# Patient Record
Sex: MALE | Race: WHITE | NOT HISPANIC OR LATINO | Employment: OTHER | URBAN - NONMETROPOLITAN AREA
[De-identification: names, ages, dates, MRNs, and addresses within clinical notes are randomized per-mention and may not be internally consistent; named-entity substitution may affect disease eponyms.]

---

## 2017-09-09 ENCOUNTER — HOSPITAL ENCOUNTER (EMERGENCY)
Facility: HOSPITAL | Age: 42
Discharge: HOME | End: 2017-09-10
Attending: FAMILY MEDICINE | Admitting: FAMILY MEDICINE

## 2017-09-09 ENCOUNTER — APPOINTMENT (OUTPATIENT)
Dept: GENERAL RADIOLOGY | Facility: HOSPITAL | Age: 42
End: 2017-09-09

## 2017-09-09 DIAGNOSIS — R07.9 CHEST PAIN WITH LOW RISK FOR CARDIAC ETIOLOGY: Primary | ICD-10-CM

## 2017-09-09 LAB
6-ACETYL MORPHINE: NEGATIVE
ALBUMIN SERPL-MCNC: 4.6 G/DL (ref 3.5–5)
ALBUMIN/GLOB SERPL: 1.8 G/DL (ref 1.5–2.5)
ALP SERPL-CCNC: 88 U/L (ref 40–129)
ALT SERPL W P-5'-P-CCNC: 30 U/L (ref 10–44)
AMPHET+METHAMPHET UR QL: NEGATIVE
AMYLASE SERPL-CCNC: 60 U/L (ref 28–100)
ANION GAP SERPL CALCULATED.3IONS-SCNC: 5.2 MMOL/L (ref 3.6–11.2)
APTT PPP: 27 SECONDS (ref 23.8–36.1)
AST SERPL-CCNC: 25 U/L (ref 10–34)
BARBITURATES UR QL SCN: NEGATIVE
BASOPHILS # BLD AUTO: 0.09 10*3/MM3 (ref 0–0.3)
BASOPHILS NFR BLD AUTO: 0.7 % (ref 0–2)
BENZODIAZ UR QL SCN: NEGATIVE
BILIRUB SERPL-MCNC: 0.3 MG/DL (ref 0.2–1.8)
BILIRUB UR QL STRIP: NEGATIVE
BUN BLD-MCNC: 7 MG/DL (ref 7–21)
BUN/CREAT SERPL: 8 (ref 7–25)
BUPRENORPHINE SERPL-MCNC: NEGATIVE NG/ML
CALCIUM SPEC-SCNC: 9 MG/DL (ref 7.7–10)
CANNABINOIDS SERPL QL: NEGATIVE
CHLORIDE SERPL-SCNC: 110 MMOL/L (ref 99–112)
CK MB SERPL-CCNC: 2.2 NG/ML (ref 0–5)
CK MB SERPL-CCNC: 2.52 NG/ML (ref 0–5)
CK MB SERPL-RTO: 1.5 % (ref 0–3)
CK MB SERPL-RTO: 1.8 % (ref 0–3)
CK SERPL-CCNC: 142 U/L (ref 24–204)
CK SERPL-CCNC: 143 U/L (ref 24–204)
CLARITY UR: CLEAR
CO2 SERPL-SCNC: 24.8 MMOL/L (ref 24.3–31.9)
COCAINE UR QL: NEGATIVE
COLOR UR: YELLOW
CREAT BLD-MCNC: 0.87 MG/DL (ref 0.43–1.29)
DEPRECATED RDW RBC AUTO: 43.3 FL (ref 37–54)
EOSINOPHIL # BLD AUTO: 0.27 10*3/MM3 (ref 0–0.7)
EOSINOPHIL NFR BLD AUTO: 2.1 % (ref 0–5)
ERYTHROCYTE [DISTWIDTH] IN BLOOD BY AUTOMATED COUNT: 13.3 % (ref 11.5–14.5)
GFR SERPL CREATININE-BSD FRML MDRD: 96 ML/MIN/1.73
GLOBULIN UR ELPH-MCNC: 2.6 GM/DL
GLUCOSE BLD-MCNC: 131 MG/DL (ref 70–110)
GLUCOSE UR STRIP-MCNC: NEGATIVE MG/DL
HCT VFR BLD AUTO: 45.4 % (ref 42–52)
HGB BLD-MCNC: 15.1 G/DL (ref 14–18)
HGB UR QL STRIP.AUTO: NEGATIVE
IMM GRANULOCYTES # BLD: 0.02 10*3/MM3 (ref 0–0.03)
IMM GRANULOCYTES NFR BLD: 0.2 % (ref 0–0.5)
INR PPP: 0.99 (ref 0.9–1.1)
KETONES UR QL STRIP: NEGATIVE
LEUKOCYTE ESTERASE UR QL STRIP.AUTO: NEGATIVE
LIPASE SERPL-CCNC: 70 U/L (ref 13–60)
LYMPHOCYTES # BLD AUTO: 2.59 10*3/MM3 (ref 1–3)
LYMPHOCYTES NFR BLD AUTO: 20.4 % (ref 21–51)
MCH RBC QN AUTO: 30 PG (ref 27–33)
MCHC RBC AUTO-ENTMCNC: 33.3 G/DL (ref 33–37)
MCV RBC AUTO: 90.3 FL (ref 80–94)
METHADONE UR QL SCN: NEGATIVE
MONOCYTES # BLD AUTO: 1.19 10*3/MM3 (ref 0.1–0.9)
MONOCYTES NFR BLD AUTO: 9.4 % (ref 0–10)
NEUTROPHILS # BLD AUTO: 8.55 10*3/MM3 (ref 1.4–6.5)
NEUTROPHILS NFR BLD AUTO: 67.2 % (ref 30–70)
NITRITE UR QL STRIP: NEGATIVE
OPIATES UR QL: POSITIVE
OSMOLALITY SERPL CALC.SUM OF ELEC: 279.2 MOSM/KG (ref 273–305)
OXYCODONE UR QL SCN: NEGATIVE
PCP UR QL SCN: NEGATIVE
PH UR STRIP.AUTO: 6.5 [PH] (ref 5–8)
PLATELET # BLD AUTO: 386 10*3/MM3 (ref 130–400)
PMV BLD AUTO: 9.9 FL (ref 6–10)
POTASSIUM BLD-SCNC: 3.6 MMOL/L (ref 3.5–5.3)
PROT SERPL-MCNC: 7.2 G/DL (ref 6–8)
PROT UR QL STRIP: NEGATIVE
PROTHROMBIN TIME: 13.2 SECONDS (ref 11–15.4)
RBC # BLD AUTO: 5.03 10*6/MM3 (ref 4.7–6.1)
SODIUM BLD-SCNC: 140 MMOL/L (ref 135–153)
SP GR UR STRIP: 1.02 (ref 1–1.03)
TROPONIN I SERPL-MCNC: <0.006 NG/ML
TROPONIN I SERPL-MCNC: <0.006 NG/ML
UROBILINOGEN UR QL STRIP: NORMAL
WBC NRBC COR # BLD: 12.71 10*3/MM3 (ref 4.5–12.5)

## 2017-09-09 PROCEDURE — 80307 DRUG TEST PRSMV CHEM ANLYZR: CPT | Performed by: FAMILY MEDICINE

## 2017-09-09 PROCEDURE — 96376 TX/PRO/DX INJ SAME DRUG ADON: CPT

## 2017-09-09 PROCEDURE — 85025 COMPLETE CBC W/AUTO DIFF WBC: CPT | Performed by: FAMILY MEDICINE

## 2017-09-09 PROCEDURE — 99284 EMERGENCY DEPT VISIT MOD MDM: CPT

## 2017-09-09 PROCEDURE — 81003 URINALYSIS AUTO W/O SCOPE: CPT | Performed by: FAMILY MEDICINE

## 2017-09-09 PROCEDURE — 25010000002 ONDANSETRON PER 1 MG: Performed by: FAMILY MEDICINE

## 2017-09-09 PROCEDURE — 84484 ASSAY OF TROPONIN QUANT: CPT | Performed by: FAMILY MEDICINE

## 2017-09-09 PROCEDURE — 85610 PROTHROMBIN TIME: CPT | Performed by: FAMILY MEDICINE

## 2017-09-09 PROCEDURE — 71020 HC CHEST PA AND LATERAL: CPT

## 2017-09-09 PROCEDURE — 85730 THROMBOPLASTIN TIME PARTIAL: CPT | Performed by: FAMILY MEDICINE

## 2017-09-09 PROCEDURE — 71020 XR CHEST 2 VW: CPT | Performed by: RADIOLOGY

## 2017-09-09 PROCEDURE — 93005 ELECTROCARDIOGRAM TRACING: CPT | Performed by: FAMILY MEDICINE

## 2017-09-09 PROCEDURE — 25010000002 MORPHINE PER 10 MG: Performed by: FAMILY MEDICINE

## 2017-09-09 PROCEDURE — 96375 TX/PRO/DX INJ NEW DRUG ADDON: CPT

## 2017-09-09 PROCEDURE — 87086 URINE CULTURE/COLONY COUNT: CPT | Performed by: FAMILY MEDICINE

## 2017-09-09 PROCEDURE — 93010 ELECTROCARDIOGRAM REPORT: CPT | Performed by: INTERNAL MEDICINE

## 2017-09-09 PROCEDURE — 82150 ASSAY OF AMYLASE: CPT | Performed by: FAMILY MEDICINE

## 2017-09-09 PROCEDURE — 82550 ASSAY OF CK (CPK): CPT | Performed by: FAMILY MEDICINE

## 2017-09-09 PROCEDURE — 82553 CREATINE MB FRACTION: CPT | Performed by: FAMILY MEDICINE

## 2017-09-09 PROCEDURE — 96374 THER/PROPH/DIAG INJ IV PUSH: CPT

## 2017-09-09 PROCEDURE — 83690 ASSAY OF LIPASE: CPT | Performed by: FAMILY MEDICINE

## 2017-09-09 PROCEDURE — 80053 COMPREHEN METABOLIC PANEL: CPT | Performed by: FAMILY MEDICINE

## 2017-09-09 RX ORDER — ASPIRIN 81 MG/1
324 TABLET, CHEWABLE ORAL ONCE
Status: COMPLETED | OUTPATIENT
Start: 2017-09-09 | End: 2017-09-09

## 2017-09-09 RX ORDER — MORPHINE SULFATE 2 MG/ML
2 INJECTION, SOLUTION INTRAMUSCULAR; INTRAVENOUS ONCE
Status: COMPLETED | OUTPATIENT
Start: 2017-09-09 | End: 2017-09-09

## 2017-09-09 RX ORDER — SODIUM CHLORIDE 0.9 % (FLUSH) 0.9 %
10 SYRINGE (ML) INJECTION AS NEEDED
Status: DISCONTINUED | OUTPATIENT
Start: 2017-09-09 | End: 2017-09-10 | Stop reason: HOSPADM

## 2017-09-09 RX ORDER — HYDROCODONE BITARTRATE AND ACETAMINOPHEN 5; 325 MG/1; MG/1
1 TABLET ORAL EVERY 6 HOURS PRN
Qty: 12 TABLET | Refills: 0 | Status: SHIPPED | OUTPATIENT
Start: 2017-09-09 | End: 2017-11-22

## 2017-09-09 RX ORDER — SODIUM CHLORIDE 9 MG/ML
INJECTION, SOLUTION INTRAVENOUS
Status: COMPLETED
Start: 2017-09-09 | End: 2017-09-09

## 2017-09-09 RX ORDER — ONDANSETRON 2 MG/ML
4 INJECTION INTRAMUSCULAR; INTRAVENOUS ONCE
Status: COMPLETED | OUTPATIENT
Start: 2017-09-09 | End: 2017-09-09

## 2017-09-09 RX ADMIN — ONDANSETRON 4 MG: 2 INJECTION, SOLUTION INTRAMUSCULAR; INTRAVENOUS at 20:49

## 2017-09-09 RX ADMIN — ASPIRIN 324 MG: 81 TABLET, CHEWABLE ORAL at 20:19

## 2017-09-09 RX ADMIN — MORPHINE SULFATE 2 MG: 2 INJECTION, SOLUTION INTRAMUSCULAR; INTRAVENOUS at 20:49

## 2017-09-09 RX ADMIN — MORPHINE SULFATE 2 MG: 2 INJECTION, SOLUTION INTRAMUSCULAR; INTRAVENOUS at 21:43

## 2017-09-09 RX ADMIN — SODIUM CHLORIDE 500 ML: 9 INJECTION, SOLUTION INTRAVENOUS at 20:19

## 2017-09-10 VITALS
OXYGEN SATURATION: 100 % | DIASTOLIC BLOOD PRESSURE: 75 MMHG | HEART RATE: 83 BPM | HEIGHT: 74 IN | RESPIRATION RATE: 16 BRPM | SYSTOLIC BLOOD PRESSURE: 124 MMHG | WEIGHT: 210 LBS | BODY MASS INDEX: 26.95 KG/M2 | TEMPERATURE: 97.9 F

## 2017-09-10 NOTE — ED NOTES
Pt asking for something for pain at this time. Rates pain 8/10. Provider to be made aware.      Steffanie Lunsford RN  09/09/17 4541

## 2017-09-10 NOTE — ED PROVIDER NOTES
Subjective   HPI Comments: 41 yo male new to the area - moved from [place]- states that he has a cardiac history- had a LHC because he had positive enzymes in [place] that showed no blockages and that was about 6 months ago; and he also has a pacemaker that was placed- says that he can not take nitro because it closes his airway off; says that he isnt sure if this is his lungs or his heart or his back because he hasnt found a doctor yet and his is out of his medications -- well his pain medication has his metoprolol and lipitor and aspirin    Patient is a 42 y.o. male presenting with chest pain.   History provided by:  Patient  Chest Pain   Pain location:  L chest  Pain quality: aching and stabbing    Pain radiates to:  Does not radiate  Onset quality:  Sudden  Duration:  2 hours  Timing:  Constant  Progression:  Partially resolved  Chronicity:  New  Context: breathing and at rest    Relieved by:  Nothing  Worsened by:  Nothing  Ineffective treatments:  None tried  Associated symptoms: anxiety and back pain    Associated symptoms: no abdominal pain, no cough, no dizziness, no fatigue, no headache, no nausea, no shortness of breath, no vomiting and no weakness    Risk factors: hypertension, male sex and smoking        Review of Systems   Constitutional: Negative for activity change, appetite change, chills and fatigue.   HENT: Negative for congestion.    Eyes: Negative for pain.   Respiratory: Negative for cough, shortness of breath, wheezing and stridor.    Cardiovascular: Positive for chest pain.   Gastrointestinal: Negative for abdominal pain, diarrhea, nausea and vomiting.   Genitourinary: Negative for dysuria.   Musculoskeletal: Positive for back pain. Negative for arthralgias, myalgias, neck pain and neck stiffness.   Skin: Negative for rash.   Neurological: Negative for dizziness, syncope, speech difficulty, weakness and headaches.   Psychiatric/Behavioral: Negative for agitation.       Past Medical History:    Diagnosis Date   • Hyperlipidemia    • Injury of back        Allergies   Allergen Reactions   • Celexa [Citalopram Hydrobromide]    • Haldol [Haloperidol Lactate]    • Nitroglycerin    • Risperidone And Related        History reviewed. No pertinent surgical history.    History reviewed. No pertinent family history.    Social History     Social History   • Marital status:      Spouse name: N/A   • Number of children: N/A   • Years of education: N/A     Social History Main Topics   • Smoking status: Current Every Day Smoker     Packs/day: 1.00   • Smokeless tobacco: None   • Alcohol use None   • Drug use: None   • Sexual activity: Not Asked     Other Topics Concern   • None     Social History Narrative   • None           Objective   Physical Exam   Constitutional: He is oriented to person, place, and time. He appears well-developed and well-nourished.   HENT:   Head: Normocephalic.   Right Ear: External ear normal.   Left Ear: External ear normal.   Nose: Nose normal.   Mouth/Throat: Oropharynx is clear and moist.   Eyes: EOM are normal. Pupils are equal, round, and reactive to light.   Neck: Neck supple. No tracheal deviation present. No thyromegaly present.   Cardiovascular: Normal rate and regular rhythm.  Exam reveals no friction rub.    No murmur heard.  Pulmonary/Chest: Effort normal and breath sounds normal. No respiratory distress. He has no wheezes.   Abdominal: Soft. Bowel sounds are normal. He exhibits no distension. There is no tenderness.   Musculoskeletal: Normal range of motion.   Neurological: He is alert and oriented to person, place, and time.   Skin: Skin is warm.   Psychiatric: He has a normal mood and affect. His behavior is normal. Judgment and thought content normal.   Nursing note and vitals reviewed.      Procedures         ED Course  ED Course   Value Comment By Time   ECG 12 Lead EKG interpretation 1944 sinus tachycardia PCPs right bundle branch block 103 bpm nonspecific ST changes  Krystina Campbell, DO 09/10 0104   ECG 12 Lead Spoke with interventional cardiology to review EKG - felt RBBB but nothing acute at this time.    Pt says he is having back and chest pain would like more pain medicine ; discussed work up and pt says he wants to leave feels better- up walking in the hallway wanting IV out- will set up for outpt stress test and cardiology follow up Krystina Campbell, DO 09/10 0108   ECG 12 Lead EKG interpretation 2055 normal sinus rhythm right bundle branch block  nonspecific ST changes no evidence of acute ischemic change at this time the Krystina Campbell, DO 09/10 0110   ECG 12 Lead EKG interpretation 2337 paced rhythm 80 bpm nonspecific ST changes in no evidence of acute ischemic changes Krystina Campbell, DO 09/10 0110            HEART Score  History: Slightly suspicious (+0)  ECG: Normal (+0)  Age: Less than 45 (+0)  Risk Factors: 3 or more risk factors OR history of atherosclerotic disease (+2)  Troponin: Normal limit or lower (+0)  Total: 2         MDM  Number of Diagnoses or Management Options  Chest pain with low risk for cardiac etiology: new and does not require workup     Amount and/or Complexity of Data Reviewed  Clinical lab tests: ordered and reviewed  Tests in the radiology section of CPT®: ordered and reviewed  Tests in the medicine section of CPT®: reviewed and ordered  Discuss the patient with other providers: yes  Independent visualization of images, tracings, or specimens: yes    Risk of Complications, Morbidity, and/or Mortality  Presenting problems: moderate  Diagnostic procedures: moderate  Management options: moderate        Final diagnoses:   Chest pain with low risk for cardiac etiology            Krystina Campbell, DO  09/10/17 7179

## 2017-09-10 NOTE — ED NOTES
Spouse signed as pts  on the chart. Pt left ambulatory with understanding of out pt stress test. NADN. VS stable.        Steffanie Lunsford RN  09/10/17 0025

## 2017-09-11 ENCOUNTER — TRANSCRIBE ORDERS (OUTPATIENT)
Dept: ADMINISTRATIVE | Facility: HOSPITAL | Age: 42
End: 2017-09-11

## 2017-09-11 DIAGNOSIS — R07.9 CHEST PAIN WITH LOW RISK FOR CARDIAC ETIOLOGY: Primary | ICD-10-CM

## 2017-09-12 LAB — BACTERIA SPEC AEROBE CULT: NORMAL

## 2017-09-14 ENCOUNTER — HOSPITAL ENCOUNTER (OUTPATIENT)
Dept: GENERAL RADIOLOGY | Facility: HOSPITAL | Age: 42
Discharge: HOME | End: 2017-09-14
Admitting: NURSE PRACTITIONER

## 2017-09-14 ENCOUNTER — HOSPITAL ENCOUNTER (OUTPATIENT)
Dept: GENERAL RADIOLOGY | Facility: HOSPITAL | Age: 42
Discharge: HOME | End: 2017-09-14

## 2017-09-14 ENCOUNTER — TRANSCRIBE ORDERS (OUTPATIENT)
Dept: ADMINISTRATIVE | Facility: HOSPITAL | Age: 42
End: 2017-09-14

## 2017-09-14 DIAGNOSIS — M25.561 RIGHT KNEE PAIN, UNSPECIFIED CHRONICITY: ICD-10-CM

## 2017-09-14 DIAGNOSIS — M25.561 RIGHT KNEE PAIN, UNSPECIFIED CHRONICITY: Primary | ICD-10-CM

## 2017-09-14 DIAGNOSIS — M54.9 BACK PAIN, UNSPECIFIED BACK LOCATION, UNSPECIFIED BACK PAIN LATERALITY, UNSPECIFIED CHRONICITY: ICD-10-CM

## 2017-09-14 PROCEDURE — 73564 X-RAY EXAM KNEE 4 OR MORE: CPT | Performed by: RADIOLOGY

## 2017-09-14 PROCEDURE — 72052 X-RAY EXAM NECK SPINE 6/>VWS: CPT | Performed by: RADIOLOGY

## 2017-09-14 PROCEDURE — 72074 X-RAY EXAM THORAC SPINE4/>VW: CPT

## 2017-09-14 PROCEDURE — 73564 X-RAY EXAM KNEE 4 OR MORE: CPT

## 2017-09-14 PROCEDURE — 72072 X-RAY EXAM THORAC SPINE 3VWS: CPT | Performed by: RADIOLOGY

## 2017-09-14 PROCEDURE — 72050 X-RAY EXAM NECK SPINE 4/5VWS: CPT

## 2017-09-14 PROCEDURE — 72110 X-RAY EXAM L-2 SPINE 4/>VWS: CPT

## 2017-09-14 PROCEDURE — 72110 X-RAY EXAM L-2 SPINE 4/>VWS: CPT | Performed by: RADIOLOGY

## 2017-10-10 ENCOUNTER — APPOINTMENT (OUTPATIENT)
Dept: GENERAL RADIOLOGY | Facility: HOSPITAL | Age: 42
End: 2017-10-10

## 2017-10-10 ENCOUNTER — HOSPITAL ENCOUNTER (EMERGENCY)
Facility: HOSPITAL | Age: 42
Discharge: LEFT BEFORE DISCHARGE | End: 2017-10-10
Attending: INTERNAL MEDICINE | Admitting: INTERNAL MEDICINE

## 2017-10-10 ENCOUNTER — APPOINTMENT (OUTPATIENT)
Dept: CT IMAGING | Facility: HOSPITAL | Age: 42
End: 2017-10-10

## 2017-10-10 VITALS
TEMPERATURE: 98 F | RESPIRATION RATE: 20 BRPM | SYSTOLIC BLOOD PRESSURE: 158 MMHG | HEART RATE: 104 BPM | DIASTOLIC BLOOD PRESSURE: 94 MMHG | OXYGEN SATURATION: 98 % | HEIGHT: 74 IN | WEIGHT: 200 LBS | BODY MASS INDEX: 25.67 KG/M2

## 2017-10-10 DIAGNOSIS — R51.9 ACUTE INTRACTABLE HEADACHE, UNSPECIFIED HEADACHE TYPE: ICD-10-CM

## 2017-10-10 DIAGNOSIS — R53.1 WEAKNESS: ICD-10-CM

## 2017-10-10 DIAGNOSIS — R07.9 CHEST PAIN AT REST: Primary | ICD-10-CM

## 2017-10-10 LAB
6-ACETYL MORPHINE: NEGATIVE
ALBUMIN SERPL-MCNC: 4.4 G/DL (ref 3.5–5)
ALBUMIN/GLOB SERPL: 1.6 G/DL (ref 1.5–2.5)
ALP SERPL-CCNC: 96 U/L (ref 40–129)
ALT SERPL W P-5'-P-CCNC: 21 U/L (ref 10–44)
AMPHET+METHAMPHET UR QL: NEGATIVE
ANION GAP SERPL CALCULATED.3IONS-SCNC: 8.4 MMOL/L (ref 3.6–11.2)
APTT PPP: 26.1 SECONDS (ref 23.8–36.1)
AST SERPL-CCNC: 21 U/L (ref 10–34)
BARBITURATES UR QL SCN: NEGATIVE
BASOPHILS # BLD AUTO: 0.16 10*3/MM3 (ref 0–0.3)
BASOPHILS NFR BLD AUTO: 1.6 % (ref 0–2)
BENZODIAZ UR QL SCN: NEGATIVE
BILIRUB SERPL-MCNC: 0.2 MG/DL (ref 0.2–1.8)
BUN BLD-MCNC: 14 MG/DL (ref 7–21)
BUN/CREAT SERPL: 12.6 (ref 7–25)
BUPRENORPHINE SERPL-MCNC: NEGATIVE NG/ML
CALCIUM SPEC-SCNC: 9.5 MG/DL (ref 7.7–10)
CANNABINOIDS SERPL QL: NEGATIVE
CHLORIDE SERPL-SCNC: 107 MMOL/L (ref 99–112)
CO2 SERPL-SCNC: 26.6 MMOL/L (ref 24.3–31.9)
COCAINE UR QL: NEGATIVE
CREAT BLD-MCNC: 1.11 MG/DL (ref 0.43–1.29)
CRP SERPL-MCNC: <0.5 MG/DL (ref 0–0.99)
D DIMER PPP FEU-MCNC: 0.89 MCGFEU/ML (ref 0–0.5)
DEPRECATED RDW RBC AUTO: 43.9 FL (ref 37–54)
EOSINOPHIL # BLD AUTO: 0.61 10*3/MM3 (ref 0–0.7)
EOSINOPHIL NFR BLD AUTO: 6.2 % (ref 0–5)
ERYTHROCYTE [DISTWIDTH] IN BLOOD BY AUTOMATED COUNT: 13.4 % (ref 11.5–14.5)
ERYTHROCYTE [SEDIMENTATION RATE] IN BLOOD: 7 MM/HR (ref 0–15)
GFR SERPL CREATININE-BSD FRML MDRD: 72 ML/MIN/1.73
GLOBULIN UR ELPH-MCNC: 2.8 GM/DL
GLUCOSE BLD-MCNC: 97 MG/DL (ref 70–110)
HCT VFR BLD AUTO: 42.8 % (ref 42–52)
HGB BLD-MCNC: 14 G/DL (ref 14–18)
IMM GRANULOCYTES # BLD: 0.03 10*3/MM3 (ref 0–0.03)
IMM GRANULOCYTES NFR BLD: 0.3 % (ref 0–0.5)
INR PPP: 1.03 (ref 0.9–1.1)
LYMPHOCYTES # BLD AUTO: 2.89 10*3/MM3 (ref 1–3)
LYMPHOCYTES NFR BLD AUTO: 29.5 % (ref 21–51)
MCH RBC QN AUTO: 29.5 PG (ref 27–33)
MCHC RBC AUTO-ENTMCNC: 32.7 G/DL (ref 33–37)
MCV RBC AUTO: 90.3 FL (ref 80–94)
METHADONE UR QL SCN: NEGATIVE
MONOCYTES # BLD AUTO: 0.9 10*3/MM3 (ref 0.1–0.9)
MONOCYTES NFR BLD AUTO: 9.2 % (ref 0–10)
NEUTROPHILS # BLD AUTO: 5.2 10*3/MM3 (ref 1.4–6.5)
NEUTROPHILS NFR BLD AUTO: 53.2 % (ref 30–70)
OPIATES UR QL: NEGATIVE
OSMOLALITY SERPL CALC.SUM OF ELEC: 283.5 MOSM/KG (ref 273–305)
OXYCODONE UR QL SCN: NEGATIVE
PCP UR QL SCN: NEGATIVE
PLATELET # BLD AUTO: 341 10*3/MM3 (ref 130–400)
PMV BLD AUTO: 10 FL (ref 6–10)
POTASSIUM BLD-SCNC: 3.5 MMOL/L (ref 3.5–5.3)
PROT SERPL-MCNC: 7.2 G/DL (ref 6–8)
PROTHROMBIN TIME: 13.7 SECONDS (ref 11–15.4)
RBC # BLD AUTO: 4.74 10*6/MM3 (ref 4.7–6.1)
SODIUM BLD-SCNC: 142 MMOL/L (ref 135–153)
TROPONIN I SERPL-MCNC: <0.006 NG/ML
WBC NRBC COR # BLD: 9.79 10*3/MM3 (ref 4.5–12.5)

## 2017-10-10 PROCEDURE — 85025 COMPLETE CBC W/AUTO DIFF WBC: CPT | Performed by: INTERNAL MEDICINE

## 2017-10-10 PROCEDURE — 80307 DRUG TEST PRSMV CHEM ANLYZR: CPT | Performed by: INTERNAL MEDICINE

## 2017-10-10 PROCEDURE — 85379 FIBRIN DEGRADATION QUANT: CPT | Performed by: INTERNAL MEDICINE

## 2017-10-10 PROCEDURE — 85652 RBC SED RATE AUTOMATED: CPT | Performed by: INTERNAL MEDICINE

## 2017-10-10 PROCEDURE — 70450 CT HEAD/BRAIN W/O DYE: CPT | Performed by: RADIOLOGY

## 2017-10-10 PROCEDURE — 84484 ASSAY OF TROPONIN QUANT: CPT | Performed by: INTERNAL MEDICINE

## 2017-10-10 PROCEDURE — 70450 CT HEAD/BRAIN W/O DYE: CPT

## 2017-10-10 PROCEDURE — 85610 PROTHROMBIN TIME: CPT | Performed by: INTERNAL MEDICINE

## 2017-10-10 PROCEDURE — 93005 ELECTROCARDIOGRAM TRACING: CPT | Performed by: INTERNAL MEDICINE

## 2017-10-10 PROCEDURE — 99284 EMERGENCY DEPT VISIT MOD MDM: CPT

## 2017-10-10 PROCEDURE — 86140 C-REACTIVE PROTEIN: CPT | Performed by: INTERNAL MEDICINE

## 2017-10-10 PROCEDURE — 85730 THROMBOPLASTIN TIME PARTIAL: CPT | Performed by: INTERNAL MEDICINE

## 2017-10-10 PROCEDURE — 80053 COMPREHEN METABOLIC PANEL: CPT | Performed by: INTERNAL MEDICINE

## 2017-10-10 PROCEDURE — 93010 ELECTROCARDIOGRAM REPORT: CPT | Performed by: INTERNAL MEDICINE

## 2017-10-10 RX ORDER — METOCLOPRAMIDE HYDROCHLORIDE 5 MG/ML
10 INJECTION INTRAMUSCULAR; INTRAVENOUS ONCE
Status: DISCONTINUED | OUTPATIENT
Start: 2017-10-10 | End: 2017-10-10

## 2017-10-10 RX ORDER — SODIUM CHLORIDE 0.9 % (FLUSH) 0.9 %
10 SYRINGE (ML) INJECTION AS NEEDED
Status: DISCONTINUED | OUTPATIENT
Start: 2017-10-10 | End: 2017-10-10 | Stop reason: HOSPADM

## 2017-10-10 NOTE — ED NOTES
Patient back from CT at this time now complains of CP. EKG orders placed at this time.      Tammy Garcia RN  10/10/17 1922

## 2017-10-10 NOTE — ED PROVIDER NOTES
Subjective   HPI Comments: This is a 42-year-old male who presents to the emergency department reporting that around 3:00 this afternoon which is the last time seen well, he began to experience amnesia and is unable to remember presenting to the emergency department.  He has his wife to corroborate this.  He is also experienced very mild weakness to his right upper extremity and right lower extremity, but he's not had any numbness.    He was able to ambulate without difficulty.    Also reported onset at shortly thereafter of a constant anterior chest discomfort that is dull a 3 or 4 out of 10 and does not radiate.    Patient is a 42 y.o. male presenting with strokes.   Stroke   Presenting symptoms: confusion, headaches and weakness    Onset quality:  Sudden  Duration:  5 hours  Timing:  Constant  Progression:  Unchanged  Similar to previous episodes: yes    Associated symptoms: chest pain    Associated symptoms: no difficulty swallowing, no dizziness, no facial pain, no fall, no fever, no hearing loss, no bladder incontinence, no nausea, no neck pain, no paresthesias, no seizures, no tinnitus, no vertigo and no vomiting        Review of Systems   Constitutional: Negative for fever.   HENT: Negative for hearing loss, tinnitus and trouble swallowing.    Cardiovascular: Positive for chest pain.   Gastrointestinal: Negative for nausea and vomiting.   Genitourinary: Negative for bladder incontinence.   Musculoskeletal: Negative for neck pain.   Neurological: Positive for weakness and headaches. Negative for dizziness, vertigo, seizures and paresthesias.   Psychiatric/Behavioral: Positive for confusion.   All other systems reviewed and are negative.      Past Medical History:   Diagnosis Date   • Hyperlipidemia    • Injury of back        Allergies   Allergen Reactions   • Celexa [Citalopram Hydrobromide]    • Haldol [Haloperidol Lactate]    • Nitroglycerin    • Risperidone And Related        No past surgical history on  file.    No family history on file.    Social History     Social History   • Marital status:      Spouse name: N/A   • Number of children: N/A   • Years of education: N/A     Social History Main Topics   • Smoking status: Current Every Day Smoker     Packs/day: 1.00   • Smokeless tobacco: Not on file   • Alcohol use Not on file   • Drug use: Not on file   • Sexual activity: Not on file     Other Topics Concern   • Not on file     Social History Narrative   • No narrative on file           Objective   Physical Exam   Constitutional: He is oriented to person, place, and time. He appears well-developed.   HENT:   Head: Normocephalic.   Eyes: Conjunctivae and EOM are normal. Pupils are equal, round, and reactive to light.   Neck: Normal range of motion. Neck supple.   Cardiovascular: Normal rate, regular rhythm, normal heart sounds and intact distal pulses.    Pulmonary/Chest: Effort normal.   Abdominal: Soft. Bowel sounds are normal.   Musculoskeletal: Normal range of motion.   Neurological: He is alert and oriented to person, place, and time. He has normal reflexes. No cranial nerve deficit or sensory deficit. GCS eye subscore is 4. GCS verbal subscore is 5. GCS motor subscore is 6.   Reflex Scores:       Brachioradialis reflexes are 2+ on the right side and 2+ on the left side.       Patellar reflexes are 2+ on the right side and 2+ on the left side.  Mild weakness in hand  and plantar flex flexion on the right.   Vitals reviewed.      Procedures         ED Course  ED Course   Value Comment By Time   ECG 12 Lead (Reviewed) Mohsen Bajwa MD 10/10 2006    NSR@98 RBBB NSSTW changes Mohsen Bajwa MD 10/10 2008    In addition to the neurologic complaint, chest pain, patient's also complaining of a dull frontal headache. Mohsen Bajwa MD 10/10 2013    Requested IV narcotics and was adamant that he would leave AGAINST MEDICAL ADVICE if he did not receive them.  Offered alternative pain medications, and  Reglan for his headache and he left. Mohsen Bajwa MD 10/10 2045                  [facility]    Final diagnoses:   Chest pain at rest   Acute intractable headache, unspecified headache type   Weakness            Mohsen Bajwa MD  10/10/17 2047

## 2017-10-11 NOTE — ED NOTES
Patient reports about 5 hours ago he started having chest pain and right sided weakness, patient reports short term memory loss. Patient alert and oriented x3, patient reports walking around lobby before being room. Provider aware     Alley Bates RN  10/10/17 2011

## 2017-10-11 NOTE — ED NOTES
"Patient states \" if I don't get something stronger for pain than Im leaving\". Informed patient of risk when signing out against medical advice. Provider aware, provider informed patient he would give him non narcotic pain medication, patient refused. Patient signed AMA papers and stated \" Im going to [place]\". Provider aware.      Alley Bates RN  10/10/17 2045    "

## 2017-10-11 NOTE — ED NOTES
"Patient refused IV Reglan, states \"I want something stronger for pain\". Provider aware.       Alley Bates RN  10/10/17 2033    "

## 2017-10-30 ENCOUNTER — TRANSCRIBE ORDERS (OUTPATIENT)
Dept: ADMINISTRATIVE | Facility: HOSPITAL | Age: 42
End: 2017-10-30

## 2017-10-30 DIAGNOSIS — I35.0 AORTIC STENOSIS, MILD: Primary | ICD-10-CM

## 2017-11-05 ENCOUNTER — APPOINTMENT (OUTPATIENT)
Dept: CT IMAGING | Facility: HOSPITAL | Age: 42
End: 2017-11-05

## 2017-11-05 ENCOUNTER — HOSPITAL ENCOUNTER (EMERGENCY)
Facility: HOSPITAL | Age: 42
Discharge: HOME | End: 2017-11-05
Attending: EMERGENCY MEDICINE | Admitting: FAMILY MEDICINE

## 2017-11-05 VITALS
TEMPERATURE: 98.8 F | SYSTOLIC BLOOD PRESSURE: 151 MMHG | RESPIRATION RATE: 17 BRPM | WEIGHT: 210 LBS | HEIGHT: 74 IN | BODY MASS INDEX: 26.95 KG/M2 | HEART RATE: 81 BPM | DIASTOLIC BLOOD PRESSURE: 84 MMHG | OXYGEN SATURATION: 98 %

## 2017-11-05 DIAGNOSIS — M54.2 ACUTE NECK PAIN: Primary | ICD-10-CM

## 2017-11-05 DIAGNOSIS — S39.92XA INJURY OF LOW BACK, INITIAL ENCOUNTER: ICD-10-CM
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
## 2017-11-05 PROCEDURE — 72125 CT NECK SPINE W/O DYE: CPT | Performed by: RADIOLOGY

## 2017-11-05 PROCEDURE — 72131 CT LUMBAR SPINE W/O DYE: CPT

## 2017-11-05 PROCEDURE — 72131 CT LUMBAR SPINE W/O DYE: CPT | Performed by: RADIOLOGY

## 2017-11-05 PROCEDURE — 99283 EMERGENCY DEPT VISIT LOW MDM: CPT

## 2017-11-05 PROCEDURE — 72125 CT NECK SPINE W/O DYE: CPT

## 2017-11-05 RX ORDER — CYCLOBENZAPRINE HCL 10 MG
10 TABLET ORAL 3 TIMES DAILY PRN
Qty: 20 TABLET | Refills: 0 | Status: ON HOLD | OUTPATIENT
Start: 2017-11-05 | End: 2017-11-25

## 2017-11-05 RX ORDER — HYDROCODONE BITARTRATE AND ACETAMINOPHEN 10; 325 MG/1; MG/1
1 TABLET ORAL ONCE
Status: COMPLETED | OUTPATIENT
Start: 2017-11-05 | End: 2017-11-05

## 2017-11-05 RX ADMIN — HYDROCODONE BITARTRATE AND ACETAMINOPHEN 1 TABLET: 10; 325 TABLET ORAL at 19:20

## 2017-11-06 NOTE — ED PROVIDER NOTES
Subjective   Patient is a 42 y.o. male presenting with fall.   History provided by:  Patient   used: No    Fall   Mechanism of injury: fall    Injury location:  Torso and head/neck  Head/neck injury location:  R neck and L neck  Torso injury location:  Back  Incident location:  Home  Time since incident:  1 hour  Arrived directly from scene: yes    Fall:     Fall occurred:  Tripped (off front porch)    Impact surface:  [place]    Point of impact:  Back and neck  Tetanus status:  Up to date  Prior to arrival data:     Patient ambulatory at scene: yes      Blood loss:  None    Responsiveness at scene:  Alert    Orientation at scene:  Person, place, situation and time    Loss of consciousness: no      Amnesic to event: no    Associated symptoms: back pain and neck pain    Associated symptoms: no chest pain, no headaches, no nausea and no vomiting    Risk factors: no anticoagulation therapy        Review of Systems   Constitutional: Negative for activity change and fever.   HENT: Negative for congestion, ear pain and sore throat.    Eyes: Negative for pain.   Respiratory: Negative for cough, shortness of breath and wheezing.    Cardiovascular: Negative for chest pain.   Gastrointestinal: Negative for abdominal distention, diarrhea, nausea and vomiting.   Genitourinary: Negative for difficulty urinating and dysuria.   Musculoskeletal: Positive for back pain and neck pain. Negative for arthralgias and myalgias.   Skin: Negative for rash and wound.   Neurological: Negative for dizziness and headaches.   Psychiatric/Behavioral: Negative for agitation.   All other systems reviewed and are negative.      Past Medical History:   Diagnosis Date   • Hyperlipidemia    • Injury of back        Allergies   Allergen Reactions   • Celexa [Citalopram Hydrobromide]    • Haldol [Haloperidol Lactate]    • Nitroglycerin    • Risperidone And Related        History reviewed. No pertinent surgical history.    History  reviewed. No pertinent family history.    Social History     Social History   • Marital status:      Spouse name: N/A   • Number of children: N/A   • Years of education: N/A     Social History Main Topics   • Smoking status: Current Every Day Smoker     Packs/day: 1.00   • Smokeless tobacco: None   • Alcohol use None   • Drug use: None   • Sexual activity: Not Asked     Other Topics Concern   • None     Social History Narrative           Objective   Physical Exam   Constitutional: He is oriented to person, place, and time. He appears well-developed and well-nourished.   HENT:   Head: Normocephalic and atraumatic.   Eyes: EOM are normal. Pupils are equal, round, and reactive to light.   Neck: Normal range of motion. Neck supple.   Cardiovascular: Normal rate, regular rhythm and normal heart sounds.    Pulmonary/Chest: Effort normal and breath sounds normal.   Abdominal: Soft. Bowel sounds are normal.   Musculoskeletal:        Cervical back: He exhibits decreased range of motion, pain and spasm.        Lumbar back: He exhibits decreased range of motion, tenderness and pain.   Neurological: He is alert and oriented to person, place, and time.   Skin: Skin is warm and dry.   Psychiatric: He has a normal mood and affect. His behavior is normal. Judgment and thought content normal.   Nursing note and vitals reviewed.      Procedures         ED Course  ED Course                  MDM  Number of Diagnoses or Management Options  Acute neck pain:   Fall, initial encounter:   Injury of low back, initial encounter:      Amount and/or Complexity of Data Reviewed  Clinical lab tests: ordered and reviewed  Tests in the radiology section of CPT®: ordered and reviewed  Tests in the medicine section of CPT®: reviewed and ordered  Independent visualization of images, tracings, or specimens: yes    Patient Progress  Patient progress: stable      Final diagnoses:   Acute neck pain   Injury of low back, initial encounter   Fall,  initial encounter            ROSEMARIE Agrawal  11/05/17 2014

## 2017-11-18 ENCOUNTER — HOSPITAL ENCOUNTER (EMERGENCY)
Facility: HOSPITAL | Age: 42
Discharge: HOME | End: 2017-11-18
Attending: EMERGENCY MEDICINE | Admitting: EMERGENCY MEDICINE

## 2017-11-18 ENCOUNTER — APPOINTMENT (OUTPATIENT)
Dept: CT IMAGING | Facility: HOSPITAL | Age: 42
End: 2017-11-18

## 2017-11-18 VITALS
BODY MASS INDEX: 26.95 KG/M2 | SYSTOLIC BLOOD PRESSURE: 122 MMHG | HEART RATE: 89 BPM | OXYGEN SATURATION: 98 % | TEMPERATURE: 98.7 F | DIASTOLIC BLOOD PRESSURE: 86 MMHG | WEIGHT: 210 LBS | HEIGHT: 74 IN | RESPIRATION RATE: 18 BRPM

## 2017-11-18 DIAGNOSIS — R10.32 BILATERAL LOWER ABDOMINAL CRAMPING: Primary | ICD-10-CM

## 2017-11-18 DIAGNOSIS — R10.31 BILATERAL LOWER ABDOMINAL CRAMPING: Primary | ICD-10-CM

## 2017-11-18 LAB
6-ACETYL MORPHINE: NEGATIVE
ALBUMIN SERPL-MCNC: 4.3 G/DL (ref 3.5–5)
ALBUMIN/GLOB SERPL: 1.8 G/DL (ref 1.5–2.5)
ALP SERPL-CCNC: 92 U/L (ref 40–129)
ALT SERPL W P-5'-P-CCNC: 20 U/L (ref 10–44)
AMPHET+METHAMPHET UR QL: NEGATIVE
AMYLASE SERPL-CCNC: 50 U/L (ref 28–100)
ANION GAP SERPL CALCULATED.3IONS-SCNC: 4 MMOL/L (ref 3.6–11.2)
AST SERPL-CCNC: 19 U/L (ref 10–34)
BARBITURATES UR QL SCN: NEGATIVE
BASOPHILS # BLD AUTO: 0.06 10*3/MM3 (ref 0–0.3)
BASOPHILS NFR BLD AUTO: 0.5 % (ref 0–2)
BENZODIAZ UR QL SCN: NEGATIVE
BILIRUB SERPL-MCNC: 0.3 MG/DL (ref 0.2–1.8)
BILIRUB UR QL STRIP: NEGATIVE
BUN BLD-MCNC: 14 MG/DL (ref 7–21)
BUN/CREAT SERPL: 12.3 (ref 7–25)
BUPRENORPHINE SERPL-MCNC: NEGATIVE NG/ML
CALCIUM SPEC-SCNC: 9.4 MG/DL (ref 7.7–10)
CANNABINOIDS SERPL QL: NEGATIVE
CHLORIDE SERPL-SCNC: 111 MMOL/L (ref 99–112)
CLARITY UR: CLEAR
CO2 SERPL-SCNC: 25 MMOL/L (ref 24.3–31.9)
COCAINE UR QL: NEGATIVE
COLOR UR: YELLOW
CREAT BLD-MCNC: 1.14 MG/DL (ref 0.43–1.29)
DEPRECATED RDW RBC AUTO: 44.3 FL (ref 37–54)
EOSINOPHIL # BLD AUTO: 0.39 10*3/MM3 (ref 0–0.7)
EOSINOPHIL NFR BLD AUTO: 3.4 % (ref 0–5)
ERYTHROCYTE [DISTWIDTH] IN BLOOD BY AUTOMATED COUNT: 13.8 % (ref 11.5–14.5)
GFR SERPL CREATININE-BSD FRML MDRD: 70 ML/MIN/1.73
GLOBULIN UR ELPH-MCNC: 2.4 GM/DL
GLUCOSE BLD-MCNC: 111 MG/DL (ref 70–110)
GLUCOSE UR STRIP-MCNC: NEGATIVE MG/DL
HCT VFR BLD AUTO: 42.9 % (ref 42–52)
HGB BLD-MCNC: 14.2 G/DL (ref 14–18)
HGB UR QL STRIP.AUTO: NEGATIVE
IMM GRANULOCYTES # BLD: 0.01 10*3/MM3 (ref 0–0.03)
IMM GRANULOCYTES NFR BLD: 0.1 % (ref 0–0.5)
KETONES UR QL STRIP: NEGATIVE
LEUKOCYTE ESTERASE UR QL STRIP.AUTO: NEGATIVE
LIPASE SERPL-CCNC: 55 U/L (ref 13–60)
LYMPHOCYTES # BLD AUTO: 2.33 10*3/MM3 (ref 1–3)
LYMPHOCYTES NFR BLD AUTO: 20.4 % (ref 21–51)
MCH RBC QN AUTO: 29.7 PG (ref 27–33)
MCHC RBC AUTO-ENTMCNC: 33.1 G/DL (ref 33–37)
MCV RBC AUTO: 89.7 FL (ref 80–94)
METHADONE UR QL SCN: NEGATIVE
MONOCYTES # BLD AUTO: 0.9 10*3/MM3 (ref 0.1–0.9)
MONOCYTES NFR BLD AUTO: 7.9 % (ref 0–10)
NEUTROPHILS # BLD AUTO: 7.72 10*3/MM3 (ref 1.4–6.5)
NEUTROPHILS NFR BLD AUTO: 67.7 % (ref 30–70)
NITRITE UR QL STRIP: NEGATIVE
OPIATES UR QL: NEGATIVE
OSMOLALITY SERPL CALC.SUM OF ELEC: 280.6 MOSM/KG (ref 273–305)
OXYCODONE UR QL SCN: NEGATIVE
PCP UR QL SCN: NEGATIVE
PH UR STRIP.AUTO: 7 [PH] (ref 5–8)
PLATELET # BLD AUTO: 329 10*3/MM3 (ref 130–400)
PMV BLD AUTO: 10.2 FL (ref 6–10)
POTASSIUM BLD-SCNC: 3.8 MMOL/L (ref 3.5–5.3)
PROT SERPL-MCNC: 6.7 G/DL (ref 6–8)
PROT UR QL STRIP: NEGATIVE
RBC # BLD AUTO: 4.78 10*6/MM3 (ref 4.7–6.1)
SODIUM BLD-SCNC: 140 MMOL/L (ref 135–153)
SP GR UR STRIP: 1.02 (ref 1–1.03)
UROBILINOGEN UR QL STRIP: NORMAL
WBC NRBC COR # BLD: 11.41 10*3/MM3 (ref 4.5–12.5)

## 2017-11-18 PROCEDURE — 74177 CT ABD & PELVIS W/CONTRAST: CPT | Performed by: RADIOLOGY

## 2017-11-18 PROCEDURE — 80307 DRUG TEST PRSMV CHEM ANLYZR: CPT | Performed by: NURSE PRACTITIONER

## 2017-11-18 PROCEDURE — 82150 ASSAY OF AMYLASE: CPT | Performed by: NURSE PRACTITIONER

## 2017-11-18 PROCEDURE — 83690 ASSAY OF LIPASE: CPT | Performed by: NURSE PRACTITIONER

## 2017-11-18 PROCEDURE — 80053 COMPREHEN METABOLIC PANEL: CPT | Performed by: NURSE PRACTITIONER

## 2017-11-18 PROCEDURE — 96374 THER/PROPH/DIAG INJ IV PUSH: CPT

## 2017-11-18 PROCEDURE — 96375 TX/PRO/DX INJ NEW DRUG ADDON: CPT

## 2017-11-18 PROCEDURE — 85025 COMPLETE CBC W/AUTO DIFF WBC: CPT | Performed by: NURSE PRACTITIONER

## 2017-11-18 PROCEDURE — 25010000002 MORPHINE PER 10 MG: Performed by: NURSE PRACTITIONER

## 2017-11-18 PROCEDURE — 25010000002 ONDANSETRON PER 1 MG: Performed by: NURSE PRACTITIONER

## 2017-11-18 PROCEDURE — 74177 CT ABD & PELVIS W/CONTRAST: CPT

## 2017-11-18 PROCEDURE — 81003 URINALYSIS AUTO W/O SCOPE: CPT | Performed by: NURSE PRACTITIONER

## 2017-11-18 PROCEDURE — 99283 EMERGENCY DEPT VISIT LOW MDM: CPT

## 2017-11-18 PROCEDURE — 0 IOPAMIDOL 61 % SOLUTION: Performed by: EMERGENCY MEDICINE

## 2017-11-18 RX ORDER — TRAZODONE HYDROCHLORIDE 150 MG/1
150 TABLET ORAL NIGHTLY
Status: ON HOLD | COMMUNITY
End: 2018-06-11

## 2017-11-18 RX ORDER — SODIUM CHLORIDE 0.9 % (FLUSH) 0.9 %
10 SYRINGE (ML) INJECTION AS NEEDED
Status: DISCONTINUED | OUTPATIENT
Start: 2017-11-18 | End: 2017-11-18 | Stop reason: HOSPADM

## 2017-11-18 RX ORDER — FLUOXETINE HYDROCHLORIDE 20 MG/1
20 CAPSULE ORAL NIGHTLY
Status: ON HOLD | COMMUNITY
End: 2018-06-11

## 2017-11-18 RX ORDER — METOCLOPRAMIDE 5 MG/1
5 TABLET ORAL
Qty: 20 TABLET | Refills: 0 | Status: SHIPPED | OUTPATIENT
Start: 2017-11-18 | End: 2017-12-01 | Stop reason: HOSPADM

## 2017-11-18 RX ORDER — LOXAPINE SUCCINATE 10 MG/1
75 TABLET ORAL NIGHTLY
Status: ON HOLD | COMMUNITY
End: 2018-06-11

## 2017-11-18 RX ORDER — ONDANSETRON 2 MG/ML
4 INJECTION INTRAMUSCULAR; INTRAVENOUS ONCE
Status: COMPLETED | OUTPATIENT
Start: 2017-11-18 | End: 2017-11-18

## 2017-11-18 RX ORDER — MORPHINE SULFATE 2 MG/ML
2 INJECTION, SOLUTION INTRAMUSCULAR; INTRAVENOUS ONCE
Status: COMPLETED | OUTPATIENT
Start: 2017-11-18 | End: 2017-11-18

## 2017-11-18 RX ORDER — AMLODIPINE BESYLATE 10 MG/1
10 TABLET ORAL NIGHTLY
COMMUNITY
End: 2017-12-01 | Stop reason: HOSPADM

## 2017-11-18 RX ADMIN — IOPAMIDOL 100 ML: 612 INJECTION, SOLUTION INTRAVENOUS at 17:28

## 2017-11-18 RX ADMIN — ONDANSETRON 4 MG: 2 INJECTION, SOLUTION INTRAMUSCULAR; INTRAVENOUS at 17:06

## 2017-11-18 RX ADMIN — MORPHINE SULFATE 2 MG: 2 INJECTION, SOLUTION INTRAMUSCULAR; INTRAVENOUS at 17:07

## 2017-11-18 NOTE — ED NOTES
Patient c/o of continued left umbilicus abdominal pain; FRANKIE Castellanos APRN aware.     Itzel Lin RN  11/18/17 3275

## 2017-11-18 NOTE — DISCHARGE INSTRUCTIONS

## 2017-11-18 NOTE — ED PROVIDER NOTES
Subjective   Patient is a 42 y.o. male presenting with abdominal pain.   History provided by:  Patient  Abdominal Pain   Pain location:  RLQ and LLQ  Pain quality: aching and sharp    Pain radiates to:  Does not radiate  Pain severity:  Moderate  Onset quality:  Sudden  Timing:  Constant  Progression:  Worsening  Chronicity:  New  Relieved by:  None tried  Worsened by:  Nothing  Ineffective treatments:  None tried  Associated symptoms: nausea    Associated symptoms: no chest pain, no diarrhea, no dysuria, no fever, no flatus and no vomiting        Review of Systems   Constitutional: Negative.  Negative for fever.   HENT: Negative.    Respiratory: Negative.    Cardiovascular: Negative.  Negative for chest pain.   Gastrointestinal: Positive for abdominal pain and nausea. Negative for diarrhea, flatus and vomiting.   Endocrine: Negative.    Genitourinary: Negative.  Negative for dysuria.   Skin: Negative.    Neurological: Negative.    Psychiatric/Behavioral: Negative.    All other systems reviewed and are negative.      Past Medical History:   Diagnosis Date   • Heart attack    • Hyperlipidemia    • Injury of back    • Stroke        Allergies   Allergen Reactions   • Celexa [Citalopram Hydrobromide]    • Haldol [Haloperidol Lactate]    • Nitroglycerin    • Risperidone And Related        Past Surgical History:   Procedure Laterality Date   • AORTIC VALVE REPAIR/REPLACEMENT MITRAL VALVE REPAIR/REPLACEMENT     • APPENDECTOMY     • CARDIAC CATHETERIZATION     • CHOLECYSTECTOMY     • HERNIA REPAIR     • PACEMAKER IMPLANTATION         History reviewed. No pertinent family history.    Social History     Social History   • Marital status:      Spouse name: N/A   • Number of children: N/A   • Years of education: N/A     Social History Main Topics   • Smoking status: Current Every Day Smoker     Packs/day: 1.00   • Smokeless tobacco: Never Used   • Alcohol use No   • Drug use: No   • Sexual activity: Defer     Other  Topics Concern   • None     Social History Narrative   • None           Objective   Physical Exam   Constitutional: He is oriented to person, place, and time. He appears well-developed and well-nourished. No distress.   HENT:   Head: Normocephalic and atraumatic.   Right Ear: External ear normal.   Left Ear: External ear normal.   Nose: Nose normal.   Eyes: Conjunctivae and EOM are normal. Pupils are equal, round, and reactive to light.   Neck: Normal range of motion. Neck supple. No JVD present. No tracheal deviation present.   Cardiovascular: Normal rate, regular rhythm and normal heart sounds.    No murmur heard.  Pulmonary/Chest: Effort normal and breath sounds normal. No respiratory distress. He has no wheezes.   Abdominal: Soft. Bowel sounds are normal. There is tenderness.   Musculoskeletal: Normal range of motion. He exhibits no edema or deformity.   Neurological: He is alert and oriented to person, place, and time. No cranial nerve deficit.   Skin: Skin is warm and dry. No rash noted. He is not diaphoretic. No erythema. No pallor.   Psychiatric: He has a normal mood and affect. His behavior is normal. Thought content normal.   Nursing note and vitals reviewed.      Procedures         ED Course  ED Course                  MDM  Number of Diagnoses or Management Options  Bilateral lower abdominal cramping: new and does not require workup     Amount and/or Complexity of Data Reviewed  Clinical lab tests: reviewed  Tests in the radiology section of CPT®: reviewed    Risk of Complications, Morbidity, and/or Mortality  Presenting problems: low  Diagnostic procedures: low  Management options: low    Patient Progress  Patient progress: stable      Final diagnoses:   Bilateral lower abdominal cramping            Lynsey Castellanos, APRN  11/18/17 1829

## 2017-11-18 NOTE — ED NOTES
Consent signed by patient for Ct Abd/Pelvis with Contrast; patient reports that he has had this CT before and verbalizes understanding.     Itzel Lin RN  11/18/17 4306

## 2017-11-22 ENCOUNTER — HOSPITAL ENCOUNTER (EMERGENCY)
Facility: HOSPITAL | Age: 42
Discharge: HOME | End: 2017-11-22
Attending: FAMILY MEDICINE | Admitting: FAMILY MEDICINE

## 2017-11-22 ENCOUNTER — APPOINTMENT (OUTPATIENT)
Dept: GENERAL RADIOLOGY | Facility: HOSPITAL | Age: 42
End: 2017-11-22

## 2017-11-22 VITALS
HEIGHT: 74 IN | OXYGEN SATURATION: 95 % | DIASTOLIC BLOOD PRESSURE: 96 MMHG | TEMPERATURE: 98.1 F | BODY MASS INDEX: 26.95 KG/M2 | RESPIRATION RATE: 18 BRPM | WEIGHT: 210 LBS | HEART RATE: 76 BPM | SYSTOLIC BLOOD PRESSURE: 129 MMHG

## 2017-11-22 DIAGNOSIS — R07.9 CHEST PAIN WITH LOW RISK FOR CARDIAC ETIOLOGY: Primary | ICD-10-CM

## 2017-11-22 LAB
ALBUMIN SERPL-MCNC: 4.8 G/DL (ref 3.5–5)
ALBUMIN/GLOB SERPL: 1.8 G/DL (ref 1.5–2.5)
ALP SERPL-CCNC: 113 U/L (ref 40–129)
ALT SERPL W P-5'-P-CCNC: 20 U/L (ref 10–44)
ANION GAP SERPL CALCULATED.3IONS-SCNC: 5.7 MMOL/L (ref 3.6–11.2)
AST SERPL-CCNC: 21 U/L (ref 10–34)
BASOPHILS # BLD AUTO: 0.07 10*3/MM3 (ref 0–0.3)
BASOPHILS NFR BLD AUTO: 0.7 % (ref 0–2)
BILIRUB SERPL-MCNC: 0.5 MG/DL (ref 0.2–1.8)
BUN BLD-MCNC: 9 MG/DL (ref 7–21)
BUN/CREAT SERPL: 8.3 (ref 7–25)
CALCIUM SPEC-SCNC: 9.3 MG/DL (ref 7.7–10)
CHLORIDE SERPL-SCNC: 112 MMOL/L (ref 99–112)
CK MB SERPL-CCNC: <0.18 NG/ML (ref 0–5)
CO2 SERPL-SCNC: 24.3 MMOL/L (ref 24.3–31.9)
CREAT BLD-MCNC: 1.08 MG/DL (ref 0.43–1.29)
DEPRECATED RDW RBC AUTO: 44.4 FL (ref 37–54)
EOSINOPHIL # BLD AUTO: 0.46 10*3/MM3 (ref 0–0.7)
EOSINOPHIL NFR BLD AUTO: 4.4 % (ref 0–5)
ERYTHROCYTE [DISTWIDTH] IN BLOOD BY AUTOMATED COUNT: 13.5 % (ref 11.5–14.5)
GFR SERPL CREATININE-BSD FRML MDRD: 75 ML/MIN/1.73
GLOBULIN UR ELPH-MCNC: 2.7 GM/DL
GLUCOSE BLD-MCNC: 123 MG/DL (ref 70–110)
HCT VFR BLD AUTO: 46.2 % (ref 42–52)
HGB BLD-MCNC: 15.2 G/DL (ref 14–18)
HOLD SPECIMEN: NORMAL
HOLD SPECIMEN: NORMAL
IMM GRANULOCYTES # BLD: 0.02 10*3/MM3 (ref 0–0.03)
IMM GRANULOCYTES NFR BLD: 0.2 % (ref 0–0.5)
LYMPHOCYTES # BLD AUTO: 2.31 10*3/MM3 (ref 1–3)
LYMPHOCYTES NFR BLD AUTO: 21.9 % (ref 21–51)
MCH RBC QN AUTO: 29.6 PG (ref 27–33)
MCHC RBC AUTO-ENTMCNC: 32.9 G/DL (ref 33–37)
MCV RBC AUTO: 89.9 FL (ref 80–94)
MONOCYTES # BLD AUTO: 0.77 10*3/MM3 (ref 0.1–0.9)
MONOCYTES NFR BLD AUTO: 7.3 % (ref 0–10)
NEUTROPHILS # BLD AUTO: 6.93 10*3/MM3 (ref 1.4–6.5)
NEUTROPHILS NFR BLD AUTO: 65.5 % (ref 30–70)
OSMOLALITY SERPL CALC.SUM OF ELEC: 283.2 MOSM/KG (ref 273–305)
PLATELET # BLD AUTO: 332 10*3/MM3 (ref 130–400)
PMV BLD AUTO: 10.6 FL (ref 6–10)
POTASSIUM BLD-SCNC: 4.4 MMOL/L (ref 3.5–5.3)
PROT SERPL-MCNC: 7.5 G/DL (ref 6–8)
RBC # BLD AUTO: 5.14 10*6/MM3 (ref 4.7–6.1)
SODIUM BLD-SCNC: 142 MMOL/L (ref 135–153)
TROPONIN I SERPL-MCNC: <0.006 NG/ML
TROPONIN I SERPL-MCNC: <0.006 NG/ML
WBC NRBC COR # BLD: 10.56 10*3/MM3 (ref 4.5–12.5)
WHOLE BLOOD HOLD SPECIMEN: NORMAL
WHOLE BLOOD HOLD SPECIMEN: NORMAL

## 2017-11-22 PROCEDURE — 25010000002 MORPHINE PER 10 MG: Performed by: FAMILY MEDICINE

## 2017-11-22 PROCEDURE — 25010000002 HYDROMORPHONE PER 4 MG

## 2017-11-22 PROCEDURE — 93010 ELECTROCARDIOGRAM REPORT: CPT | Performed by: INTERNAL MEDICINE

## 2017-11-22 PROCEDURE — 36415 COLL VENOUS BLD VENIPUNCTURE: CPT

## 2017-11-22 PROCEDURE — 93005 ELECTROCARDIOGRAM TRACING: CPT | Performed by: FAMILY MEDICINE

## 2017-11-22 PROCEDURE — 84484 ASSAY OF TROPONIN QUANT: CPT | Performed by: FAMILY MEDICINE

## 2017-11-22 PROCEDURE — 71020 XR CHEST 2 VW: CPT | Performed by: RADIOLOGY

## 2017-11-22 PROCEDURE — 96374 THER/PROPH/DIAG INJ IV PUSH: CPT

## 2017-11-22 PROCEDURE — 99284 EMERGENCY DEPT VISIT MOD MDM: CPT

## 2017-11-22 PROCEDURE — 93005 ELECTROCARDIOGRAM TRACING: CPT | Performed by: EMERGENCY MEDICINE

## 2017-11-22 PROCEDURE — 96375 TX/PRO/DX INJ NEW DRUG ADDON: CPT

## 2017-11-22 PROCEDURE — 25010000002 ONDANSETRON PER 1 MG

## 2017-11-22 PROCEDURE — 82553 CREATINE MB FRACTION: CPT | Performed by: FAMILY MEDICINE

## 2017-11-22 PROCEDURE — 80053 COMPREHEN METABOLIC PANEL: CPT | Performed by: FAMILY MEDICINE

## 2017-11-22 PROCEDURE — 71020 HC CHEST PA AND LATERAL: CPT

## 2017-11-22 PROCEDURE — 85025 COMPLETE CBC W/AUTO DIFF WBC: CPT | Performed by: FAMILY MEDICINE

## 2017-11-22 RX ORDER — MORPHINE SULFATE 2 MG/ML
2 INJECTION, SOLUTION INTRAMUSCULAR; INTRAVENOUS ONCE
Status: COMPLETED | OUTPATIENT
Start: 2017-11-22 | End: 2017-11-22

## 2017-11-22 RX ORDER — ASPIRIN 325 MG
325 TABLET ORAL ONCE
Status: COMPLETED | OUTPATIENT
Start: 2017-11-22 | End: 2017-11-22

## 2017-11-22 RX ORDER — ONDANSETRON 2 MG/ML
4 INJECTION INTRAMUSCULAR; INTRAVENOUS ONCE
Status: COMPLETED | OUTPATIENT
Start: 2017-11-22 | End: 2017-11-22

## 2017-11-22 RX ORDER — HYDROMORPHONE HYDROCHLORIDE 1 MG/ML
0.5 INJECTION, SOLUTION INTRAMUSCULAR; INTRAVENOUS; SUBCUTANEOUS ONCE
Status: COMPLETED | OUTPATIENT
Start: 2017-11-22 | End: 2017-11-22

## 2017-11-22 RX ORDER — HYDROCODONE BITARTRATE AND ACETAMINOPHEN 5; 325 MG/1; MG/1
1 TABLET ORAL EVERY 6 HOURS PRN
Qty: 12 TABLET | Refills: 0 | Status: ON HOLD | OUTPATIENT
Start: 2017-11-22 | End: 2017-11-25

## 2017-11-22 RX ORDER — ONDANSETRON 2 MG/ML
INJECTION INTRAMUSCULAR; INTRAVENOUS
Status: COMPLETED
Start: 2017-11-22 | End: 2017-11-22

## 2017-11-22 RX ADMIN — ONDANSETRON 4 MG: 2 INJECTION INTRAMUSCULAR; INTRAVENOUS at 20:45

## 2017-11-22 RX ADMIN — MORPHINE SULFATE 2 MG: 2 INJECTION, SOLUTION INTRAMUSCULAR; INTRAVENOUS at 20:45

## 2017-11-22 RX ADMIN — ONDANSETRON 4 MG: 2 INJECTION, SOLUTION INTRAMUSCULAR; INTRAVENOUS at 20:45

## 2017-11-22 RX ADMIN — HYDROMORPHONE HYDROCHLORIDE 0.5 MG: 1 INJECTION, SOLUTION INTRAMUSCULAR; INTRAVENOUS; SUBCUTANEOUS at 22:18

## 2017-11-22 RX ADMIN — ASPIRIN 325 MG: 325 TABLET ORAL at 20:20

## 2017-11-24 ENCOUNTER — HOSPITAL ENCOUNTER (INPATIENT)
Facility: HOSPITAL | Age: 42
LOS: 3 days | Discharge: HOME | End: 2017-12-01
Attending: FAMILY MEDICINE | Admitting: INTERNAL MEDICINE

## 2017-11-24 ENCOUNTER — APPOINTMENT (OUTPATIENT)
Dept: GENERAL RADIOLOGY | Facility: HOSPITAL | Age: 42
End: 2017-11-24

## 2017-11-24 DIAGNOSIS — R07.9 CHEST PAIN IN ADULT: Primary | ICD-10-CM

## 2017-11-24 DIAGNOSIS — I25.10 CAD IN NATIVE ARTERY: ICD-10-CM

## 2017-11-24 LAB
ALBUMIN SERPL-MCNC: 4.7 G/DL (ref 3.5–5)
ALBUMIN/GLOB SERPL: 1.8 G/DL (ref 1.5–2.5)
ALP SERPL-CCNC: 110 U/L (ref 40–129)
ALT SERPL W P-5'-P-CCNC: 19 U/L (ref 10–44)
ANION GAP SERPL CALCULATED.3IONS-SCNC: 5 MMOL/L (ref 3.6–11.2)
AST SERPL-CCNC: 18 U/L (ref 10–34)
BASOPHILS # BLD AUTO: 0.1 10*3/MM3 (ref 0–0.3)
BASOPHILS NFR BLD AUTO: 1.2 % (ref 0–2)
BILIRUB SERPL-MCNC: 0.3 MG/DL (ref 0.2–1.8)
BNP SERPL-MCNC: <2 PG/ML (ref 0–100)
BUN BLD-MCNC: 9 MG/DL (ref 7–21)
BUN/CREAT SERPL: 7.6 (ref 7–25)
CALCIUM SPEC-SCNC: 9.5 MG/DL (ref 7.7–10)
CHLORIDE SERPL-SCNC: 110 MMOL/L (ref 99–112)
CO2 SERPL-SCNC: 26 MMOL/L (ref 24.3–31.9)
CREAT BLD-MCNC: 1.19 MG/DL (ref 0.43–1.29)
DEPRECATED RDW RBC AUTO: 43 FL (ref 37–54)
EOSINOPHIL # BLD AUTO: 0.43 10*3/MM3 (ref 0–0.7)
EOSINOPHIL NFR BLD AUTO: 5.1 % (ref 0–5)
ERYTHROCYTE [DISTWIDTH] IN BLOOD BY AUTOMATED COUNT: 13.2 % (ref 11.5–14.5)
GFR SERPL CREATININE-BSD FRML MDRD: 67 ML/MIN/1.73
GLOBULIN UR ELPH-MCNC: 2.6 GM/DL
GLUCOSE BLD-MCNC: 117 MG/DL (ref 70–110)
HCT VFR BLD AUTO: 43.9 % (ref 42–52)
HGB BLD-MCNC: 14.3 G/DL (ref 14–18)
IMM GRANULOCYTES # BLD: 0.01 10*3/MM3 (ref 0–0.03)
IMM GRANULOCYTES NFR BLD: 0.1 % (ref 0–0.5)
LYMPHOCYTES # BLD AUTO: 2.33 10*3/MM3 (ref 1–3)
LYMPHOCYTES NFR BLD AUTO: 27.4 % (ref 21–51)
MCH RBC QN AUTO: 29.3 PG (ref 27–33)
MCHC RBC AUTO-ENTMCNC: 32.6 G/DL (ref 33–37)
MCV RBC AUTO: 90 FL (ref 80–94)
MONOCYTES # BLD AUTO: 0.57 10*3/MM3 (ref 0.1–0.9)
MONOCYTES NFR BLD AUTO: 6.7 % (ref 0–10)
NEUTROPHILS # BLD AUTO: 5.05 10*3/MM3 (ref 1.4–6.5)
NEUTROPHILS NFR BLD AUTO: 59.5 % (ref 30–70)
OSMOLALITY SERPL CALC.SUM OF ELEC: 281 MOSM/KG (ref 273–305)
PLATELET # BLD AUTO: 322 10*3/MM3 (ref 130–400)
PMV BLD AUTO: 10.3 FL (ref 6–10)
POTASSIUM BLD-SCNC: 3.5 MMOL/L (ref 3.5–5.3)
PROT SERPL-MCNC: 7.3 G/DL (ref 6–8)
RBC # BLD AUTO: 4.88 10*6/MM3 (ref 4.7–6.1)
SODIUM BLD-SCNC: 141 MMOL/L (ref 135–153)
TROPONIN I SERPL-MCNC: <0.006 NG/ML
TROPONIN I SERPL-MCNC: <0.006 NG/ML
WBC NRBC COR # BLD: 8.49 10*3/MM3 (ref 4.5–12.5)

## 2017-11-24 PROCEDURE — 99284 EMERGENCY DEPT VISIT MOD MDM: CPT

## 2017-11-24 PROCEDURE — 83880 ASSAY OF NATRIURETIC PEPTIDE: CPT | Performed by: FAMILY MEDICINE

## 2017-11-24 PROCEDURE — 71010 XR CHEST 1 VW: CPT | Performed by: RADIOLOGY

## 2017-11-24 PROCEDURE — 84484 ASSAY OF TROPONIN QUANT: CPT | Performed by: FAMILY MEDICINE

## 2017-11-24 PROCEDURE — 25010000002 MORPHINE PER 10 MG: Performed by: FAMILY MEDICINE

## 2017-11-24 PROCEDURE — 80053 COMPREHEN METABOLIC PANEL: CPT | Performed by: FAMILY MEDICINE

## 2017-11-24 PROCEDURE — 96375 TX/PRO/DX INJ NEW DRUG ADDON: CPT

## 2017-11-24 PROCEDURE — 85025 COMPLETE CBC W/AUTO DIFF WBC: CPT | Performed by: FAMILY MEDICINE

## 2017-11-24 PROCEDURE — 93005 ELECTROCARDIOGRAM TRACING: CPT | Performed by: EMERGENCY MEDICINE

## 2017-11-24 PROCEDURE — 71010 HC CHEST PA OR AP: CPT

## 2017-11-24 PROCEDURE — G0378 HOSPITAL OBSERVATION PER HR: HCPCS

## 2017-11-24 PROCEDURE — 96376 TX/PRO/DX INJ SAME DRUG ADON: CPT

## 2017-11-24 RX ORDER — MORPHINE SULFATE 2 MG/ML
2 INJECTION, SOLUTION INTRAMUSCULAR; INTRAVENOUS ONCE
Status: COMPLETED | OUTPATIENT
Start: 2017-11-24 | End: 2017-11-24

## 2017-11-24 RX ORDER — SODIUM CHLORIDE 0.9 % (FLUSH) 0.9 %
10 SYRINGE (ML) INJECTION AS NEEDED
Status: DISCONTINUED | OUTPATIENT
Start: 2017-11-24 | End: 2017-12-01 | Stop reason: HOSPADM

## 2017-11-24 RX ORDER — SODIUM CHLORIDE 0.9 % (FLUSH) 0.9 %
1-10 SYRINGE (ML) INJECTION AS NEEDED
Status: DISCONTINUED | OUTPATIENT
Start: 2017-11-24 | End: 2017-12-01 | Stop reason: HOSPADM

## 2017-11-24 RX ORDER — ASPIRIN 81 MG/1
324 TABLET, CHEWABLE ORAL ONCE
Status: COMPLETED | OUTPATIENT
Start: 2017-11-24 | End: 2017-11-24

## 2017-11-24 RX ORDER — HEPARIN SODIUM 5000 [USP'U]/ML
5000 INJECTION, SOLUTION INTRAVENOUS; SUBCUTANEOUS EVERY 12 HOURS SCHEDULED
Status: DISCONTINUED | OUTPATIENT
Start: 2017-11-25 | End: 2017-11-25

## 2017-11-24 RX ORDER — MORPHINE SULFATE 2 MG/ML
1 INJECTION, SOLUTION INTRAMUSCULAR; INTRAVENOUS ONCE
Status: COMPLETED | OUTPATIENT
Start: 2017-11-24 | End: 2017-11-24

## 2017-11-24 RX ADMIN — ASPIRIN 324 MG: 81 TABLET, CHEWABLE ORAL at 20:51

## 2017-11-24 RX ADMIN — Medication 10 ML: at 21:53

## 2017-11-24 RX ADMIN — MORPHINE SULFATE 1 MG: 2 INJECTION, SOLUTION INTRAMUSCULAR; INTRAVENOUS at 20:52

## 2017-11-24 RX ADMIN — MORPHINE SULFATE 2 MG: 2 INJECTION, SOLUTION INTRAMUSCULAR; INTRAVENOUS at 21:53

## 2017-11-25 ENCOUNTER — APPOINTMENT (OUTPATIENT)
Dept: NUCLEAR MEDICINE | Facility: HOSPITAL | Age: 42
End: 2017-11-25
Attending: INTERNAL MEDICINE

## 2017-11-25 ENCOUNTER — APPOINTMENT (OUTPATIENT)
Dept: CARDIOLOGY | Facility: HOSPITAL | Age: 42
End: 2017-11-25
Attending: INTERNAL MEDICINE

## 2017-11-25 LAB
ALBUMIN SERPL-MCNC: 4.3 G/DL (ref 3.5–5)
ALBUMIN/GLOB SERPL: 1.7 G/DL (ref 1.5–2.5)
ALP SERPL-CCNC: 99 U/L (ref 40–129)
ALT SERPL W P-5'-P-CCNC: 18 U/L (ref 10–44)
ANION GAP SERPL CALCULATED.3IONS-SCNC: 4.3 MMOL/L (ref 3.6–11.2)
APTT PPP: 28.5 SECONDS (ref 23.8–36.1)
APTT PPP: 40 SECONDS (ref 23.8–36.1)
AST SERPL-CCNC: 17 U/L (ref 10–34)
BASOPHILS # BLD AUTO: 0.08 10*3/MM3 (ref 0–0.3)
BASOPHILS # BLD AUTO: 0.09 10*3/MM3 (ref 0–0.3)
BASOPHILS NFR BLD AUTO: 0.9 % (ref 0–2)
BASOPHILS NFR BLD AUTO: 1.1 % (ref 0–2)
BH CV ECHO MEAS - % IVS THICK: 10 %
BH CV ECHO MEAS - % LVPW THICK: 39.1 %
BH CV ECHO MEAS - ACS: 0.96 CM
BH CV ECHO MEAS - AO MAX PG (FULL): 14.5 MMHG
BH CV ECHO MEAS - AO MAX PG: 25.8 MMHG
BH CV ECHO MEAS - AO MEAN PG (FULL): 8.8 MMHG
BH CV ECHO MEAS - AO MEAN PG: 13.7 MMHG
BH CV ECHO MEAS - AO ROOT AREA (BSA CORRECTED): 1.5
BH CV ECHO MEAS - AO ROOT AREA: 9 CM^2
BH CV ECHO MEAS - AO ROOT DIAM: 3.4 CM
BH CV ECHO MEAS - AO V2 MAX: 253.3 CM/SEC
BH CV ECHO MEAS - AO V2 MEAN: 170.3 CM/SEC
BH CV ECHO MEAS - AO V2 VTI: 53.9 CM
BH CV ECHO MEAS - AVA(I,A): 1.3 CM^2
BH CV ECHO MEAS - AVA(I,D): 1.3 CM^2
BH CV ECHO MEAS - AVA(V,A): 1.4 CM^2
BH CV ECHO MEAS - AVA(V,D): 1.4 CM^2
BH CV ECHO MEAS - BSA(HAYCOCK): 2.2 M^2
BH CV ECHO MEAS - BSA: 2.2 M^2
BH CV ECHO MEAS - BZI_BMI: 27 KILOGRAMS/M^2
BH CV ECHO MEAS - BZI_METRIC_HEIGHT: 188 CM
BH CV ECHO MEAS - BZI_METRIC_WEIGHT: 95.3 KG
BH CV ECHO MEAS - CONTRAST EF 4CH: 53.7 ML/M^2
BH CV ECHO MEAS - EDV(CUBED): 179.8 ML
BH CV ECHO MEAS - EDV(MOD-SP4): 41 ML
BH CV ECHO MEAS - EDV(TEICH): 156.4 ML
BH CV ECHO MEAS - EF(CUBED): 67.9 %
BH CV ECHO MEAS - EF(MOD-SP4): 53.7 %
BH CV ECHO MEAS - EF(TEICH): 58.8 %
BH CV ECHO MEAS - ESV(CUBED): 57.7 ML
BH CV ECHO MEAS - ESV(MOD-SP4): 19 ML
BH CV ECHO MEAS - ESV(TEICH): 64.5 ML
BH CV ECHO MEAS - FS: 31.5 %
BH CV ECHO MEAS - IVS/LVPW: 0.87
BH CV ECHO MEAS - IVSD: 0.87 CM
BH CV ECHO MEAS - IVSS: 0.96 CM
BH CV ECHO MEAS - LV DIASTOLIC VOL/BSA (35-75): 18.5 ML/M^2
BH CV ECHO MEAS - LV MASS(C)D: 203.5 GRAMS
BH CV ECHO MEAS - LV MASS(C)DI: 91.7 GRAMS/M^2
BH CV ECHO MEAS - LV MASS(C)S: 151.7 GRAMS
BH CV ECHO MEAS - LV MASS(C)SI: 68.4 GRAMS/M^2
BH CV ECHO MEAS - LV MAX PG: 11.3 MMHG
BH CV ECHO MEAS - LV MEAN PG: 5 MMHG
BH CV ECHO MEAS - LV SYSTOLIC VOL/BSA (12-30): 8.6 ML/M^2
BH CV ECHO MEAS - LV V1 MAX: 165 CM/SEC
BH CV ECHO MEAS - LV V1 MEAN: 97.8 CM/SEC
BH CV ECHO MEAS - LV V1 VTI: 33.2 CM
BH CV ECHO MEAS - LVIDD: 5.6 CM
BH CV ECHO MEAS - LVIDS: 3.9 CM
BH CV ECHO MEAS - LVLD AP4: 7.4 CM
BH CV ECHO MEAS - LVLS AP4: 6.5 CM
BH CV ECHO MEAS - LVOT AREA (M): 2 CM^2
BH CV ECHO MEAS - LVOT AREA: 2.1 CM^2
BH CV ECHO MEAS - LVOT DIAM: 1.6 CM
BH CV ECHO MEAS - LVPWD: 1 CM
BH CV ECHO MEAS - LVPWS: 1.4 CM
BH CV ECHO MEAS - MV A MAX VEL: 56.8 CM/SEC
BH CV ECHO MEAS - MV E MAX VEL: 62.7 CM/SEC
BH CV ECHO MEAS - MV E/A: 1.1
BH CV ECHO MEAS - PA ACC SLOPE: 703 CM/SEC^2
BH CV ECHO MEAS - PA ACC TIME: 0.12 SEC
BH CV ECHO MEAS - PA PR(ACCEL): 25.1 MMHG
BH CV ECHO MEAS - RAP SYSTOLE: 10 MMHG
BH CV ECHO MEAS - RVDD: 0.82 CM
BH CV ECHO MEAS - RVSP: 28.9 MMHG
BH CV ECHO MEAS - SI(AO): 218.7 ML/M^2
BH CV ECHO MEAS - SI(CUBED): 55 ML/M^2
BH CV ECHO MEAS - SI(LVOT): 31.3 ML/M^2
BH CV ECHO MEAS - SI(MOD-SP4): 9.9 ML/M^2
BH CV ECHO MEAS - SI(TEICH): 41.5 ML/M^2
BH CV ECHO MEAS - SV(AO): 485.2 ML
BH CV ECHO MEAS - SV(CUBED): 122.1 ML
BH CV ECHO MEAS - SV(LVOT): 69.5 ML
BH CV ECHO MEAS - SV(MOD-SP4): 22 ML
BH CV ECHO MEAS - SV(TEICH): 92 ML
BH CV ECHO MEAS - TR MAX VEL: 217.2 CM/SEC
BH CV NUCLEAR PRIOR STUDY: 3
BH CV STRESS BP STAGE 1: NORMAL
BH CV STRESS BP STAGE 2: NORMAL
BH CV STRESS COMMENTS STAGE 1: NORMAL
BH CV STRESS COMMENTS STAGE 2: NORMAL
BH CV STRESS DOSE REGADENOSON STAGE 1: 0.4
BH CV STRESS DURATION MIN STAGE 1: 0
BH CV STRESS DURATION MIN STAGE 2: 4
BH CV STRESS DURATION SEC STAGE 1: 15
BH CV STRESS DURATION SEC STAGE 2: 0
BH CV STRESS HR STAGE 1: 84
BH CV STRESS HR STAGE 2: 88
BH CV STRESS PROTOCOL 1: NORMAL
BH CV STRESS RECOVERY BP: NORMAL MMHG
BH CV STRESS RECOVERY HR: 87 BPM
BH CV STRESS STAGE 1: 1
BH CV STRESS STAGE 2: 2
BILIRUB SERPL-MCNC: 0.3 MG/DL (ref 0.2–1.8)
BUN BLD-MCNC: 9 MG/DL (ref 7–21)
BUN/CREAT SERPL: 8.2 (ref 7–25)
CALCIUM SPEC-SCNC: 9 MG/DL (ref 7.7–10)
CHLORIDE SERPL-SCNC: 111 MMOL/L (ref 99–112)
CHOLEST SERPL-MCNC: 235 MG/DL (ref 0–200)
CK MB SERPL-CCNC: 0.24 NG/ML (ref 0–5)
CK MB SERPL-CCNC: 0.44 NG/ML (ref 0–5)
CK MB SERPL-CCNC: <0.18 NG/ML (ref 0–5)
CK MB SERPL-RTO: 0.4 % (ref 0–3)
CK MB SERPL-RTO: 0.4 % (ref 0–3)
CK MB SERPL-RTO: NORMAL % (ref 0–3)
CK SERPL-CCNC: 66 U/L (ref 24–204)
CK SERPL-CCNC: 67 U/L (ref 24–204)
CK SERPL-CCNC: 99 U/L (ref 24–204)
CO2 SERPL-SCNC: 25.7 MMOL/L (ref 24.3–31.9)
CREAT BLD-MCNC: 1.1 MG/DL (ref 0.43–1.29)
DEPRECATED RDW RBC AUTO: 43.6 FL (ref 37–54)
DEPRECATED RDW RBC AUTO: 43.9 FL (ref 37–54)
EOSINOPHIL # BLD AUTO: 0.33 10*3/MM3 (ref 0–0.7)
EOSINOPHIL # BLD AUTO: 0.37 10*3/MM3 (ref 0–0.7)
EOSINOPHIL NFR BLD AUTO: 3.9 % (ref 0–5)
EOSINOPHIL NFR BLD AUTO: 4.6 % (ref 0–5)
ERYTHROCYTE [DISTWIDTH] IN BLOOD BY AUTOMATED COUNT: 13.3 % (ref 11.5–14.5)
ERYTHROCYTE [DISTWIDTH] IN BLOOD BY AUTOMATED COUNT: 13.3 % (ref 11.5–14.5)
GFR SERPL CREATININE-BSD FRML MDRD: 73 ML/MIN/1.73
GLOBULIN UR ELPH-MCNC: 2.5 GM/DL
GLUCOSE BLD-MCNC: 95 MG/DL (ref 70–110)
HBA1C MFR BLD: 5.6 % (ref 4.5–5.7)
HCT VFR BLD AUTO: 43 % (ref 42–52)
HCT VFR BLD AUTO: 43.8 % (ref 42–52)
HDLC SERPL-MCNC: 45 MG/DL (ref 60–100)
HGB BLD-MCNC: 13.7 G/DL (ref 14–18)
HGB BLD-MCNC: 14.1 G/DL (ref 14–18)
IMM GRANULOCYTES # BLD: 0.01 10*3/MM3 (ref 0–0.03)
IMM GRANULOCYTES # BLD: 0.02 10*3/MM3 (ref 0–0.03)
IMM GRANULOCYTES NFR BLD: 0.1 % (ref 0–0.5)
IMM GRANULOCYTES NFR BLD: 0.3 % (ref 0–0.5)
INR PPP: 0.93 (ref 0.9–1.1)
LDLC SERPL CALC-MCNC: 153 MG/DL (ref 0–100)
LDLC/HDLC SERPL: 3.4 {RATIO}
LV EF 2D ECHO EST: 65 %
LV EF NUC BP: 60 %
LYMPHOCYTES # BLD AUTO: 1.85 10*3/MM3 (ref 1–3)
LYMPHOCYTES # BLD AUTO: 2.58 10*3/MM3 (ref 1–3)
LYMPHOCYTES NFR BLD AUTO: 21.9 % (ref 21–51)
LYMPHOCYTES NFR BLD AUTO: 32.4 % (ref 21–51)
MAXIMAL PREDICTED HEART RATE: 177 BPM
MCH RBC QN AUTO: 29 PG (ref 27–33)
MCH RBC QN AUTO: 29.5 PG (ref 27–33)
MCHC RBC AUTO-ENTMCNC: 31.9 G/DL (ref 33–37)
MCHC RBC AUTO-ENTMCNC: 32.2 G/DL (ref 33–37)
MCV RBC AUTO: 91.1 FL (ref 80–94)
MCV RBC AUTO: 91.6 FL (ref 80–94)
MONOCYTES # BLD AUTO: 0.57 10*3/MM3 (ref 0.1–0.9)
MONOCYTES # BLD AUTO: 0.59 10*3/MM3 (ref 0.1–0.9)
MONOCYTES NFR BLD AUTO: 6.8 % (ref 0–10)
MONOCYTES NFR BLD AUTO: 7.4 % (ref 0–10)
MYOGLOBIN SERPL-MCNC: 20 NG/ML (ref 0–109)
MYOGLOBIN SERPL-MCNC: 23 NG/ML (ref 0–109)
MYOGLOBIN SERPL-MCNC: 44 NG/ML (ref 0–109)
NEUTROPHILS # BLD AUTO: 4.32 10*3/MM3 (ref 1.4–6.5)
NEUTROPHILS # BLD AUTO: 5.6 10*3/MM3 (ref 1.4–6.5)
NEUTROPHILS NFR BLD AUTO: 54.2 % (ref 30–70)
NEUTROPHILS NFR BLD AUTO: 66.4 % (ref 30–70)
OSMOLALITY SERPL CALC.SUM OF ELEC: 279.8 MOSM/KG (ref 273–305)
PERCENT MAX PREDICTED HR: 49.15 %
PLATELET # BLD AUTO: 272 10*3/MM3 (ref 130–400)
PLATELET # BLD AUTO: 301 10*3/MM3 (ref 130–400)
PMV BLD AUTO: 10.4 FL (ref 6–10)
PMV BLD AUTO: 10.6 FL (ref 6–10)
POTASSIUM BLD-SCNC: 3.8 MMOL/L (ref 3.5–5.3)
PROT SERPL-MCNC: 6.8 G/DL (ref 6–8)
PROTHROMBIN TIME: 12.6 SECONDS (ref 11–15.4)
RBC # BLD AUTO: 4.72 10*6/MM3 (ref 4.7–6.1)
RBC # BLD AUTO: 4.78 10*6/MM3 (ref 4.7–6.1)
SODIUM BLD-SCNC: 141 MMOL/L (ref 135–153)
STRESS BASELINE BP: NORMAL MMHG
STRESS BASELINE HR: 69 BPM
STRESS PERCENT HR: 58 %
STRESS POST PEAK BP: NORMAL MMHG
STRESS POST PEAK HR: 87 BPM
STRESS TARGET HR: 150 BPM
TRIGL SERPL-MCNC: 186 MG/DL (ref 0–150)
TROPONIN I SERPL-MCNC: <0.006 NG/ML
VLDLC SERPL-MCNC: 37.2 MG/DL
WBC NRBC COR # BLD: 7.97 10*3/MM3 (ref 4.5–12.5)
WBC NRBC COR # BLD: 8.44 10*3/MM3 (ref 4.5–12.5)

## 2017-11-25 PROCEDURE — 93306 TTE W/DOPPLER COMPLETE: CPT

## 2017-11-25 PROCEDURE — G0378 HOSPITAL OBSERVATION PER HR: HCPCS

## 2017-11-25 PROCEDURE — 25010000002 HEPARIN (PORCINE) PER 1000 UNITS: Performed by: INTERNAL MEDICINE

## 2017-11-25 PROCEDURE — 93306 TTE W/DOPPLER COMPLETE: CPT | Performed by: INTERNAL MEDICINE

## 2017-11-25 PROCEDURE — A9500 TC99M SESTAMIBI: HCPCS | Performed by: INTERNAL MEDICINE

## 2017-11-25 PROCEDURE — 25010000002 REGADENOSON 0.4 MG/5ML SOLUTION: Performed by: INTERNAL MEDICINE

## 2017-11-25 PROCEDURE — 85025 COMPLETE CBC W/AUTO DIFF WBC: CPT | Performed by: INTERNAL MEDICINE

## 2017-11-25 PROCEDURE — 82550 ASSAY OF CK (CPK): CPT | Performed by: INTERNAL MEDICINE

## 2017-11-25 PROCEDURE — 83036 HEMOGLOBIN GLYCOSYLATED A1C: CPT | Performed by: INTERNAL MEDICINE

## 2017-11-25 PROCEDURE — 84484 ASSAY OF TROPONIN QUANT: CPT | Performed by: INTERNAL MEDICINE

## 2017-11-25 PROCEDURE — 80061 LIPID PANEL: CPT | Performed by: INTERNAL MEDICINE

## 2017-11-25 PROCEDURE — 25010000002 MORPHINE PER 10 MG: Performed by: INTERNAL MEDICINE

## 2017-11-25 PROCEDURE — 78452 HT MUSCLE IMAGE SPECT MULT: CPT

## 2017-11-25 PROCEDURE — 0 TECHNETIUM SESTAMIBI: Performed by: INTERNAL MEDICINE

## 2017-11-25 PROCEDURE — 99223 1ST HOSP IP/OBS HIGH 75: CPT | Performed by: INTERNAL MEDICINE

## 2017-11-25 PROCEDURE — 85730 THROMBOPLASTIN TIME PARTIAL: CPT | Performed by: INTERNAL MEDICINE

## 2017-11-25 PROCEDURE — 80053 COMPREHEN METABOLIC PANEL: CPT | Performed by: INTERNAL MEDICINE

## 2017-11-25 PROCEDURE — 82553 CREATINE MB FRACTION: CPT | Performed by: INTERNAL MEDICINE

## 2017-11-25 PROCEDURE — 99222 1ST HOSP IP/OBS MODERATE 55: CPT | Performed by: INTERNAL MEDICINE

## 2017-11-25 PROCEDURE — 83874 ASSAY OF MYOGLOBIN: CPT | Performed by: INTERNAL MEDICINE

## 2017-11-25 PROCEDURE — 94799 UNLISTED PULMONARY SVC/PX: CPT

## 2017-11-25 PROCEDURE — 78452 HT MUSCLE IMAGE SPECT MULT: CPT | Performed by: INTERNAL MEDICINE

## 2017-11-25 PROCEDURE — 93017 CV STRESS TEST TRACING ONLY: CPT

## 2017-11-25 PROCEDURE — 25010000002 ONDANSETRON PER 1 MG: Performed by: INTERNAL MEDICINE

## 2017-11-25 PROCEDURE — 85610 PROTHROMBIN TIME: CPT | Performed by: INTERNAL MEDICINE

## 2017-11-25 PROCEDURE — 93018 CV STRESS TEST I&R ONLY: CPT | Performed by: INTERNAL MEDICINE

## 2017-11-25 RX ORDER — AMLODIPINE BESYLATE 10 MG/1
10 TABLET ORAL NIGHTLY
Status: CANCELLED | OUTPATIENT
Start: 2017-11-25

## 2017-11-25 RX ORDER — TRAZODONE HYDROCHLORIDE 50 MG/1
150 TABLET ORAL NIGHTLY
Status: CANCELLED | OUTPATIENT
Start: 2017-11-25

## 2017-11-25 RX ORDER — ATORVASTATIN CALCIUM 40 MG/1
80 TABLET, FILM COATED ORAL NIGHTLY
Status: DISCONTINUED | OUTPATIENT
Start: 2017-11-25 | End: 2017-12-01 | Stop reason: HOSPADM

## 2017-11-25 RX ORDER — WARFARIN SODIUM 5 MG/1
5 TABLET ORAL
Status: DISCONTINUED | OUTPATIENT
Start: 2017-11-25 | End: 2017-11-27

## 2017-11-25 RX ORDER — FLUOXETINE HYDROCHLORIDE 20 MG/1
20 CAPSULE ORAL NIGHTLY
Status: CANCELLED | OUTPATIENT
Start: 2017-11-25

## 2017-11-25 RX ORDER — METOCLOPRAMIDE 10 MG/1
5 TABLET ORAL
Status: CANCELLED | OUTPATIENT
Start: 2017-11-25

## 2017-11-25 RX ORDER — HEPARIN SODIUM 5000 [USP'U]/ML
53.2 INJECTION, SOLUTION INTRAVENOUS; SUBCUTANEOUS AS NEEDED
Status: DISCONTINUED | OUTPATIENT
Start: 2017-11-25 | End: 2017-12-01

## 2017-11-25 RX ORDER — TRAZODONE HYDROCHLORIDE 50 MG/1
150 TABLET ORAL NIGHTLY
Status: DISCONTINUED | OUTPATIENT
Start: 2017-11-25 | End: 2017-12-01 | Stop reason: HOSPADM

## 2017-11-25 RX ORDER — LOXAPINE SUCCINATE 10 MG/1
75 TABLET ORAL NIGHTLY
Status: CANCELLED | OUTPATIENT
Start: 2017-11-25

## 2017-11-25 RX ORDER — LOXAPINE SUCCINATE 25 MG/1
75 TABLET ORAL NIGHTLY
Status: DISCONTINUED | OUTPATIENT
Start: 2017-11-25 | End: 2017-12-01 | Stop reason: HOSPADM

## 2017-11-25 RX ORDER — MORPHINE SULFATE 2 MG/ML
2 INJECTION, SOLUTION INTRAMUSCULAR; INTRAVENOUS EVERY 4 HOURS PRN
Status: DISCONTINUED | OUTPATIENT
Start: 2017-11-25 | End: 2017-12-01 | Stop reason: HOSPADM

## 2017-11-25 RX ORDER — ONDANSETRON 2 MG/ML
4 INJECTION INTRAMUSCULAR; INTRAVENOUS EVERY 6 HOURS PRN
Status: DISCONTINUED | OUTPATIENT
Start: 2017-11-25 | End: 2017-12-01 | Stop reason: HOSPADM

## 2017-11-25 RX ORDER — HEPARIN SODIUM 5000 [USP'U]/ML
26.6 INJECTION, SOLUTION INTRAVENOUS; SUBCUTANEOUS AS NEEDED
Status: DISCONTINUED | OUTPATIENT
Start: 2017-11-25 | End: 2017-12-01

## 2017-11-25 RX ORDER — POTASSIUM CHLORIDE 20 MEQ/1
40 TABLET, EXTENDED RELEASE ORAL ONCE
Status: COMPLETED | OUTPATIENT
Start: 2017-11-25 | End: 2017-11-25

## 2017-11-25 RX ORDER — FLUOXETINE HYDROCHLORIDE 20 MG/1
20 CAPSULE ORAL NIGHTLY
Status: DISCONTINUED | OUTPATIENT
Start: 2017-11-25 | End: 2017-12-01 | Stop reason: HOSPADM

## 2017-11-25 RX ORDER — HEPARIN SODIUM 5000 [USP'U]/ML
53.2 INJECTION, SOLUTION INTRAVENOUS; SUBCUTANEOUS ONCE
Status: COMPLETED | OUTPATIENT
Start: 2017-11-25 | End: 2017-11-25

## 2017-11-25 RX ADMIN — HEPARIN SODIUM 5000 UNITS: 5000 INJECTION, SOLUTION INTRAVENOUS; SUBCUTANEOUS at 14:05

## 2017-11-25 RX ADMIN — Medication 10 ML: at 18:57

## 2017-11-25 RX ADMIN — REGADENOSON 0.4 MG: 0.08 INJECTION, SOLUTION INTRAVENOUS at 09:54

## 2017-11-25 RX ADMIN — ATORVASTATIN CALCIUM 80 MG: 40 TABLET, FILM COATED ORAL at 18:57

## 2017-11-25 RX ADMIN — MORPHINE SULFATE 2 MG: 2 INJECTION, SOLUTION INTRAMUSCULAR; INTRAVENOUS at 10:54

## 2017-11-25 RX ADMIN — METOPROLOL TARTRATE 12.5 MG: 25 TABLET, FILM COATED ORAL at 18:57

## 2017-11-25 RX ADMIN — HEPARIN SODIUM 10.6 UNITS/KG/HR: 10000 INJECTION, SOLUTION INTRAVENOUS at 14:05

## 2017-11-25 RX ADMIN — HEPARIN SODIUM 5000 UNITS: 5000 INJECTION, SOLUTION INTRAVENOUS; SUBCUTANEOUS at 00:34

## 2017-11-25 RX ADMIN — MORPHINE SULFATE 2 MG: 2 INJECTION, SOLUTION INTRAMUSCULAR; INTRAVENOUS at 00:34

## 2017-11-25 RX ADMIN — WARFARIN SODIUM 5 MG: 5 TABLET ORAL at 17:06

## 2017-11-25 RX ADMIN — POTASSIUM CHLORIDE 40 MEQ: 1500 TABLET, EXTENDED RELEASE ORAL at 02:01

## 2017-11-25 RX ADMIN — TECHNETIUM TC-99M SESTAMIBI 1 DOSE: 1 INJECTION INTRAVENOUS at 09:55

## 2017-11-25 RX ADMIN — MORPHINE SULFATE 2 MG: 2 INJECTION, SOLUTION INTRAMUSCULAR; INTRAVENOUS at 23:10

## 2017-11-25 RX ADMIN — TRAZODONE HYDROCHLORIDE 150 MG: 50 TABLET ORAL at 18:57

## 2017-11-25 RX ADMIN — ONDANSETRON 4 MG: 2 INJECTION, SOLUTION INTRAMUSCULAR; INTRAVENOUS at 00:34

## 2017-11-25 RX ADMIN — MORPHINE SULFATE 2 MG: 2 INJECTION, SOLUTION INTRAMUSCULAR; INTRAVENOUS at 14:57

## 2017-11-25 RX ADMIN — MORPHINE SULFATE 2 MG: 2 INJECTION, SOLUTION INTRAMUSCULAR; INTRAVENOUS at 18:57

## 2017-11-25 RX ADMIN — METOPROLOL TARTRATE 12.5 MG: 25 TABLET, FILM COATED ORAL at 02:01

## 2017-11-25 RX ADMIN — Medication 10 ML: at 00:34

## 2017-11-25 RX ADMIN — MORPHINE SULFATE 2 MG: 2 INJECTION, SOLUTION INTRAMUSCULAR; INTRAVENOUS at 04:22

## 2017-11-25 RX ADMIN — ONDANSETRON 4 MG: 2 INJECTION, SOLUTION INTRAMUSCULAR; INTRAVENOUS at 18:57

## 2017-11-25 RX ADMIN — TECHNETIUM TC-99M SESTAMIBI 1 DOSE: 1 INJECTION INTRAVENOUS at 07:57

## 2017-11-25 RX ADMIN — FLUOXETINE HYDROCHLORIDE 20 MG: 20 CAPSULE ORAL at 18:57

## 2017-11-25 RX ADMIN — ATORVASTATIN CALCIUM 80 MG: 40 TABLET, FILM COATED ORAL at 02:01

## 2017-11-26 LAB
ANION GAP SERPL CALCULATED.3IONS-SCNC: 2.9 MMOL/L (ref 3.6–11.2)
APTT PPP: 52.5 SECONDS (ref 23.8–36.1)
APTT PPP: 66.9 SECONDS (ref 23.8–36.1)
APTT PPP: 71.3 SECONDS (ref 23.8–36.1)
BASOPHILS # BLD AUTO: 0.07 10*3/MM3 (ref 0–0.3)
BASOPHILS NFR BLD AUTO: 0.9 % (ref 0–2)
BUN BLD-MCNC: 12 MG/DL (ref 7–21)
BUN/CREAT SERPL: 11.1 (ref 7–25)
CALCIUM SPEC-SCNC: 9 MG/DL (ref 7.7–10)
CHLORIDE SERPL-SCNC: 112 MMOL/L (ref 99–112)
CO2 SERPL-SCNC: 25.1 MMOL/L (ref 24.3–31.9)
CREAT BLD-MCNC: 1.08 MG/DL (ref 0.43–1.29)
DEPRECATED RDW RBC AUTO: 44.3 FL (ref 37–54)
EOSINOPHIL # BLD AUTO: 0.61 10*3/MM3 (ref 0–0.7)
EOSINOPHIL NFR BLD AUTO: 7.4 % (ref 0–5)
ERYTHROCYTE [DISTWIDTH] IN BLOOD BY AUTOMATED COUNT: 13.4 % (ref 11.5–14.5)
GFR SERPL CREATININE-BSD FRML MDRD: 75 ML/MIN/1.73
GLUCOSE BLD-MCNC: 104 MG/DL (ref 70–110)
HCT VFR BLD AUTO: 45.5 % (ref 42–52)
HGB BLD-MCNC: 14.8 G/DL (ref 14–18)
IMM GRANULOCYTES # BLD: 0.02 10*3/MM3 (ref 0–0.03)
IMM GRANULOCYTES NFR BLD: 0.2 % (ref 0–0.5)
INR PPP: 0.91 (ref 0.9–1.1)
LYMPHOCYTES # BLD AUTO: 2.75 10*3/MM3 (ref 1–3)
LYMPHOCYTES NFR BLD AUTO: 33.5 % (ref 21–51)
MAGNESIUM SERPL-MCNC: 2.2 MG/DL (ref 1.7–2.6)
MCH RBC QN AUTO: 29.9 PG (ref 27–33)
MCHC RBC AUTO-ENTMCNC: 32.5 G/DL (ref 33–37)
MCV RBC AUTO: 91.9 FL (ref 80–94)
MONOCYTES # BLD AUTO: 0.6 10*3/MM3 (ref 0.1–0.9)
MONOCYTES NFR BLD AUTO: 7.3 % (ref 0–10)
NEUTROPHILS # BLD AUTO: 4.16 10*3/MM3 (ref 1.4–6.5)
NEUTROPHILS NFR BLD AUTO: 50.7 % (ref 30–70)
OSMOLALITY SERPL CALC.SUM OF ELEC: 279.5 MOSM/KG (ref 273–305)
PHOSPHATE SERPL-MCNC: 4.7 MG/DL (ref 2.7–4.5)
PLATELET # BLD AUTO: 303 10*3/MM3 (ref 130–400)
PMV BLD AUTO: 10.6 FL (ref 6–10)
POTASSIUM BLD-SCNC: 4.3 MMOL/L (ref 3.5–5.3)
PROTHROMBIN TIME: 12.3 SECONDS (ref 11–15.4)
RBC # BLD AUTO: 4.95 10*6/MM3 (ref 4.7–6.1)
SODIUM BLD-SCNC: 140 MMOL/L (ref 135–153)
WBC NRBC COR # BLD: 8.21 10*3/MM3 (ref 4.5–12.5)

## 2017-11-26 PROCEDURE — 25010000002 HEPARIN (PORCINE) PER 1000 UNITS: Performed by: INTERNAL MEDICINE

## 2017-11-26 PROCEDURE — 99233 SBSQ HOSP IP/OBS HIGH 50: CPT | Performed by: INTERNAL MEDICINE

## 2017-11-26 PROCEDURE — G0378 HOSPITAL OBSERVATION PER HR: HCPCS

## 2017-11-26 PROCEDURE — 25010000002 MORPHINE PER 10 MG: Performed by: INTERNAL MEDICINE

## 2017-11-26 PROCEDURE — 85025 COMPLETE CBC W/AUTO DIFF WBC: CPT | Performed by: PHYSICIAN ASSISTANT

## 2017-11-26 PROCEDURE — 25010000002 ONDANSETRON PER 1 MG: Performed by: INTERNAL MEDICINE

## 2017-11-26 PROCEDURE — 99232 SBSQ HOSP IP/OBS MODERATE 35: CPT | Performed by: INTERNAL MEDICINE

## 2017-11-26 PROCEDURE — 83735 ASSAY OF MAGNESIUM: CPT | Performed by: PHYSICIAN ASSISTANT

## 2017-11-26 PROCEDURE — 85730 THROMBOPLASTIN TIME PARTIAL: CPT | Performed by: INTERNAL MEDICINE

## 2017-11-26 PROCEDURE — 85610 PROTHROMBIN TIME: CPT | Performed by: INTERNAL MEDICINE

## 2017-11-26 PROCEDURE — 80048 BASIC METABOLIC PNL TOTAL CA: CPT | Performed by: PHYSICIAN ASSISTANT

## 2017-11-26 PROCEDURE — 84100 ASSAY OF PHOSPHORUS: CPT | Performed by: PHYSICIAN ASSISTANT

## 2017-11-26 PROCEDURE — 94799 UNLISTED PULMONARY SVC/PX: CPT

## 2017-11-26 RX ORDER — PANTOPRAZOLE SODIUM 40 MG/1
40 TABLET, DELAYED RELEASE ORAL
Status: DISCONTINUED | OUTPATIENT
Start: 2017-11-26 | End: 2017-12-01 | Stop reason: HOSPADM

## 2017-11-26 RX ADMIN — ATORVASTATIN CALCIUM 80 MG: 40 TABLET, FILM COATED ORAL at 19:52

## 2017-11-26 RX ADMIN — MORPHINE SULFATE 2 MG: 2 INJECTION, SOLUTION INTRAMUSCULAR; INTRAVENOUS at 07:47

## 2017-11-26 RX ADMIN — METOPROLOL TARTRATE 12.5 MG: 25 TABLET, FILM COATED ORAL at 19:52

## 2017-11-26 RX ADMIN — MORPHINE SULFATE 2 MG: 2 INJECTION, SOLUTION INTRAMUSCULAR; INTRAVENOUS at 15:58

## 2017-11-26 RX ADMIN — ONDANSETRON 4 MG: 2 INJECTION, SOLUTION INTRAMUSCULAR; INTRAVENOUS at 19:55

## 2017-11-26 RX ADMIN — HEPARIN SODIUM 16.6 UNITS/KG/HR: 10000 INJECTION, SOLUTION INTRAVENOUS at 11:06

## 2017-11-26 RX ADMIN — WARFARIN SODIUM 5 MG: 5 TABLET ORAL at 16:52

## 2017-11-26 RX ADMIN — FLUOXETINE HYDROCHLORIDE 20 MG: 20 CAPSULE ORAL at 19:52

## 2017-11-26 RX ADMIN — PANTOPRAZOLE SODIUM 40 MG: 40 TABLET, DELAYED RELEASE ORAL at 11:57

## 2017-11-26 RX ADMIN — MORPHINE SULFATE 2 MG: 2 INJECTION, SOLUTION INTRAMUSCULAR; INTRAVENOUS at 11:57

## 2017-11-26 RX ADMIN — TRAZODONE HYDROCHLORIDE 150 MG: 50 TABLET ORAL at 19:52

## 2017-11-26 RX ADMIN — MORPHINE SULFATE 2 MG: 2 INJECTION, SOLUTION INTRAMUSCULAR; INTRAVENOUS at 19:53

## 2017-11-26 RX ADMIN — Medication 10 ML: at 19:55

## 2017-11-27 ENCOUNTER — ANESTHESIA EVENT (OUTPATIENT)
Dept: CARDIOLOGY | Facility: HOSPITAL | Age: 42
End: 2017-11-27

## 2017-11-27 ENCOUNTER — APPOINTMENT (OUTPATIENT)
Dept: CARDIOLOGY | Facility: HOSPITAL | Age: 42
End: 2017-11-27
Attending: INTERNAL MEDICINE

## 2017-11-27 ENCOUNTER — ANESTHESIA (OUTPATIENT)
Dept: CARDIOLOGY | Facility: HOSPITAL | Age: 42
End: 2017-11-27

## 2017-11-27 LAB
APTT PPP: 95.8 SECONDS (ref 23.8–36.1)
APTT PPP: >100 SECONDS (ref 23.8–36.1)
APTT PPP: >100 SECONDS (ref 23.8–36.1)
INR PPP: 0.94 (ref 0.9–1.1)
LV EF 2D ECHO EST: 65 %
PROTHROMBIN TIME: 12.6 SECONDS (ref 11–15.4)

## 2017-11-27 PROCEDURE — 93312 ECHO TRANSESOPHAGEAL: CPT | Performed by: INTERNAL MEDICINE

## 2017-11-27 PROCEDURE — 93325 DOPPLER ECHO COLOR FLOW MAPG: CPT | Performed by: INTERNAL MEDICINE

## 2017-11-27 PROCEDURE — 85610 PROTHROMBIN TIME: CPT | Performed by: INTERNAL MEDICINE

## 2017-11-27 PROCEDURE — 25010000002 PROPOFOL 10 MG/ML EMULSION: Performed by: NURSE ANESTHETIST, CERTIFIED REGISTERED

## 2017-11-27 PROCEDURE — 93321 DOPPLER ECHO F-UP/LMTD STD: CPT

## 2017-11-27 PROCEDURE — 93312 ECHO TRANSESOPHAGEAL: CPT

## 2017-11-27 PROCEDURE — 25010000002 HEPARIN (PORCINE) PER 1000 UNITS: Performed by: INTERNAL MEDICINE

## 2017-11-27 PROCEDURE — 99232 SBSQ HOSP IP/OBS MODERATE 35: CPT | Performed by: INTERNAL MEDICINE

## 2017-11-27 PROCEDURE — 94799 UNLISTED PULMONARY SVC/PX: CPT

## 2017-11-27 PROCEDURE — 25010000002 MORPHINE PER 10 MG: Performed by: INTERNAL MEDICINE

## 2017-11-27 PROCEDURE — 85730 THROMBOPLASTIN TIME PARTIAL: CPT | Performed by: INTERNAL MEDICINE

## 2017-11-27 PROCEDURE — 93321 DOPPLER ECHO F-UP/LMTD STD: CPT | Performed by: INTERNAL MEDICINE

## 2017-11-27 PROCEDURE — G0378 HOSPITAL OBSERVATION PER HR: HCPCS

## 2017-11-27 PROCEDURE — 93325 DOPPLER ECHO COLOR FLOW MAPG: CPT

## 2017-11-27 PROCEDURE — 25010000002 MIDAZOLAM PER 1 MG: Performed by: NURSE ANESTHETIST, CERTIFIED REGISTERED

## 2017-11-27 RX ORDER — ONDANSETRON 2 MG/ML
4 INJECTION INTRAMUSCULAR; INTRAVENOUS ONCE AS NEEDED
Status: DISCONTINUED | OUTPATIENT
Start: 2017-11-27 | End: 2017-12-01 | Stop reason: HOSPADM

## 2017-11-27 RX ORDER — WARFARIN SODIUM 7.5 MG/1
7.5 TABLET ORAL
Status: DISCONTINUED | OUTPATIENT
Start: 2017-11-27 | End: 2017-11-29

## 2017-11-27 RX ORDER — MIDAZOLAM HYDROCHLORIDE 1 MG/ML
INJECTION INTRAMUSCULAR; INTRAVENOUS AS NEEDED
Status: DISCONTINUED | OUTPATIENT
Start: 2017-11-27 | End: 2017-11-27 | Stop reason: SURG

## 2017-11-27 RX ORDER — SODIUM CHLORIDE 9 MG/ML
INJECTION, SOLUTION INTRAVENOUS CONTINUOUS PRN
Status: DISCONTINUED | OUTPATIENT
Start: 2017-11-27 | End: 2017-11-27 | Stop reason: SURG

## 2017-11-27 RX ORDER — PROPOFOL 10 MG/ML
VIAL (ML) INTRAVENOUS AS NEEDED
Status: DISCONTINUED | OUTPATIENT
Start: 2017-11-27 | End: 2017-11-27 | Stop reason: SURG

## 2017-11-27 RX ORDER — OXYCODONE HYDROCHLORIDE AND ACETAMINOPHEN 5; 325 MG/1; MG/1
1 TABLET ORAL ONCE AS NEEDED
Status: DISCONTINUED | OUTPATIENT
Start: 2017-11-27 | End: 2017-12-01 | Stop reason: HOSPADM

## 2017-11-27 RX ORDER — IPRATROPIUM BROMIDE AND ALBUTEROL SULFATE 2.5; .5 MG/3ML; MG/3ML
3 SOLUTION RESPIRATORY (INHALATION) ONCE AS NEEDED
Status: DISCONTINUED | OUTPATIENT
Start: 2017-11-27 | End: 2017-12-01 | Stop reason: HOSPADM

## 2017-11-27 RX ADMIN — MORPHINE SULFATE 2 MG: 2 INJECTION, SOLUTION INTRAMUSCULAR; INTRAVENOUS at 00:23

## 2017-11-27 RX ADMIN — MORPHINE SULFATE 2 MG: 2 INJECTION, SOLUTION INTRAMUSCULAR; INTRAVENOUS at 17:19

## 2017-11-27 RX ADMIN — MORPHINE SULFATE 2 MG: 2 INJECTION, SOLUTION INTRAMUSCULAR; INTRAVENOUS at 21:31

## 2017-11-27 RX ADMIN — TRAZODONE HYDROCHLORIDE 150 MG: 50 TABLET ORAL at 20:19

## 2017-11-27 RX ADMIN — PROPOFOL 30 MG: 10 INJECTION, EMULSION INTRAVENOUS at 10:10

## 2017-11-27 RX ADMIN — MIDAZOLAM HYDROCHLORIDE 2 MG: 1 INJECTION, SOLUTION INTRAMUSCULAR; INTRAVENOUS at 10:08

## 2017-11-27 RX ADMIN — WARFARIN SODIUM 7.5 MG: 7.5 TABLET ORAL at 17:19

## 2017-11-27 RX ADMIN — PROPOFOL 20 MG: 10 INJECTION, EMULSION INTRAVENOUS at 10:18

## 2017-11-27 RX ADMIN — PROPOFOL 30 MG: 10 INJECTION, EMULSION INTRAVENOUS at 10:13

## 2017-11-27 RX ADMIN — PROPOFOL 20 MG: 10 INJECTION, EMULSION INTRAVENOUS at 10:11

## 2017-11-27 RX ADMIN — MORPHINE SULFATE 2 MG: 2 INJECTION, SOLUTION INTRAMUSCULAR; INTRAVENOUS at 09:34

## 2017-11-27 RX ADMIN — ATORVASTATIN CALCIUM 80 MG: 40 TABLET, FILM COATED ORAL at 20:19

## 2017-11-27 RX ADMIN — MORPHINE SULFATE 2 MG: 2 INJECTION, SOLUTION INTRAMUSCULAR; INTRAVENOUS at 13:31

## 2017-11-27 RX ADMIN — MORPHINE SULFATE 2 MG: 2 INJECTION, SOLUTION INTRAMUSCULAR; INTRAVENOUS at 06:05

## 2017-11-27 RX ADMIN — METOPROLOL TARTRATE 12.5 MG: 25 TABLET, FILM COATED ORAL at 12:22

## 2017-11-27 RX ADMIN — HEPARIN SODIUM 16.6 UNITS/KG/HR: 10000 INJECTION, SOLUTION INTRAVENOUS at 06:05

## 2017-11-27 RX ADMIN — SODIUM CHLORIDE: 9 INJECTION, SOLUTION INTRAVENOUS at 10:07

## 2017-11-27 RX ADMIN — Medication 10 ML: at 06:05

## 2017-11-27 RX ADMIN — MIDAZOLAM HYDROCHLORIDE 2 MG: 1 INJECTION, SOLUTION INTRAMUSCULAR; INTRAVENOUS at 10:10

## 2017-11-27 RX ADMIN — PROPOFOL 30 MG: 10 INJECTION, EMULSION INTRAVENOUS at 10:16

## 2017-11-27 RX ADMIN — METOPROLOL TARTRATE 12.5 MG: 25 TABLET, FILM COATED ORAL at 20:19

## 2017-11-27 RX ADMIN — HEPARIN SODIUM 13.6 UNITS/KG/HR: 10000 INJECTION, SOLUTION INTRAVENOUS at 09:35

## 2017-11-27 RX ADMIN — HEPARIN SODIUM 10.6 UNITS/KG/HR: 10000 INJECTION, SOLUTION INTRAVENOUS at 18:53

## 2017-11-27 RX ADMIN — FLUOXETINE HYDROCHLORIDE 20 MG: 20 CAPSULE ORAL at 20:19

## 2017-11-27 RX ADMIN — PROPOFOL 30 MG: 10 INJECTION, EMULSION INTRAVENOUS at 10:21

## 2017-11-27 RX ADMIN — PROPOFOL 20 MG: 10 INJECTION, EMULSION INTRAVENOUS at 10:24

## 2017-11-27 NOTE — ANESTHESIA PREPROCEDURE EVALUATION
Anesthesia Evaluation     Patient summary reviewed and Nursing notes reviewed   no history of anesthetic complications:  NPO Solid Status: > 8 hours  NPO Liquid Status: > 8 hours     Airway   Mallampati: II  TM distance: >3 FB  Neck ROM: full  no difficulty expected  Dental - normal exam     Pulmonary - normal exam   (+) a smoker Current,   Cardiovascular - normal exam  Exercise tolerance: good (4-7 METS)    NYHA Classification: II  Rhythm: regular  Rate: normal    (+) hypertension, past MI  >12 months, hyperlipidemia      Neuro/Psych  (+) CVA, psychiatric history Anxiety and Depression,    GI/Hepatic/Renal/Endo - negative ROS     Musculoskeletal     (+) back pain, neck pain,   Abdominal  - normal exam    Bowel sounds: normal.   Substance History - negative use     OB/GYN negative ob/gyn ROS         Other - negative ROS                                     Anesthesia Plan    ASA 3     general     intravenous induction   Anesthetic plan and risks discussed with patient.    Plan discussed with attending.

## 2017-11-28 ENCOUNTER — APPOINTMENT (OUTPATIENT)
Dept: ULTRASOUND IMAGING | Facility: HOSPITAL | Age: 42
End: 2017-11-28
Attending: INTERNAL MEDICINE

## 2017-11-28 ENCOUNTER — TRANSCRIBE ORDERS (OUTPATIENT)
Dept: ADMINISTRATIVE | Facility: HOSPITAL | Age: 42
End: 2017-11-28

## 2017-11-28 PROBLEM — R07.9 CHEST PAIN IN ADULT: Status: ACTIVE | Noted: 2017-11-28

## 2017-11-28 LAB
ANION GAP SERPL CALCULATED.3IONS-SCNC: 6.3 MMOL/L (ref 3.6–11.2)
APTT PPP: 54.1 SECONDS (ref 23.8–36.1)
APTT PPP: 56.5 SECONDS (ref 23.8–36.1)
APTT PPP: 59.7 SECONDS (ref 23.8–36.1)
APTT PPP: 79.6 SECONDS (ref 23.8–36.1)
BASOPHILS # BLD AUTO: 0.08 10*3/MM3 (ref 0–0.3)
BASOPHILS NFR BLD AUTO: 0.9 % (ref 0–2)
BUN BLD-MCNC: 8 MG/DL (ref 7–21)
BUN/CREAT SERPL: 6.7 (ref 7–25)
CALCIUM SPEC-SCNC: 8.9 MG/DL (ref 7.7–10)
CHLORIDE SERPL-SCNC: 112 MMOL/L (ref 99–112)
CO2 SERPL-SCNC: 22.7 MMOL/L (ref 24.3–31.9)
CREAT BLD-MCNC: 1.19 MG/DL (ref 0.43–1.29)
DEPRECATED RDW RBC AUTO: 43.6 FL (ref 37–54)
DEPRECATED RDW RBC AUTO: 43.8 FL (ref 37–54)
EOSINOPHIL # BLD AUTO: 0.41 10*3/MM3 (ref 0–0.7)
EOSINOPHIL NFR BLD AUTO: 4.4 % (ref 0–5)
ERYTHROCYTE [DISTWIDTH] IN BLOOD BY AUTOMATED COUNT: 13.2 % (ref 11.5–14.5)
ERYTHROCYTE [DISTWIDTH] IN BLOOD BY AUTOMATED COUNT: 13.3 % (ref 11.5–14.5)
GFR SERPL CREATININE-BSD FRML MDRD: 67 ML/MIN/1.73
GLUCOSE BLD-MCNC: 93 MG/DL (ref 70–110)
HCT VFR BLD AUTO: 42.9 % (ref 42–52)
HCT VFR BLD AUTO: 43.9 % (ref 42–52)
HGB BLD-MCNC: 13.6 G/DL (ref 14–18)
HGB BLD-MCNC: 14 G/DL (ref 14–18)
IMM GRANULOCYTES # BLD: 0.02 10*3/MM3 (ref 0–0.03)
IMM GRANULOCYTES NFR BLD: 0.2 % (ref 0–0.5)
INR PPP: 1.04 (ref 0.9–1.1)
LYMPHOCYTES # BLD AUTO: 2.32 10*3/MM3 (ref 1–3)
LYMPHOCYTES NFR BLD AUTO: 24.7 % (ref 21–51)
MCH RBC QN AUTO: 29.5 PG (ref 27–33)
MCH RBC QN AUTO: 29.6 PG (ref 27–33)
MCHC RBC AUTO-ENTMCNC: 31.7 G/DL (ref 33–37)
MCHC RBC AUTO-ENTMCNC: 31.9 G/DL (ref 33–37)
MCV RBC AUTO: 92.4 FL (ref 80–94)
MCV RBC AUTO: 93.5 FL (ref 80–94)
MONOCYTES # BLD AUTO: 0.66 10*3/MM3 (ref 0.1–0.9)
MONOCYTES NFR BLD AUTO: 7 % (ref 0–10)
NEUTROPHILS # BLD AUTO: 5.89 10*3/MM3 (ref 1.4–6.5)
NEUTROPHILS NFR BLD AUTO: 62.8 % (ref 30–70)
OSMOLALITY SERPL CALC.SUM OF ELEC: 279.3 MOSM/KG (ref 273–305)
PLATELET # BLD AUTO: 290 10*3/MM3 (ref 130–400)
PLATELET # BLD AUTO: 313 10*3/MM3 (ref 130–400)
PMV BLD AUTO: 10.3 FL (ref 6–10)
PMV BLD AUTO: 10.6 FL (ref 6–10)
POTASSIUM BLD-SCNC: 4.3 MMOL/L (ref 3.5–5.3)
PROTHROMBIN TIME: 13.7 SECONDS (ref 11–15.4)
RBC # BLD AUTO: 4.59 10*6/MM3 (ref 4.7–6.1)
RBC # BLD AUTO: 4.75 10*6/MM3 (ref 4.7–6.1)
SODIUM BLD-SCNC: 141 MMOL/L (ref 135–153)
WBC NRBC COR # BLD: 8.44 10*3/MM3 (ref 4.5–12.5)
WBC NRBC COR # BLD: 9.38 10*3/MM3 (ref 4.5–12.5)

## 2017-11-28 PROCEDURE — 94799 UNLISTED PULMONARY SVC/PX: CPT

## 2017-11-28 PROCEDURE — 85610 PROTHROMBIN TIME: CPT | Performed by: INTERNAL MEDICINE

## 2017-11-28 PROCEDURE — 85730 THROMBOPLASTIN TIME PARTIAL: CPT | Performed by: INTERNAL MEDICINE

## 2017-11-28 PROCEDURE — 85025 COMPLETE CBC W/AUTO DIFF WBC: CPT | Performed by: INTERNAL MEDICINE

## 2017-11-28 PROCEDURE — 25010000002 MORPHINE PER 10 MG: Performed by: INTERNAL MEDICINE

## 2017-11-28 PROCEDURE — 85027 COMPLETE CBC AUTOMATED: CPT | Performed by: INTERNAL MEDICINE

## 2017-11-28 PROCEDURE — 80048 BASIC METABOLIC PNL TOTAL CA: CPT | Performed by: INTERNAL MEDICINE

## 2017-11-28 PROCEDURE — 99231 SBSQ HOSP IP/OBS SF/LOW 25: CPT | Performed by: INTERNAL MEDICINE

## 2017-11-28 PROCEDURE — 25010000002 HEPARIN (PORCINE) PER 1000 UNITS: Performed by: INTERNAL MEDICINE

## 2017-11-28 PROCEDURE — 99232 SBSQ HOSP IP/OBS MODERATE 35: CPT | Performed by: INTERNAL MEDICINE

## 2017-11-28 PROCEDURE — 93970 EXTREMITY STUDY: CPT | Performed by: RADIOLOGY

## 2017-11-28 PROCEDURE — 93970 EXTREMITY STUDY: CPT

## 2017-11-28 RX ORDER — RANOLAZINE 500 MG/1
500 TABLET, EXTENDED RELEASE ORAL EVERY 12 HOURS SCHEDULED
Status: DISCONTINUED | OUTPATIENT
Start: 2017-11-28 | End: 2017-12-01 | Stop reason: HOSPADM

## 2017-11-28 RX ORDER — WARFARIN SODIUM 2.5 MG/1
2.5 TABLET ORAL
Status: COMPLETED | OUTPATIENT
Start: 2017-11-28 | End: 2017-11-28

## 2017-11-28 RX ADMIN — WARFARIN 2.5 MG: 2.5 TABLET ORAL at 18:01

## 2017-11-28 RX ADMIN — PANTOPRAZOLE SODIUM 40 MG: 40 TABLET, DELAYED RELEASE ORAL at 05:47

## 2017-11-28 RX ADMIN — MORPHINE SULFATE 2 MG: 2 INJECTION, SOLUTION INTRAMUSCULAR; INTRAVENOUS at 14:16

## 2017-11-28 RX ADMIN — WARFARIN SODIUM 7.5 MG: 7.5 TABLET ORAL at 18:01

## 2017-11-28 RX ADMIN — MORPHINE SULFATE 2 MG: 2 INJECTION, SOLUTION INTRAMUSCULAR; INTRAVENOUS at 05:55

## 2017-11-28 RX ADMIN — MORPHINE SULFATE 2 MG: 2 INJECTION, SOLUTION INTRAMUSCULAR; INTRAVENOUS at 18:44

## 2017-11-28 RX ADMIN — ATORVASTATIN CALCIUM 80 MG: 40 TABLET, FILM COATED ORAL at 19:55

## 2017-11-28 RX ADMIN — RANOLAZINE 500 MG: 500 TABLET, FILM COATED, EXTENDED RELEASE ORAL at 19:54

## 2017-11-28 RX ADMIN — HEPARIN SODIUM: 10000 INJECTION, SOLUTION INTRAVENOUS at 07:41

## 2017-11-28 RX ADMIN — TRAZODONE HYDROCHLORIDE 150 MG: 50 TABLET ORAL at 19:55

## 2017-11-28 RX ADMIN — FLUOXETINE HYDROCHLORIDE 20 MG: 20 CAPSULE ORAL at 19:54

## 2017-11-28 RX ADMIN — METOPROLOL TARTRATE 12.5 MG: 25 TABLET, FILM COATED ORAL at 07:44

## 2017-11-28 RX ADMIN — METOPROLOL TARTRATE 12.5 MG: 25 TABLET, FILM COATED ORAL at 19:55

## 2017-11-28 RX ADMIN — MORPHINE SULFATE 2 MG: 2 INJECTION, SOLUTION INTRAMUSCULAR; INTRAVENOUS at 10:15

## 2017-11-29 LAB
APTT PPP: 52.8 SECONDS (ref 23.8–36.1)
APTT PPP: 55.6 SECONDS (ref 23.8–36.1)
APTT PPP: >100 SECONDS (ref 23.8–36.1)
BASOPHILS # BLD AUTO: 0.08 10*3/MM3 (ref 0–0.3)
BASOPHILS NFR BLD AUTO: 0.7 % (ref 0–2)
DEPRECATED RDW RBC AUTO: 43.5 FL (ref 37–54)
EOSINOPHIL # BLD AUTO: 0.6 10*3/MM3 (ref 0–0.7)
EOSINOPHIL NFR BLD AUTO: 5.5 % (ref 0–5)
ERYTHROCYTE [DISTWIDTH] IN BLOOD BY AUTOMATED COUNT: 13.1 % (ref 11.5–14.5)
HCT VFR BLD AUTO: 43.1 % (ref 42–52)
HGB BLD-MCNC: 13.7 G/DL (ref 14–18)
IMM GRANULOCYTES # BLD: 0.03 10*3/MM3 (ref 0–0.03)
IMM GRANULOCYTES NFR BLD: 0.3 % (ref 0–0.5)
INR PPP: 1.34 (ref 0.9–1.1)
LYMPHOCYTES # BLD AUTO: 2.57 10*3/MM3 (ref 1–3)
LYMPHOCYTES NFR BLD AUTO: 23.4 % (ref 21–51)
MCH RBC QN AUTO: 29.2 PG (ref 27–33)
MCHC RBC AUTO-ENTMCNC: 31.8 G/DL (ref 33–37)
MCV RBC AUTO: 91.9 FL (ref 80–94)
MONOCYTES # BLD AUTO: 0.81 10*3/MM3 (ref 0.1–0.9)
MONOCYTES NFR BLD AUTO: 7.4 % (ref 0–10)
NEUTROPHILS # BLD AUTO: 6.88 10*3/MM3 (ref 1.4–6.5)
NEUTROPHILS NFR BLD AUTO: 62.7 % (ref 30–70)
PLATELET # BLD AUTO: 269 10*3/MM3 (ref 130–400)
PMV BLD AUTO: 10.4 FL (ref 6–10)
PROTHROMBIN TIME: 16.7 SECONDS (ref 11–15.4)
RBC # BLD AUTO: 4.69 10*6/MM3 (ref 4.7–6.1)
WBC NRBC COR # BLD: 10.97 10*3/MM3 (ref 4.5–12.5)

## 2017-11-29 PROCEDURE — 25010000002 MORPHINE PER 10 MG: Performed by: INTERNAL MEDICINE

## 2017-11-29 PROCEDURE — 94799 UNLISTED PULMONARY SVC/PX: CPT

## 2017-11-29 PROCEDURE — 25010000002 HEPARIN (PORCINE) PER 1000 UNITS: Performed by: INTERNAL MEDICINE

## 2017-11-29 PROCEDURE — 85610 PROTHROMBIN TIME: CPT | Performed by: INTERNAL MEDICINE

## 2017-11-29 PROCEDURE — 85025 COMPLETE CBC W/AUTO DIFF WBC: CPT | Performed by: PHYSICIAN ASSISTANT

## 2017-11-29 PROCEDURE — 99232 SBSQ HOSP IP/OBS MODERATE 35: CPT | Performed by: INTERNAL MEDICINE

## 2017-11-29 PROCEDURE — 85730 THROMBOPLASTIN TIME PARTIAL: CPT | Performed by: HOSPITALIST

## 2017-11-29 PROCEDURE — 85730 THROMBOPLASTIN TIME PARTIAL: CPT | Performed by: INTERNAL MEDICINE

## 2017-11-29 RX ORDER — ASPIRIN 81 MG/1
81 TABLET ORAL DAILY
Status: DISCONTINUED | OUTPATIENT
Start: 2017-11-29 | End: 2017-12-01 | Stop reason: HOSPADM

## 2017-11-29 RX ORDER — LISINOPRIL 2.5 MG/1
5 TABLET ORAL
Status: DISCONTINUED | OUTPATIENT
Start: 2017-11-29 | End: 2017-12-01 | Stop reason: HOSPADM

## 2017-11-29 RX ORDER — WARFARIN SODIUM 5 MG/1
10 TABLET ORAL
Status: COMPLETED | OUTPATIENT
Start: 2017-11-29 | End: 2017-11-29

## 2017-11-29 RX ADMIN — PANTOPRAZOLE SODIUM 40 MG: 40 TABLET, DELAYED RELEASE ORAL at 05:20

## 2017-11-29 RX ADMIN — HEPARIN SODIUM 9.6 UNITS/KG/HR: 10000 INJECTION, SOLUTION INTRAVENOUS at 05:18

## 2017-11-29 RX ADMIN — MORPHINE SULFATE 2 MG: 2 INJECTION, SOLUTION INTRAMUSCULAR; INTRAVENOUS at 01:16

## 2017-11-29 RX ADMIN — MORPHINE SULFATE 2 MG: 2 INJECTION, SOLUTION INTRAMUSCULAR; INTRAVENOUS at 21:16

## 2017-11-29 RX ADMIN — LISINOPRIL 5 MG: 2.5 TABLET ORAL at 14:20

## 2017-11-29 RX ADMIN — MORPHINE SULFATE 2 MG: 2 INJECTION, SOLUTION INTRAMUSCULAR; INTRAVENOUS at 05:20

## 2017-11-29 RX ADMIN — MORPHINE SULFATE 2 MG: 2 INJECTION, SOLUTION INTRAMUSCULAR; INTRAVENOUS at 13:09

## 2017-11-29 RX ADMIN — ATORVASTATIN CALCIUM 80 MG: 40 TABLET, FILM COATED ORAL at 20:18

## 2017-11-29 RX ADMIN — FLUOXETINE HYDROCHLORIDE 20 MG: 20 CAPSULE ORAL at 20:18

## 2017-11-29 RX ADMIN — ASPIRIN 81 MG: 81 TABLET ORAL at 14:20

## 2017-11-29 RX ADMIN — METOPROLOL TARTRATE 12.5 MG: 25 TABLET, FILM COATED ORAL at 20:18

## 2017-11-29 RX ADMIN — METOPROLOL TARTRATE 12.5 MG: 25 TABLET, FILM COATED ORAL at 08:33

## 2017-11-29 RX ADMIN — MORPHINE SULFATE 2 MG: 2 INJECTION, SOLUTION INTRAMUSCULAR; INTRAVENOUS at 09:32

## 2017-11-29 RX ADMIN — MORPHINE SULFATE 2 MG: 2 INJECTION, SOLUTION INTRAMUSCULAR; INTRAVENOUS at 17:12

## 2017-11-29 RX ADMIN — WARFARIN SODIUM 10 MG: 5 TABLET ORAL at 17:30

## 2017-11-29 RX ADMIN — RANOLAZINE 500 MG: 500 TABLET, FILM COATED, EXTENDED RELEASE ORAL at 20:18

## 2017-11-29 RX ADMIN — TRAZODONE HYDROCHLORIDE 150 MG: 50 TABLET ORAL at 20:18

## 2017-11-29 RX ADMIN — RANOLAZINE 500 MG: 500 TABLET, FILM COATED, EXTENDED RELEASE ORAL at 08:33

## 2017-11-29 NOTE — ANESTHESIA POSTPROCEDURE EVALUATION
Patient: Bahman Banks    Procedure Summary     Date Anesthesia Start Anesthesia Stop Room / Location    11/27/17 1008 1038 [facility] NONINVASIVE LAB       Procedure Diagnosis Scheduled Providers Provider    ADULT TRANSESOPHAGEAL ECHO (MELODY) W/ CONT IF NECESSARY PER PROTOCOL (Valvular Disease; Aortic Valve Assessment) MD Teodoro Siegel MD          Anesthesia Type: general  Last vitals  BP   114/83 (11/29/17 0603)   Temp   97.5 °F (36.4 °C) (11/29/17 0603)   Pulse   68 (11/29/17 0603)   Resp   18 (11/29/17 0603)     SpO2   93 % (11/29/17 0603)     Post Anesthesia Care and Evaluation    Patient location during evaluation: floor  Patient participation: complete - patient participated  Level of consciousness: awake and alert  Pain score: 0  Pain management: adequate  Airway patency: patent  Anesthetic complications: No anesthetic complications  PONV Status: controlled  Cardiovascular status: acceptable  Respiratory status: acceptable  Hydration status: acceptable

## 2017-11-30 ENCOUNTER — APPOINTMENT (OUTPATIENT)
Dept: CT IMAGING | Facility: HOSPITAL | Age: 42
End: 2017-11-30

## 2017-11-30 LAB
ANION GAP SERPL CALCULATED.3IONS-SCNC: 4.7 MMOL/L (ref 3.6–11.2)
APTT PPP: 58 SECONDS (ref 23.8–36.1)
APTT PPP: 77.8 SECONDS (ref 23.8–36.1)
APTT PPP: >100 SECONDS (ref 23.8–36.1)
BASOPHILS # BLD AUTO: 0.07 10*3/MM3 (ref 0–0.3)
BASOPHILS NFR BLD AUTO: 0.8 % (ref 0–2)
BUN BLD-MCNC: 11 MG/DL (ref 7–21)
BUN/CREAT SERPL: 11.6 (ref 7–25)
CALCIUM SPEC-SCNC: 8.8 MG/DL (ref 7.7–10)
CHLORIDE SERPL-SCNC: 108 MMOL/L (ref 99–112)
CO2 SERPL-SCNC: 28.3 MMOL/L (ref 24.3–31.9)
CREAT BLD-MCNC: 0.95 MG/DL (ref 0.43–1.29)
DEPRECATED RDW RBC AUTO: 43.1 FL (ref 37–54)
EOSINOPHIL # BLD AUTO: 0.42 10*3/MM3 (ref 0–0.7)
EOSINOPHIL NFR BLD AUTO: 5 % (ref 0–5)
ERYTHROCYTE [DISTWIDTH] IN BLOOD BY AUTOMATED COUNT: 13.1 % (ref 11.5–14.5)
GFR SERPL CREATININE-BSD FRML MDRD: 87 ML/MIN/1.73
GLUCOSE BLD-MCNC: 95 MG/DL (ref 70–110)
HCT VFR BLD AUTO: 42.5 % (ref 42–52)
HGB BLD-MCNC: 13.5 G/DL (ref 14–18)
IMM GRANULOCYTES # BLD: 0.02 10*3/MM3 (ref 0–0.03)
IMM GRANULOCYTES NFR BLD: 0.2 % (ref 0–0.5)
INR PPP: 1.63 (ref 0.9–1.1)
LYMPHOCYTES # BLD AUTO: 2.52 10*3/MM3 (ref 1–3)
LYMPHOCYTES NFR BLD AUTO: 29.7 % (ref 21–51)
MCH RBC QN AUTO: 28.8 PG (ref 27–33)
MCHC RBC AUTO-ENTMCNC: 31.8 G/DL (ref 33–37)
MCV RBC AUTO: 90.6 FL (ref 80–94)
MONOCYTES # BLD AUTO: 0.93 10*3/MM3 (ref 0.1–0.9)
MONOCYTES NFR BLD AUTO: 11 % (ref 0–10)
NEUTROPHILS # BLD AUTO: 4.52 10*3/MM3 (ref 1.4–6.5)
NEUTROPHILS NFR BLD AUTO: 53.3 % (ref 30–70)
OSMOLALITY SERPL CALC.SUM OF ELEC: 280.5 MOSM/KG (ref 273–305)
PLATELET # BLD AUTO: 271 10*3/MM3 (ref 130–400)
PMV BLD AUTO: 10.6 FL (ref 6–10)
POTASSIUM BLD-SCNC: 3.8 MMOL/L (ref 3.5–5.3)
PROTHROMBIN TIME: 19.6 SECONDS (ref 11–15.4)
RBC # BLD AUTO: 4.69 10*6/MM3 (ref 4.7–6.1)
SODIUM BLD-SCNC: 141 MMOL/L (ref 135–153)
WBC NRBC COR # BLD: 8.48 10*3/MM3 (ref 4.5–12.5)

## 2017-11-30 PROCEDURE — 85025 COMPLETE CBC W/AUTO DIFF WBC: CPT | Performed by: INTERNAL MEDICINE

## 2017-11-30 PROCEDURE — 99232 SBSQ HOSP IP/OBS MODERATE 35: CPT | Performed by: INTERNAL MEDICINE

## 2017-11-30 PROCEDURE — 85610 PROTHROMBIN TIME: CPT | Performed by: INTERNAL MEDICINE

## 2017-11-30 PROCEDURE — 71250 CT THORAX DX C-: CPT | Performed by: RADIOLOGY

## 2017-11-30 PROCEDURE — 71250 CT THORAX DX C-: CPT

## 2017-11-30 PROCEDURE — 25010000002 HEPARIN (PORCINE) PER 1000 UNITS: Performed by: INTERNAL MEDICINE

## 2017-11-30 PROCEDURE — 85730 THROMBOPLASTIN TIME PARTIAL: CPT | Performed by: INTERNAL MEDICINE

## 2017-11-30 PROCEDURE — 25010000002 MORPHINE PER 10 MG: Performed by: INTERNAL MEDICINE

## 2017-11-30 PROCEDURE — 80048 BASIC METABOLIC PNL TOTAL CA: CPT | Performed by: INTERNAL MEDICINE

## 2017-11-30 RX ORDER — WARFARIN SODIUM 5 MG/1
10 TABLET ORAL
Status: COMPLETED | OUTPATIENT
Start: 2017-11-30 | End: 2017-11-30

## 2017-11-30 RX ADMIN — FLUOXETINE HYDROCHLORIDE 20 MG: 20 CAPSULE ORAL at 20:19

## 2017-11-30 RX ADMIN — ASPIRIN 81 MG: 81 TABLET ORAL at 09:24

## 2017-11-30 RX ADMIN — METOPROLOL TARTRATE 12.5 MG: 25 TABLET, FILM COATED ORAL at 20:19

## 2017-11-30 RX ADMIN — TRAZODONE HYDROCHLORIDE 150 MG: 50 TABLET ORAL at 20:19

## 2017-11-30 RX ADMIN — MORPHINE SULFATE 2 MG: 2 INJECTION, SOLUTION INTRAMUSCULAR; INTRAVENOUS at 19:00

## 2017-11-30 RX ADMIN — MORPHINE SULFATE 2 MG: 2 INJECTION, SOLUTION INTRAMUSCULAR; INTRAVENOUS at 10:22

## 2017-11-30 RX ADMIN — LISINOPRIL 5 MG: 2.5 TABLET ORAL at 09:23

## 2017-11-30 RX ADMIN — MORPHINE SULFATE 2 MG: 2 INJECTION, SOLUTION INTRAMUSCULAR; INTRAVENOUS at 01:59

## 2017-11-30 RX ADMIN — MORPHINE SULFATE 2 MG: 2 INJECTION, SOLUTION INTRAMUSCULAR; INTRAVENOUS at 14:17

## 2017-11-30 RX ADMIN — METOPROLOL TARTRATE 12.5 MG: 25 TABLET, FILM COATED ORAL at 09:24

## 2017-11-30 RX ADMIN — RANOLAZINE 500 MG: 500 TABLET, FILM COATED, EXTENDED RELEASE ORAL at 20:19

## 2017-11-30 RX ADMIN — MORPHINE SULFATE 2 MG: 2 INJECTION, SOLUTION INTRAMUSCULAR; INTRAVENOUS at 05:53

## 2017-11-30 RX ADMIN — ATORVASTATIN CALCIUM 80 MG: 40 TABLET, FILM COATED ORAL at 20:19

## 2017-11-30 RX ADMIN — PANTOPRAZOLE SODIUM 40 MG: 40 TABLET, DELAYED RELEASE ORAL at 05:52

## 2017-11-30 RX ADMIN — WARFARIN SODIUM 10 MG: 5 TABLET ORAL at 17:13

## 2017-11-30 RX ADMIN — HEPARIN SODIUM 11.6 UNITS/KG/HR: 10000 INJECTION, SOLUTION INTRAVENOUS at 05:53

## 2017-11-30 RX ADMIN — RANOLAZINE 500 MG: 500 TABLET, FILM COATED, EXTENDED RELEASE ORAL at 09:20

## 2017-12-01 VITALS
WEIGHT: 210 LBS | HEIGHT: 74 IN | TEMPERATURE: 98.1 F | DIASTOLIC BLOOD PRESSURE: 89 MMHG | RESPIRATION RATE: 18 BRPM | OXYGEN SATURATION: 96 % | SYSTOLIC BLOOD PRESSURE: 153 MMHG | HEART RATE: 66 BPM | BODY MASS INDEX: 26.95 KG/M2

## 2017-12-01 LAB
APTT PPP: 82.7 SECONDS (ref 23.8–36.1)
APTT PPP: >100 SECONDS (ref 23.8–36.1)
INR PPP: 2.28 (ref 0.9–1.1)
INR PPP: 2.55 (ref 0.9–1.1)
PROTHROMBIN TIME: 25.5 SECONDS (ref 11–15.4)
PROTHROMBIN TIME: 27.8 SECONDS (ref 11–15.4)

## 2017-12-01 PROCEDURE — 85730 THROMBOPLASTIN TIME PARTIAL: CPT | Performed by: INTERNAL MEDICINE

## 2017-12-01 PROCEDURE — 85730 THROMBOPLASTIN TIME PARTIAL: CPT | Performed by: HOSPITALIST

## 2017-12-01 PROCEDURE — 99239 HOSP IP/OBS DSCHRG MGMT >30: CPT | Performed by: INTERNAL MEDICINE

## 2017-12-01 PROCEDURE — 25010000002 ONDANSETRON PER 1 MG: Performed by: INTERNAL MEDICINE

## 2017-12-01 PROCEDURE — 85610 PROTHROMBIN TIME: CPT | Performed by: INTERNAL MEDICINE

## 2017-12-01 PROCEDURE — 25010000002 HEPARIN (PORCINE) PER 1000 UNITS: Performed by: INTERNAL MEDICINE

## 2017-12-01 PROCEDURE — 25010000002 MORPHINE PER 10 MG: Performed by: INTERNAL MEDICINE

## 2017-12-01 RX ORDER — WARFARIN SODIUM 5 MG/1
TABLET ORAL
Qty: 45 TABLET | Refills: 0 | Status: SHIPPED | OUTPATIENT
Start: 2017-12-01 | End: 2017-12-01

## 2017-12-01 RX ORDER — ATORVASTATIN CALCIUM 80 MG/1
80 TABLET, FILM COATED ORAL NIGHTLY
Qty: 30 TABLET | Refills: 0 | Status: ON HOLD | OUTPATIENT
Start: 2017-12-01 | End: 2018-06-11

## 2017-12-01 RX ORDER — WARFARIN SODIUM 5 MG/1
5 TABLET ORAL
Status: COMPLETED | OUTPATIENT
Start: 2017-12-01 | End: 2017-12-01

## 2017-12-01 RX ORDER — LISINOPRIL 5 MG/1
5 TABLET ORAL DAILY
Qty: 30 TABLET | Refills: 0 | Status: ON HOLD | OUTPATIENT
Start: 2017-12-01 | End: 2018-06-11

## 2017-12-01 RX ORDER — WARFARIN SODIUM 5 MG/1
TABLET ORAL
Qty: 45 TABLET | Refills: 0 | Status: SHIPPED | OUTPATIENT
Start: 2017-12-01 | End: 2017-12-29 | Stop reason: SDUPTHER

## 2017-12-01 RX ORDER — RANOLAZINE 500 MG/1
500 TABLET, EXTENDED RELEASE ORAL EVERY 12 HOURS SCHEDULED
Qty: 60 TABLET | Refills: 0 | Status: ON HOLD | OUTPATIENT
Start: 2017-12-01 | End: 2018-06-11

## 2017-12-01 RX ORDER — ASPIRIN 81 MG/1
81 TABLET ORAL DAILY
Status: ON HOLD
Start: 2017-12-01 | End: 2018-06-11

## 2017-12-01 RX ADMIN — ONDANSETRON 4 MG: 2 INJECTION, SOLUTION INTRAMUSCULAR; INTRAVENOUS at 11:38

## 2017-12-01 RX ADMIN — MORPHINE SULFATE 2 MG: 2 INJECTION, SOLUTION INTRAMUSCULAR; INTRAVENOUS at 02:46

## 2017-12-01 RX ADMIN — MORPHINE SULFATE 2 MG: 2 INJECTION, SOLUTION INTRAMUSCULAR; INTRAVENOUS at 06:21

## 2017-12-01 RX ADMIN — MORPHINE SULFATE 2 MG: 2 INJECTION, SOLUTION INTRAMUSCULAR; INTRAVENOUS at 10:19

## 2017-12-01 RX ADMIN — RANOLAZINE 500 MG: 500 TABLET, FILM COATED, EXTENDED RELEASE ORAL at 07:55

## 2017-12-01 RX ADMIN — ASPIRIN 81 MG: 81 TABLET ORAL at 07:56

## 2017-12-01 RX ADMIN — HEPARIN SODIUM 6.6 UNITS/KG/HR: 10000 INJECTION, SOLUTION INTRAVENOUS at 06:20

## 2017-12-01 RX ADMIN — LISINOPRIL 5 MG: 2.5 TABLET ORAL at 07:56

## 2017-12-01 RX ADMIN — PANTOPRAZOLE SODIUM 40 MG: 40 TABLET, DELAYED RELEASE ORAL at 06:20

## 2017-12-01 RX ADMIN — METOPROLOL TARTRATE 12.5 MG: 25 TABLET, FILM COATED ORAL at 07:55

## 2017-12-01 RX ADMIN — Medication 10 ML: at 10:20

## 2017-12-01 RX ADMIN — WARFARIN SODIUM 5 MG: 5 TABLET ORAL at 13:09

## 2017-12-01 RX ADMIN — OXYCODONE HYDROCHLORIDE AND ACETAMINOPHEN 1 TABLET: 5; 325 TABLET ORAL at 07:56

## 2017-12-04 ENCOUNTER — ANTICOAGULATION VISIT (OUTPATIENT)
Dept: PHARMACY | Facility: HOSPITAL | Age: 42
End: 2017-12-04

## 2017-12-04 DIAGNOSIS — I48.91 ATRIAL FIBRILLATION, UNSPECIFIED TYPE (HCC): ICD-10-CM

## 2017-12-04 LAB
INR PPP: 4.8 (ref 0.91–1.09)
PROTHROMBIN TIME: 58.1 SECONDS (ref 10–13.8)

## 2017-12-04 PROCEDURE — G0463 HOSPITAL OUTPT CLINIC VISIT: HCPCS | Performed by: PHARMACIST

## 2017-12-04 PROCEDURE — 36416 COLLJ CAPILLARY BLOOD SPEC: CPT

## 2017-12-04 PROCEDURE — 85610 PROTHROMBIN TIME: CPT

## 2017-12-04 NOTE — PROGRESS NOTES
Anticoagulation Clinic Progress Note  Indication:     Referring Provider:   Initial Warfarin Start Date:   Planned Duration of Therapy:   Goal INR: 2.0-3.0  Current Drug Interactions: Aspirin, Fluoxetine      Anticoagulation Clinic INR History:  Date 12/4/17           Total Weekly Dose            INR 4.8               Clinic Interview:  Tablet Strength: pt has 5mg tablets  Current Dose: pt verified dose of 7.5mg daily   Clinical Outcomes      Negatives Major bleeding event, Thromboembolic event, Anticoagulation-related hospital admission, Anticoagulation-related ED visit, Anticoagulation-related fatality     Comments Pt reportedly asked for S/Sx of bleeding and denied any bleeding or bruising.        Patient Findings      Negatives Signs/symptoms of thrombosis, Signs/symptoms of bleeding, Laboratory test error suspected, Change in health, Change in alcohol use, Change in activity, Upcoming invasive procedure, Emergency department visit, Upcoming dental procedure, Missed doses, Extra doses, Change in medications, Change in diet/appetite, Hospital admission, Bruising, Other complaints     Comments Pt reportedly asked for S/Sx of bleeding and denied any bleeding or bruising         Plan:  1. INR is 4.8. Instructed pt to hold warfarin tonight then take 2.5mg daily until RTC  2. RTC Thursday   3. Medications: reviewed  4. Pt declines refills.  5. Verbal and written information provided. Pt expresses understanding and has no further questions at this time.  6. Pt has appointment with Dr. Mcclellan  On Dec 13th at 3:40 (per [place] on 3S)  7. Pt was given warfarin education by Lynsey Hdz, JassonD Candidate.  Pt was given folder with drug cards and expressed understanding.   8. Pt was counseled on risk of bleeding and advised to seek emergency help if significant bleeding occurred.  Pt expressed understanding.         Heather Dillard PharmD  12/4/2017  4:09 PM

## 2017-12-06 ENCOUNTER — APPOINTMENT (OUTPATIENT)
Dept: CT IMAGING | Facility: HOSPITAL | Age: 42
End: 2017-12-06

## 2017-12-06 ENCOUNTER — HOSPITAL ENCOUNTER (EMERGENCY)
Facility: HOSPITAL | Age: 42
Discharge: HOME | End: 2017-12-06
Attending: EMERGENCY MEDICINE | Admitting: EMERGENCY MEDICINE

## 2017-12-06 ENCOUNTER — APPOINTMENT (OUTPATIENT)
Dept: GENERAL RADIOLOGY | Facility: HOSPITAL | Age: 42
End: 2017-12-06

## 2017-12-06 VITALS
OXYGEN SATURATION: 99 % | HEART RATE: 72 BPM | DIASTOLIC BLOOD PRESSURE: 105 MMHG | HEIGHT: 74 IN | SYSTOLIC BLOOD PRESSURE: 128 MMHG | BODY MASS INDEX: 26.95 KG/M2 | TEMPERATURE: 98 F | RESPIRATION RATE: 18 BRPM | WEIGHT: 210 LBS

## 2017-12-06 DIAGNOSIS — R07.89 ATYPICAL CHEST PAIN: ICD-10-CM

## 2017-12-06 DIAGNOSIS — R53.1 ACUTE LEFT-SIDED WEAKNESS: Primary | ICD-10-CM

## 2017-12-06 LAB
6-ACETYL MORPHINE: NEGATIVE
ABO GROUP BLD: NORMAL
ABO GROUP BLD: NORMAL
ALBUMIN SERPL-MCNC: 5 G/DL (ref 3.5–5)
ALBUMIN/GLOB SERPL: 1.8 G/DL (ref 1.5–2.5)
ALP SERPL-CCNC: 120 U/L (ref 40–129)
ALT SERPL W P-5'-P-CCNC: 68 U/L (ref 10–44)
AMPHET+METHAMPHET UR QL: NEGATIVE
ANION GAP SERPL CALCULATED.3IONS-SCNC: 9.8 MMOL/L (ref 3.6–11.2)
APTT PPP: 33.1 SECONDS (ref 23.8–36.1)
AST SERPL-CCNC: 23 U/L (ref 10–34)
BARBITURATES UR QL SCN: NEGATIVE
BASOPHILS # BLD AUTO: 0.07 10*3/MM3 (ref 0–0.3)
BASOPHILS NFR BLD AUTO: 0.6 % (ref 0–2)
BENZODIAZ UR QL SCN: NEGATIVE
BILIRUB SERPL-MCNC: 0.6 MG/DL (ref 0.2–1.8)
BILIRUB UR QL STRIP: NEGATIVE
BLD GP AB SCN SERPL QL: NEGATIVE
BUN BLD-MCNC: 11 MG/DL (ref 7–21)
BUN/CREAT SERPL: 10.4 (ref 7–25)
BUPRENORPHINE SERPL-MCNC: NEGATIVE NG/ML
CALCIUM SPEC-SCNC: 9.5 MG/DL (ref 7.7–10)
CANNABINOIDS SERPL QL: NEGATIVE
CHLORIDE SERPL-SCNC: 107 MMOL/L (ref 99–112)
CLARITY UR: CLEAR
CO2 SERPL-SCNC: 23.2 MMOL/L (ref 24.3–31.9)
COCAINE UR QL: NEGATIVE
COLOR UR: ABNORMAL
CREAT BLD-MCNC: 1.06 MG/DL (ref 0.43–1.29)
DEPRECATED RDW RBC AUTO: 43.6 FL (ref 37–54)
EOSINOPHIL # BLD AUTO: 0.16 10*3/MM3 (ref 0–0.7)
EOSINOPHIL NFR BLD AUTO: 1.3 % (ref 0–5)
ERYTHROCYTE [DISTWIDTH] IN BLOOD BY AUTOMATED COUNT: 13.5 % (ref 11.5–14.5)
GFR SERPL CREATININE-BSD FRML MDRD: 76 ML/MIN/1.73
GLOBULIN UR ELPH-MCNC: 2.8 GM/DL
GLUCOSE BLD-MCNC: 128 MG/DL (ref 70–110)
GLUCOSE BLDC GLUCOMTR-MCNC: 124 MG/DL (ref 70–130)
GLUCOSE UR STRIP-MCNC: NEGATIVE MG/DL
HCT VFR BLD AUTO: 45.4 % (ref 42–52)
HGB BLD-MCNC: 15.4 G/DL (ref 14–18)
HGB UR QL STRIP.AUTO: NEGATIVE
HOLD SPECIMEN: NORMAL
HOLD SPECIMEN: NORMAL
IMM GRANULOCYTES # BLD: 0.03 10*3/MM3 (ref 0–0.03)
IMM GRANULOCYTES NFR BLD: 0.2 % (ref 0–0.5)
INR PPP: 1.59 (ref 0.9–1.1)
KETONES UR QL STRIP: NEGATIVE
LEUKOCYTE ESTERASE UR QL STRIP.AUTO: NEGATIVE
LYMPHOCYTES # BLD AUTO: 2.41 10*3/MM3 (ref 1–3)
LYMPHOCYTES NFR BLD AUTO: 19.9 % (ref 21–51)
MCH RBC QN AUTO: 29.7 PG (ref 27–33)
MCHC RBC AUTO-ENTMCNC: 33.9 G/DL (ref 33–37)
MCV RBC AUTO: 87.6 FL (ref 80–94)
METHADONE UR QL SCN: NEGATIVE
MONOCYTES # BLD AUTO: 0.87 10*3/MM3 (ref 0.1–0.9)
MONOCYTES NFR BLD AUTO: 7.2 % (ref 0–10)
NEUTROPHILS # BLD AUTO: 8.57 10*3/MM3 (ref 1.4–6.5)
NEUTROPHILS NFR BLD AUTO: 70.8 % (ref 30–70)
NITRITE UR QL STRIP: NEGATIVE
OPIATES UR QL: POSITIVE
OSMOLALITY SERPL CALC.SUM OF ELEC: 280.4 MOSM/KG (ref 273–305)
OXYCODONE UR QL SCN: NEGATIVE
PCP UR QL SCN: NEGATIVE
PH UR STRIP.AUTO: 5.5 [PH] (ref 5–8)
PLATELET # BLD AUTO: 298 10*3/MM3 (ref 130–400)
PMV BLD AUTO: 10.6 FL (ref 6–10)
POTASSIUM BLD-SCNC: 4 MMOL/L (ref 3.5–5.3)
PROT SERPL-MCNC: 7.8 G/DL (ref 6–8)
PROT UR QL STRIP: ABNORMAL
PROTHROMBIN TIME: 19.2 SECONDS (ref 11–15.4)
RBC # BLD AUTO: 5.18 10*6/MM3 (ref 4.7–6.1)
RH BLD: POSITIVE
RH BLD: POSITIVE
SODIUM BLD-SCNC: 140 MMOL/L (ref 135–153)
SP GR UR STRIP: 1.02 (ref 1–1.03)
TROPONIN I SERPL-MCNC: <0.006 NG/ML
UROBILINOGEN UR QL STRIP: ABNORMAL
WBC NRBC COR # BLD: 12.11 10*3/MM3 (ref 4.5–12.5)
WHOLE BLOOD HOLD SPECIMEN: NORMAL
WHOLE BLOOD HOLD SPECIMEN: NORMAL

## 2017-12-06 PROCEDURE — 86900 BLOOD TYPING SEROLOGIC ABO: CPT | Performed by: EMERGENCY MEDICINE

## 2017-12-06 PROCEDURE — 80307 DRUG TEST PRSMV CHEM ANLYZR: CPT | Performed by: EMERGENCY MEDICINE

## 2017-12-06 PROCEDURE — 70450 CT HEAD/BRAIN W/O DYE: CPT

## 2017-12-06 PROCEDURE — 86850 RBC ANTIBODY SCREEN: CPT | Performed by: EMERGENCY MEDICINE

## 2017-12-06 PROCEDURE — 85730 THROMBOPLASTIN TIME PARTIAL: CPT | Performed by: EMERGENCY MEDICINE

## 2017-12-06 PROCEDURE — 96374 THER/PROPH/DIAG INJ IV PUSH: CPT

## 2017-12-06 PROCEDURE — 85610 PROTHROMBIN TIME: CPT | Performed by: EMERGENCY MEDICINE

## 2017-12-06 PROCEDURE — 70450 CT HEAD/BRAIN W/O DYE: CPT | Performed by: RADIOLOGY

## 2017-12-06 PROCEDURE — 86901 BLOOD TYPING SEROLOGIC RH(D): CPT | Performed by: EMERGENCY MEDICINE

## 2017-12-06 PROCEDURE — 99285 EMERGENCY DEPT VISIT HI MDM: CPT

## 2017-12-06 PROCEDURE — 81003 URINALYSIS AUTO W/O SCOPE: CPT | Performed by: EMERGENCY MEDICINE

## 2017-12-06 PROCEDURE — 93005 ELECTROCARDIOGRAM TRACING: CPT | Performed by: EMERGENCY MEDICINE

## 2017-12-06 PROCEDURE — 86900 BLOOD TYPING SEROLOGIC ABO: CPT

## 2017-12-06 PROCEDURE — 85025 COMPLETE CBC W/AUTO DIFF WBC: CPT | Performed by: EMERGENCY MEDICINE

## 2017-12-06 PROCEDURE — 82962 GLUCOSE BLOOD TEST: CPT

## 2017-12-06 PROCEDURE — 71010 XR CHEST 1 VW: CPT | Performed by: RADIOLOGY

## 2017-12-06 PROCEDURE — 84484 ASSAY OF TROPONIN QUANT: CPT | Performed by: EMERGENCY MEDICINE

## 2017-12-06 PROCEDURE — 71010 HC CHEST PA OR AP: CPT

## 2017-12-06 PROCEDURE — 25010000002 MORPHINE PER 10 MG: Performed by: EMERGENCY MEDICINE

## 2017-12-06 PROCEDURE — 93010 ELECTROCARDIOGRAM REPORT: CPT | Performed by: INTERNAL MEDICINE

## 2017-12-06 PROCEDURE — 80053 COMPREHEN METABOLIC PANEL: CPT | Performed by: EMERGENCY MEDICINE

## 2017-12-06 PROCEDURE — 86901 BLOOD TYPING SEROLOGIC RH(D): CPT

## 2017-12-06 RX ORDER — SODIUM CHLORIDE 0.9 % (FLUSH) 0.9 %
10 SYRINGE (ML) INJECTION AS NEEDED
Status: DISCONTINUED | OUTPATIENT
Start: 2017-12-06 | End: 2017-12-06 | Stop reason: HOSPADM

## 2017-12-06 RX ADMIN — MORPHINE SULFATE 4 MG: 4 INJECTION INTRAVENOUS at 21:03

## 2017-12-07 ENCOUNTER — TELEPHONE (OUTPATIENT)
Dept: PHARMACY | Facility: HOSPITAL | Age: 42
End: 2017-12-07

## 2017-12-07 ENCOUNTER — APPOINTMENT (OUTPATIENT)
Dept: PHARMACY | Facility: HOSPITAL | Age: 42
End: 2017-12-07

## 2017-12-07 NOTE — TELEPHONE ENCOUNTER
Patients wife called the clinic today and reported that they did not have transportation to come to the appointment today and needed to cancel but that the patient had been in the emergency department last night for symptoms of stroke. The patient wife reported that a stroke was ruled out and that the patient felt good today and was not having any of the same symptoms. The patients INR was collected last night in the ER and resulted at 1.59. Instructions were given via telephone to the patients wife since they reported that they could not come today and knowing what the patients INR was last night. The patients wife reported that the patient held coumadin dosing Monday night then took 2.5 mg the rest of the time. The patient was instructed to take 5 mg tonight, Saturday, Sunday and 6 mg on Friday then return to the clinic Monday for follow-up and recheck of INR. The patient only has 5 mg tablets on hand and said they would not be able to get a prescription until tomorrow. The patients wife requested the prescription to be sent to [facility].    Called [facility]: Warfarin 1 mg: Take 1 tablet by mouth UD by anticoagulation clinic. #30 Rf:0    Thank you,  Tabitha Peraza. AMARI High  12/07/17  3:56 PM

## 2017-12-07 NOTE — DISCHARGE INSTRUCTIONS

## 2017-12-07 NOTE — ED PROVIDER NOTES
Subjective   HPI Comments: Patient with history of A. fib coronary artery disease status post open heart surgery pacemaker.  Who recently was told his INR was way high and stopped his Coumadin.  Today about 8:15 AM started having left-sided weakness not progressed states than a little confused no speech changes or facial droop.  He also started having chest pain all across  his chest, sharp stabbing pain.     Patient is a 42 y.o. male presenting with neurologic complaint.   History provided by:  Patient   used: No    Neurologic Problem   The patient's primary symptoms include focal weakness. This is a new problem. The current episode started today. The neurological problem developed suddenly. The last time the patient was known to be well was 12/6/2017 8:15 AM.  The problem is unchanged. There was left-sided focality noted. Associated symptoms include chest pain and shortness of breath. Pertinent negatives include no abdominal pain or fever. Past treatments include nothing. The treatment provided no relief.       Review of Systems   Constitutional: Negative.  Negative for fever.   HENT: Negative.    Respiratory: Positive for shortness of breath.    Cardiovascular: Positive for chest pain.   Gastrointestinal: Negative.  Negative for abdominal pain.   Endocrine: Negative.    Genitourinary: Negative.  Negative for dysuria.   Skin: Negative.    Neurological: Positive for focal weakness.   Psychiatric/Behavioral: Negative.    All other systems reviewed and are negative.      Past Medical History:   Diagnosis Date   • Hallucinations    • Heart attack    • Hyperlipidemia    • Hypertension    • Injury of back    • Nervous breakdown     12 years ago   • Stroke        Allergies   Allergen Reactions   • Bee Venom Anaphylaxis   • Fentanyl      Pt states that he stops breathing   • Celexa [Citalopram Hydrobromide]    • Haldol [Haloperidol Lactate]    • Nitroglycerin    • Risperidone And Related        Past  Surgical History:   Procedure Laterality Date   • AORTIC VALVE REPAIR/REPLACEMENT MITRAL VALVE REPAIR/REPLACEMENT     • APPENDECTOMY     • CARDIAC CATHETERIZATION     • CHOLECYSTECTOMY     • HERNIA REPAIR      umbilical   • PACEMAKER IMPLANTATION     • PATELLAR RECONSTRUCTION     • QUADRICEPS REPAIR         History reviewed. No pertinent family history.    Social History     Social History   • Marital status:      Spouse name: N/A   • Number of children: N/A   • Years of education: N/A     Social History Main Topics   • Smoking status: Current Every Day Smoker     Packs/day: 1.00   • Smokeless tobacco: Never Used   • Alcohol use No   • Drug use: No   • Sexual activity: Defer     Other Topics Concern   • None     Social History Narrative           Objective   Physical Exam   Constitutional: He is oriented to person, place, and time. He appears well-developed and well-nourished. No distress.   HENT:   Head: Normocephalic and atraumatic.   Right Ear: External ear normal.   Left Ear: External ear normal.   Nose: Nose normal.   Eyes: Conjunctivae and EOM are normal. Pupils are equal, round, and reactive to light.   Neck: Normal range of motion. Neck supple. No JVD present. No tracheal deviation present.   Cardiovascular: Normal rate, regular rhythm and normal heart sounds.    No murmur heard.  Pulmonary/Chest: Effort normal and breath sounds normal. No respiratory distress. He has no wheezes.   Abdominal: Soft. Bowel sounds are normal. There is no tenderness.   Musculoskeletal: Normal range of motion. He exhibits no edema or deformity.   Neurological: He is alert and oriented to person, place, and time. He has normal strength. No cranial nerve deficit or sensory deficit.   Has some mild decrease in strength in the left side but still 5 over 5   Skin: Skin is warm and dry. No rash noted. He is not diaphoretic. No erythema. No pallor.   Psychiatric: He has a normal mood and affect. His behavior is normal. Thought  content normal.   Nursing note and vitals reviewed.      Procedures         ED Course  ED Course        CT is negative.  His PT is returned 1.97 he is in sinus rhythm at this time.  Had discussion about possibility of a stroke. symptoms began 9 hours ago and is on Coumadin he would not be a candidate for TPA in any case.  He declines going to [place] where there is a stroke center.  States he wants to go home and sleep it off and if he is not better in the morning will return.  Understands the risk that we cannot rule out a completed stroke or that his symptoms may worsen          MDM    Final diagnoses:   Acute left-sided weakness   Atypical chest pain            Hunter Callejas MD  12/06/17 5217

## 2017-12-08 ENCOUNTER — HOSPITAL ENCOUNTER (EMERGENCY)
Facility: HOSPITAL | Age: 42
Discharge: HOME | End: 2017-12-08
Attending: FAMILY MEDICINE | Admitting: FAMILY MEDICINE

## 2017-12-08 VITALS
TEMPERATURE: 97.6 F | HEART RATE: 78 BPM | SYSTOLIC BLOOD PRESSURE: 136 MMHG | WEIGHT: 210 LBS | BODY MASS INDEX: 26.95 KG/M2 | OXYGEN SATURATION: 99 % | RESPIRATION RATE: 18 BRPM | DIASTOLIC BLOOD PRESSURE: 67 MMHG | HEIGHT: 74 IN

## 2017-12-08 DIAGNOSIS — Z79.01 CHRONIC ANTICOAGULATION: ICD-10-CM

## 2017-12-08 DIAGNOSIS — R07.9 CHEST PAIN IN ADULT: Primary | ICD-10-CM

## 2017-12-08 LAB
ALBUMIN SERPL-MCNC: 5.4 G/DL (ref 3.5–5)
ALBUMIN/GLOB SERPL: 1.9 G/DL (ref 1.5–2.5)
ALP SERPL-CCNC: 120 U/L (ref 40–129)
ALT SERPL W P-5'-P-CCNC: 61 U/L (ref 10–44)
ANION GAP SERPL CALCULATED.3IONS-SCNC: 6 MMOL/L (ref 3.6–11.2)
AST SERPL-CCNC: 26 U/L (ref 10–34)
BASOPHILS # BLD AUTO: 0.08 10*3/MM3 (ref 0–0.3)
BASOPHILS NFR BLD AUTO: 0.7 % (ref 0–2)
BILIRUB SERPL-MCNC: 0.5 MG/DL (ref 0.2–1.8)
BUN BLD-MCNC: 15 MG/DL (ref 7–21)
BUN/CREAT SERPL: 12.8 (ref 7–25)
CALCIUM SPEC-SCNC: 9.9 MG/DL (ref 7.7–10)
CHLORIDE SERPL-SCNC: 104 MMOL/L (ref 99–112)
CO2 SERPL-SCNC: 28 MMOL/L (ref 24.3–31.9)
CREAT BLD-MCNC: 1.17 MG/DL (ref 0.43–1.29)
DEPRECATED RDW RBC AUTO: 42.2 FL (ref 37–54)
EOSINOPHIL # BLD AUTO: 0.25 10*3/MM3 (ref 0–0.7)
EOSINOPHIL NFR BLD AUTO: 2.3 % (ref 0–5)
ERYTHROCYTE [DISTWIDTH] IN BLOOD BY AUTOMATED COUNT: 13.4 % (ref 11.5–14.5)
ETHANOL BLD-MCNC: <10 MG/DL
ETHANOL UR QL: <0.01 %
GFR SERPL CREATININE-BSD FRML MDRD: 68 ML/MIN/1.73
GLOBULIN UR ELPH-MCNC: 2.9 GM/DL
GLUCOSE BLD-MCNC: 109 MG/DL (ref 70–110)
HCT VFR BLD AUTO: 48.3 % (ref 42–52)
HGB BLD-MCNC: 16.2 G/DL (ref 14–18)
IMM GRANULOCYTES # BLD: 0.02 10*3/MM3 (ref 0–0.03)
IMM GRANULOCYTES NFR BLD: 0.2 % (ref 0–0.5)
INR PPP: 1.53 (ref 0.9–1.1)
LYMPHOCYTES # BLD AUTO: 2.2 10*3/MM3 (ref 1–3)
LYMPHOCYTES NFR BLD AUTO: 20.3 % (ref 21–51)
MCH RBC QN AUTO: 29 PG (ref 27–33)
MCHC RBC AUTO-ENTMCNC: 33.5 G/DL (ref 33–37)
MCV RBC AUTO: 86.4 FL (ref 80–94)
MONOCYTES # BLD AUTO: 0.79 10*3/MM3 (ref 0.1–0.9)
MONOCYTES NFR BLD AUTO: 7.3 % (ref 0–10)
NEUTROPHILS # BLD AUTO: 7.48 10*3/MM3 (ref 1.4–6.5)
NEUTROPHILS NFR BLD AUTO: 69.2 % (ref 30–70)
OSMOLALITY SERPL CALC.SUM OF ELEC: 277.1 MOSM/KG (ref 273–305)
PLATELET # BLD AUTO: 333 10*3/MM3 (ref 130–400)
PMV BLD AUTO: 10.4 FL (ref 6–10)
POTASSIUM BLD-SCNC: 4 MMOL/L (ref 3.5–5.3)
PROT SERPL-MCNC: 8.3 G/DL (ref 6–8)
PROTHROMBIN TIME: 18.6 SECONDS (ref 11–15.4)
RBC # BLD AUTO: 5.59 10*6/MM3 (ref 4.7–6.1)
SODIUM BLD-SCNC: 138 MMOL/L (ref 135–153)
TROPONIN I SERPL-MCNC: <0.006 NG/ML
TROPONIN I SERPL-MCNC: <0.006 NG/ML
WBC NRBC COR # BLD: 10.82 10*3/MM3 (ref 4.5–12.5)

## 2017-12-08 PROCEDURE — 96375 TX/PRO/DX INJ NEW DRUG ADDON: CPT

## 2017-12-08 PROCEDURE — 96376 TX/PRO/DX INJ SAME DRUG ADON: CPT

## 2017-12-08 PROCEDURE — 96374 THER/PROPH/DIAG INJ IV PUSH: CPT

## 2017-12-08 PROCEDURE — 84484 ASSAY OF TROPONIN QUANT: CPT | Performed by: FAMILY MEDICINE

## 2017-12-08 PROCEDURE — 36415 COLL VENOUS BLD VENIPUNCTURE: CPT

## 2017-12-08 PROCEDURE — 25010000002 LORAZEPAM PER 2 MG: Performed by: FAMILY MEDICINE

## 2017-12-08 PROCEDURE — 85610 PROTHROMBIN TIME: CPT | Performed by: FAMILY MEDICINE

## 2017-12-08 PROCEDURE — 80053 COMPREHEN METABOLIC PANEL: CPT | Performed by: FAMILY MEDICINE

## 2017-12-08 PROCEDURE — 25010000002 HYDROMORPHONE PER 4 MG: Performed by: FAMILY MEDICINE

## 2017-12-08 PROCEDURE — 85025 COMPLETE CBC W/AUTO DIFF WBC: CPT | Performed by: FAMILY MEDICINE

## 2017-12-08 PROCEDURE — 93005 ELECTROCARDIOGRAM TRACING: CPT | Performed by: EMERGENCY MEDICINE

## 2017-12-08 PROCEDURE — 80307 DRUG TEST PRSMV CHEM ANLYZR: CPT | Performed by: FAMILY MEDICINE

## 2017-12-08 PROCEDURE — 99284 EMERGENCY DEPT VISIT MOD MDM: CPT

## 2017-12-08 RX ORDER — HYDROMORPHONE HYDROCHLORIDE 1 MG/ML
0.5 INJECTION, SOLUTION INTRAMUSCULAR; INTRAVENOUS; SUBCUTANEOUS ONCE
Status: COMPLETED | OUTPATIENT
Start: 2017-12-08 | End: 2017-12-08

## 2017-12-08 RX ORDER — SODIUM CHLORIDE 0.9 % (FLUSH) 0.9 %
10 SYRINGE (ML) INJECTION AS NEEDED
Status: DISCONTINUED | OUTPATIENT
Start: 2017-12-08 | End: 2017-12-08 | Stop reason: HOSPADM

## 2017-12-08 RX ORDER — LORAZEPAM 2 MG/ML
1 INJECTION INTRAMUSCULAR ONCE
Status: COMPLETED | OUTPATIENT
Start: 2017-12-08 | End: 2017-12-08

## 2017-12-08 RX ORDER — ASPIRIN 81 MG/1
324 TABLET, CHEWABLE ORAL ONCE
Status: COMPLETED | OUTPATIENT
Start: 2017-12-08 | End: 2017-12-08

## 2017-12-08 RX ORDER — FAMOTIDINE 10 MG/ML
20 INJECTION, SOLUTION INTRAVENOUS ONCE
Status: COMPLETED | OUTPATIENT
Start: 2017-12-08 | End: 2017-12-08

## 2017-12-08 RX ADMIN — FAMOTIDINE 20 MG: 10 INJECTION INTRAVENOUS at 17:04

## 2017-12-08 RX ADMIN — HYDROMORPHONE HYDROCHLORIDE 1 MG: 1 INJECTION, SOLUTION INTRAMUSCULAR; INTRAVENOUS; SUBCUTANEOUS at 18:20

## 2017-12-08 RX ADMIN — ASPIRIN 324 MG: 81 TABLET, CHEWABLE ORAL at 17:03

## 2017-12-08 RX ADMIN — HYDROMORPHONE HYDROCHLORIDE 0.5 MG: 1 INJECTION, SOLUTION INTRAMUSCULAR; INTRAVENOUS; SUBCUTANEOUS at 17:04

## 2017-12-08 RX ADMIN — HYDROMORPHONE HYDROCHLORIDE 1 MG: 1 INJECTION, SOLUTION INTRAMUSCULAR; INTRAVENOUS; SUBCUTANEOUS at 19:39

## 2017-12-08 RX ADMIN — LORAZEPAM 1 MG: 2 INJECTION INTRAMUSCULAR; INTRAVENOUS at 18:21

## 2017-12-08 NOTE — ED PROVIDER NOTES
"Subjective   History of Present Illness  41 y/o M here w/ continued CP for several weeks. Pt was recently admitted for CP and had stress test that was read as a \"low risk study.\" Pt also underwent MELODY during recent admission with EF of 65%. Pt states that the CP is no worse but will not resolve since his admission. Pt is currently being worked up by Dr Pastrana as outpt and reports he has been referred to specialists in [place] but has not had appt yet. Pt denies any SOA. Pt reports some skin lesions in the midline chest over his scar from valve repair and states that the area has some tenderness and redness. No fevers/chills. Pt reports that the only thing that relieves his CP is dilaudid.   Review of Systems   Constitutional: Negative for chills, fatigue and fever.   Eyes: Negative for photophobia and visual disturbance.   Respiratory: Negative for cough, chest tightness, shortness of breath and wheezing.    Cardiovascular: Positive for chest pain. Negative for palpitations.   Gastrointestinal: Negative for abdominal distention, abdominal pain, diarrhea, nausea and vomiting.   Genitourinary: Negative for difficulty urinating and dysuria.   Musculoskeletal: Negative for back pain and neck pain.   Skin: Positive for rash. Negative for color change and pallor.   Neurological: Negative for tremors, seizures, light-headedness, numbness and headaches.   Hematological: Bruises/bleeds easily.   All other systems reviewed and are negative.      Past Medical History:   Diagnosis Date   • Hallucinations    • Heart attack    • Hyperlipidemia    • Hypertension    • Injury of back    • Nervous breakdown     12 years ago   • Stroke        Allergies   Allergen Reactions   • Bee Venom Anaphylaxis   • Fentanyl      Pt states that he stops breathing   • Celexa [Citalopram Hydrobromide]    • Haldol [Haloperidol Lactate]    • Nitroglycerin    • Risperidone And Related        Past Surgical History:   Procedure Laterality Date   • AORTIC " VALVE REPAIR/REPLACEMENT MITRAL VALVE REPAIR/REPLACEMENT     • APPENDECTOMY     • CARDIAC CATHETERIZATION     • CHOLECYSTECTOMY     • HERNIA REPAIR      umbilical   • PACEMAKER IMPLANTATION     • PATELLAR RECONSTRUCTION     • QUADRICEPS REPAIR         History reviewed. No pertinent family history.    Social History     Social History   • Marital status:      Spouse name: N/A   • Number of children: N/A   • Years of education: N/A     Social History Main Topics   • Smoking status: Current Every Day Smoker     Packs/day: 1.00   • Smokeless tobacco: Never Used   • Alcohol use No   • Drug use: No   • Sexual activity: Defer     Other Topics Concern   • None     Social History Narrative           Objective   Physical Exam   Constitutional: He is oriented to person, place, and time. He appears well-developed and well-nourished. He is active.   HENT:   Head: Normocephalic and atraumatic.   Right Ear: Hearing and external ear normal.   Left Ear: Hearing and external ear normal.   Nose: Nose normal.   Mouth/Throat: Uvula is midline, oropharynx is clear and moist and mucous membranes are normal.   Eyes: Conjunctivae, EOM and lids are normal. Pupils are equal, round, and reactive to light.   Neck: Trachea normal, normal range of motion, full passive range of motion without pain and phonation normal. Neck supple.   Cardiovascular: Normal rate, regular rhythm and normal heart sounds.    Pulmonary/Chest: Effort normal and breath sounds normal.   Abdominal: Soft. Normal appearance. There is no tenderness.   Neurological: He is alert and oriented to person, place, and time. GCS eye subscore is 4. GCS verbal subscore is 5. GCS motor subscore is 6.   Skin: Skin is warm, dry and intact.        Psychiatric: He has a normal mood and affect. His speech is normal and behavior is normal. Cognition and memory are normal.   Nursing note and vitals reviewed.      Procedures         ED Course  ED Course      Pt has Tn neg x 2. HEART  score of 3. Pt had stress test within the past two weeks that was read as a low risk study. Pt has had multiple presentations to this ED with this continued CP and has been r/o for MI multiple times. Pt continued to require dilaudid but at d/c states his pain is much improved.            MDM  Number of Diagnoses or Management Options  Chest pain in adult: new and requires workup  Chronic anticoagulation: established and worsening     Amount and/or Complexity of Data Reviewed  Clinical lab tests: reviewed and ordered  Tests in the medicine section of CPT®: reviewed and ordered  Independent visualization of images, tracings, or specimens: yes    Risk of Complications, Morbidity, and/or Mortality  Presenting problems: high  Diagnostic procedures: high  Management options: high    Patient Progress  Patient progress: stable      Final diagnoses:   Chest pain in adult   Chronic anticoagulation            Ezra Joselo Yousif MD  12/08/17 1946

## 2017-12-11 ENCOUNTER — TELEPHONE (OUTPATIENT)
Dept: PHARMACY | Facility: HOSPITAL | Age: 42
End: 2017-12-11

## 2017-12-11 ENCOUNTER — APPOINTMENT (OUTPATIENT)
Dept: PHARMACY | Facility: HOSPITAL | Age: 42
End: 2017-12-11

## 2017-12-11 NOTE — TELEPHONE ENCOUNTER
Patient did not show for coumadin clinic appointment today. Called patient at phone number provided on face sheet. The phone went directly to voicemail and voicemail was not set up. Called x 2 with no success.       Thank you,  Tabitha Peraza. AMARI High  12/11/17  4:45 PM

## 2017-12-12 ENCOUNTER — TELEPHONE (OUTPATIENT)
Dept: PHARMACY | Facility: HOSPITAL | Age: 42
End: 2017-12-12

## 2017-12-12 ENCOUNTER — TELEPHONE (OUTPATIENT)
Dept: CARDIOLOGY | Facility: CLINIC | Age: 42
End: 2017-12-12

## 2017-12-12 NOTE — TELEPHONE ENCOUNTER
Heather called from the coag clinic and stated that Bahman has no showed his last 2 appts and if he no shows his next one that they will have to dismiss him. She stated that he has an apt with  tomorrow and wanted to know if we can mention to him about it. She stated that he can call them tomorrow and they will work him in so he can get his INR checked. Their phone number is [phone].

## 2017-12-12 NOTE — TELEPHONE ENCOUNTER
I have attempted to call the patient x 3 for missed appointment.  I am sending a letter of missed appointment to the address listed in the patient's chart.      Heather Dillard [facility]  12/12/17  10:40 AM      Spoke with Haleigh at Dr. Mcclellan's office and asked her to advise Pt to come to Coumadin Clinic ASAP.  Explained to her that he has not shown up since his original appointment and we have not been able to reach him.     Heather Dillard [facility]  12/12/17  1:31 PM

## 2017-12-13 ENCOUNTER — TELEPHONE (OUTPATIENT)
Dept: PHARMACY | Facility: HOSPITAL | Age: 42
End: 2017-12-13

## 2017-12-13 NOTE — TELEPHONE ENCOUNTER
Mrs. Banks called the coumadin clinic today and said that they are not going to be able to come to the clinic today and probably not this week.  They do not have any gas money and she said that her  only has medicare but not been able to get medicaid yet so they can't afford the fee for RT[facility].  She also said that they called Dr. Mcclellan's office and told them that they wanted to see Dr. Pastrana instead of Eleuterio and the office didn't have an open appointment for them until January.  I explained the severity of their situation of being on warfarin without being monitored.  She said she knows but that there is nothing they can do about it at this time.  She said he is taking warfarin 5mg daily since he left the ER on 12/8 and that is the dose they told him to continue with his last INR of 1.53.  I told her that I was going to check on a few things for them and call them back and she gave me a phone number to call her ([phone]).  I then called Justyna Webb and she recommended calling Michael Bean and that she may have gas cards.  She said to call her back if Michael didn't and she may have a  look into it.  I called Michael and left a message to call me as soon as possible along with her coworker in their office.    I then called Carey at Dr. Mcclellan's office and explained the situation.  She said there is no notes about the wife calling and there was not an appointment scheduled with any of their cardiologists.  She said she is going to call the patient and talk to them and try to get them to come in sooner.  They would be able to check the inr while he was at the office but they wouldn't be able to continue since they will not be allowed to check inrs at the beginning of the year.  She said she would call back and let us know what was said between her and the family on Thursday.    Renee Winston [facility]  12/13/17  6:08 PM

## 2017-12-13 NOTE — TELEPHONE ENCOUNTER
Patient apparently showed for appointment today and only wants to see Dr. Pastrana.  They apparently said they did not have money to get the PT/INRs checked.  We'll let Dr. Pastrana discuss at his next appointment and see if we are going to be able to continue warfarin as it will be dangerous to continue without monitoring and we will not be able to prescribe this for him.

## 2017-12-14 ENCOUNTER — ANTICOAGULATION VISIT (OUTPATIENT)
Dept: PHARMACY | Facility: HOSPITAL | Age: 42
End: 2017-12-14

## 2017-12-14 DIAGNOSIS — I48.91 ATRIAL FIBRILLATION, UNSPECIFIED TYPE (HCC): ICD-10-CM

## 2017-12-14 LAB — INR PPP: 3.3 (ref 0.9–1.1)

## 2017-12-14 PROCEDURE — 85610 PROTHROMBIN TIME: CPT

## 2017-12-14 PROCEDURE — G0463 HOSPITAL OUTPT CLINIC VISIT: HCPCS | Performed by: PHARMACIST

## 2017-12-14 PROCEDURE — 36416 COLLJ CAPILLARY BLOOD SPEC: CPT

## 2017-12-14 NOTE — PROGRESS NOTES
Anticoagulation Clinic Progress Note  Indication:     Referring Provider:   Initial Warfarin Start Date:   Planned Duration of Therapy:   Goal INR: 2.0-3.0  Current Drug Interactions: Aspirin, Fluoxetine      Anticoagulation Clinic INR History:  Date 12/4/17 12/14/17          Total Weekly Dose  35mg          INR 4.8 3.3              Clinic Interview:  Tablet Strength: pt has 5mg tablets  Current Dose: pt verified dose of 5mg daily (Pt was given those instructions at the ER)     Clinical Outcomes      Negatives Major bleeding event, Thromboembolic event, Anticoagulation-related hospital admission, Anticoagulation-related ED visit, Anticoagulation-related fatality     Comments Pt reports that he came to the ER and may have had a stroke.  MD recommended pt be sent to [facility], pt denied.       Pt reported minor bleeding to finger (that he cut) that stopped with pressure.        Patient Findings      Positives Signs/symptoms of thrombosis, Signs/symptoms of bleeding, Change in health, Emergency department visit     Negatives Laboratory test error suspected, Change in alcohol use, Change in activity, Upcoming invasive procedure, Upcoming dental procedure, Missed doses, Extra doses, Change in medications, Change in diet/appetite, Hospital admission, Bruising, Other complaints     Comments Pt reports that he came to the ER and may have had a stroke.  MD recommended pt be sent to [facility], pt denied.       Pt reported minor bleeding to finger (that he cut) that stopped with pressure.          Plan:  1. INR is 3.3. Instructed pt to take 2.5mg on Mon, Thursday and 5mg ROW   2. RTC Thursday   3. Medications: reports no changes  4. Pt declines refills.  5. Verbal and written information provided. Pt expresses understanding and has no further questions at this time.  6. Pt states they will call to see about getting sooner appointment with cardiologist and PCP (Tessa Hurtado)   7. Pt has had trouble with transportation issues.  Pt was  counseled on the importance of monitoring INR and the dangers of warfarin without proper monitoring.  Pt has completed a [employer] application to see if assistance is available for transportation.  I will also see if PCP would be interested in providing Home Health Referral for monitoring.   8.  Pt counseled to go to ER with any S/Sx of stroke/clot or S/Sx of severe bleeding.         Heather Dillard, PharmD  12/14/2017  11:33 AM

## 2017-12-18 ENCOUNTER — APPOINTMENT (OUTPATIENT)
Dept: GENERAL RADIOLOGY | Facility: HOSPITAL | Age: 42
End: 2017-12-18

## 2017-12-18 ENCOUNTER — HOSPITAL ENCOUNTER (EMERGENCY)
Facility: HOSPITAL | Age: 42
Discharge: HOME | End: 2017-12-19
Attending: FAMILY MEDICINE | Admitting: FAMILY MEDICINE

## 2017-12-18 DIAGNOSIS — Z86.79 HISTORY OF CORONARY ARTERY DISEASE: ICD-10-CM

## 2017-12-18 DIAGNOSIS — R07.9 CHEST PAIN IN ADULT: Primary | ICD-10-CM

## 2017-12-18 LAB
ALBUMIN SERPL-MCNC: 4.7 G/DL (ref 3.5–5)
ALBUMIN/GLOB SERPL: 1.7 G/DL (ref 1.5–2.5)
ALP SERPL-CCNC: 112 U/L (ref 40–129)
ALT SERPL W P-5'-P-CCNC: 29 U/L (ref 10–44)
ANION GAP SERPL CALCULATED.3IONS-SCNC: 6.8 MMOL/L (ref 3.6–11.2)
AST SERPL-CCNC: 17 U/L (ref 10–34)
BASOPHILS # BLD AUTO: 0.08 10*3/MM3 (ref 0–0.3)
BASOPHILS NFR BLD AUTO: 0.6 % (ref 0–2)
BILIRUB SERPL-MCNC: 0.3 MG/DL (ref 0.2–1.8)
BNP SERPL-MCNC: <2 PG/ML (ref 0–100)
BUN BLD-MCNC: 13 MG/DL (ref 7–21)
BUN/CREAT SERPL: 9.4 (ref 7–25)
CALCIUM SPEC-SCNC: 9.2 MG/DL (ref 7.7–10)
CHLORIDE SERPL-SCNC: 112 MMOL/L (ref 99–112)
CO2 SERPL-SCNC: 25.2 MMOL/L (ref 24.3–31.9)
CREAT BLD-MCNC: 1.39 MG/DL (ref 0.43–1.29)
DEPRECATED RDW RBC AUTO: 44.5 FL (ref 37–54)
EOSINOPHIL # BLD AUTO: 0.44 10*3/MM3 (ref 0–0.7)
EOSINOPHIL NFR BLD AUTO: 3.3 % (ref 0–5)
ERYTHROCYTE [DISTWIDTH] IN BLOOD BY AUTOMATED COUNT: 13.8 % (ref 11.5–14.5)
GFR SERPL CREATININE-BSD FRML MDRD: 56 ML/MIN/1.73
GLOBULIN UR ELPH-MCNC: 2.7 GM/DL
GLUCOSE BLD-MCNC: 100 MG/DL (ref 70–110)
HCT VFR BLD AUTO: 45.5 % (ref 42–52)
HGB BLD-MCNC: 15 G/DL (ref 14–18)
IMM GRANULOCYTES # BLD: 0.04 10*3/MM3 (ref 0–0.03)
IMM GRANULOCYTES NFR BLD: 0.3 % (ref 0–0.5)
INR PPP: 1.55 (ref 0.9–1.1)
LYMPHOCYTES # BLD AUTO: 3.14 10*3/MM3 (ref 1–3)
LYMPHOCYTES NFR BLD AUTO: 23.5 % (ref 21–51)
MCH RBC QN AUTO: 29.5 PG (ref 27–33)
MCHC RBC AUTO-ENTMCNC: 33 G/DL (ref 33–37)
MCV RBC AUTO: 89.6 FL (ref 80–94)
MONOCYTES # BLD AUTO: 1.05 10*3/MM3 (ref 0.1–0.9)
MONOCYTES NFR BLD AUTO: 7.9 % (ref 0–10)
NEUTROPHILS # BLD AUTO: 8.6 10*3/MM3 (ref 1.4–6.5)
NEUTROPHILS NFR BLD AUTO: 64.4 % (ref 30–70)
OSMOLALITY SERPL CALC.SUM OF ELEC: 287 MOSM/KG (ref 273–305)
PLATELET # BLD AUTO: 397 10*3/MM3 (ref 130–400)
PMV BLD AUTO: 10.2 FL (ref 6–10)
POTASSIUM BLD-SCNC: 4.6 MMOL/L (ref 3.5–5.3)
PROT SERPL-MCNC: 7.4 G/DL (ref 6–8)
PROTHROMBIN TIME: 18.8 SECONDS (ref 11–15.4)
RBC # BLD AUTO: 5.08 10*6/MM3 (ref 4.7–6.1)
SODIUM BLD-SCNC: 144 MMOL/L (ref 135–153)
TROPONIN I SERPL-MCNC: <0.006 NG/ML
WBC NRBC COR # BLD: 13.35 10*3/MM3 (ref 4.5–12.5)

## 2017-12-18 PROCEDURE — 83880 ASSAY OF NATRIURETIC PEPTIDE: CPT | Performed by: FAMILY MEDICINE

## 2017-12-18 PROCEDURE — 99284 EMERGENCY DEPT VISIT MOD MDM: CPT

## 2017-12-18 PROCEDURE — 84484 ASSAY OF TROPONIN QUANT: CPT | Performed by: FAMILY MEDICINE

## 2017-12-18 PROCEDURE — 80053 COMPREHEN METABOLIC PANEL: CPT | Performed by: FAMILY MEDICINE

## 2017-12-18 PROCEDURE — 85025 COMPLETE CBC W/AUTO DIFF WBC: CPT | Performed by: FAMILY MEDICINE

## 2017-12-18 PROCEDURE — 71010 HC CHEST PA OR AP: CPT

## 2017-12-18 PROCEDURE — 71010 XR CHEST 1 VW: CPT | Performed by: RADIOLOGY

## 2017-12-18 PROCEDURE — 36415 COLL VENOUS BLD VENIPUNCTURE: CPT

## 2017-12-18 PROCEDURE — 93005 ELECTROCARDIOGRAM TRACING: CPT | Performed by: FAMILY MEDICINE

## 2017-12-18 PROCEDURE — 93010 ELECTROCARDIOGRAM REPORT: CPT | Performed by: INTERNAL MEDICINE

## 2017-12-18 PROCEDURE — 85610 PROTHROMBIN TIME: CPT | Performed by: FAMILY MEDICINE

## 2017-12-19 ENCOUNTER — APPOINTMENT (OUTPATIENT)
Dept: ULTRASOUND IMAGING | Facility: HOSPITAL | Age: 42
End: 2017-12-19

## 2017-12-19 VITALS
HEART RATE: 78 BPM | HEIGHT: 74 IN | WEIGHT: 210 LBS | OXYGEN SATURATION: 96 % | DIASTOLIC BLOOD PRESSURE: 89 MMHG | SYSTOLIC BLOOD PRESSURE: 126 MMHG | TEMPERATURE: 98.2 F | BODY MASS INDEX: 26.95 KG/M2 | RESPIRATION RATE: 16 BRPM

## 2017-12-19 LAB — TROPONIN I SERPL-MCNC: <0.006 NG/ML

## 2017-12-19 PROCEDURE — 84484 ASSAY OF TROPONIN QUANT: CPT | Performed by: FAMILY MEDICINE

## 2017-12-19 PROCEDURE — 25010000002 ONDANSETRON PER 1 MG: Performed by: FAMILY MEDICINE

## 2017-12-19 PROCEDURE — 25010000002 HYDROMORPHONE PER 4 MG: Performed by: FAMILY MEDICINE

## 2017-12-19 PROCEDURE — 25010000002 HYDROMORPHONE PER 4 MG

## 2017-12-19 PROCEDURE — 36415 COLL VENOUS BLD VENIPUNCTURE: CPT

## 2017-12-19 PROCEDURE — 96375 TX/PRO/DX INJ NEW DRUG ADDON: CPT

## 2017-12-19 PROCEDURE — 96361 HYDRATE IV INFUSION ADD-ON: CPT

## 2017-12-19 PROCEDURE — 96374 THER/PROPH/DIAG INJ IV PUSH: CPT

## 2017-12-19 RX ORDER — HYDROMORPHONE HCL 110MG/55ML
PATIENT CONTROLLED ANALGESIA SYRINGE INTRAVENOUS
Status: COMPLETED
Start: 2017-12-19 | End: 2017-12-19

## 2017-12-19 RX ORDER — HYDROMORPHONE HYDROCHLORIDE 1 MG/ML
0.5 INJECTION, SOLUTION INTRAMUSCULAR; INTRAVENOUS; SUBCUTANEOUS ONCE
Status: DISCONTINUED | OUTPATIENT
Start: 2017-12-19 | End: 2017-12-19 | Stop reason: HOSPADM

## 2017-12-19 RX ORDER — ONDANSETRON 2 MG/ML
4 INJECTION INTRAMUSCULAR; INTRAVENOUS ONCE
Status: COMPLETED | OUTPATIENT
Start: 2017-12-19 | End: 2017-12-19

## 2017-12-19 RX ORDER — HYDROMORPHONE HYDROCHLORIDE 1 MG/ML
0.5 INJECTION, SOLUTION INTRAMUSCULAR; INTRAVENOUS; SUBCUTANEOUS ONCE
Status: COMPLETED | OUTPATIENT
Start: 2017-12-19 | End: 2017-12-19

## 2017-12-19 RX ORDER — SODIUM CHLORIDE 0.9 % (FLUSH) 0.9 %
10 SYRINGE (ML) INJECTION AS NEEDED
Status: DISCONTINUED | OUTPATIENT
Start: 2017-12-19 | End: 2017-12-19 | Stop reason: HOSPADM

## 2017-12-19 RX ORDER — FAMOTIDINE 10 MG/ML
20 INJECTION, SOLUTION INTRAVENOUS ONCE
Status: COMPLETED | OUTPATIENT
Start: 2017-12-19 | End: 2017-12-19

## 2017-12-19 RX ADMIN — HYDROMORPHONE HYDROCHLORIDE 1 MG: 2 INJECTION INTRAMUSCULAR; INTRAVENOUS; SUBCUTANEOUS at 00:19

## 2017-12-19 RX ADMIN — ONDANSETRON 4 MG: 2 INJECTION, SOLUTION INTRAMUSCULAR; INTRAVENOUS at 01:03

## 2017-12-19 RX ADMIN — SODIUM CHLORIDE 1000 ML: 9 INJECTION, SOLUTION INTRAVENOUS at 00:20

## 2017-12-19 RX ADMIN — HYDROMORPHONE HYDROCHLORIDE 0.5 MG: 1 INJECTION, SOLUTION INTRAMUSCULAR; INTRAVENOUS; SUBCUTANEOUS at 02:17

## 2017-12-19 RX ADMIN — FAMOTIDINE 20 MG: 10 INJECTION INTRAVENOUS at 01:03

## 2017-12-19 RX ADMIN — HYDROMORPHONE HYDROCHLORIDE 0.5 MG: 2 INJECTION INTRAMUSCULAR; INTRAVENOUS; SUBCUTANEOUS at 01:03

## 2017-12-19 NOTE — ED PROVIDER NOTES
Subjective   History of Present Illness  43 y/o M w/h/o MI in the past p/w continued CP for 2 days. Pt has been seen multiple times within the past few weeks for this continued CP that is believed to be noncardiac in nature. Pt had a recent admission to this hosp and had stress test performed that was low risk for ischemic disease. Pt states his CP has been continuous and worsened today. Pt has had LUE symptoms with this CP in the past. Pt denies any SOA. Pt has been adherent to his coumadin.  Review of Systems   Constitutional: Negative for chills, fatigue and fever.   Eyes: Negative for photophobia and visual disturbance.   Respiratory: Negative for cough, shortness of breath and wheezing.    Cardiovascular: Positive for chest pain. Negative for palpitations and leg swelling.   Gastrointestinal: Negative for abdominal distention, abdominal pain, diarrhea, nausea and vomiting.   Genitourinary: Negative for difficulty urinating and dysuria.   Musculoskeletal: Negative for back pain and neck pain.   Skin: Negative for color change and pallor.   Hematological: Bruises/bleeds easily.   All other systems reviewed and are negative.      Past Medical History:   Diagnosis Date   • Hallucinations    • Heart attack    • Hyperlipidemia    • Hypertension    • Injury of back    • Nervous breakdown     12 years ago   • Stroke        Allergies   Allergen Reactions   • Bee Venom Anaphylaxis   • Fentanyl      Pt states that he stops breathing   • Celexa [Citalopram Hydrobromide]    • Haldol [Haloperidol Lactate]    • Nitroglycerin    • Risperidone And Related        Past Surgical History:   Procedure Laterality Date   • AORTIC VALVE REPAIR/REPLACEMENT MITRAL VALVE REPAIR/REPLACEMENT     • APPENDECTOMY     • CARDIAC CATHETERIZATION     • CHOLECYSTECTOMY     • HERNIA REPAIR      umbilical   • PACEMAKER IMPLANTATION     • PATELLAR RECONSTRUCTION     • QUADRICEPS REPAIR         History reviewed. No pertinent family history.    Social  History     Social History   • Marital status:      Spouse name: N/A   • Number of children: N/A   • Years of education: N/A     Social History Main Topics   • Smoking status: Current Every Day Smoker     Packs/day: 1.00   • Smokeless tobacco: Never Used   • Alcohol use No   • Drug use: No   • Sexual activity: Defer     Other Topics Concern   • None     Social History Narrative           Objective   Physical Exam   Constitutional: He is oriented to person, place, and time. He appears well-developed and well-nourished. He is active.   HENT:   Head: Normocephalic and atraumatic.   Right Ear: Hearing and external ear normal.   Left Ear: Hearing and external ear normal.   Nose: Nose normal.   Mouth/Throat: Uvula is midline, oropharynx is clear and moist and mucous membranes are normal.   Eyes: Conjunctivae, EOM and lids are normal. Pupils are equal, round, and reactive to light.   Neck: Trachea normal, normal range of motion, full passive range of motion without pain and phonation normal. Neck supple.   Cardiovascular: Normal rate and regular rhythm.    Murmur heard.  Pulmonary/Chest: Effort normal and breath sounds normal.   Abdominal: Soft. Normal appearance.   Neurological: He is alert and oriented to person, place, and time. GCS eye subscore is 4. GCS verbal subscore is 5. GCS motor subscore is 6.   Skin: Skin is warm, dry and intact.   Psychiatric: He has a normal mood and affect. His speech is normal and behavior is normal. Cognition and memory are normal.   Nursing note and vitals reviewed.      Procedures         ED Course  ED Course   Value Comment By Time   ECG 12 Lead Atrial-sensed ventricular-paced rhythm. 80 bpm. QTc 463ms. Ezra Yousif MD 12/19 0031      Pt's Tn negative x 2, no acute EKG changes concerning for STEMI. Pt with recent admission for this CP that was evaluated with stress test that was read as a low risk study. Pt pain well controlled with dilaudid and resolved by the time  of d/c. Pt to f/u with Dr Pastrana as outpt and f/u with coumadin clinic.  0201-I spoke to both Dr Pastrana and Dr Rodarte who agreed pt did not need admission for this pain. Dr Pastrana informed me that the pt was not f/u as an outpt as he should. Additionally, pt's insistence on dilaudid and morphine to handle his pain is concerning. Pt informed that he would need to f/u as an outpt with Dr Pastrana and maintain his anticoagulation clinic appts.            MDM  Number of Diagnoses or Management Options  Chest pain in adult: established and worsening  History of coronary artery disease: established and worsening     Amount and/or Complexity of Data Reviewed  Clinical lab tests: reviewed and ordered  Tests in the radiology section of CPT®: reviewed and ordered  Tests in the medicine section of CPT®: reviewed and ordered  Decide to obtain previous medical records or to obtain history from someone other than the patient: yes  Independent visualization of images, tracings, or specimens: yes    Risk of Complications, Morbidity, and/or Mortality  Presenting problems: high  Diagnostic procedures: high  Management options: high    Patient Progress  Patient progress: stable      Final diagnoses:   Chest pain in adult   History of coronary artery disease            Ezra Yousif MD  12/19/17 0140       Ezra Yousif MD  12/19/17 0141       Ezra Yousif MD  12/19/17 0204       Ezra Yousif MD  12/19/17 0210

## 2017-12-19 NOTE — ED NOTES
Pt awaiting someone to pick him up at this time. Pt states family will be at the hospital in 20 minutes.      Tereza Ochoa RN  12/19/17 0602

## 2017-12-19 NOTE — ED NOTES
Pt requesting a muscle relaxer prescription to go home with. Dr. Sandoval made aware, no new prescriptions provided.      Tereza Ochoa RN  12/19/17 0549

## 2017-12-19 NOTE — ED NOTES
0.5mg dilaudid administered from prior 2mg vial administration. 0.5mg from 2mg administered at 0103.  1mg total wasted in accudose.      Tereza Ochoa RN  12/19/17 7715

## 2017-12-19 NOTE — ED NOTES
Pt presents to the ER stating that he has been having left sided chest pain for 4 weeks. Pt states that he has a history of valve replacement and stroke. Pt states that he was diagnosed previously with chest wall pain, but this pain is different today than usual. Pt states that today pain began radiating down his left arm and making his fingers tingle. Pt denies any other symptoms other than left sided chest pain rating it 10/10 that is unresolved with rest. Pt states that he was recently seen by Dr. Pastrana and started on Coumadin and has been making visits to a clinic to monitor his levels.      Tereza Ochoa RN  12/18/17 0370

## 2017-12-19 NOTE — ED NOTES
"Pt standing in the hallway stating, \"I removed my IV, I'm ready to leave.\" Pt redirected to Pt room and gauze applied to area.      Tereza Ochoa RN  12/19/17 0600    "

## 2017-12-20 LAB
INR PPP: 3.3 (ref 0.91–1.09)
PROTHROMBIN TIME: 40 SECONDS (ref 10–13.8)

## 2017-12-21 ENCOUNTER — ANTICOAGULATION VISIT (OUTPATIENT)
Dept: PHARMACY | Facility: HOSPITAL | Age: 42
End: 2017-12-21

## 2017-12-21 DIAGNOSIS — I48.91 ATRIAL FIBRILLATION, UNSPECIFIED TYPE (HCC): ICD-10-CM

## 2017-12-21 LAB
INR PPP: 1.2 (ref 0.91–1.09)
PROTHROMBIN TIME: 14.5 SECONDS (ref 10–13.8)

## 2017-12-21 PROCEDURE — G0463 HOSPITAL OUTPT CLINIC VISIT: HCPCS | Performed by: PHARMACIST

## 2017-12-21 PROCEDURE — 36416 COLLJ CAPILLARY BLOOD SPEC: CPT

## 2017-12-21 PROCEDURE — 85610 PROTHROMBIN TIME: CPT

## 2017-12-21 NOTE — PROGRESS NOTES
Anticoagulation Clinic Progress Note  Indication: A. Fib     Referring Provider: Dr. Pastrana    Initial Warfarin Start Date:   Planned Duration of Therapy: Indefinite  Goal INR: 2.0-3.0  Current Drug Interactions: Aspirin, Fluoxetine      Anticoagulation Clinic INR History:  Date 12/4/17 12/14/17 12/21/17         Total Weekly Dose  35mg 22.5mg         INR 4.8 3.3 1.2             Clinic Interview:  Tablet Strength: pt has 5mg tablets  Current Dose: pt verified he missed a dose Monday and Wed otherwise   Clinical Outcomes      Negatives Major bleeding event, Thromboembolic event, Anticoagulation-related hospital admission, Anticoagulation-related ED visit, Anticoagulation-related fatality     Comments Missed Monday 12/18 and 12/20        Patient Findings      Positives Emergency department visit, Missed doses     Negatives Signs/symptoms of thrombosis, Signs/symptoms of bleeding, Laboratory test error suspected, Change in health, Change in alcohol use, Change in activity, Upcoming invasive procedure, Upcoming dental procedure, Extra doses, Change in medications, Change in diet/appetite, Hospital admission, Bruising, Other complaints     Comments Missed Monday 12/18 and 12/20        Plan:  1. INR is 1.2, likely due to non-compliance.  Instructed pt to take 7.5mg tonight and tomorrow then 5mg until RTC.  Also Lovenox 100mg BID x 3 days   2. RTC Tuesday.   3. Medications: reports no changes  4. Pt declines refills.  5. Verbal and written information provided. Pt expresses understanding and has no further questions at this time.  6. Pt counseled to go to ER with any S/Sx of stroke/clot or S/Sx of severe bleeding. Emphasized the importance of taking warfarin as prescribed.      Heather Dillard, PharmD  12/21/2017  10:43 AM

## 2017-12-22 ENCOUNTER — HOSPITAL ENCOUNTER (EMERGENCY)
Facility: HOSPITAL | Age: 42
Discharge: HOME | End: 2017-12-22
Attending: FAMILY MEDICINE | Admitting: EMERGENCY MEDICINE

## 2017-12-22 ENCOUNTER — APPOINTMENT (OUTPATIENT)
Dept: GENERAL RADIOLOGY | Facility: HOSPITAL | Age: 42
End: 2017-12-22

## 2017-12-22 VITALS
SYSTOLIC BLOOD PRESSURE: 116 MMHG | HEART RATE: 105 BPM | BODY MASS INDEX: 26.95 KG/M2 | TEMPERATURE: 98.5 F | OXYGEN SATURATION: 97 % | DIASTOLIC BLOOD PRESSURE: 86 MMHG | RESPIRATION RATE: 20 BRPM | WEIGHT: 210 LBS | HEIGHT: 74 IN

## 2017-12-22 DIAGNOSIS — R07.9 CHEST PAIN, UNSPECIFIED TYPE: Primary | ICD-10-CM

## 2017-12-22 LAB
ALBUMIN SERPL-MCNC: 4.6 G/DL (ref 3.5–5)
ALBUMIN/GLOB SERPL: 1.8 G/DL (ref 1.5–2.5)
ALP SERPL-CCNC: 102 U/L (ref 40–129)
ALT SERPL W P-5'-P-CCNC: 32 U/L (ref 10–44)
ANION GAP SERPL CALCULATED.3IONS-SCNC: 5 MMOL/L (ref 3.6–11.2)
AST SERPL-CCNC: 19 U/L (ref 10–34)
BASOPHILS # BLD AUTO: 0.04 10*3/MM3 (ref 0–0.3)
BASOPHILS NFR BLD AUTO: 0.2 % (ref 0–2)
BILIRUB SERPL-MCNC: 0.3 MG/DL (ref 0.2–1.8)
BUN BLD-MCNC: 6 MG/DL (ref 7–21)
BUN/CREAT SERPL: 5.6 (ref 7–25)
CALCIUM SPEC-SCNC: 9 MG/DL (ref 7.7–10)
CHLORIDE SERPL-SCNC: 110 MMOL/L (ref 99–112)
CK MB SERPL-CCNC: 0.27 NG/ML (ref 0–5)
CK MB SERPL-RTO: 0.4 % (ref 0–3)
CK SERPL-CCNC: 76 U/L (ref 24–204)
CO2 SERPL-SCNC: 25 MMOL/L (ref 24.3–31.9)
CREAT BLD-MCNC: 1.07 MG/DL (ref 0.43–1.29)
D-LACTATE SERPL-SCNC: 0.7 MMOL/L (ref 0.5–2)
DEPRECATED RDW RBC AUTO: 43.7 FL (ref 37–54)
EOSINOPHIL # BLD AUTO: 0.64 10*3/MM3 (ref 0–0.7)
EOSINOPHIL NFR BLD AUTO: 3.5 % (ref 0–5)
ERYTHROCYTE [DISTWIDTH] IN BLOOD BY AUTOMATED COUNT: 13.4 % (ref 11.5–14.5)
GFR SERPL CREATININE-BSD FRML MDRD: 75 ML/MIN/1.73
GLOBULIN UR ELPH-MCNC: 2.5 GM/DL
GLUCOSE BLD-MCNC: 91 MG/DL (ref 70–110)
HCT VFR BLD AUTO: 43.1 % (ref 42–52)
HGB BLD-MCNC: 13.9 G/DL (ref 14–18)
HOLD SPECIMEN: NORMAL
HOLD SPECIMEN: NORMAL
IMM GRANULOCYTES # BLD: 0.05 10*3/MM3 (ref 0–0.03)
IMM GRANULOCYTES NFR BLD: 0.3 % (ref 0–0.5)
INR PPP: 1.1 (ref 0.9–1.1)
LYMPHOCYTES # BLD AUTO: 2.26 10*3/MM3 (ref 1–3)
LYMPHOCYTES NFR BLD AUTO: 12.2 % (ref 21–51)
MCH RBC QN AUTO: 29 PG (ref 27–33)
MCHC RBC AUTO-ENTMCNC: 32.3 G/DL (ref 33–37)
MCV RBC AUTO: 89.8 FL (ref 80–94)
MONOCYTES # BLD AUTO: 2.04 10*3/MM3 (ref 0.1–0.9)
MONOCYTES NFR BLD AUTO: 11 % (ref 0–10)
NEUTROPHILS # BLD AUTO: 13.44 10*3/MM3 (ref 1.4–6.5)
NEUTROPHILS NFR BLD AUTO: 72.8 % (ref 30–70)
OSMOLALITY SERPL CALC.SUM OF ELEC: 276.6 MOSM/KG (ref 273–305)
PLATELET # BLD AUTO: 292 10*3/MM3 (ref 130–400)
PMV BLD AUTO: 10.4 FL (ref 6–10)
POTASSIUM BLD-SCNC: 4 MMOL/L (ref 3.5–5.3)
PROT SERPL-MCNC: 7.1 G/DL (ref 6–8)
PROTHROMBIN TIME: 14.4 SECONDS (ref 11–15.4)
RBC # BLD AUTO: 4.8 10*6/MM3 (ref 4.7–6.1)
SODIUM BLD-SCNC: 140 MMOL/L (ref 135–153)
TROPONIN I SERPL-MCNC: <0.006 NG/ML
TROPONIN I SERPL-MCNC: <0.006 NG/ML
WBC NRBC COR # BLD: 18.47 10*3/MM3 (ref 4.5–12.5)
WHOLE BLOOD HOLD SPECIMEN: NORMAL
WHOLE BLOOD HOLD SPECIMEN: NORMAL

## 2017-12-22 PROCEDURE — 87040 BLOOD CULTURE FOR BACTERIA: CPT | Performed by: FAMILY MEDICINE

## 2017-12-22 PROCEDURE — 83605 ASSAY OF LACTIC ACID: CPT | Performed by: FAMILY MEDICINE

## 2017-12-22 PROCEDURE — 82550 ASSAY OF CK (CPK): CPT | Performed by: FAMILY MEDICINE

## 2017-12-22 PROCEDURE — 80053 COMPREHEN METABOLIC PANEL: CPT | Performed by: FAMILY MEDICINE

## 2017-12-22 PROCEDURE — 84484 ASSAY OF TROPONIN QUANT: CPT | Performed by: FAMILY MEDICINE

## 2017-12-22 PROCEDURE — 99284 EMERGENCY DEPT VISIT MOD MDM: CPT

## 2017-12-22 PROCEDURE — 93005 ELECTROCARDIOGRAM TRACING: CPT | Performed by: FAMILY MEDICINE

## 2017-12-22 PROCEDURE — 36415 COLL VENOUS BLD VENIPUNCTURE: CPT

## 2017-12-22 PROCEDURE — 71010 XR CHEST 1 VW: CPT | Performed by: RADIOLOGY

## 2017-12-22 PROCEDURE — 96374 THER/PROPH/DIAG INJ IV PUSH: CPT

## 2017-12-22 PROCEDURE — 25010000002 ONDANSETRON PER 1 MG: Performed by: FAMILY MEDICINE

## 2017-12-22 PROCEDURE — 85610 PROTHROMBIN TIME: CPT | Performed by: FAMILY MEDICINE

## 2017-12-22 PROCEDURE — 82553 CREATINE MB FRACTION: CPT | Performed by: FAMILY MEDICINE

## 2017-12-22 PROCEDURE — 25010000002 MORPHINE PER 10 MG: Performed by: FAMILY MEDICINE

## 2017-12-22 PROCEDURE — 85025 COMPLETE CBC W/AUTO DIFF WBC: CPT | Performed by: FAMILY MEDICINE

## 2017-12-22 PROCEDURE — 93010 ELECTROCARDIOGRAM REPORT: CPT | Performed by: INTERNAL MEDICINE

## 2017-12-22 PROCEDURE — 71010 HC CHEST PA OR AP: CPT

## 2017-12-22 PROCEDURE — 96375 TX/PRO/DX INJ NEW DRUG ADDON: CPT

## 2017-12-22 RX ORDER — ONDANSETRON 2 MG/ML
4 INJECTION INTRAMUSCULAR; INTRAVENOUS ONCE
Status: COMPLETED | OUTPATIENT
Start: 2017-12-22 | End: 2017-12-22

## 2017-12-22 RX ORDER — MORPHINE SULFATE 2 MG/ML
2 INJECTION, SOLUTION INTRAMUSCULAR; INTRAVENOUS ONCE
Status: COMPLETED | OUTPATIENT
Start: 2017-12-22 | End: 2017-12-22

## 2017-12-22 RX ORDER — ASPIRIN 81 MG/1
324 TABLET, CHEWABLE ORAL ONCE
Status: COMPLETED | OUTPATIENT
Start: 2017-12-22 | End: 2017-12-22

## 2017-12-22 RX ORDER — ASPIRIN 81 MG/1
324 TABLET, CHEWABLE ORAL ONCE
Status: DISCONTINUED | OUTPATIENT
Start: 2017-12-22 | End: 2017-12-22

## 2017-12-22 RX ORDER — ASPIRIN 325 MG
325 TABLET ORAL ONCE
Status: DISCONTINUED | OUTPATIENT
Start: 2017-12-22 | End: 2017-12-22

## 2017-12-22 RX ORDER — SODIUM CHLORIDE 0.9 % (FLUSH) 0.9 %
10 SYRINGE (ML) INJECTION AS NEEDED
Status: DISCONTINUED | OUTPATIENT
Start: 2017-12-22 | End: 2017-12-22 | Stop reason: HOSPADM

## 2017-12-22 RX ADMIN — MORPHINE SULFATE 2 MG: 2 INJECTION, SOLUTION INTRAMUSCULAR; INTRAVENOUS at 19:17

## 2017-12-22 RX ADMIN — ASPIRIN 324 MG: 81 TABLET, CHEWABLE ORAL at 19:16

## 2017-12-22 RX ADMIN — ONDANSETRON 4 MG: 2 INJECTION INTRAMUSCULAR; INTRAVENOUS at 19:17

## 2017-12-23 NOTE — ED PROVIDER NOTES
Subjective   Patient is a 42 y.o. male presenting with chest pain.   History provided by:  Patient and spouse  Chest Pain   Pain location:  L chest  Pain quality: pressure    Pain radiates to:  Does not radiate  Onset quality:  Gradual  Timing:  Constant  Progression:  Unchanged  Chronicity:  Recurrent  Context: at rest    Relieved by:  Nothing  Worsened by:  Nothing  Ineffective treatments:  None tried  Associated symptoms: no abdominal pain, no AICD problem, no altered mental status, no cough, no dizziness, no fatigue, no headache, no nausea, no shortness of breath, no vomiting and no weakness    Risk factors: aortic disease, hypertension, male sex and smoking        Review of Systems   Constitutional: Negative for activity change, appetite change, chills and fatigue.   HENT: Negative for congestion.    Eyes: Negative for pain.   Respiratory: Negative for cough, shortness of breath, wheezing and stridor.    Cardiovascular: Positive for chest pain.   Gastrointestinal: Negative for abdominal pain, diarrhea, nausea and vomiting.   Genitourinary: Negative for dysuria.   Musculoskeletal: Negative for arthralgias, myalgias, neck pain and neck stiffness.   Skin: Negative for rash.   Neurological: Negative for dizziness, syncope, speech difficulty, weakness and headaches.   Psychiatric/Behavioral: Negative for agitation.       Past Medical History:   Diagnosis Date   • Hallucinations    • Heart attack    • Hyperlipidemia    • Hypertension    • Injury of back    • Nervous breakdown     12 years ago   • Stroke        Allergies   Allergen Reactions   • Bee Venom Anaphylaxis   • Fentanyl      Pt states that he stops breathing   • Celexa [Citalopram Hydrobromide]    • Haldol [Haloperidol Lactate]    • Nitroglycerin    • Risperidone And Related        Past Surgical History:   Procedure Laterality Date   • AORTIC VALVE REPAIR/REPLACEMENT MITRAL VALVE REPAIR/REPLACEMENT     • APPENDECTOMY     • CARDIAC CATHETERIZATION     •  CHOLECYSTECTOMY     • HERNIA REPAIR      umbilical   • PACEMAKER IMPLANTATION     • PATELLAR RECONSTRUCTION     • QUADRICEPS REPAIR         History reviewed. No pertinent family history.    Social History     Social History   • Marital status:      Spouse name: N/A   • Number of children: N/A   • Years of education: N/A     Social History Main Topics   • Smoking status: Current Every Day Smoker     Packs/day: 1.00   • Smokeless tobacco: Never Used   • Alcohol use No   • Drug use: No   • Sexual activity: Defer     Other Topics Concern   • None     Social History Narrative           Objective   Physical Exam   Constitutional: He is oriented to person, place, and time. He appears well-developed and well-nourished.   HENT:   Head: Normocephalic and atraumatic.   Right Ear: External ear normal.   Left Ear: External ear normal.   Nose: Nose normal.   Mouth/Throat: Oropharynx is clear and moist.   Eyes: EOM are normal. Pupils are equal, round, and reactive to light.   Neck: Neck supple.   Cardiovascular: Normal rate and regular rhythm.    Murmur heard.  Pulmonary/Chest: Effort normal and breath sounds normal.   Abdominal: Soft. He exhibits no distension. There is no tenderness.   Musculoskeletal: Normal range of motion.   Neurological: He is alert and oriented to person, place, and time.   Skin: Skin is warm.   Psychiatric: He has a normal mood and affect. His behavior is normal. Judgment and thought content normal.   Nursing note and vitals reviewed.      Procedures         ED Course  ED Course   Comment By Time   EKG interpretation 1810 paced rhythm 109 bpm no further interpretation Krystinaisabel Leyvagauri Campbell, DO 12/22 1944   Endorsed to Dr WOMACK at shift change Krystina Campbell, DO 12/22 2004     Assumed care from Dr. amezcua at evening shift change.  Patient has remained hemodynamically stable.  He does continue to have some mild chest pressure.  He does not have any evidence of CHF or heart failure.  Patient  states that since his second troponin is negative he prefers to go home and follow-up with his routine providers as an outpatient.            XR Chest 1 View   Final Result   Stable chest. No acute cardiopulmonary findings identified.       This report was finalized on 12/22/2017 8:46 PM by Dr. Dalton Ta MD.            Labs Reviewed   COMPREHENSIVE METABOLIC PANEL - Abnormal; Notable for the following:        Result Value    BUN 6 (*)     BUN/Creatinine Ratio 5.6 (*)     All other components within normal limits   CBC WITH AUTO DIFFERENTIAL - Abnormal; Notable for the following:     WBC 18.47 (*)     Hemoglobin 13.9 (*)     MCHC 32.3 (*)     MPV 10.4 (*)     Neutrophil % 72.8 (*)     Lymphocyte % 12.2 (*)     Monocyte % 11.0 (*)     Neutrophils, Absolute 13.44 (*)     Monocytes, Absolute 2.04 (*)     Immature Grans, Absolute 0.05 (*)     All other components within normal limits   TROPONIN (IN-HOUSE) - Normal    Narrative:     Ultra Troponin I Reference Range:         <=0.039 ng/mL: Negative    0.04-0.779 ng/mL: Indeterminate Range. Suspicious of MI.  Clinical correlation required.       >=0.78  ng/mL: Consistent with myocardial injury.  Clinical correlation required.   TROPONIN (IN-HOUSE) - Normal    Narrative:     Ultra Troponin I Reference Range:         <=0.039 ng/mL: Negative    0.04-0.779 ng/mL: Indeterminate Range. Suspicious of MI.  Clinical correlation required.       >=0.78  ng/mL: Consistent with myocardial injury.  Clinical correlation required.   CK - Normal   CK MB - Normal   PROTIME-INR - Normal    Narrative:     Suggested INR therapeutic range for stable oral anticoagulant therapy:    Low Intensity therapy:   1.5-2.0  Moderate Intensity therapy:   2.0-3.0  High Intensity therapy:   2.5-4.0   LACTIC ACID, PLASMA - Normal   OSMOLALITY, CALCULATED - Normal   CKMB INDEX CALCULATION - Normal   BLOOD CULTURE   BLOOD CULTURE   RAINBOW DRAW    Narrative:     The following orders were created for panel  order Blue Draw.  Procedure                               Abnormality         Status                     ---------                               -----------         ------                     Light Blue Top[[id number]]                                   Final result               Green Top (Gel)[[id number]]                                  Final result               Lavender Top[[id number]]                                     Final result               Gold Top - SST[[id number]]                                   Final result                 Please view results for these tests on the individual orders.   TROPONIN (IN-HOUSE)   TROPONIN (IN-HOUSE)   TROPONIN (IN-HOUSE)   POCT PROTIME - INR   CBC AND DIFFERENTIAL    Narrative:     The following orders were created for panel order CBC & Differential.  Procedure                               Abnormality         Status                     ---------                               -----------         ------                     CBC Auto Differential[[id number]]        Abnormal            Final result                 Please view results for these tests on the individual orders.   LIGHT BLUE TOP   GREEN TOP   LAVENDER TOP   GOLD TOP - SST        Medication List      ASK your doctor about these medications          aspirin 81 MG EC tablet   Take 1 tablet by mouth Daily.       atorvastatin 80 MG tablet   Commonly known as:  LIPITOR   Take 1 tablet by mouth Every Night.       enoxaparin 100 MG/ML solution syringe   Commonly known as:  LOVENOX   Inject 1 mL under the skin 2 (Two) Times a Day.       FLUoxetine 20 MG capsule   Commonly known as:  PROzac       lisinopril 5 MG tablet   Commonly known as:  PRINIVIL,ZESTRIL   Take 1 tablet by mouth Daily.       loxapine 10 MG capsule   Commonly known as:  LOXITANE       metoprolol tartrate 25 MG tablet   Commonly known as:  LOPRESSOR   Take 1/2 tablet by mouth Every 12 (Twelve) Hours.       RANEXA 500 MG 12 hr tablet   Generic drug:   ranolazine   Take 1 tablet by mouth Every 12 (Twelve) Hours.       traZODone 150 MG tablet   Commonly known as:  DESYREL       warfarin 5 MG tablet   Commonly known as:  COUMADIN   Take 1 & 1/2 tablets by mouth daily, Begin 12/02/17 unless instructed to   take differently by medical personnel                     MDM    Final diagnoses:   None            Krystina Campbell,   12/23/17 0226

## 2017-12-26 ENCOUNTER — APPOINTMENT (OUTPATIENT)
Dept: PHARMACY | Facility: HOSPITAL | Age: 42
End: 2017-12-26

## 2017-12-27 ENCOUNTER — HOSPITAL ENCOUNTER (OUTPATIENT)
Dept: ULTRASOUND IMAGING | Facility: HOSPITAL | Age: 42
Discharge: HOME | End: 2017-12-27
Admitting: NURSE PRACTITIONER

## 2017-12-27 ENCOUNTER — APPOINTMENT (OUTPATIENT)
Dept: PHARMACY | Facility: HOSPITAL | Age: 42
End: 2017-12-27

## 2017-12-27 ENCOUNTER — ANTICOAGULATION VISIT (OUTPATIENT)
Dept: PHARMACY | Facility: HOSPITAL | Age: 42
End: 2017-12-27

## 2017-12-27 DIAGNOSIS — I48.91 ATRIAL FIBRILLATION, UNSPECIFIED TYPE (HCC): ICD-10-CM

## 2017-12-27 DIAGNOSIS — I35.0 AORTIC STENOSIS, MILD: ICD-10-CM

## 2017-12-27 LAB
BACTERIA SPEC AEROBE CULT: NORMAL
BACTERIA SPEC AEROBE CULT: NORMAL
INR PPP: 1.1 (ref 0.91–1.09)
PROTHROMBIN TIME: 13 SECONDS (ref 10–13.8)

## 2017-12-27 PROCEDURE — G0463 HOSPITAL OUTPT CLINIC VISIT: HCPCS | Performed by: PHARMACIST

## 2017-12-27 PROCEDURE — 76775 US EXAM ABDO BACK WALL LIM: CPT

## 2017-12-27 PROCEDURE — 76775 US EXAM ABDO BACK WALL LIM: CPT | Performed by: RADIOLOGY

## 2017-12-27 PROCEDURE — 85610 PROTHROMBIN TIME: CPT

## 2017-12-27 PROCEDURE — 36416 COLLJ CAPILLARY BLOOD SPEC: CPT

## 2017-12-27 NOTE — PROGRESS NOTES
Anticoagulation Clinic Progress Note  Indication: A. Fib     Referring Provider: Dr. Pastrana    Initial Warfarin Start Date:   Planned Duration of Therapy: Indefinite  Goal INR: 2.0-3.0  Current Drug Interactions: Aspirin, Fluoxetine      Anticoagulation Clinic INR History:  Date 12/4/17 12/14/17 12/21/17 12/27/2017        Total Weekly Dose  35mg 22.5mg 35mg        INR 4.8 3.3 1.2 1.1            Clinic Interview:  Tablet Strength: pt has 5mg tablets  Current Dose: pt verified he did not miss any doses.       Clinical Outcomes      Comments Patient describes throwing up blood and having some blood in his stool.  He stated he went to ER on the 22nd and they did blood work and chest xrays.  Both blood work and xray was negative.  I called Dr. Callejas in the ER and explained the situation.  He said it would be up to the patient to come to the ER or not.  The patient prefers to wait and not go to the ER at this time.       Patient Findings      Positives Signs/symptoms of bleeding, Emergency department visit, Change in diet/appetite     Comments Patient describes throwing up blood and having some blood in his stool.  He stated he went to ER on the 22nd and they did blood work and chest xrays.  Both blood work and xray was negative.  I called Dr. Callejas in the ER and explained the situation.  He said it would be up to the patient to come to the ER or not.  The patient prefers to wait and not go to the ER at this time.         Plan:  1. INR is 1.1, likely due to non-compliance.  Instructed pt to take 7.5mg tonight and tomorrow and return to the clinic on Friday.  Also Lovenox 100mg BID x 2 days   2. RTC Friday.   3. Medications: reports no changes  4. Pt declines refills.  5. Verbal and written information provided. Pt expresses understanding and has no further questions at this time.  6. Pt counseled to go to ER with any S/Sx of stroke/clot or S/Sx of severe bleeding. Emphasized the importance of taking warfarin as  prescribed.      Renee Winston, [facility]  12/27/2017  2:21 PM

## 2017-12-29 ENCOUNTER — TELEPHONE (OUTPATIENT)
Dept: PHARMACY | Facility: HOSPITAL | Age: 42
End: 2017-12-29

## 2017-12-29 ENCOUNTER — ANTICOAGULATION VISIT (OUTPATIENT)
Dept: PHARMACY | Facility: HOSPITAL | Age: 42
End: 2017-12-29

## 2017-12-29 DIAGNOSIS — I48.91 ATRIAL FIBRILLATION, UNSPECIFIED TYPE (HCC): ICD-10-CM

## 2017-12-29 LAB
INR PPP: 1.3 (ref 0.91–1.09)
PROTHROMBIN TIME: 16.1 SECONDS (ref 10–13.8)

## 2017-12-29 PROCEDURE — 36416 COLLJ CAPILLARY BLOOD SPEC: CPT

## 2017-12-29 PROCEDURE — G0463 HOSPITAL OUTPT CLINIC VISIT: HCPCS | Performed by: PHARMACIST

## 2017-12-29 PROCEDURE — 85610 PROTHROMBIN TIME: CPT

## 2017-12-29 RX ORDER — HYDROCODONE BITARTRATE AND ACETAMINOPHEN 10; 325 MG/1; MG/1
1 TABLET ORAL EVERY 8 HOURS
Status: ON HOLD | COMMUNITY
End: 2018-06-11

## 2017-12-29 NOTE — PROGRESS NOTES
Anticoagulation Clinic Progress Note  Indication: A. Fib     Referring Provider: Dr. Pastrana    Initial Warfarin Start Date:   Planned Duration of Therapy: Indefinite  Goal INR: 2.0-3.0  Current Drug Interactions: Aspirin, Fluoxetine      Anticoagulation Clinic INR History:  Date 12/4/17 12/14/17 12/21/17 12/27/2017 12/29/2017       Total Weekly Dose  35mg 22.5mg 35mg 15mg       INR 4.8 3.3 1.2 1.1 1.3           Clinic Interview:  Tablet Strength: pt has 5mg tablets  Current Dose: pt verified he did not miss any doses.    Clinical Outcomes      Negatives Major bleeding event, Thromboembolic event, Anticoagulation-related hospital admission, Anticoagulation-related ED visit, Anticoagulation-related fatality     Comments Patient still reports vomiting with blood.  The patient's dose of Norco was changed since his last visit, otherwise no changes.       Patient Findings      Positives Signs/symptoms of bleeding, Change in medications     Negatives Signs/symptoms of thrombosis, Laboratory test error suspected, Change in health, Change in alcohol use, Change in activity, Upcoming invasive procedure, Emergency department visit, Upcoming dental procedure, Missed doses, Extra doses, Change in diet/appetite, Hospital admission, Bruising, Other complaints     Comments Patient still reports vomiting with blood.  The patient's dose of Norco was changed since his last visit, otherwise no changes.              Plan:  1. INR is 1.3. Patient swears that he is taking his medication.  It did come up a little.  Instructed pt to take 10mg tonight and tomorrow and then 7.5mg on Sunday and Monday.  Also Lovenox 100mg BID x 8 doses  2. RTC Tuesday.   3. Medications:Norco dose was increased to 10mg tid scheduled  4. Pt requests refills, will send script to 5mg warfarin and lovenox.  5. Verbal and written information provided. Pt expresses understanding and has no further questions at this time.  6. Pt counseled to go to ER with any S/Sx of  stroke/clot or S/Sx of severe bleeding. Emphasized the importance of taking warfarin as prescribed.      Renee Winston RPH  12/29/2017  4:21 PM

## 2017-12-29 NOTE — TELEPHONE ENCOUNTER
Nagi Schneider, PharmD Candidate has attempted to call the patient multiple times for a missed appointment.      I will send a letter to the patient.  If the patient does not contact us or start to show some compliance, the patient will likely be dismissed from our clinic.      The patient has been told multiple times about the importance of compliance with taking warfarin and appropriate monitoring but has repeatedly missed appointments and doses.

## 2018-01-02 ENCOUNTER — APPOINTMENT (OUTPATIENT)
Dept: PHARMACY | Facility: HOSPITAL | Age: 43
End: 2018-01-02

## 2018-01-03 ENCOUNTER — ANTICOAGULATION VISIT (OUTPATIENT)
Dept: PHARMACY | Facility: HOSPITAL | Age: 43
End: 2018-01-03

## 2018-01-03 DIAGNOSIS — I48.91 ATRIAL FIBRILLATION, UNSPECIFIED TYPE (HCC): ICD-10-CM

## 2018-01-03 LAB
INR PPP: 1.5 (ref 0.91–1.09)
PROTHROMBIN TIME: 17.7 SECONDS (ref 10–13.8)

## 2018-01-03 PROCEDURE — G0463 HOSPITAL OUTPT CLINIC VISIT: HCPCS | Performed by: PHARMACIST

## 2018-01-03 PROCEDURE — 85610 PROTHROMBIN TIME: CPT

## 2018-01-03 PROCEDURE — 36416 COLLJ CAPILLARY BLOOD SPEC: CPT

## 2018-01-03 NOTE — PROGRESS NOTES
Anticoagulation Clinic Progress Note  Indication: A. Fib     Referring Provider: Dr. Pastrana    Initial Warfarin Start Date:   Planned Duration of Therapy: Indefinite  Goal INR: 2.0-3.0  Current Drug Interactions: Aspirin, Fluoxetine      Anticoagulation Clinic INR History:  Date 12/4/17 12/14/17 12/21/17 12/27/2017 12/29/2017 1/3/2018      Total Weekly Dose  35mg 22.5mg 35mg 15mg 42.5mg      INR 4.8 3.3 1.2 1.1 1.3 1.5          Clinic Interview:  Tablet Strength: pt has 5mg tablets  Current Dose: pt verified he did not miss any doses.    Clinical Outcomes      Negatives Major bleeding event, Thromboembolic event, Anticoagulation-related hospital admission, Anticoagulation-related ED visit, Anticoagulation-related fatality       Patient Findings      Negatives Signs/symptoms of thrombosis, Signs/symptoms of bleeding, Laboratory test error suspected, Change in health, Change in alcohol use, Change in activity, Upcoming invasive procedure, Emergency department visit, Upcoming dental procedure, Missed doses, Extra doses, Change in medications, Change in diet/appetite, Hospital admission, Bruising, Other complaints                Plan:  1. INR is 1.5. Patient swears that he is taking his medication.  It did come up a little.  Instructed pt to take 10mg daily starting tonight.  Also Lovenox 100mg BID x 10 doses  2. RTC Monday.   3. Medications:No changes  4. Pt requests refills: denied refills on warfarin  5. Verbal and written information provided. Pt expresses understanding and has no further questions at this time.  6. Pt counseled to go to ER with any S/Sx of stroke/clot or S/Sx of severe bleeding. Emphasized the importance of taking warfarin as prescribed.      Renee Winston RPH  1/3/2018  4:06 PM

## 2018-01-05 ENCOUNTER — APPOINTMENT (OUTPATIENT)
Dept: GENERAL RADIOLOGY | Facility: HOSPITAL | Age: 43
End: 2018-01-05

## 2018-01-05 ENCOUNTER — HOSPITAL ENCOUNTER (EMERGENCY)
Facility: HOSPITAL | Age: 43
Discharge: HOME | End: 2018-01-06
Attending: EMERGENCY MEDICINE | Admitting: EMERGENCY MEDICINE

## 2018-01-05 DIAGNOSIS — E87.6 HYPOKALEMIA: ICD-10-CM

## 2018-01-05 DIAGNOSIS — R07.9 CHEST PAIN, UNSPECIFIED TYPE: Primary | ICD-10-CM

## 2018-01-05 LAB
ALBUMIN SERPL-MCNC: 4.7 G/DL (ref 3.5–5)
ALBUMIN/GLOB SERPL: 1.7 G/DL (ref 1.5–2.5)
ALP SERPL-CCNC: 110 U/L (ref 40–129)
ALT SERPL W P-5'-P-CCNC: 98 U/L (ref 10–44)
ANION GAP SERPL CALCULATED.3IONS-SCNC: 6.6 MMOL/L (ref 3.6–11.2)
AST SERPL-CCNC: 26 U/L (ref 10–34)
BASOPHILS # BLD AUTO: 0.11 10*3/MM3 (ref 0–0.3)
BASOPHILS NFR BLD AUTO: 1 % (ref 0–2)
BILIRUB SERPL-MCNC: 0.2 MG/DL (ref 0.2–1.8)
BUN BLD-MCNC: 6 MG/DL (ref 7–21)
BUN/CREAT SERPL: 6.2 (ref 7–25)
CALCIUM SPEC-SCNC: 9.5 MG/DL (ref 7.7–10)
CHLORIDE SERPL-SCNC: 109 MMOL/L (ref 99–112)
CK MB SERPL-CCNC: 1.15 NG/ML (ref 0–5)
CK MB SERPL-CCNC: 1.26 NG/ML (ref 0–5)
CK MB SERPL-RTO: 0.9 % (ref 0–3)
CK MB SERPL-RTO: 1 % (ref 0–3)
CK SERPL-CCNC: 112 U/L (ref 24–204)
CK SERPL-CCNC: 147 U/L (ref 24–204)
CO2 SERPL-SCNC: 28.4 MMOL/L (ref 24.3–31.9)
CREAT BLD-MCNC: 0.97 MG/DL (ref 0.43–1.29)
DEPRECATED RDW RBC AUTO: 47.2 FL (ref 37–54)
EOSINOPHIL # BLD AUTO: 0.43 10*3/MM3 (ref 0–0.7)
EOSINOPHIL NFR BLD AUTO: 4 % (ref 0–5)
ERYTHROCYTE [DISTWIDTH] IN BLOOD BY AUTOMATED COUNT: 14.5 % (ref 11.5–14.5)
GFR SERPL CREATININE-BSD FRML MDRD: 84 ML/MIN/1.73
GLOBULIN UR ELPH-MCNC: 2.7 GM/DL
GLUCOSE BLD-MCNC: 83 MG/DL (ref 70–110)
HCT VFR BLD AUTO: 43 % (ref 42–52)
HGB BLD-MCNC: 13.9 G/DL (ref 14–18)
HOLD SPECIMEN: NORMAL
HOLD SPECIMEN: NORMAL
IMM GRANULOCYTES # BLD: 0.02 10*3/MM3 (ref 0–0.03)
IMM GRANULOCYTES NFR BLD: 0.2 % (ref 0–0.5)
INR PPP: 1.91 (ref 0.9–1.1)
LYMPHOCYTES # BLD AUTO: 3.83 10*3/MM3 (ref 1–3)
LYMPHOCYTES NFR BLD AUTO: 36.1 % (ref 21–51)
MCH RBC QN AUTO: 29.7 PG (ref 27–33)
MCHC RBC AUTO-ENTMCNC: 32.3 G/DL (ref 33–37)
MCV RBC AUTO: 91.9 FL (ref 80–94)
MONOCYTES # BLD AUTO: 0.87 10*3/MM3 (ref 0.1–0.9)
MONOCYTES NFR BLD AUTO: 8.2 % (ref 0–10)
NEUTROPHILS # BLD AUTO: 5.36 10*3/MM3 (ref 1.4–6.5)
NEUTROPHILS NFR BLD AUTO: 50.5 % (ref 30–70)
OSMOLALITY SERPL CALC.SUM OF ELEC: 283.6 MOSM/KG (ref 273–305)
PLATELET # BLD AUTO: 334 10*3/MM3 (ref 130–400)
PMV BLD AUTO: 10.5 FL (ref 6–10)
POTASSIUM BLD-SCNC: 2.8 MMOL/L (ref 3.5–5.3)
PROT SERPL-MCNC: 7.4 G/DL (ref 6–8)
PROTHROMBIN TIME: 22.1 SECONDS (ref 11–15.4)
RBC # BLD AUTO: 4.68 10*6/MM3 (ref 4.7–6.1)
SODIUM BLD-SCNC: 144 MMOL/L (ref 135–153)
TROPONIN I SERPL-MCNC: <0.006 NG/ML
TROPONIN I SERPL-MCNC: <0.006 NG/ML
WBC NRBC COR # BLD: 10.62 10*3/MM3 (ref 4.5–12.5)
WHOLE BLOOD HOLD SPECIMEN: NORMAL
WHOLE BLOOD HOLD SPECIMEN: NORMAL

## 2018-01-05 PROCEDURE — 36415 COLL VENOUS BLD VENIPUNCTURE: CPT

## 2018-01-05 PROCEDURE — 71045 X-RAY EXAM CHEST 1 VIEW: CPT

## 2018-01-05 PROCEDURE — 99284 EMERGENCY DEPT VISIT MOD MDM: CPT

## 2018-01-05 PROCEDURE — 25010000002 ONDANSETRON PER 1 MG: Performed by: EMERGENCY MEDICINE

## 2018-01-05 PROCEDURE — 82550 ASSAY OF CK (CPK): CPT | Performed by: EMERGENCY MEDICINE

## 2018-01-05 PROCEDURE — 85610 PROTHROMBIN TIME: CPT | Performed by: EMERGENCY MEDICINE

## 2018-01-05 PROCEDURE — 96366 THER/PROPH/DIAG IV INF ADDON: CPT

## 2018-01-05 PROCEDURE — 85025 COMPLETE CBC W/AUTO DIFF WBC: CPT | Performed by: EMERGENCY MEDICINE

## 2018-01-05 PROCEDURE — 25010000003 POTASSIUM CHLORIDE 10 MEQ/100ML SOLUTION: Performed by: EMERGENCY MEDICINE

## 2018-01-05 PROCEDURE — 80053 COMPREHEN METABOLIC PANEL: CPT | Performed by: EMERGENCY MEDICINE

## 2018-01-05 PROCEDURE — 96375 TX/PRO/DX INJ NEW DRUG ADDON: CPT

## 2018-01-05 PROCEDURE — 82553 CREATINE MB FRACTION: CPT | Performed by: EMERGENCY MEDICINE

## 2018-01-05 PROCEDURE — 84484 ASSAY OF TROPONIN QUANT: CPT | Performed by: EMERGENCY MEDICINE

## 2018-01-05 PROCEDURE — 96365 THER/PROPH/DIAG IV INF INIT: CPT

## 2018-01-05 PROCEDURE — 93005 ELECTROCARDIOGRAM TRACING: CPT | Performed by: FAMILY MEDICINE

## 2018-01-05 PROCEDURE — 71045 X-RAY EXAM CHEST 1 VIEW: CPT | Performed by: RADIOLOGY

## 2018-01-05 RX ORDER — ALUMINA, MAGNESIA, AND SIMETHICONE 2400; 2400; 240 MG/30ML; MG/30ML; MG/30ML
15 SUSPENSION ORAL ONCE
Status: COMPLETED | OUTPATIENT
Start: 2018-01-05 | End: 2018-01-05

## 2018-01-05 RX ORDER — SODIUM CHLORIDE 0.9 % (FLUSH) 0.9 %
10 SYRINGE (ML) INJECTION AS NEEDED
Status: DISCONTINUED | OUTPATIENT
Start: 2018-01-05 | End: 2018-01-06 | Stop reason: HOSPADM

## 2018-01-05 RX ORDER — HYDROCODONE BITARTRATE AND ACETAMINOPHEN 10; 325 MG/1; MG/1
1 TABLET ORAL ONCE
Status: COMPLETED | OUTPATIENT
Start: 2018-01-05 | End: 2018-01-05

## 2018-01-05 RX ORDER — ONDANSETRON 2 MG/ML
4 INJECTION INTRAMUSCULAR; INTRAVENOUS ONCE
Status: COMPLETED | OUTPATIENT
Start: 2018-01-05 | End: 2018-01-05

## 2018-01-05 RX ORDER — ASPIRIN 325 MG
325 TABLET ORAL ONCE
Status: COMPLETED | OUTPATIENT
Start: 2018-01-05 | End: 2018-01-05

## 2018-01-05 RX ORDER — POTASSIUM CHLORIDE 7.45 MG/ML
10 INJECTION INTRAVENOUS
Status: COMPLETED | OUTPATIENT
Start: 2018-01-05 | End: 2018-01-05

## 2018-01-05 RX ORDER — POTASSIUM CHLORIDE 20 MEQ/1
40 TABLET, EXTENDED RELEASE ORAL ONCE
Status: COMPLETED | OUTPATIENT
Start: 2018-01-05 | End: 2018-01-05

## 2018-01-05 RX ORDER — SODIUM CHLORIDE 9 MG/ML
125 INJECTION, SOLUTION INTRAVENOUS CONTINUOUS
Status: DISCONTINUED | OUTPATIENT
Start: 2018-01-05 | End: 2018-01-06 | Stop reason: HOSPADM

## 2018-01-05 RX ADMIN — POTASSIUM CHLORIDE 10 MEQ: 10 INJECTION, SOLUTION INTRAVENOUS at 20:35

## 2018-01-05 RX ADMIN — ASPIRIN 325 MG: 325 TABLET ORAL at 19:11

## 2018-01-05 RX ADMIN — POTASSIUM CHLORIDE 10 MEQ: 10 INJECTION, SOLUTION INTRAVENOUS at 22:50

## 2018-01-05 RX ADMIN — SODIUM CHLORIDE 125 ML/HR: 9 INJECTION, SOLUTION INTRAVENOUS at 20:34

## 2018-01-05 RX ADMIN — POTASSIUM CHLORIDE 40 MEQ: 1500 TABLET, EXTENDED RELEASE ORAL at 20:30

## 2018-01-05 RX ADMIN — POTASSIUM CHLORIDE 10 MEQ: 10 INJECTION, SOLUTION INTRAVENOUS at 21:45

## 2018-01-05 RX ADMIN — ONDANSETRON 4 MG: 2 INJECTION INTRAMUSCULAR; INTRAVENOUS at 20:30

## 2018-01-05 RX ADMIN — HYDROCODONE BITARTRATE AND ACETAMINOPHEN 1 TABLET: 10; 325 TABLET ORAL at 22:46

## 2018-01-05 RX ADMIN — ALUMINUM HYDROXIDE, MAGNESIUM HYDROXIDE, AND DIMETHICONE 15 ML: 400; 400; 40 SUSPENSION ORAL at 20:28

## 2018-01-05 RX ADMIN — LIDOCAINE HYDROCHLORIDE 15 ML: 20 SOLUTION ORAL; TOPICAL at 20:28

## 2018-01-06 VITALS
WEIGHT: 205 LBS | SYSTOLIC BLOOD PRESSURE: 142 MMHG | TEMPERATURE: 98.2 F | HEART RATE: 76 BPM | RESPIRATION RATE: 18 BRPM | BODY MASS INDEX: 26.31 KG/M2 | OXYGEN SATURATION: 99 % | DIASTOLIC BLOOD PRESSURE: 103 MMHG | HEIGHT: 74 IN

## 2018-01-06 NOTE — ED PROVIDER NOTES
"Subjective   HPI Comments: Pt comes in with concern for CP.  Numerous prior visits for same.  Pt has had previous \"Open heeart\".  Has known aortic stenosis.  Pain onset yesterday morning and worsening since.    Patient is a 42 y.o. male presenting with chest pain.   History provided by:  Patient and medical records  Chest Pain   Pain location:  L chest and substernal area  Pain quality: sharp and stabbing    Pain quality: not aching, not burning, not crushing, not dull, not hot, no pressure, not radiating, not shooting, not tearing, not throbbing and no tightness    Pain radiates to:  Does not radiate  Pain severity:  Severe  Onset quality:  Gradual  Duration:  1 day  Timing:  Constant  Progression:  Worsening  Chronicity:  Chronic  Context: not breathing, not drug use, not eating, not intercourse, not lifting, not movement, not raising an arm, not at rest, not stress and not trauma    Relieved by:  Nothing  Worsened by:  Nothing  Ineffective treatments:  None tried  Associated symptoms: no abdominal pain, no AICD problem, no altered mental status, no anorexia, no anxiety, no back pain, no claudication, no cough, no diaphoresis, no dizziness, no dysphagia, no fatigue, no fever, no headache, no heartburn, no lower extremity edema, no nausea, no near-syncope, no numbness, no orthopnea, no palpitations, no PND, no shortness of breath, no syncope, no vomiting and no weakness    Risk factors: coronary artery disease, high cholesterol, hypertension, male sex and smoking    Risk factors: no diabetes mellitus, no Jia-Danlos syndrome, no immobilization, no Marfan's syndrome, not obese, not pregnant, no prior DVT/PE and no surgery        Review of Systems   Constitutional: Negative.  Negative for diaphoresis, fatigue and fever.   HENT: Negative.  Negative for trouble swallowing.    Eyes: Negative.    Respiratory: Negative.  Negative for cough, shortness of breath and wheezing.    Cardiovascular: Positive for chest pain. " Negative for palpitations, orthopnea, claudication, syncope, PND and near-syncope.   Gastrointestinal: Negative.  Negative for abdominal pain, anorexia, heartburn, nausea and vomiting.   Endocrine: Negative.    Genitourinary: Negative.    Musculoskeletal: Negative.  Negative for back pain.   Skin: Negative.    Allergic/Immunologic: Negative.    Neurological: Negative.  Negative for dizziness, weakness, numbness and headaches.   Hematological: Negative.    Psychiatric/Behavioral: Negative.    All other systems reviewed and are negative.      Past Medical History:   Diagnosis Date   • Hallucinations    • Heart attack    • Hyperlipidemia    • Hypertension    • Injury of back    • Nervous breakdown     12 years ago   • Stroke        Allergies   Allergen Reactions   • Bee Venom Anaphylaxis   • Fentanyl      Pt states that he stops breathing   • Celexa [Citalopram Hydrobromide]    • Haldol [Haloperidol Lactate]    • Nitroglycerin    • Risperidone And Related        Past Surgical History:   Procedure Laterality Date   • AORTIC VALVE REPAIR/REPLACEMENT MITRAL VALVE REPAIR/REPLACEMENT     • APPENDECTOMY     • CARDIAC CATHETERIZATION     • CHOLECYSTECTOMY     • HERNIA REPAIR      umbilical   • PACEMAKER IMPLANTATION     • PATELLAR RECONSTRUCTION     • QUADRICEPS REPAIR         History reviewed. No pertinent family history.    Social History     Social History   • Marital status:      Spouse name: N/A   • Number of children: N/A   • Years of education: N/A     Social History Main Topics   • Smoking status: Current Every Day Smoker     Packs/day: 1.00   • Smokeless tobacco: Never Used   • Alcohol use No   • Drug use: No   • Sexual activity: Defer     Other Topics Concern   • None     Social History Narrative           Objective   Physical Exam   Constitutional: He is oriented to person, place, and time. He appears well-developed and well-nourished. No distress.   HENT:   Head: Normocephalic and atraumatic.   Nose: Nose  normal.   Moist mucus membranes   Eyes: Conjunctivae and EOM are normal. Right eye exhibits no discharge. Left eye exhibits no discharge. No scleral icterus.   Neck: Normal range of motion. No JVD present. No tracheal deviation present. No thyromegaly present.   Cardiovascular: Normal rate, regular rhythm and normal heart sounds.  Exam reveals no gallop and no friction rub.    No murmur heard.  Pulmonary/Chest: Effort normal and breath sounds normal. No stridor. No respiratory distress. He has no wheezes. He has no rales.   1inch diameter area of erythema and tenderness to LLSB.  Palpable tender underlying sternal wire suture   Abdominal: Soft. Bowel sounds are normal. He exhibits no distension and no mass. There is no tenderness. There is no guarding.   Genitourinary:   Genitourinary Comments: No CVAT   Musculoskeletal: Normal range of motion. He exhibits no edema.   Neurological: He is alert and oriented to person, place, and time. He exhibits normal muscle tone. Coordination normal.   Skin: Skin is warm and dry. There is erythema.   Chest wall erythema   Psychiatric: He has a normal mood and affect. His behavior is normal. Judgment and thought content normal.   Nursing note and vitals reviewed.      Procedures         ED Course  ED Course   Value Comment By Time    Patient hemodynamically stable.  Negative serial cardiac enzymes.  This is numerous N/A and ongoing series.  Negative EKG. Nima Sandoval MD 01/05 7892   ECG 12 Lead Atrial sensed pacemaker.  Rate 89.  IA interval 142.  QRS duration 130.  QTc 390.  QTc 474.  No overt ischemia.  Unchanged from previous on 12/22/2017. Nima Sandoval MD 01/05 9374      XR Chest 1 View    (Results Pending)     Labs Reviewed   COMPREHENSIVE METABOLIC PANEL - Abnormal; Notable for the following:        Result Value    BUN 6 (*)     Potassium 2.8 (*)     ALT (SGPT) 98 (*)     BUN/Creatinine Ratio 6.2 (*)     All other components within normal limits   CBC WITH  AUTO DIFFERENTIAL - Abnormal; Notable for the following:     RBC 4.68 (*)     Hemoglobin 13.9 (*)     MCHC 32.3 (*)     MPV 10.5 (*)     Lymphocytes, Absolute 3.83 (*)     All other components within normal limits   PROTIME-INR - Abnormal; Notable for the following:     Protime 22.1 (*)     INR 1.91 (*)     All other components within normal limits    Narrative:     Suggested INR therapeutic range for stable oral anticoagulant therapy:    Low Intensity therapy:   1.5-2.0  Moderate Intensity therapy:   2.0-3.0  High Intensity therapy:   2.5-4.0   TROPONIN (IN-HOUSE) - Normal    Narrative:     Ultra Troponin I Reference Range:         <=0.039 ng/mL: Negative    0.04-0.779 ng/mL: Indeterminate Range. Suspicious of MI.  Clinical correlation required.       >=0.78  ng/mL: Consistent with myocardial injury.  Clinical correlation required.   CK - Normal   CK MB - Normal   OSMOLALITY, CALCULATED - Normal   CKMB INDEX CALCULATION - Normal   TROPONIN (IN-HOUSE) - Normal    Narrative:     Ultra Troponin I Reference Range:         <=0.039 ng/mL: Negative    0.04-0.779 ng/mL: Indeterminate Range. Suspicious of MI.  Clinical correlation required.       >=0.78  ng/mL: Consistent with myocardial injury.  Clinical correlation required.   CK - Normal   CK MB - Normal   CKMB INDEX CALCULATION - Normal   RAINBOW DRAW    Narrative:     The following orders were created for panel order [place] Draw.  Procedure                               Abnormality         Status                     ---------                               -----------         ------                     Light Blue Top[[id number]]                                   Final result               Green Top (Gel)[[id number]]                                  Final result               Lavender Top[[id number]]                                     Final result               Gold Top - SST[[id number]]                                   Final result                 Please view results for  these tests on the individual orders.   CBC AND DIFFERENTIAL    Narrative:     The following orders were created for panel order CBC & Differential.  Procedure                               Abnormality         Status                     ---------                               -----------         ------                     CBC Auto Differential[[id number]]        Abnormal            Final result                 Please view results for these tests on the individual orders.   LIGHT BLUE TOP   GREEN TOP   LAVENDER TOP   GOLD TOP - SST        Medication List      CHANGE how you take these medications          lisinopril 5 MG tablet   Commonly known as:  PRINIVIL,ZESTRIL   Take 1 tablet by mouth Daily.   What changed:  how much to take         CONTINUE taking these medications          aspirin 81 MG EC tablet   Take 1 tablet by mouth Daily.       atorvastatin 80 MG tablet   Commonly known as:  LIPITOR   Take 1 tablet by mouth Every Night.       enoxaparin 100 MG/ML solution syringe   Commonly known as:  LOVENOX   Inject 1 mL under the skin 2 (Two) Times a Day.       FLUoxetine 20 MG capsule   Commonly known as:  PROzac       HYDROcodone-acetaminophen  MG per tablet   Commonly known as:  NORCO       loxapine 10 MG capsule   Commonly known as:  LOXITANE       metoprolol tartrate 25 MG tablet   Commonly known as:  LOPRESSOR   Take 1/2 tablet by mouth Every 12 (Twelve) Hours.       RANEXA 500 MG 12 hr tablet   Generic drug:  ranolazine   Take 1 tablet by mouth Every 12 (Twelve) Hours.       traZODone 150 MG tablet   Commonly known as:  DESYREL       warfarin 5 MG tablet   Commonly known as:  COUMADIN   TAKE BY MOUTH AS DIRECTED                 MDM  Number of Diagnoses or Management Options  Chest pain, unspecified type: new and requires workup  Hypokalemia: new and requires workup     Amount and/or Complexity of Data Reviewed  Clinical lab tests: ordered and reviewed  Tests in the radiology section of CPT®: ordered and  reviewed    Risk of Complications, Morbidity, and/or Mortality  Presenting problems: high  Diagnostic procedures: high  Management options: moderate    Patient Progress  Patient progress: stable      Final diagnoses:   Chest pain, unspecified type   Hypokalemia            Nima Sandoval MD  01/06/18 0502

## 2018-01-06 NOTE — ED NOTES
Patient is resting on stretcher alert and verbal talking to family at bedside and watching TV. NADN. Patient continues to c/o chest pain/pressure, ED MD made aware. Patient also c/o headache and MD is aware of this as well.      Crytsal Zavala RN  01/05/18 7107

## 2018-01-08 ENCOUNTER — ANTICOAGULATION VISIT (OUTPATIENT)
Dept: PHARMACY | Facility: HOSPITAL | Age: 43
End: 2018-01-08

## 2018-01-08 DIAGNOSIS — I48.91 ATRIAL FIBRILLATION, UNSPECIFIED TYPE (HCC): ICD-10-CM

## 2018-01-08 LAB
INR PPP: 4.5 (ref 0.91–1.09)
INR PPP: 4.5 (ref 0.91–1.09)
PROTHROMBIN TIME: 53.8 SECONDS (ref 10–13.8)
PROTHROMBIN TIME: 53.8 SECONDS (ref 10–13.8)

## 2018-01-08 PROCEDURE — 36416 COLLJ CAPILLARY BLOOD SPEC: CPT

## 2018-01-08 PROCEDURE — 85610 PROTHROMBIN TIME: CPT

## 2018-01-08 PROCEDURE — G0463 HOSPITAL OUTPT CLINIC VISIT: HCPCS | Performed by: PHARMACIST

## 2018-01-08 NOTE — PROGRESS NOTES
Anticoagulation Clinic Progress Note  Indication: A. Fib     Referring Provider: Dr. Pastrana    Initial Warfarin Start Date:   Planned Duration of Therapy: Indefinite  Goal INR: 2.0-3.0  Current Drug Interactions: Aspirin, Fluoxetine      Anticoagulation Clinic INR History:  Date 12/4/17 12/14/17 12/21/17 12/27/2017 12/29/2017 1/3/2018 1/8/18     Total Weekly Dose  35mg 22.5mg 35mg 15mg 42.5mg 65 mg     INR 4.8 3.3 1.2 1.1 1.3 1.5 4.5/ repeat 4.5         Clinic Interview:  Tablet Strength: pt has 5mg tablets  Current Dose: pt verified dose of what was on calendar which had 10 mg daily. Patient also reported finishing Lovenox injections with the last injection being this AM    Clinical Outcomes      Negatives Major bleeding event, Thromboembolic event, Anticoagulation-related hospital admission, Anticoagulation-related ED visit, Anticoagulation-related fatality     Comments Patient seen in ER on 1/5/18 for symptoms of chest pain and palpitations.  Work up was negative.  Patient will follow up with Dr. Pastrana on 1/10. Patient reported finishing Lovenox injection this AM.       Patient Findings      Positives Emergency department visit, Change in medications     Negatives Signs/symptoms of thrombosis, Signs/symptoms of bleeding, Laboratory test error suspected, Change in health, Change in alcohol use, Change in activity, Upcoming invasive procedure, Upcoming dental procedure, Missed doses, Extra doses, Change in diet/appetite, Hospital admission, Bruising, Other complaints     Comments Patient seen in ER on 1/5/18 for symptoms of chest pain and palpitations.  Work up was negative.  Patient will follow up with Dr. Pastrana on 1/10. Patient reported finishing Lovenox injection this AM.                      Plan:  1. INR is 4.5.  Instructed pt to decrease to 7.5 mg MWF then 10 mg ROW.   2. RTC Thursday .   3. Medications:No changes  4. Pt requests refills: denied refills on warfarin  5. Verbal and written information provided.  Pt expresses understanding and has no further questions at this time.  6. Pt counseled to go to ER with any S/Sx of stroke/clot or S/Sx of severe bleeding. Patient acknowledged understanding. Emphasized the importance of taking warfarin as prescribed.As mentioned in previous encounters, patients compliance of medication has been uncertain and therefore dosing has been difficult with the patient staying with the 1-2 INR range. Patient was seen in the ER on 1/5/18 for chest pain in which he reported that the work up did not reveal any problems and has an appointment with Dr. Pastrana on Wednesday. Patients INR has jumped from 1.9 in the ER on 1/5/18 to 4.5 today. Patient also reported using last Lovenox injection this AM.  Dose was decreased and increase frequency of monitoring.     Tabitha Peraza. AMARI High  1/8/2018  3:03 PM

## 2018-01-11 ENCOUNTER — APPOINTMENT (OUTPATIENT)
Dept: GENERAL RADIOLOGY | Facility: HOSPITAL | Age: 43
End: 2018-01-11

## 2018-01-11 ENCOUNTER — HOSPITAL ENCOUNTER (EMERGENCY)
Facility: HOSPITAL | Age: 43
Discharge: LEFT BEFORE DISCHARGE | End: 2018-01-11

## 2018-01-11 ENCOUNTER — ANTICOAGULATION VISIT (OUTPATIENT)
Dept: PHARMACY | Facility: HOSPITAL | Age: 43
End: 2018-01-11

## 2018-01-11 VITALS
TEMPERATURE: 98.8 F | HEIGHT: 74 IN | HEART RATE: 94 BPM | WEIGHT: 210 LBS | BODY MASS INDEX: 26.95 KG/M2 | SYSTOLIC BLOOD PRESSURE: 146 MMHG | DIASTOLIC BLOOD PRESSURE: 98 MMHG | OXYGEN SATURATION: 96 % | RESPIRATION RATE: 18 BRPM

## 2018-01-11 DIAGNOSIS — I48.91 ATRIAL FIBRILLATION, UNSPECIFIED TYPE (HCC): ICD-10-CM

## 2018-01-11 LAB
BASOPHILS # BLD AUTO: 0.07 10*3/MM3 (ref 0–0.3)
BASOPHILS NFR BLD AUTO: 0.7 % (ref 0–2)
DEPRECATED RDW RBC AUTO: 47.8 FL (ref 37–54)
EOSINOPHIL # BLD AUTO: 0.29 10*3/MM3 (ref 0–0.7)
EOSINOPHIL NFR BLD AUTO: 2.9 % (ref 0–5)
ERYTHROCYTE [DISTWIDTH] IN BLOOD BY AUTOMATED COUNT: 14.2 % (ref 11.5–14.5)
HCT VFR BLD AUTO: 43.5 % (ref 42–52)
HGB BLD-MCNC: 13.9 G/DL (ref 14–18)
HOLD SPECIMEN: NORMAL
HOLD SPECIMEN: NORMAL
IMM GRANULOCYTES # BLD: 0.03 10*3/MM3 (ref 0–0.03)
IMM GRANULOCYTES NFR BLD: 0.3 % (ref 0–0.5)
INR PPP: 4.3 (ref 0.91–1.09)
LYMPHOCYTES # BLD AUTO: 2.64 10*3/MM3 (ref 1–3)
LYMPHOCYTES NFR BLD AUTO: 26.8 % (ref 21–51)
MCH RBC QN AUTO: 29.4 PG (ref 27–33)
MCHC RBC AUTO-ENTMCNC: 32 G/DL (ref 33–37)
MCV RBC AUTO: 92 FL (ref 80–94)
MONOCYTES # BLD AUTO: 0.84 10*3/MM3 (ref 0.1–0.9)
MONOCYTES NFR BLD AUTO: 8.5 % (ref 0–10)
NEUTROPHILS # BLD AUTO: 5.99 10*3/MM3 (ref 1.4–6.5)
NEUTROPHILS NFR BLD AUTO: 60.8 % (ref 30–70)
PLATELET # BLD AUTO: 390 10*3/MM3 (ref 130–400)
PMV BLD AUTO: 10.3 FL (ref 6–10)
PROTHROMBIN TIME: 51.8 SECONDS (ref 10–13.8)
RBC # BLD AUTO: 4.73 10*6/MM3 (ref 4.7–6.1)
TROPONIN I SERPL-MCNC: <0.006 NG/ML
WBC NRBC COR # BLD: 9.86 10*3/MM3 (ref 4.5–12.5)
WHOLE BLOOD HOLD SPECIMEN: NORMAL
WHOLE BLOOD HOLD SPECIMEN: NORMAL

## 2018-01-11 PROCEDURE — 36416 COLLJ CAPILLARY BLOOD SPEC: CPT

## 2018-01-11 PROCEDURE — 99211 OFF/OP EST MAY X REQ PHY/QHP: CPT

## 2018-01-11 PROCEDURE — 93010 ELECTROCARDIOGRAM REPORT: CPT | Performed by: INTERNAL MEDICINE

## 2018-01-11 PROCEDURE — 71046 X-RAY EXAM CHEST 2 VIEWS: CPT | Performed by: RADIOLOGY

## 2018-01-11 PROCEDURE — 85610 PROTHROMBIN TIME: CPT

## 2018-01-11 PROCEDURE — 85025 COMPLETE CBC W/AUTO DIFF WBC: CPT | Performed by: EMERGENCY MEDICINE

## 2018-01-11 PROCEDURE — G0463 HOSPITAL OUTPT CLINIC VISIT: HCPCS | Performed by: PHARMACIST

## 2018-01-11 PROCEDURE — 93005 ELECTROCARDIOGRAM TRACING: CPT | Performed by: EMERGENCY MEDICINE

## 2018-01-11 PROCEDURE — 71046 X-RAY EXAM CHEST 2 VIEWS: CPT

## 2018-01-11 PROCEDURE — 84484 ASSAY OF TROPONIN QUANT: CPT | Performed by: EMERGENCY MEDICINE

## 2018-01-11 RX ORDER — SODIUM CHLORIDE 0.9 % (FLUSH) 0.9 %
10 SYRINGE (ML) INJECTION AS NEEDED
Status: DISCONTINUED | OUTPATIENT
Start: 2018-01-11 | End: 2018-01-11 | Stop reason: HOSPADM

## 2018-01-11 NOTE — PROGRESS NOTES
Anticoagulation Clinic Progress Note  Indication: A. Fib     Referring Provider: Dr. Pastrana    Initial Warfarin Start Date:   Planned Duration of Therapy: Indefinite  Goal INR: 2.0-3.0  Current Drug Interactions: Aspirin, Fluoxetine      Anticoagulation Clinic INR History:  Date 12/4/17 12/14/17 12/21/17 12/27/2017 12/29/2017 1/3/2018 1/8/18 1/11/2018    Total Weekly Dose  35mg 22.5mg 35mg 15mg 42.5mg 65 mg 25 mg (last 3 days)    INR 4.8 3.3 1.2 1.1 1.3 1.5 4.5/ repeat 4.5 4.3        Clinic Interview:  Tablet Strength: pt has 5mg tablets  Current Dose: pt verified dose of what was on calendar which had 7.5mg on Monday and Wednesday and 10mg on Tuesday.    Clinical Outcomes      Negatives Major bleeding event, Thromboembolic event, Anticoagulation-related hospital admission, Anticoagulation-related ED visit, Anticoagulation-related fatality       Patient Findings      Negatives Signs/symptoms of thrombosis, Signs/symptoms of bleeding, Laboratory test error suspected, Change in health, Change in alcohol use, Change in activity, Upcoming invasive procedure, Emergency department visit, Upcoming dental procedure, Missed doses, Extra doses, Change in medications, Change in diet/appetite, Hospital admission, Bruising, Other complaints         Plan:  1. INR is 4.3.  Instructed pt to decrease to 5mg tonight, then 7.5mg daily until RTC.   2. RTC Monday .   3. Medications:No changes  4. Pt requests refills: denied refills on warfarin  5. Verbal and written information provided. Pt expresses understanding and has no further questions at this time.  6. Pt counseled to go to ER with any S/Sx of stroke/clot or S/Sx of severe bleeding. Patient acknowledged understanding. Emphasized the importance of taking warfarin as prescribed.As mentioned in previous encounters, patients compliance of medication has been uncertain and therefore dosing has been difficult with the patient staying with the 1-2 INR range. Patient cancelled scheduled  follow-up with Dr. Pastrana. Continuing increased frequency of monitoring with supra-therapeutic INR.     Nagi Schneider, Pharmacy Intern  1/11/2018  9:29 AM

## 2018-01-12 NOTE — ED NOTES
Patient called to a room x 3 with no answer. Unable to be found in ED lobby, bathroom or outside ED doors. Patient appears to have left without being seen or without notifying staff.      Loren Fried RN  01/11/18 6625

## 2018-01-13 ENCOUNTER — HOSPITAL ENCOUNTER (EMERGENCY)
Facility: HOSPITAL | Age: 43
Discharge: LEFT BEFORE DISCHARGE | End: 2018-01-13
Attending: EMERGENCY MEDICINE | Admitting: EMERGENCY MEDICINE

## 2018-01-13 ENCOUNTER — APPOINTMENT (OUTPATIENT)
Dept: CT IMAGING | Facility: HOSPITAL | Age: 43
End: 2018-01-13

## 2018-01-13 ENCOUNTER — APPOINTMENT (OUTPATIENT)
Dept: GENERAL RADIOLOGY | Facility: HOSPITAL | Age: 43
End: 2018-01-13

## 2018-01-13 VITALS
TEMPERATURE: 98.4 F | BODY MASS INDEX: 26.95 KG/M2 | HEART RATE: 86 BPM | OXYGEN SATURATION: 95 % | HEIGHT: 74 IN | DIASTOLIC BLOOD PRESSURE: 106 MMHG | WEIGHT: 210 LBS | RESPIRATION RATE: 18 BRPM | SYSTOLIC BLOOD PRESSURE: 134 MMHG

## 2018-01-13 DIAGNOSIS — R41.82 ALTERED MENTAL STATUS, UNSPECIFIED ALTERED MENTAL STATUS TYPE: ICD-10-CM

## 2018-01-13 DIAGNOSIS — R07.9 CHEST PAIN, UNSPECIFIED TYPE: Primary | ICD-10-CM

## 2018-01-13 LAB
6-ACETYL MORPHINE: NEGATIVE
ALBUMIN SERPL-MCNC: 4.7 G/DL (ref 3.5–5)
ALBUMIN/GLOB SERPL: 1.7 G/DL (ref 1.5–2.5)
ALP SERPL-CCNC: 111 U/L (ref 40–129)
ALT SERPL W P-5'-P-CCNC: 50 U/L (ref 10–44)
AMPHET+METHAMPHET UR QL: NEGATIVE
ANION GAP SERPL CALCULATED.3IONS-SCNC: 5.2 MMOL/L (ref 3.6–11.2)
APAP SERPL-MCNC: <10 MCG/ML (ref 0–200)
APTT PPP: 33.5 SECONDS (ref 23.8–36.1)
AST SERPL-CCNC: 20 U/L (ref 10–34)
BARBITURATES UR QL SCN: NEGATIVE
BASOPHILS # BLD AUTO: 0.07 10*3/MM3 (ref 0–0.3)
BASOPHILS NFR BLD AUTO: 0.7 % (ref 0–2)
BENZODIAZ UR QL SCN: NEGATIVE
BILIRUB SERPL-MCNC: 0.4 MG/DL (ref 0.2–1.8)
BNP SERPL-MCNC: <2 PG/ML (ref 0–100)
BUN BLD-MCNC: 9 MG/DL (ref 7–21)
BUN/CREAT SERPL: 8.3 (ref 7–25)
BUPRENORPHINE SERPL-MCNC: NEGATIVE NG/ML
CALCIUM SPEC-SCNC: 9.1 MG/DL (ref 7.7–10)
CANNABINOIDS SERPL QL: NEGATIVE
CHLORIDE SERPL-SCNC: 105 MMOL/L (ref 99–112)
CO2 SERPL-SCNC: 28.8 MMOL/L (ref 24.3–31.9)
COCAINE UR QL: NEGATIVE
CREAT BLD-MCNC: 1.08 MG/DL (ref 0.43–1.29)
D-LACTATE SERPL-SCNC: 1.1 MMOL/L (ref 0.5–2)
DEPRECATED RDW RBC AUTO: 47.2 FL (ref 37–54)
EOSINOPHIL # BLD AUTO: 0.34 10*3/MM3 (ref 0–0.7)
EOSINOPHIL NFR BLD AUTO: 3.3 % (ref 0–5)
ERYTHROCYTE [DISTWIDTH] IN BLOOD BY AUTOMATED COUNT: 14.4 % (ref 11.5–14.5)
ETHANOL BLD-MCNC: <10 MG/DL
ETHANOL UR QL: <0.01 %
GFR SERPL CREATININE-BSD FRML MDRD: 75 ML/MIN/1.73
GLOBULIN UR ELPH-MCNC: 2.8 GM/DL
GLUCOSE BLD-MCNC: 97 MG/DL (ref 70–110)
HCT VFR BLD AUTO: 43.4 % (ref 42–52)
HGB BLD-MCNC: 13.9 G/DL (ref 14–18)
HOLD SPECIMEN: NORMAL
HOLD SPECIMEN: NORMAL
IMM GRANULOCYTES # BLD: 0.03 10*3/MM3 (ref 0–0.03)
IMM GRANULOCYTES NFR BLD: 0.3 % (ref 0–0.5)
INR PPP: 1.88 (ref 0.9–1.1)
LIPASE SERPL-CCNC: 40 U/L (ref 13–60)
LYMPHOCYTES # BLD AUTO: 2.75 10*3/MM3 (ref 1–3)
LYMPHOCYTES NFR BLD AUTO: 26.7 % (ref 21–51)
MCH RBC QN AUTO: 29.6 PG (ref 27–33)
MCHC RBC AUTO-ENTMCNC: 32 G/DL (ref 33–37)
MCV RBC AUTO: 92.3 FL (ref 80–94)
METHADONE UR QL SCN: NEGATIVE
MONOCYTES # BLD AUTO: 1.02 10*3/MM3 (ref 0.1–0.9)
MONOCYTES NFR BLD AUTO: 9.9 % (ref 0–10)
NEUTROPHILS # BLD AUTO: 6.1 10*3/MM3 (ref 1.4–6.5)
NEUTROPHILS NFR BLD AUTO: 59.1 % (ref 30–70)
OPIATES UR QL: NEGATIVE
OSMOLALITY SERPL CALC.SUM OF ELEC: 276.1 MOSM/KG (ref 273–305)
OXYCODONE UR QL SCN: NEGATIVE
PCP UR QL SCN: NEGATIVE
PLATELET # BLD AUTO: 383 10*3/MM3 (ref 130–400)
PMV BLD AUTO: 10.1 FL (ref 6–10)
POTASSIUM BLD-SCNC: 3.9 MMOL/L (ref 3.5–5.3)
PROT SERPL-MCNC: 7.5 G/DL (ref 6–8)
PROTHROMBIN TIME: 21.9 SECONDS (ref 11–15.4)
RBC # BLD AUTO: 4.7 10*6/MM3 (ref 4.7–6.1)
SALICYLATES SERPL-MCNC: <1 MG/DL (ref 0–30)
SODIUM BLD-SCNC: 139 MMOL/L (ref 135–153)
TROPONIN I SERPL-MCNC: 0.01 NG/ML
WBC NRBC COR # BLD: 10.31 10*3/MM3 (ref 4.5–12.5)
WHOLE BLOOD HOLD SPECIMEN: NORMAL
WHOLE BLOOD HOLD SPECIMEN: NORMAL

## 2018-01-13 PROCEDURE — 83605 ASSAY OF LACTIC ACID: CPT | Performed by: EMERGENCY MEDICINE

## 2018-01-13 PROCEDURE — 71045 X-RAY EXAM CHEST 1 VIEW: CPT

## 2018-01-13 PROCEDURE — 25010000002 CEFTRIAXONE PER 250 MG: Performed by: EMERGENCY MEDICINE

## 2018-01-13 PROCEDURE — 80307 DRUG TEST PRSMV CHEM ANLYZR: CPT | Performed by: EMERGENCY MEDICINE

## 2018-01-13 PROCEDURE — 96375 TX/PRO/DX INJ NEW DRUG ADDON: CPT

## 2018-01-13 PROCEDURE — 85610 PROTHROMBIN TIME: CPT | Performed by: EMERGENCY MEDICINE

## 2018-01-13 PROCEDURE — 85025 COMPLETE CBC W/AUTO DIFF WBC: CPT | Performed by: EMERGENCY MEDICINE

## 2018-01-13 PROCEDURE — 25010000002 VANCOMYCIN PER 500 MG: Performed by: EMERGENCY MEDICINE

## 2018-01-13 PROCEDURE — 83880 ASSAY OF NATRIURETIC PEPTIDE: CPT | Performed by: EMERGENCY MEDICINE

## 2018-01-13 PROCEDURE — 70450 CT HEAD/BRAIN W/O DYE: CPT

## 2018-01-13 PROCEDURE — 93010 ELECTROCARDIOGRAM REPORT: CPT | Performed by: INTERNAL MEDICINE

## 2018-01-13 PROCEDURE — 93005 ELECTROCARDIOGRAM TRACING: CPT | Performed by: EMERGENCY MEDICINE

## 2018-01-13 PROCEDURE — 85730 THROMBOPLASTIN TIME PARTIAL: CPT | Performed by: EMERGENCY MEDICINE

## 2018-01-13 PROCEDURE — 83690 ASSAY OF LIPASE: CPT | Performed by: EMERGENCY MEDICINE

## 2018-01-13 PROCEDURE — 71045 X-RAY EXAM CHEST 1 VIEW: CPT | Performed by: RADIOLOGY

## 2018-01-13 PROCEDURE — 74176 CT ABD & PELVIS W/O CONTRAST: CPT | Performed by: RADIOLOGY

## 2018-01-13 PROCEDURE — 99283 EMERGENCY DEPT VISIT LOW MDM: CPT

## 2018-01-13 PROCEDURE — 70450 CT HEAD/BRAIN W/O DYE: CPT | Performed by: RADIOLOGY

## 2018-01-13 PROCEDURE — 84484 ASSAY OF TROPONIN QUANT: CPT | Performed by: EMERGENCY MEDICINE

## 2018-01-13 PROCEDURE — 96367 TX/PROPH/DG ADDL SEQ IV INF: CPT

## 2018-01-13 PROCEDURE — 74176 CT ABD & PELVIS W/O CONTRAST: CPT

## 2018-01-13 PROCEDURE — 87040 BLOOD CULTURE FOR BACTERIA: CPT | Performed by: EMERGENCY MEDICINE

## 2018-01-13 PROCEDURE — 96365 THER/PROPH/DIAG IV INF INIT: CPT

## 2018-01-13 PROCEDURE — 80053 COMPREHEN METABOLIC PANEL: CPT | Performed by: EMERGENCY MEDICINE

## 2018-01-13 RX ORDER — HEPARIN SODIUM 5000 [USP'U]/ML
52.5 INJECTION, SOLUTION INTRAVENOUS; SUBCUTANEOUS ONCE
Status: DISCONTINUED | OUTPATIENT
Start: 2018-01-13 | End: 2018-01-13

## 2018-01-13 RX ORDER — HEPARIN SODIUM 5000 [USP'U]/ML
26.2 INJECTION, SOLUTION INTRAVENOUS; SUBCUTANEOUS AS NEEDED
Status: DISCONTINUED | OUTPATIENT
Start: 2018-01-13 | End: 2018-01-13

## 2018-01-13 RX ORDER — SODIUM CHLORIDE 0.9 % (FLUSH) 0.9 %
10 SYRINGE (ML) INJECTION AS NEEDED
Status: DISCONTINUED | OUTPATIENT
Start: 2018-01-13 | End: 2018-01-13 | Stop reason: HOSPADM

## 2018-01-13 RX ORDER — NALOXONE HYDROCHLORIDE 1 MG/ML
2 INJECTION INTRAMUSCULAR; INTRAVENOUS; SUBCUTANEOUS ONCE
Status: COMPLETED | OUTPATIENT
Start: 2018-01-13 | End: 2018-01-13

## 2018-01-13 RX ORDER — HEPARIN SODIUM 5000 [USP'U]/ML
52.5 INJECTION, SOLUTION INTRAVENOUS; SUBCUTANEOUS AS NEEDED
Status: DISCONTINUED | OUTPATIENT
Start: 2018-01-13 | End: 2018-01-13

## 2018-01-13 RX ADMIN — VANCOMYCIN HYDROCHLORIDE 2000 MG: 5 INJECTION, POWDER, LYOPHILIZED, FOR SOLUTION INTRAVENOUS at 19:34

## 2018-01-13 RX ADMIN — CEFTRIAXONE 2 G: 2 INJECTION, SOLUTION INTRAVENOUS at 18:38

## 2018-01-13 RX ADMIN — NALOXONE HYDROCHLORIDE 2 MG: 1 INJECTION PARENTERAL at 18:04

## 2018-01-13 NOTE — ED PROVIDER NOTES
"Subjective   HPI Comments: Very limited history due to patient's altered mental status.  Patient is a 42-year-old gentleman brought in by his wife and sister for chest pain and altered mental status.  The patient has been \"falling out\" in route.  Patient's confused and does not know where we are.  Patient believes weren't [place] he cannot tell me the date or the month or year.  Patient has a history of aortic stenosis with mitral valve repair he has a prosthetic valve but is scheduled to have a mechanical valve placed.  He is on Coumadin according to the wife his INR was 4 two days ago.  Patient hasn't any fevers.  He was acting normal until today.  It's unclear when he was last seen normal.  Denies any drug abuse.    Patient is a 42 y.o. male presenting with chest pain.   History limited by:  Mental status change  Chest Pain   Pain location:  Substernal area      Review of Systems   Unable to perform ROS: Mental status change   Cardiovascular: Positive for chest pain.       Past Medical History:   Diagnosis Date   • Hallucinations    • Heart attack    • Hyperlipidemia    • Hypertension    • Injury of back    • Nervous breakdown     12 years ago   • Stroke        Allergies   Allergen Reactions   • Bee Venom Anaphylaxis   • Fentanyl      Pt states that he stops breathing   • Celexa [Citalopram Hydrobromide]    • Haldol [Haloperidol Lactate]    • Nitroglycerin    • Risperidone And Related        Past Surgical History:   Procedure Laterality Date   • AORTIC VALVE REPAIR/REPLACEMENT MITRAL VALVE REPAIR/REPLACEMENT     • APPENDECTOMY     • CARDIAC CATHETERIZATION     • CHOLECYSTECTOMY     • HERNIA REPAIR      umbilical   • PACEMAKER IMPLANTATION     • PATELLAR RECONSTRUCTION     • QUADRICEPS REPAIR         No family history on file.    Social History     Social History   • Marital status:      Spouse name: N/A   • Number of children: N/A   • Years of education: N/A     Social History Main Topics   • Smoking " status: Current Every Day Smoker     Packs/day: 1.00   • Smokeless tobacco: Never Used   • Alcohol use No   • Drug use: No   • Sexual activity: Defer     Other Topics Concern   • Not on file     Social History Narrative           Objective   Physical Exam   Constitutional: He appears well-developed and well-nourished.   Somnolent but easily arousable   HENT:   Head: Normocephalic and atraumatic.   Eyes: EOM are normal. Pupils are equal, round, and reactive to light.   Pupils were 3 mm and reactive but Narcan given due to altered mental status without effect   Neck: Normal range of motion. Neck supple.   Cardiovascular: Normal rate and regular rhythm.    Murmur heard.  Murmur auscultated   Pulmonary/Chest: Effort normal and breath sounds normal.   Abdominal: Soft. He exhibits no distension. There is no tenderness. There is no rebound and no guarding.   Musculoskeletal: Normal range of motion. He exhibits no edema or deformity.   Neurological: He is alert.   Patient oriented to person only.  Noncompliant with formal neuro exam, moving all extremities spontaneously.   Skin: Skin is warm and dry.       Procedures         ED Course  ED Course   Comment By Time   Patient has altered mental status.  We'll not build to do lumbar puncture due to elevated INR.  Patient also noncompliant with that and refusing.  I will start Rocephin and vancomycin empirically. Rodo Mace MD 01/13 1850   I had a long discussion with the patient about the risk of leaving AGAINST MEDICAL ADVICE.  I discussed risk including but not limited to stroke, heart attack, death, in permanent impairment.  The patient heard and verbalized understanding.  I discussed alternatives with the patient.  The patient still wants to leave AGAINST MEDICAL ADVICE.  The patient is sober, of sound mind and has the capacity to make this decision. I made it clear to the patient they are always welcome back should they choose return. Patient will be  discharged home against medical advice at this time.  Patient has been back to his baseline for approximately 30 minutes.  His wife cannot convince him to stay but after discussion with myself the wife Kasandra and nurse Hunter he agrees to stay until his results come back but he says he is leaving AGAINST MEDICAL ADVICE at that time. Rodo Mace MD 01/13 1928                  MDM  Number of Diagnoses or Management Options      Final diagnoses:   Chest pain, unspecified type   Altered mental status, unspecified altered mental status type            Rodo Mace MD  01/13/18 1941

## 2018-01-13 NOTE — ED NOTES
Called to pts room by family, pt reports he wants to leave facility, Dr. Mace in room at this time, pt continues to be disoriented, reports he is in [place].     Cristina Christianson RN  01/13/18 7327

## 2018-01-14 NOTE — DISCHARGE INSTRUCTIONS
Read and follow all instructions in this handout    Follow up with primary doctor or clinic ASAP  Return to ED if symptoms persist or worsen.  Return to ED if you have any other medical concerns.  As discussed, you are leaving AGAINST MEDICAL ADVICE.  You are risk of stroke, heart attack, death, and permanent impairment.  You are excepting his risk by leaving AGAINST MEDICAL ADVICE.  Should you choose to return to the emergency department you're always welcome back. You can ask for myself and I would be happy to take care of you and continue your care.    Hypertension  Hypertension, commonly called high blood pressure, is when the force of blood pumping through your arteries is too strong. Your arteries are the blood vessels that carry blood from your heart throughout your body. A blood pressure reading consists of a higher number over a lower number, such as 110/72. The higher number (systolic) is the pressure inside your arteries when your heart pumps. The lower number (diastolic) is the pressure inside your arteries when your heart relaxes. Ideally you want your blood pressure below 120/80.  Hypertension forces your heart to work harder to pump blood. Your arteries may become narrow or stiff. Having untreated or uncontrolled hypertension can cause heart attack, stroke, kidney disease, and other problems.  What increases the risk?  Some risk factors for high blood pressure are controllable. Others are not.  Risk factors you cannot control include:  · Race. You may be at higher risk if you are .  · Age. Risk increases with age.  · Gender. Men are at higher risk than women before age 45 years. After age 65, women are at higher risk than men.  Risk factors you can control include:  · Not getting enough exercise or physical activity.  · Being overweight.  · Getting too much fat, sugar, calories, or salt in your diet.  · Drinking too much alcohol.  What are the signs or symptoms?  Hypertension does not  usually cause signs or symptoms. Extremely high blood pressure (hypertensive crisis) may cause headache, anxiety, shortness of breath, and nosebleed.  How is this diagnosed?  To check if you have hypertension, your health care provider will measure your blood pressure while you are seated, with your arm held at the level of your heart. It should be measured at least twice using the same arm. Certain conditions can cause a difference in blood pressure between your right and left arms. A blood pressure reading that is higher than normal on one occasion does not mean that you need treatment. If it is not clear whether you have high blood pressure, you may be asked to return on a different day to have your blood pressure checked again. Or, you may be asked to monitor your blood pressure at home for 1 or more weeks.  How is this treated?  Treating high blood pressure includes making lifestyle changes and possibly taking medicine. Living a healthy lifestyle can help lower high blood pressure. You may need to change some of your habits.  Lifestyle changes may include:  · Following the DASH diet. This diet is high in fruits, vegetables, and whole grains. It is low in salt, red meat, and added sugars.  · Keep your sodium intake below 2,300 mg per day.  · Getting at least 30-45 minutes of aerobic exercise at least 4 times per week.  · Losing weight if necessary.  · Not smoking.  · Limiting alcoholic beverages.  · Learning ways to reduce stress.  Your health care provider may prescribe medicine if lifestyle changes are not enough to get your blood pressure under control, and if one of the following is true:  · You are 18-59 years of age and your systolic blood pressure is above 140.  · You are 60 years of age or older, and your systolic blood pressure is above 150.  · Your diastolic blood pressure is above 90.  · You have diabetes, and your systolic blood pressure is over 140 or your diastolic blood pressure is over 90.  · You  have kidney disease and your blood pressure is above 140/90.  · You have heart disease and your blood pressure is above 140/90.  Your personal target blood pressure may vary depending on your medical conditions, your age, and other factors.  Follow these instructions at home:  · Have your blood pressure rechecked as directed by your health care provider.  · Take medicines only as directed by your health care provider. Follow the directions carefully. Blood pressure medicines must be taken as prescribed. The medicine does not work as well when you skip doses. Skipping doses also puts you at risk for problems.  · Do not smoke.  · Monitor your blood pressure at home as directed by your health care provider.  Contact a health care provider if:  · You think you are having a reaction to medicines taken.  · You have recurrent headaches or feel dizzy.  · You have swelling in your ankles.  · You have trouble with your vision.  Get help right away if:  · You develop a severe headache or confusion.  · You have unusual weakness, numbness, or feel faint.  · You have severe chest or abdominal pain.  · You vomit repeatedly.  · You have trouble breathing.  This information is not intended to replace advice given to you by your health care provider. Make sure you discuss any questions you have with your health care provider.  Document Released: 12/18/2006 Document Revised: 05/25/2017 Document Reviewed: 10/10/2014  Elsevier Interactive Patient Education © 2017 [employer]

## 2018-01-15 ENCOUNTER — APPOINTMENT (OUTPATIENT)
Dept: PHARMACY | Facility: HOSPITAL | Age: 43
End: 2018-01-15

## 2018-01-15 ENCOUNTER — TELEPHONE (OUTPATIENT)
Dept: PHARMACY | Facility: HOSPITAL | Age: 43
End: 2018-01-15

## 2018-01-15 NOTE — TELEPHONE ENCOUNTER
Patient spouse called the clinic today to cancel his appointment and reported that the patient was admitted to the hospital at [facility]. The spouse was asking for dosing instructions to have the patient take while in the hospital. I explained to the spouse that she needed to make sure the patient had reported that he was on coumadin and that the hospital should be dosing and adjusting and checking his INR appropriate to his course of therapy while he is there. The patient's spouse acknowledged understanding. I also advised the spouse to notify the clinic when the patient was discharged so that appropriate follow-up appointments could be made.       Thank you,  Tabitha Peraza. [place], [facility]  01/15/18  4:20 PM

## 2018-01-18 ENCOUNTER — TELEPHONE (OUTPATIENT)
Dept: PHARMACY | Facility: HOSPITAL | Age: 43
End: 2018-01-18

## 2018-01-18 ENCOUNTER — APPOINTMENT (OUTPATIENT)
Dept: PHARMACY | Facility: HOSPITAL | Age: 43
End: 2018-01-18

## 2018-01-18 LAB — BACTERIA SPEC AEROBE CULT: NORMAL

## 2018-01-18 NOTE — TELEPHONE ENCOUNTER
The clinic has been attempting to closely follow patient after discharge from  hospital admission at [facility]. Called [facility] today and verified that the patient has been discharged from the hospital on 1/15/18.  Have attempted to call the patient x 2 without success to schedule a follow-up for re-check of INR since discharge from hospital. Patient's spouse had also been asked that they call the clinic when the patient was discharged.     Will attempt contact with patient again if he does not contact the clinic.       Thank you,  Tabitha Peraza. [facility]  01/18/18  3:09 PM

## 2018-01-21 ENCOUNTER — HOSPITAL ENCOUNTER (EMERGENCY)
Facility: HOSPITAL | Age: 43
Discharge: HOME | End: 2018-01-21
Attending: EMERGENCY MEDICINE | Admitting: EMERGENCY MEDICINE

## 2018-01-21 ENCOUNTER — APPOINTMENT (OUTPATIENT)
Dept: ULTRASOUND IMAGING | Facility: HOSPITAL | Age: 43
End: 2018-01-21
Attending: EMERGENCY MEDICINE

## 2018-01-21 ENCOUNTER — APPOINTMENT (OUTPATIENT)
Dept: GENERAL RADIOLOGY | Facility: HOSPITAL | Age: 43
End: 2018-01-21

## 2018-01-21 VITALS
BODY MASS INDEX: 26.31 KG/M2 | DIASTOLIC BLOOD PRESSURE: 97 MMHG | WEIGHT: 205 LBS | RESPIRATION RATE: 19 BRPM | HEART RATE: 70 BPM | OXYGEN SATURATION: 95 % | HEIGHT: 74 IN | TEMPERATURE: 97.9 F | SYSTOLIC BLOOD PRESSURE: 134 MMHG

## 2018-01-21 DIAGNOSIS — R07.89 ATYPICAL CHEST PAIN: Primary | ICD-10-CM

## 2018-01-21 LAB
ALBUMIN SERPL-MCNC: 4.7 G/DL (ref 3.5–5)
ALBUMIN/GLOB SERPL: 2 G/DL (ref 1.5–2.5)
ALP SERPL-CCNC: 102 U/L (ref 40–129)
ALT SERPL W P-5'-P-CCNC: 34 U/L (ref 10–44)
ANION GAP SERPL CALCULATED.3IONS-SCNC: 1.9 MMOL/L (ref 3.6–11.2)
APTT PPP: 31.7 SECONDS (ref 23.8–36.1)
AST SERPL-CCNC: 19 U/L (ref 10–34)
BASOPHILS # BLD AUTO: 0.08 10*3/MM3 (ref 0–0.3)
BASOPHILS NFR BLD AUTO: 0.9 % (ref 0–2)
BILIRUB SERPL-MCNC: 0.3 MG/DL (ref 0.2–1.8)
BUN BLD-MCNC: 10 MG/DL (ref 7–21)
BUN/CREAT SERPL: 10.3 (ref 7–25)
CALCIUM SPEC-SCNC: 9.1 MG/DL (ref 7.7–10)
CHLORIDE SERPL-SCNC: 109 MMOL/L (ref 99–112)
CO2 SERPL-SCNC: 28.1 MMOL/L (ref 24.3–31.9)
CREAT BLD-MCNC: 0.97 MG/DL (ref 0.43–1.29)
DEPRECATED RDW RBC AUTO: 46.2 FL (ref 37–54)
EOSINOPHIL # BLD AUTO: 0.52 10*3/MM3 (ref 0–0.7)
EOSINOPHIL NFR BLD AUTO: 5.9 % (ref 0–5)
ERYTHROCYTE [DISTWIDTH] IN BLOOD BY AUTOMATED COUNT: 14 % (ref 11.5–14.5)
GFR SERPL CREATININE-BSD FRML MDRD: 84 ML/MIN/1.73
GLOBULIN UR ELPH-MCNC: 2.4 GM/DL
GLUCOSE BLD-MCNC: 89 MG/DL (ref 70–110)
HCT VFR BLD AUTO: 42.3 % (ref 42–52)
HGB BLD-MCNC: 13.3 G/DL (ref 14–18)
HOLD SPECIMEN: NORMAL
HOLD SPECIMEN: NORMAL
IMM GRANULOCYTES # BLD: 0.02 10*3/MM3 (ref 0–0.03)
IMM GRANULOCYTES NFR BLD: 0.2 % (ref 0–0.5)
INR PPP: 1.67 (ref 0.9–1.1)
LYMPHOCYTES # BLD AUTO: 2.75 10*3/MM3 (ref 1–3)
LYMPHOCYTES NFR BLD AUTO: 31.4 % (ref 21–51)
MCH RBC QN AUTO: 29.2 PG (ref 27–33)
MCHC RBC AUTO-ENTMCNC: 31.4 G/DL (ref 33–37)
MCV RBC AUTO: 92.8 FL (ref 80–94)
MONOCYTES # BLD AUTO: 0.62 10*3/MM3 (ref 0.1–0.9)
MONOCYTES NFR BLD AUTO: 7.1 % (ref 0–10)
NEUTROPHILS # BLD AUTO: 4.77 10*3/MM3 (ref 1.4–6.5)
NEUTROPHILS NFR BLD AUTO: 54.5 % (ref 30–70)
OSMOLALITY SERPL CALC.SUM OF ELEC: 276.1 MOSM/KG (ref 273–305)
PLATELET # BLD AUTO: 331 10*3/MM3 (ref 130–400)
PMV BLD AUTO: 10.3 FL (ref 6–10)
POTASSIUM BLD-SCNC: 3.8 MMOL/L (ref 3.5–5.3)
PROT SERPL-MCNC: 7.1 G/DL (ref 6–8)
PROTHROMBIN TIME: 19.9 SECONDS (ref 11–15.4)
RBC # BLD AUTO: 4.56 10*6/MM3 (ref 4.7–6.1)
SODIUM BLD-SCNC: 139 MMOL/L (ref 135–153)
TROPONIN I SERPL-MCNC: <0.006 NG/ML
WBC NRBC COR # BLD: 8.76 10*3/MM3 (ref 4.5–12.5)
WHOLE BLOOD HOLD SPECIMEN: NORMAL
WHOLE BLOOD HOLD SPECIMEN: NORMAL

## 2018-01-21 PROCEDURE — 36415 COLL VENOUS BLD VENIPUNCTURE: CPT

## 2018-01-21 PROCEDURE — 84484 ASSAY OF TROPONIN QUANT: CPT | Performed by: EMERGENCY MEDICINE

## 2018-01-21 PROCEDURE — 85025 COMPLETE CBC W/AUTO DIFF WBC: CPT | Performed by: EMERGENCY MEDICINE

## 2018-01-21 PROCEDURE — 71046 X-RAY EXAM CHEST 2 VIEWS: CPT

## 2018-01-21 PROCEDURE — 93971 EXTREMITY STUDY: CPT

## 2018-01-21 PROCEDURE — 99283 EMERGENCY DEPT VISIT LOW MDM: CPT

## 2018-01-21 PROCEDURE — 80053 COMPREHEN METABOLIC PANEL: CPT | Performed by: EMERGENCY MEDICINE

## 2018-01-21 PROCEDURE — 93010 ELECTROCARDIOGRAM REPORT: CPT | Performed by: INTERNAL MEDICINE

## 2018-01-21 PROCEDURE — 85730 THROMBOPLASTIN TIME PARTIAL: CPT | Performed by: EMERGENCY MEDICINE

## 2018-01-21 PROCEDURE — 71046 X-RAY EXAM CHEST 2 VIEWS: CPT | Performed by: RADIOLOGY

## 2018-01-21 PROCEDURE — 85610 PROTHROMBIN TIME: CPT | Performed by: EMERGENCY MEDICINE

## 2018-01-21 PROCEDURE — 93971 EXTREMITY STUDY: CPT | Performed by: RADIOLOGY

## 2018-01-21 PROCEDURE — 93005 ELECTROCARDIOGRAM TRACING: CPT | Performed by: FAMILY MEDICINE

## 2018-01-21 RX ORDER — OXYCODONE AND ACETAMINOPHEN 10; 325 MG/1; MG/1
1 TABLET ORAL ONCE
Status: COMPLETED | OUTPATIENT
Start: 2018-01-21 | End: 2018-01-21

## 2018-01-21 RX ADMIN — OXYCODONE HYDROCHLORIDE AND ACETAMINOPHEN 1 TABLET: 10; 325 TABLET ORAL at 21:05

## 2018-01-22 ENCOUNTER — TELEPHONE (OUTPATIENT)
Dept: PHARMACY | Facility: HOSPITAL | Age: 43
End: 2018-01-22

## 2018-01-22 ENCOUNTER — APPOINTMENT (OUTPATIENT)
Dept: PHARMACY | Facility: HOSPITAL | Age: 43
End: 2018-01-22

## 2018-01-22 NOTE — TELEPHONE ENCOUNTER
Patient called today to cancel his 4 PM appointment. The patient has not been to the clinic since 1/11/18 in which his INR was supra-therapeutic. As it has been documented, there has been difficulty and habit of non-compliance with the patient showing to his appointments for INR monitoring and possible issues with non-compliance of taking warfarin as prescribed. It has been documented several times that if the clinic continued to have issues with the patient that dismissal from the clinic could be a possibility due to safety concerns for the patient.  I reached out to Dr. Pastrana with concern due to how often the patient has been non-compliant in following up for appropriate INR monitoring and for again cancelling today's appointment. After discussion with Dr. Pastrana, it was decided that the patient would be stopped on coumadin due to the risk of inappropriate INR monitoring due to non-compliance. Dr. Pastrana wanted to make sure that the patient understood that this decision was having to be made due to his non-compliance and that there were risks involved with either situation: taking coumadin and non-compliance with appropriate monitoring and the increased risk of stroke, heart attack, clot. Etc with not being anticoagulated with warfarin.     When the patient called the clinic to cancel, I let him know that I was going to have to speak to Dr. Pastrana since there had been so many issues and times that he had cancelled. The patient acknowledged understanding. I called the patient back and explained to him that I had spoken with Dr. Pastrana and at this time he would be stopped on warfarin therapy due to the non-compliance of appropriate INR monitoring and the risks this involves. The patient was explained thoroughly that stopping warfarin therapy put him at increased risk of his blood being too thick which could involve a stroke, blood clot, heart attack,etc. I also re-iterated to the patient what he had been educated on  when he did show for clinic appointments of the dangers of inappropriate INR monitoring due to lack of non-compliance and keeping appointments. The patient verbally acknowledged understanding. I asked if he had any questions for me in which he reported that he did not. I also explained to the patient that he had cancelled his follow-up appointments with Dr. Pastrana and that as soon as he was finished speaking with me that he needed to call his office to schedule a follow up so that they may address this issue and decide on a plan for the patient. I emphasized the importance of this and the patient reported that he would call.     Both Dr. Pastrana and the patient have been sent letters of official notification of discharge from [facility].     Thank you,  Tabitha Peraza. [facility]  01/22/18  4:58 PM

## 2018-02-20 ENCOUNTER — HOSPITAL ENCOUNTER (EMERGENCY)
Facility: HOSPITAL | Age: 43
Discharge: LEFT BEFORE DISCHARGE | End: 2018-02-20
Attending: EMERGENCY MEDICINE | Admitting: EMERGENCY MEDICINE

## 2018-02-20 ENCOUNTER — APPOINTMENT (OUTPATIENT)
Dept: GENERAL RADIOLOGY | Facility: HOSPITAL | Age: 43
End: 2018-02-20

## 2018-02-20 VITALS
TEMPERATURE: 97.5 F | SYSTOLIC BLOOD PRESSURE: 142 MMHG | HEART RATE: 90 BPM | RESPIRATION RATE: 18 BRPM | WEIGHT: 210 LBS | BODY MASS INDEX: 26.95 KG/M2 | DIASTOLIC BLOOD PRESSURE: 106 MMHG | HEIGHT: 74 IN | OXYGEN SATURATION: 100 %

## 2018-02-20 DIAGNOSIS — R07.89 ATYPICAL CHEST PAIN: Primary | ICD-10-CM

## 2018-02-20 LAB
ALBUMIN SERPL-MCNC: 4.7 G/DL (ref 3.5–5)
ALBUMIN/GLOB SERPL: 1.7 G/DL (ref 1.5–2.5)
ALP SERPL-CCNC: 91 U/L (ref 40–129)
ALT SERPL W P-5'-P-CCNC: 17 U/L (ref 10–44)
ANION GAP SERPL CALCULATED.3IONS-SCNC: 5.4 MMOL/L (ref 3.6–11.2)
APTT PPP: 25.9 SECONDS (ref 23.8–36.1)
AST SERPL-CCNC: 15 U/L (ref 10–34)
BASOPHILS # BLD AUTO: 0.07 10*3/MM3 (ref 0–0.3)
BASOPHILS NFR BLD AUTO: 0.7 % (ref 0–2)
BILIRUB SERPL-MCNC: 0.2 MG/DL (ref 0.2–1.8)
BUN BLD-MCNC: 8 MG/DL (ref 7–21)
BUN/CREAT SERPL: 7 (ref 7–25)
CALCIUM SPEC-SCNC: 9.3 MG/DL (ref 7.7–10)
CHLORIDE SERPL-SCNC: 110 MMOL/L (ref 99–112)
CO2 SERPL-SCNC: 22.6 MMOL/L (ref 24.3–31.9)
CREAT BLD-MCNC: 1.14 MG/DL (ref 0.43–1.29)
DEPRECATED RDW RBC AUTO: 44.3 FL (ref 37–54)
EOSINOPHIL # BLD AUTO: 0.5 10*3/MM3 (ref 0–0.7)
EOSINOPHIL NFR BLD AUTO: 4.7 % (ref 0–5)
ERYTHROCYTE [DISTWIDTH] IN BLOOD BY AUTOMATED COUNT: 13.5 % (ref 11.5–14.5)
GFR SERPL CREATININE-BSD FRML MDRD: 70 ML/MIN/1.73
GLOBULIN UR ELPH-MCNC: 2.7 GM/DL
GLUCOSE BLD-MCNC: 116 MG/DL (ref 70–110)
HCT VFR BLD AUTO: 44.4 % (ref 42–52)
HGB BLD-MCNC: 14.7 G/DL (ref 14–18)
IMM GRANULOCYTES # BLD: 0.02 10*3/MM3 (ref 0–0.03)
IMM GRANULOCYTES NFR BLD: 0.2 % (ref 0–0.5)
INR PPP: 0.91 (ref 0.9–1.1)
LIPASE SERPL-CCNC: 49 U/L (ref 13–60)
LYMPHOCYTES # BLD AUTO: 2.78 10*3/MM3 (ref 1–3)
LYMPHOCYTES NFR BLD AUTO: 26.2 % (ref 21–51)
MCH RBC QN AUTO: 30.6 PG (ref 27–33)
MCHC RBC AUTO-ENTMCNC: 33.1 G/DL (ref 33–37)
MCV RBC AUTO: 92.3 FL (ref 80–94)
MONOCYTES # BLD AUTO: 0.78 10*3/MM3 (ref 0.1–0.9)
MONOCYTES NFR BLD AUTO: 7.3 % (ref 0–10)
NEUTROPHILS # BLD AUTO: 6.47 10*3/MM3 (ref 1.4–6.5)
NEUTROPHILS NFR BLD AUTO: 60.9 % (ref 30–70)
OSMOLALITY SERPL CALC.SUM OF ELEC: 275 MOSM/KG (ref 273–305)
PLATELET # BLD AUTO: 362 10*3/MM3 (ref 130–400)
PMV BLD AUTO: 10.2 FL (ref 6–10)
POTASSIUM BLD-SCNC: 3.5 MMOL/L (ref 3.5–5.3)
PROT SERPL-MCNC: 7.4 G/DL (ref 6–8)
PROTHROMBIN TIME: 12.3 SECONDS (ref 11–15.4)
RBC # BLD AUTO: 4.81 10*6/MM3 (ref 4.7–6.1)
SODIUM BLD-SCNC: 138 MMOL/L (ref 135–153)
TROPONIN I SERPL-MCNC: <0.006 NG/ML
WBC NRBC COR # BLD: 10.62 10*3/MM3 (ref 4.5–12.5)

## 2018-02-20 PROCEDURE — 93005 ELECTROCARDIOGRAM TRACING: CPT | Performed by: EMERGENCY MEDICINE

## 2018-02-20 PROCEDURE — 99284 EMERGENCY DEPT VISIT MOD MDM: CPT

## 2018-02-20 PROCEDURE — 85025 COMPLETE CBC W/AUTO DIFF WBC: CPT | Performed by: EMERGENCY MEDICINE

## 2018-02-20 PROCEDURE — 71045 X-RAY EXAM CHEST 1 VIEW: CPT

## 2018-02-20 PROCEDURE — 80053 COMPREHEN METABOLIC PANEL: CPT | Performed by: EMERGENCY MEDICINE

## 2018-02-20 PROCEDURE — 71045 X-RAY EXAM CHEST 1 VIEW: CPT | Performed by: RADIOLOGY

## 2018-02-20 PROCEDURE — 85610 PROTHROMBIN TIME: CPT | Performed by: EMERGENCY MEDICINE

## 2018-02-20 PROCEDURE — 83690 ASSAY OF LIPASE: CPT | Performed by: EMERGENCY MEDICINE

## 2018-02-20 PROCEDURE — 85730 THROMBOPLASTIN TIME PARTIAL: CPT | Performed by: EMERGENCY MEDICINE

## 2018-02-20 PROCEDURE — 36415 COLL VENOUS BLD VENIPUNCTURE: CPT

## 2018-02-20 PROCEDURE — 93010 ELECTROCARDIOGRAM REPORT: CPT | Performed by: INTERNAL MEDICINE

## 2018-02-20 PROCEDURE — 84484 ASSAY OF TROPONIN QUANT: CPT | Performed by: EMERGENCY MEDICINE

## 2018-02-21 ENCOUNTER — APPOINTMENT (OUTPATIENT)
Dept: GENERAL RADIOLOGY | Facility: HOSPITAL | Age: 43
End: 2018-02-21

## 2018-02-21 ENCOUNTER — HOSPITAL ENCOUNTER (EMERGENCY)
Facility: HOSPITAL | Age: 43
Discharge: HOME | End: 2018-02-21
Attending: EMERGENCY MEDICINE | Admitting: EMERGENCY MEDICINE

## 2018-02-21 VITALS
TEMPERATURE: 98.1 F | BODY MASS INDEX: 26.95 KG/M2 | RESPIRATION RATE: 20 BRPM | SYSTOLIC BLOOD PRESSURE: 123 MMHG | OXYGEN SATURATION: 98 % | WEIGHT: 210 LBS | DIASTOLIC BLOOD PRESSURE: 93 MMHG | HEIGHT: 74 IN | HEART RATE: 107 BPM

## 2018-02-21 DIAGNOSIS — I35.0 AORTIC VALVE STENOSIS, ETIOLOGY OF CARDIAC VALVE DISEASE UNSPECIFIED: ICD-10-CM

## 2018-02-21 DIAGNOSIS — R07.89 NON-CARDIAC CHEST PAIN: Primary | ICD-10-CM

## 2018-02-21 LAB
ALBUMIN SERPL-MCNC: 4.9 G/DL (ref 3.5–5)
ALBUMIN/GLOB SERPL: 1.8 G/DL (ref 1.5–2.5)
ALP SERPL-CCNC: 98 U/L (ref 40–129)
ALT SERPL W P-5'-P-CCNC: 20 U/L (ref 10–44)
ANION GAP SERPL CALCULATED.3IONS-SCNC: 4.2 MMOL/L (ref 3.6–11.2)
AST SERPL-CCNC: 23 U/L (ref 10–34)
BASOPHILS # BLD AUTO: 0.08 10*3/MM3 (ref 0–0.3)
BASOPHILS NFR BLD AUTO: 0.9 % (ref 0–2)
BILIRUB SERPL-MCNC: 0.4 MG/DL (ref 0.2–1.8)
BUN BLD-MCNC: 7 MG/DL (ref 7–21)
BUN/CREAT SERPL: 6.7 (ref 7–25)
CALCIUM SPEC-SCNC: 9.2 MG/DL (ref 7.7–10)
CHLORIDE SERPL-SCNC: 112 MMOL/L (ref 99–112)
CO2 SERPL-SCNC: 23.8 MMOL/L (ref 24.3–31.9)
CREAT BLD-MCNC: 1.04 MG/DL (ref 0.43–1.29)
DEPRECATED RDW RBC AUTO: 44.4 FL (ref 37–54)
EOSINOPHIL # BLD AUTO: 0.21 10*3/MM3 (ref 0–0.7)
EOSINOPHIL NFR BLD AUTO: 2.4 % (ref 0–5)
ERYTHROCYTE [DISTWIDTH] IN BLOOD BY AUTOMATED COUNT: 13.5 % (ref 11.5–14.5)
GFR SERPL CREATININE-BSD FRML MDRD: 78 ML/MIN/1.73
GLOBULIN UR ELPH-MCNC: 2.7 GM/DL
GLUCOSE BLD-MCNC: 102 MG/DL (ref 70–110)
HCT VFR BLD AUTO: 46.3 % (ref 42–52)
HGB BLD-MCNC: 14.8 G/DL (ref 14–18)
IMM GRANULOCYTES # BLD: 0.02 10*3/MM3 (ref 0–0.03)
IMM GRANULOCYTES NFR BLD: 0.2 % (ref 0–0.5)
INR PPP: 0.95 (ref 0.9–1.1)
LYMPHOCYTES # BLD AUTO: 1.86 10*3/MM3 (ref 1–3)
LYMPHOCYTES NFR BLD AUTO: 21.3 % (ref 21–51)
MCH RBC QN AUTO: 29.5 PG (ref 27–33)
MCHC RBC AUTO-ENTMCNC: 32 G/DL (ref 33–37)
MCV RBC AUTO: 92.2 FL (ref 80–94)
MONOCYTES # BLD AUTO: 0.61 10*3/MM3 (ref 0.1–0.9)
MONOCYTES NFR BLD AUTO: 7 % (ref 0–10)
NEUTROPHILS # BLD AUTO: 5.94 10*3/MM3 (ref 1.4–6.5)
NEUTROPHILS NFR BLD AUTO: 68.2 % (ref 30–70)
OSMOLALITY SERPL CALC.SUM OF ELEC: 277.6 MOSM/KG (ref 273–305)
PLATELET # BLD AUTO: 350 10*3/MM3 (ref 130–400)
PMV BLD AUTO: 10.1 FL (ref 6–10)
POTASSIUM BLD-SCNC: 4.2 MMOL/L (ref 3.5–5.3)
PROT SERPL-MCNC: 7.6 G/DL (ref 6–8)
PROTHROMBIN TIME: 12.8 SECONDS (ref 11–15.4)
RBC # BLD AUTO: 5.02 10*6/MM3 (ref 4.7–6.1)
SODIUM BLD-SCNC: 140 MMOL/L (ref 135–153)
TROPONIN I SERPL-MCNC: <0.006 NG/ML
WBC NRBC COR # BLD: 8.72 10*3/MM3 (ref 4.5–12.5)

## 2018-02-21 PROCEDURE — 71045 X-RAY EXAM CHEST 1 VIEW: CPT

## 2018-02-21 PROCEDURE — 71045 X-RAY EXAM CHEST 1 VIEW: CPT | Performed by: RADIOLOGY

## 2018-02-21 PROCEDURE — 85610 PROTHROMBIN TIME: CPT | Performed by: EMERGENCY MEDICINE

## 2018-02-21 PROCEDURE — 93010 ELECTROCARDIOGRAM REPORT: CPT | Performed by: INTERNAL MEDICINE

## 2018-02-21 PROCEDURE — 96374 THER/PROPH/DIAG INJ IV PUSH: CPT

## 2018-02-21 PROCEDURE — 99284 EMERGENCY DEPT VISIT MOD MDM: CPT

## 2018-02-21 PROCEDURE — 84484 ASSAY OF TROPONIN QUANT: CPT | Performed by: EMERGENCY MEDICINE

## 2018-02-21 PROCEDURE — 85025 COMPLETE CBC W/AUTO DIFF WBC: CPT | Performed by: EMERGENCY MEDICINE

## 2018-02-21 PROCEDURE — 25010000002 ONDANSETRON PER 1 MG: Performed by: EMERGENCY MEDICINE

## 2018-02-21 PROCEDURE — 93005 ELECTROCARDIOGRAM TRACING: CPT | Performed by: EMERGENCY MEDICINE

## 2018-02-21 PROCEDURE — 96375 TX/PRO/DX INJ NEW DRUG ADDON: CPT

## 2018-02-21 PROCEDURE — 99285 EMERGENCY DEPT VISIT HI MDM: CPT

## 2018-02-21 PROCEDURE — 25010000002 MORPHINE PER 10 MG: Performed by: EMERGENCY MEDICINE

## 2018-02-21 PROCEDURE — 80053 COMPREHEN METABOLIC PANEL: CPT | Performed by: EMERGENCY MEDICINE

## 2018-02-21 RX ORDER — SODIUM CHLORIDE 0.9 % (FLUSH) 0.9 %
10 SYRINGE (ML) INJECTION AS NEEDED
Status: DISCONTINUED | OUTPATIENT
Start: 2018-02-21 | End: 2018-02-21 | Stop reason: HOSPADM

## 2018-02-21 RX ORDER — ONDANSETRON 2 MG/ML
4 INJECTION INTRAMUSCULAR; INTRAVENOUS ONCE
Status: COMPLETED | OUTPATIENT
Start: 2018-02-21 | End: 2018-02-21

## 2018-02-21 RX ORDER — QUETIAPINE FUMARATE 300 MG/1
300 TABLET, FILM COATED ORAL NIGHTLY
Status: ON HOLD | COMMUNITY
End: 2018-06-11

## 2018-02-21 RX ADMIN — ONDANSETRON 4 MG: 2 INJECTION, SOLUTION INTRAMUSCULAR; INTRAVENOUS at 14:11

## 2018-02-21 RX ADMIN — Medication 10 ML: at 14:12

## 2018-02-21 RX ADMIN — MORPHINE SULFATE 4 MG: 4 INJECTION INTRAVENOUS at 14:13

## 2018-02-21 NOTE — ED PROVIDER NOTES
"Subjective   History of Present Illness  42-year-old white male here today for chest pain.  Patient describes sudden onset of chest pain at 1 PM yesterday.  He left AMA here prior to being seen by the physician and went to [facility].  He states he had a CT there and they told him that his aortic valve was blocked and that he had bleeding in his chest.  He was supposed to follow-up with Dr. Pastrana for echo to evaluate his aortic valve.  He's had a previous aortic valve replacement, and apparently has some recurrent stenosis.  He has followed up with Dr. Pastrana for this as an outpatient.  The patient states in [place] and they told him that his valve was \"now closed and that I had blood leaking into my chest \".  Review of Systems   All other systems reviewed and are negative.      Past Medical History:   Diagnosis Date   • Hallucinations    • Heart attack    • Hyperlipidemia    • Hypertension    • Injury of back    • Nervous breakdown     12 years ago   • Stroke        Allergies   Allergen Reactions   • Bee Venom Anaphylaxis   • Fentanyl      Pt states that he stops breathing   • Haldol [Haloperidol Lactate] Anaphylaxis   • Nitroglycerin Anaphylaxis   • Risperidone And Related Anaphylaxis   • Celexa [Citalopram Hydrobromide] Other (See Comments)     Pt states that it causes him to sweat and a severe headache.        Past Surgical History:   Procedure Laterality Date   • AORTIC VALVE REPAIR/REPLACEMENT MITRAL VALVE REPAIR/REPLACEMENT     • APPENDECTOMY     • CARDIAC CATHETERIZATION     • CHOLECYSTECTOMY     • HERNIA REPAIR      umbilical   • PACEMAKER IMPLANTATION     • PATELLAR RECONSTRUCTION     • QUADRICEPS REPAIR         History reviewed. No pertinent family history.    Social History     Social History   • Marital status:      Spouse name: N/A   • Number of children: N/A   • Years of education: N/A     Social History Main Topics   • Smoking status: Current Every Day Smoker     Packs/day: 0.50   • " Smokeless tobacco: Never Used   • Alcohol use No   • Drug use: No   • Sexual activity: Defer     Other Topics Concern   • None     Social History Narrative   • None           Objective   Physical Exam   Constitutional: He is oriented to person, place, and time. He appears well-developed and well-nourished.   HENT:   Head: Normocephalic and atraumatic.   Cardiovascular: Normal rate and regular rhythm.  Exam reveals no gallop and no friction rub.    Murmur heard.   Systolic murmur is present with a grade of 1/6   Pulmonary/Chest: Effort normal and breath sounds normal. No respiratory distress. He has no wheezes. He has no rales.   Abdominal: Soft. Bowel sounds are normal. He exhibits no distension. There is no tenderness.   Musculoskeletal: Normal range of motion. He exhibits no edema.   Neurological: He is alert and oriented to person, place, and time.   Skin: Skin is warm and dry.   Psychiatric: He has a normal mood and affect.   Nursing note and vitals reviewed.      Procedures  Results for orders placed or performed during the hospital encounter of 02/21/18   Comprehensive Metabolic Panel   Result Value Ref Range    Glucose 102 70 - 110 mg/dL    BUN 7 7 - 21 mg/dL    Creatinine 1.04 0.43 - 1.29 mg/dL    Sodium 140 135 - 153 mmol/L    Potassium 4.2 3.5 - 5.3 mmol/L    Chloride 112 99 - 112 mmol/L    CO2 23.8 (L) 24.3 - 31.9 mmol/L    Calcium 9.2 7.7 - 10.0 mg/dL    Total Protein 7.6 6.0 - 8.0 g/dL    Albumin 4.90 3.50 - 5.00 g/dL    ALT (SGPT) 20 10 - 44 U/L    AST (SGOT) 23 10 - 34 U/L    Alkaline Phosphatase 98 40 - 129 U/L    Total Bilirubin 0.4 0.2 - 1.8 mg/dL    eGFR Non African Amer 78 >60 mL/min/1.73    Globulin 2.7 gm/dL    A/G Ratio 1.8 1.5 - 2.5 g/dL    BUN/Creatinine Ratio 6.7 (L) 7.0 - 25.0    Anion Gap 4.2 3.6 - 11.2 mmol/L   Troponin   Result Value Ref Range    Troponin I <0.006 <=0.040 ng/mL   Protime-INR   Result Value Ref Range    Protime 12.8 11.0 - 15.4 Seconds    INR 0.95 0.90 - 1.10   CBC Auto  Differential   Result Value Ref Range    WBC 8.72 4.50 - 12.50 10*3/mm3    RBC 5.02 4.70 - 6.10 10*6/mm3    Hemoglobin 14.8 14.0 - 18.0 g/dL    Hematocrit 46.3 42.0 - 52.0 %    MCV 92.2 80.0 - 94.0 fL    MCH 29.5 27.0 - 33.0 pg    MCHC 32.0 (L) 33.0 - 37.0 g/dL    RDW 13.5 11.5 - 14.5 %    RDW-SD 44.4 37.0 - 54.0 fl    MPV 10.1 (H) 6.0 - 10.0 fL    Platelets 350 130 - 400 10*3/mm3    Neutrophil % 68.2 30.0 - 70.0 %    Lymphocyte % 21.3 21.0 - 51.0 %    Monocyte % 7.0 0.0 - 10.0 %    Eosinophil % 2.4 0.0 - 5.0 %    Basophil % 0.9 0.0 - 2.0 %    Immature Grans % 0.2 0.0 - 0.5 %    Neutrophils, Absolute 5.94 1.40 - 6.50 10*3/mm3    Lymphocytes, Absolute 1.86 1.00 - 3.00 10*3/mm3    Monocytes, Absolute 0.61 0.10 - 0.90 10*3/mm3    Eosinophils, Absolute 0.21 0.00 - 0.70 10*3/mm3    Basophils, Absolute 0.08 0.00 - 0.30 10*3/mm3    Immature Grans, Absolute 0.02 0.00 - 0.03 10*3/mm3   Osmolality, Calculated   Result Value Ref Range    Osmolality Calc 277.6 273.0 - 305.0 mOsm/kg     Xr Chest 1 View    Result Date: 2/21/2018  Narrative: XR CHEST 1 VW-  CLINICAL INDICATION: cp     COMPARISON: 02/20/2018  TECHNIQUE: Single frontal view of the chest.  FINDINGS:  There is no focal alveolar infiltrate or effusion. The cardiac silhouette is normal. The pulmonary vasculature is unremarkable. There is no evidence of an acute osseous abnormality. There are no suspicious-appearing parenchymal soft tissue nodules.         Impression: No evidence of active or acute cardiopulmonary disease on today's chest radiograph.     This report was finalized on 2/21/2018 2:55 PM by Dr. Domenico Schroeder MD.      Xr Chest 1 View    Result Date: 2/21/2018  Narrative: EXAMINATION: XR CHEST 1 VW-  CLINICAL INDICATION:     chest pain  TECHNIQUE:  XR CHEST 1 VW-  COMPARISON: 01/21/2018  FINDINGS: Lungs are aerated. Heart and mediastinal contours are unremarkable. Prior CABG again noted. No pneumothorax. No pleural effusion. No acute osseous findings. Left  cardiac pacer device appears stable.      Impression: No radiographic evidence of acute cardiac or pulmonary disease.  This report was finalized on 2/21/2018 5:24 AM by Dr. Dalton Ta MD.             ED Course  ED Course   Comment By Time   Workup unremarkable this time.  I reviewed records from [place] yesterday, in which his workup also was within normal limits.  CT of the chest did not reveal any PE or other abnormalities.  We will discharge home.  Recommended possible GI referral for noncardiac causes of chest pain. Roman Gaytan MD 02/21 [id number]                  MDM  Number of Diagnoses or Management Options  Aortic valve stenosis, etiology of cardiac valve disease unspecified:   Non-cardiac chest pain:      Amount and/or Complexity of Data Reviewed  Clinical lab tests: reviewed  Tests in the radiology section of CPT®: reviewed  Tests in the medicine section of CPT®: reviewed    Risk of Complications, Morbidity, and/or Mortality  Presenting problems: moderate  Diagnostic procedures: moderate  Management options: moderate        Final diagnoses:   Non-cardiac chest pain   Aortic valve stenosis, etiology of cardiac valve disease unspecified            Roman Gaytan MD  02/21/18 [id number]

## 2018-02-21 NOTE — DISCHARGE INSTRUCTIONS
Aortic Valve Stenosis  Aortic valve stenosis is a narrowing of the aortic valve. The aortic valve opens and closes to regulate blood flow between the lower left chamber of the heart (left ventricle) and the blood vessel that leads away from the heart (aorta). When the aortic valve becomes narrow, it makes it difficult for the heart to pump blood into the aorta, which causes the heart to work harder. The extra work can weaken the heart over time.  Aortic valve stenosis can range from mild to severe. If untreated, it can become more severe over time and can lead to heart failure.  What are the causes?  This condition may be caused by:  · Buildup of calcium around and on the valve. This can occur with aging. This is the most common cause of aortic valve stenosis.  · Birth defect.  · Rheumatic fever.  · Radiation to the chest.  What increases the risk?  You may be more likely to develop this condition if:  · You are over the age of 65.  · You were born with an abnormal bicuspid valve.  What are the signs or symptoms?  You may have no symptoms until your condition becomes severe. It may take 10-20 years for mild or moderate aortic valve stenosis to become severe. Symptoms may include:  · Shortness of breath. This may get worse during physical activity.  · Feeling unusually weak and tired (fatigue).  · Extreme discomfort in the chest, neck, or arm (angina).  · A heartbeat that is irregular or faster than normal (palpitations).  · Dizziness or fainting. This may happen when you get physically tired or after you take certain heart medicines, such as nitroglycerin.  How is this diagnosed?  This condition may be diagnosed with:  · A physical exam.  · Echocardiogram. This is a type of imaging test that uses sound waves (ultrasound) to make an image of your heart. There are two types that may be used:  ¨ Transthoracic echocardiogram (TTE). This type of echocardiogram is noninvasive, and it is usually done  first.  ¨ Transesophageal echocardiogram (MELODY). This type of echocardiogram is done by passing a flexible tube down your esophagus. The heart and the esophagus are close to each other, so your health care provider can take very clear, detailed pictures of the heart using this type of test.  · Cardiac catheterization. In this procedure, a thin, flexible tube (catheter) is passed through a large vein in your neck, groin, or arm. This procedure provides information about arteries, structures, blood pressure, and oxygen levels in your heart.  · Electrocardiogram (ECG). This records the electrical impulses of your heart and assesses heart function.  · Stress tests. These are tests that evaluate the blood supply to your heart and your heart’s response to exercise.  · Blood tests.  You may work with a health care provider who specializes in the heart (cardiologist).  How is this treated?  Treatment depends on how severe your condition is and what your symptoms are. You will need to have your heart checked regularly to make sure that your condition is not getting worse or causing serious problems. If your condition is mild, no treatment may be needed.  Treatment may include:  · Medicines that help keep your heart rate regular.  · Medicines that thin your blood (anticoagulants) to prevent the formation of blood clots.  · Antibiotic medicines to help prevent infection.  · Surgery to replace your aortic valve. This is the most common treatment for aortic valve stenosis. Several types of surgeries are available. The surgery may be done through a large incision over your heart (open heart surgery), or it may be done using a minimally invasive technique (transcatheter aortic valve replacement, or TAVR).  Follow these instructions at home:  Lifestyle     · Limit alcohol intake to no more than 1 drink per day for nonpregnant women and 2 drinks per day for men. One drink equals 12 oz of beer, 5 oz of wine, or 1½ oz of hard  liquor.  · Do not use any tobacco products, such as cigarettes, chewing tobacco, or e-cigarettes. If you need help quitting, ask your health care provider.  · Work with your health care provider to manage your blood pressure and cholesterol.  · Maintain a healthy weight.  Eating and drinking   · Follow instructions from your health care provider about eating or drinking restrictions.  ¨ Limit how much caffeine you drink. Caffeine can affect your heart's rate and rhythm.  · Drink enough fluid to keep your urine clear or pale yellow.  · Eat a heart-healthy diet. This should include plenty of fresh fruits and vegetables. If you eat meat, it should be lean cuts. Avoid foods that are:  ¨ High in salt, saturated fat, or sugar.  ¨ Canned or highly processed.  ¨ Fried.  Activity   · Return to your normal activities as told by your health care provider. Ask your health care provider what activities are safe for you.  · Exercise regularly, as told by your health care provider. Ask your health care provider what types of exercise are safe for you.  · If your aortic valve stenosis is mild, you may need to avoid only very intense physical activity. The more severe your aortic valve stenosis is, the more activities you may need to avoid.  General instructions   · Take over-the-counter and prescription medicines only as told by your health care provider.  · If you are a woman and you plan to become pregnant, talk with your health care provider before you become pregnant.  · Tell all health care providers who care for you that you have aortic valve stenosis.  · Keep all follow-up visits as told by your health care provider. This is important.  Contact a health care provider if:  · You have a fever.  Get help right away if:  · You develop chest pain or tightness.  · You develop shortness of breath or difficulty breathing.  · You feel light-headed.  · You feel like you might faint.  · Your heartbeat is irregular or faster than  normal.  These symptoms may represent a serious problem that is an emergency. Do not wait to see if the symptoms will go away. Get medical help right away. Call your local emergency services (911 in the [place] Do not drive yourself to the hospital.  This information is not intended to replace advice given to you by your health care provider. Make sure you discuss any questions you have with your health care provider.  Document Released: 09/15/2004 Document Revised: 05/25/2017 Document Reviewed: 11/20/2016  Elsevier Interactive Patient Education © 2017 [employer]

## 2018-02-21 NOTE — ED NOTES
PT TOOK CARDIAC MONITOR AND EVERYTHING OFF AND STANDING OUTSIDE TRIAGE DOORS SMOKING     Gene Be, MARINA  02/21/18 [id number]

## 2018-02-21 NOTE — ED NOTES
"Report received from Tereza Tavarez RN. Upon entrance into Pt room Pt states \"did the doctor order my pain medication yet?\" Orders checked and provider aware of Pt pain and no orders to acknowledge. Offered Pt Tyleonl, Motrin, and Pt refused. Pt states that dilaudid and morphine only work for his chest pain. Pt states, \"can you bring me the papers to leave then?\" Pt is A&Ox4, RR even and unlabored, skin WPD. Pt noted to be NS on the monitor, no acute distress noted. Pt verbalized understanding of the benefits of receiving treatment and the risks of refusing services. Pt signed AMA form at this time and placed on Pt chart.      Tereza Ochoa RN  02/20/18 4642    "

## 2018-02-21 NOTE — ED PROVIDER NOTES
Subjective   Patient is a 42 y.o. male presenting with chest pain.   Chest Pain   Pain location:  Unable to specify  Pain quality: aching    Pain radiates to:  Does not radiate  Pain severity:  Mild  Onset quality:  Gradual  Timing:  Intermittent  Progression:  Waxing and waning  Chronicity:  Recurrent  Context: breathing    Relieved by:  Nothing  Worsened by:  Movement and deep breathing  Associated symptoms: shortness of breath    Associated symptoms: no abdominal pain, no dysphagia, no fever and no numbness        Review of Systems   Constitutional: Negative.  Negative for fever.   HENT: Negative.  Negative for trouble swallowing.    Respiratory: Positive for shortness of breath.    Cardiovascular: Positive for chest pain.   Gastrointestinal: Negative.  Negative for abdominal pain.   Endocrine: Negative.    Genitourinary: Negative.  Negative for dysuria.   Skin: Negative.    Neurological: Negative.  Negative for numbness.   Psychiatric/Behavioral: Negative.    All other systems reviewed and are negative.      Past Medical History:   Diagnosis Date   • Hallucinations    • Heart attack    • Hyperlipidemia    • Hypertension    • Injury of back    • Nervous breakdown     12 years ago   • Stroke        Allergies   Allergen Reactions   • Bee Venom Anaphylaxis   • Fentanyl      Pt states that he stops breathing   • Haldol [Haloperidol Lactate] Anaphylaxis   • Nitroglycerin Anaphylaxis   • Risperidone And Related Anaphylaxis   • Celexa [Citalopram Hydrobromide] Other (See Comments)     Pt states that it causes him to sweat and a severe headache.        Past Surgical History:   Procedure Laterality Date   • AORTIC VALVE REPAIR/REPLACEMENT MITRAL VALVE REPAIR/REPLACEMENT     • APPENDECTOMY     • CARDIAC CATHETERIZATION     • CHOLECYSTECTOMY     • HERNIA REPAIR      umbilical   • PACEMAKER IMPLANTATION     • PATELLAR RECONSTRUCTION     • QUADRICEPS REPAIR         No family history on file.    Social History     Social  History   • Marital status:      Spouse name: N/A   • Number of children: N/A   • Years of education: N/A     Social History Main Topics   • Smoking status: Current Every Day Smoker     Packs/day: 1.00   • Smokeless tobacco: Never Used   • Alcohol use No   • Drug use: No   • Sexual activity: Defer     Other Topics Concern   • Not on file     Social History Narrative           Objective   Physical Exam   Constitutional: He is oriented to person, place, and time. He appears well-developed and well-nourished. No distress.   HENT:   Head: Normocephalic and atraumatic.   Right Ear: External ear normal.   Left Ear: External ear normal.   Nose: Nose normal.   Eyes: Conjunctivae and EOM are normal. Pupils are equal, round, and reactive to light.   Neck: Normal range of motion. Neck supple. No JVD present. No tracheal deviation present.   Cardiovascular: Normal rate, regular rhythm and normal heart sounds.    No murmur heard.  Pulmonary/Chest: Effort normal and breath sounds normal. No respiratory distress. He has no wheezes.   Abdominal: Soft. Bowel sounds are normal. There is no tenderness.   Musculoskeletal: Normal range of motion. He exhibits no edema or deformity.   Neurological: He is alert and oriented to person, place, and time. No cranial nerve deficit.   Skin: Skin is warm and dry. No rash noted. He is not diaphoretic. No erythema. No pallor.   Psychiatric: He has a normal mood and affect. His behavior is normal. Thought content normal.   Nursing note and vitals reviewed.      Procedures         ED Course  ED Course                  MDM    Final diagnoses:   Atypical chest pain            Tavares Dela Cruz MD  02/20/18 6349

## 2018-03-06 ENCOUNTER — APPOINTMENT (OUTPATIENT)
Dept: GENERAL RADIOLOGY | Facility: HOSPITAL | Age: 43
End: 2018-03-06

## 2018-03-06 ENCOUNTER — HOSPITAL ENCOUNTER (INPATIENT)
Facility: HOSPITAL | Age: 43
LOS: 5 days | Discharge: HOME HEALTH | End: 2018-03-11
Attending: EMERGENCY MEDICINE | Admitting: INTERNAL MEDICINE

## 2018-03-06 ENCOUNTER — APPOINTMENT (OUTPATIENT)
Dept: CARDIOLOGY | Facility: HOSPITAL | Age: 43
End: 2018-03-06

## 2018-03-06 ENCOUNTER — APPOINTMENT (OUTPATIENT)
Dept: CT IMAGING | Facility: HOSPITAL | Age: 43
End: 2018-03-06

## 2018-03-06 DIAGNOSIS — R56.9 SEIZURE (HCC): ICD-10-CM

## 2018-03-06 DIAGNOSIS — I35.9 AORTIC VALVE DISEASE: ICD-10-CM

## 2018-03-06 DIAGNOSIS — Z78.9 IMPAIRED MOBILITY AND ADLS: ICD-10-CM

## 2018-03-06 DIAGNOSIS — Z74.09 IMPAIRED FUNCTIONAL MOBILITY, BALANCE, GAIT, AND ENDURANCE: ICD-10-CM

## 2018-03-06 DIAGNOSIS — Q67.0 FACIAL ASYMMETRY: ICD-10-CM

## 2018-03-06 DIAGNOSIS — R56.9 OBSERVED SEIZURE-LIKE ACTIVITY (HCC): ICD-10-CM

## 2018-03-06 DIAGNOSIS — I48.0 PAROXYSMAL ATRIAL FIBRILLATION (HCC): ICD-10-CM

## 2018-03-06 DIAGNOSIS — R41.82 ALTERED MENTAL STATUS, UNSPECIFIED ALTERED MENTAL STATUS TYPE: Primary | ICD-10-CM

## 2018-03-06 DIAGNOSIS — D72.829 LEUKOCYTOSIS, UNSPECIFIED TYPE: ICD-10-CM

## 2018-03-06 DIAGNOSIS — Z74.09 IMPAIRED MOBILITY AND ADLS: ICD-10-CM

## 2018-03-06 PROBLEM — I25.10 CAD (CORONARY ARTERY DISEASE): Status: ACTIVE | Noted: 2018-03-06

## 2018-03-06 PROBLEM — M54.9 CHRONIC BACK PAIN: Status: ACTIVE | Noted: 2018-03-06

## 2018-03-06 PROBLEM — E78.5 HYPERLIPIDEMIA: Status: ACTIVE | Noted: 2018-03-06

## 2018-03-06 PROBLEM — R07.9 CHEST PAIN: Status: ACTIVE | Noted: 2018-03-06

## 2018-03-06 PROBLEM — Z79.899: Status: ACTIVE | Noted: 2018-03-06

## 2018-03-06 PROBLEM — I48.91 ATRIAL FIBRILLATION (HCC): Status: ACTIVE | Noted: 2018-03-06

## 2018-03-06 PROBLEM — I10 HYPERTENSION: Status: ACTIVE | Noted: 2018-03-06

## 2018-03-06 PROBLEM — R53.1 RIGHT SIDED WEAKNESS: Status: ACTIVE | Noted: 2018-03-06

## 2018-03-06 PROBLEM — G89.29 CHRONIC BACK PAIN: Status: ACTIVE | Noted: 2018-03-06

## 2018-03-06 PROBLEM — M25.512 LEFT SHOULDER PAIN: Status: ACTIVE | Noted: 2018-03-06

## 2018-03-06 LAB
ALBUMIN SERPL-MCNC: 4.7 G/DL (ref 3.2–4.8)
ALBUMIN/GLOB SERPL: 2 G/DL (ref 1.5–2.5)
ALP SERPL-CCNC: 112 U/L (ref 25–100)
ALT SERPL W P-5'-P-CCNC: 46 U/L (ref 7–40)
AMPHET+METHAMPHET UR QL: NEGATIVE
AMPHETAMINES UR QL: NEGATIVE
ANION GAP SERPL CALCULATED.3IONS-SCNC: 11 MMOL/L (ref 3–11)
APTT PPP: 26 SECONDS (ref 24–31)
AST SERPL-CCNC: 25 U/L (ref 0–33)
BARBITURATES UR QL SCN: NEGATIVE
BASOPHILS # BLD AUTO: 0.03 10*3/MM3 (ref 0–0.2)
BASOPHILS NFR BLD AUTO: 0.2 % (ref 0–1)
BENZODIAZ UR QL SCN: NEGATIVE
BH CV ECHO MEAS - AO MAX PG (FULL): 35.2 MMHG
BH CV ECHO MEAS - AO MAX PG: 38 MMHG
BH CV ECHO MEAS - AO MEAN PG (FULL): 20.3 MMHG
BH CV ECHO MEAS - AO MEAN PG: 22.1 MMHG
BH CV ECHO MEAS - AO ROOT AREA: 5.7 CM^2
BH CV ECHO MEAS - AO ROOT DIAM: 2.7 CM
BH CV ECHO MEAS - AO V2 MAX: 307.7 CM/SEC
BH CV ECHO MEAS - AO V2 MEAN: 217 CM/SEC
BH CV ECHO MEAS - AO V2 VTI: 62.2 CM
BH CV ECHO MEAS - AVA(I,A): 1 CM^2
BH CV ECHO MEAS - AVA(I,D): 1 CM^2
BH CV ECHO MEAS - AVA(V,A): 0.91 CM^2
BH CV ECHO MEAS - AVA(V,D): 0.91 CM^2
BH CV ECHO MEAS - EDV(CUBED): 62.1 ML
BH CV ECHO MEAS - EDV(TEICH): 68.3 ML
BH CV ECHO MEAS - EF(CUBED): 68.3 %
BH CV ECHO MEAS - EF(TEICH): 60.5 %
BH CV ECHO MEAS - ESV(CUBED): 19.7 ML
BH CV ECHO MEAS - ESV(TEICH): 27 ML
BH CV ECHO MEAS - FS: 31.8 %
BH CV ECHO MEAS - IVS/LVPW: 1.1
BH CV ECHO MEAS - IVSD: 1.5 CM
BH CV ECHO MEAS - LA DIMENSION: 2.5 CM
BH CV ECHO MEAS - LA/AO: 0.94
BH CV ECHO MEAS - LAT PEAK E' VEL: 6 CM/SEC
BH CV ECHO MEAS - LV MASS(C)D: 210 GRAMS
BH CV ECHO MEAS - LV MAX PG: 2.8 MMHG
BH CV ECHO MEAS - LV MEAN PG: 1.8 MMHG
BH CV ECHO MEAS - LV V1 MAX: 84.4 CM/SEC
BH CV ECHO MEAS - LV V1 MEAN: 63.3 CM/SEC
BH CV ECHO MEAS - LV V1 VTI: 19.6 CM
BH CV ECHO MEAS - LVIDD: 4 CM
BH CV ECHO MEAS - LVIDS: 2.7 CM
BH CV ECHO MEAS - LVOT AREA (M): 3.5 CM^2
BH CV ECHO MEAS - LVOT AREA: 3.3 CM^2
BH CV ECHO MEAS - LVOT DIAM: 2.1 CM
BH CV ECHO MEAS - LVPWD: 1.3 CM
BH CV ECHO MEAS - MED PEAK E' VEL: 6.36 CM/SEC
BH CV ECHO MEAS - MV A MAX VEL: 106.6 CM/SEC
BH CV ECHO MEAS - MV E MAX VEL: 84.9 CM/SEC
BH CV ECHO MEAS - MV E/A: 0.8
BH CV ECHO MEAS - PA ACC SLOPE: 664 CM/SEC^2
BH CV ECHO MEAS - PA ACC TIME: 0.13 SEC
BH CV ECHO MEAS - PA MAX PG: 5.2 MMHG
BH CV ECHO MEAS - PA PR(ACCEL): 18.8 MMHG
BH CV ECHO MEAS - PA V2 MAX: 114 CM/SEC
BH CV ECHO MEAS - RAP SYSTOLE: 3 MMHG
BH CV ECHO MEAS - RVSP: 20 MMHG
BH CV ECHO MEAS - SV(AO): 355.6 ML
BH CV ECHO MEAS - SV(CUBED): 42.4 ML
BH CV ECHO MEAS - SV(LVOT): 64.9 ML
BH CV ECHO MEAS - SV(TEICH): 41.3 ML
BH CV ECHO MEAS - TAPSE (>1.6): 2.3 CM2
BH CV ECHO MEAS - TR MAX VEL: 205.8 CM/SEC
BH CV VAS BP RIGHT ARM: NORMAL MMHG
BH CV XLRA - RV BASE: 3.9 CM
BH CV XLRA - RV LENGTH: 7.1 CM
BH CV XLRA - RV MID: 3.1 CM
BH CV XLRA - TDI S': 9.21 CM/SEC
BH CV XLRA MEAS LEFT CCA RATIO VEL: 81.7 CM/SEC
BH CV XLRA MEAS LEFT DIST CCA EDV: 26.7 CM/SEC
BH CV XLRA MEAS LEFT DIST CCA PSV: 81.7 CM/SEC
BH CV XLRA MEAS LEFT DIST ICA EDV: 39 CM/SEC
BH CV XLRA MEAS LEFT DIST ICA PSV: 71.7 CM/SEC
BH CV XLRA MEAS LEFT ICA RATIO VEL: 73.5 CM/SEC
BH CV XLRA MEAS LEFT ICA/CCA RATIO: 0.9
BH CV XLRA MEAS LEFT MID CCA EDV: 20.4 CM/SEC
BH CV XLRA MEAS LEFT MID CCA PSV: 82.5 CM/SEC
BH CV XLRA MEAS LEFT MID ICA EDV: 40.9 CM/SEC
BH CV XLRA MEAS LEFT MID ICA PSV: 74.2 CM/SEC
BH CV XLRA MEAS LEFT PROX CCA EDV: 16.5 CM/SEC
BH CV XLRA MEAS LEFT PROX CCA PSV: 90.4 CM/SEC
BH CV XLRA MEAS LEFT PROX ECA PSV: 142.2 CM/SEC
BH CV XLRA MEAS LEFT PROX ICA EDV: 22 CM/SEC
BH CV XLRA MEAS LEFT PROX ICA PSV: 52.2 CM/SEC
BH CV XLRA MEAS LEFT PROX SCLA PSV: 100.6 CM/SEC
BH CV XLRA MEAS LEFT VERTEBRAL A PSV: 38.3 CM/SEC
BH CV XLRA MEAS RIGHT CCA RATIO VEL: 83.8 CM/SEC
BH CV XLRA MEAS RIGHT DIST CCA EDV: 27 CM/SEC
BH CV XLRA MEAS RIGHT DIST CCA PSV: 92.1 CM/SEC
BH CV XLRA MEAS RIGHT DIST ICA EDV: 39.7 CM/SEC
BH CV XLRA MEAS RIGHT DIST ICA PSV: 76.1 CM/SEC
BH CV XLRA MEAS RIGHT ICA RATIO VEL: 102 CM/SEC
BH CV XLRA MEAS RIGHT ICA/CCA RATIO: 1.2
BH CV XLRA MEAS RIGHT MID CCA EDV: 22.1 CM/SEC
BH CV XLRA MEAS RIGHT MID CCA PSV: 84.4 CM/SEC
BH CV XLRA MEAS RIGHT MID ICA EDV: 21 CM/SEC
BH CV XLRA MEAS RIGHT MID ICA PSV: 66.2 CM/SEC
BH CV XLRA MEAS RIGHT PROX CCA EDV: 20.8 CM/SEC
BH CV XLRA MEAS RIGHT PROX CCA PSV: 79.4 CM/SEC
BH CV XLRA MEAS RIGHT PROX ECA PSV: 129 CM/SEC
BH CV XLRA MEAS RIGHT PROX ICA EDV: 33.6 CM/SEC
BH CV XLRA MEAS RIGHT PROX ICA PSV: 102.6 CM/SEC
BH CV XLRA MEAS RIGHT PROX SCLA PSV: 100.6 CM/SEC
BH CV XLRA MEAS RIGHT VERTEBRAL A PSV: 32 CM/SEC
BILIRUB SERPL-MCNC: 0.4 MG/DL (ref 0.3–1.2)
BUN BLD-MCNC: 8 MG/DL (ref 9–23)
BUN BLDA-MCNC: 6 MG/DL (ref 8–26)
BUN/CREAT SERPL: 8 (ref 7–25)
BUPRENORPHINE SERPL-MCNC: NEGATIVE NG/ML
CA-I BLDA-SCNC: 1.12 MMOL/L (ref 1.2–1.32)
CALCIUM SPEC-SCNC: 9.2 MG/DL (ref 8.7–10.4)
CANNABINOIDS SERPL QL: NEGATIVE
CHLORIDE BLDA-SCNC: 102 MMOL/L (ref 98–109)
CHLORIDE SERPL-SCNC: 104 MMOL/L (ref 99–109)
CO2 BLDA-SCNC: 25 MMOL/L (ref 24–29)
CO2 SERPL-SCNC: 26 MMOL/L (ref 20–31)
COCAINE UR QL: NEGATIVE
CREAT BLD-MCNC: 1 MG/DL (ref 0.6–1.3)
CREAT BLDA-MCNC: 1 MG/DL (ref 0.6–1.3)
DEPRECATED RDW RBC AUTO: 43.6 FL (ref 37–54)
E/E' RATIO: 14
EOSINOPHIL # BLD AUTO: 0.08 10*3/MM3 (ref 0–0.3)
EOSINOPHIL NFR BLD AUTO: 0.6 % (ref 0–3)
ERYTHROCYTE [DISTWIDTH] IN BLOOD BY AUTOMATED COUNT: 13.1 % (ref 11.3–14.5)
ETHANOL BLD-MCNC: <10 MG/DL (ref 0–10)
GFR SERPL CREATININE-BSD FRML MDRD: 82 ML/MIN/1.73
GLOBULIN UR ELPH-MCNC: 2.4 GM/DL
GLUCOSE BLD-MCNC: 128 MG/DL (ref 70–100)
GLUCOSE BLDC GLUCOMTR-MCNC: 136 MG/DL (ref 70–130)
HCT VFR BLD AUTO: 43.1 % (ref 38.9–50.9)
HCT VFR BLDA CALC: 42 % (ref 38–51)
HGB BLD-MCNC: 14.1 G/DL (ref 13.1–17.5)
HGB BLDA-MCNC: 14.3 G/DL (ref 12–17)
HOLD SPECIMEN: NORMAL
HOLD SPECIMEN: NORMAL
IMM GRANULOCYTES # BLD: 0.04 10*3/MM3 (ref 0–0.03)
IMM GRANULOCYTES NFR BLD: 0.3 % (ref 0–0.6)
INR PPP: 0.96 (ref 0.91–1.09)
INR PPP: 1.1 (ref 0.8–1.2)
LEFT ATRIUM VOLUME INDEX: 28 ML/M2
LV EF 2D ECHO EST: 60 %
LYMPHOCYTES # BLD AUTO: 0.72 10*3/MM3 (ref 0.6–4.8)
LYMPHOCYTES NFR BLD AUTO: 5.2 % (ref 24–44)
MCH RBC QN AUTO: 29.7 PG (ref 27–31)
MCHC RBC AUTO-ENTMCNC: 32.7 G/DL (ref 32–36)
MCV RBC AUTO: 90.9 FL (ref 80–99)
METHADONE UR QL SCN: NEGATIVE
MONOCYTES # BLD AUTO: 0.54 10*3/MM3 (ref 0–1)
MONOCYTES NFR BLD AUTO: 3.9 % (ref 0–12)
NEUTROPHILS # BLD AUTO: 12.53 10*3/MM3 (ref 1.5–8.3)
NEUTROPHILS NFR BLD AUTO: 89.8 % (ref 41–71)
OPIATES UR QL: NEGATIVE
OXYCODONE UR QL SCN: NEGATIVE
PCP UR QL SCN: NEGATIVE
PLATELET # BLD AUTO: 261 10*3/MM3 (ref 150–450)
PMV BLD AUTO: 10.3 FL (ref 6–12)
POTASSIUM BLD-SCNC: 4.1 MMOL/L (ref 3.5–5.5)
POTASSIUM BLDA-SCNC: 3.9 MMOL/L (ref 3.5–4.9)
PROPOXYPH UR QL: NEGATIVE
PROT SERPL-MCNC: 7.1 G/DL (ref 5.7–8.2)
PROTHROMBIN TIME: 10.1 SECONDS (ref 9.6–11.5)
PROTHROMBIN TIME: 13.5 SECONDS (ref 12.8–15.2)
RBC # BLD AUTO: 4.74 10*6/MM3 (ref 4.2–5.76)
SODIUM BLD-SCNC: 141 MMOL/L (ref 132–146)
SODIUM BLDA-SCNC: 140 MMOL/L (ref 138–146)
TRICYCLICS UR QL SCN: POSITIVE
TROPONIN I SERPL-MCNC: <0.006 NG/ML
TROPONIN I SERPL-MCNC: <0.006 NG/ML
WBC NRBC COR # BLD: 13.94 10*3/MM3 (ref 3.5–10.8)
WHOLE BLOOD HOLD SPECIMEN: NORMAL

## 2018-03-06 PROCEDURE — 93306 TTE W/DOPPLER COMPLETE: CPT

## 2018-03-06 PROCEDURE — 84484 ASSAY OF TROPONIN QUANT: CPT | Performed by: NURSE PRACTITIONER

## 2018-03-06 PROCEDURE — 80053 COMPREHEN METABOLIC PANEL: CPT | Performed by: NURSE PRACTITIONER

## 2018-03-06 PROCEDURE — 70450 CT HEAD/BRAIN W/O DYE: CPT

## 2018-03-06 PROCEDURE — 85610 PROTHROMBIN TIME: CPT

## 2018-03-06 PROCEDURE — 71045 X-RAY EXAM CHEST 1 VIEW: CPT

## 2018-03-06 PROCEDURE — 93306 TTE W/DOPPLER COMPLETE: CPT | Performed by: INTERNAL MEDICINE

## 2018-03-06 PROCEDURE — 25010000003 LEVETIRACETAM IN NACL 0.75% 1000 MG/100ML SOLUTION: Performed by: EMERGENCY MEDICINE

## 2018-03-06 PROCEDURE — 85730 THROMBOPLASTIN TIME PARTIAL: CPT

## 2018-03-06 PROCEDURE — 73060 X-RAY EXAM OF HUMERUS: CPT

## 2018-03-06 PROCEDURE — 25010000002 HEPARIN (PORCINE) PER 1000 UNITS: Performed by: NURSE PRACTITIONER

## 2018-03-06 PROCEDURE — 99223 1ST HOSP IP/OBS HIGH 75: CPT | Performed by: NURSE PRACTITIONER

## 2018-03-06 PROCEDURE — 93010 ELECTROCARDIOGRAM REPORT: CPT | Performed by: INTERNAL MEDICINE

## 2018-03-06 PROCEDURE — 93880 EXTRACRANIAL BILAT STUDY: CPT

## 2018-03-06 PROCEDURE — 80047 BASIC METABLC PNL IONIZED CA: CPT

## 2018-03-06 PROCEDURE — 93005 ELECTROCARDIOGRAM TRACING: CPT | Performed by: INTERNAL MEDICINE

## 2018-03-06 PROCEDURE — 85730 THROMBOPLASTIN TIME PARTIAL: CPT | Performed by: EMERGENCY MEDICINE

## 2018-03-06 PROCEDURE — 84484 ASSAY OF TROPONIN QUANT: CPT | Performed by: EMERGENCY MEDICINE

## 2018-03-06 PROCEDURE — 85025 COMPLETE CBC W/AUTO DIFF WBC: CPT | Performed by: EMERGENCY MEDICINE

## 2018-03-06 PROCEDURE — 93880 EXTRACRANIAL BILAT STUDY: CPT | Performed by: INTERNAL MEDICINE

## 2018-03-06 PROCEDURE — 93005 ELECTROCARDIOGRAM TRACING: CPT | Performed by: EMERGENCY MEDICINE

## 2018-03-06 PROCEDURE — 80307 DRUG TEST PRSMV CHEM ANLYZR: CPT | Performed by: EMERGENCY MEDICINE

## 2018-03-06 PROCEDURE — 73030 X-RAY EXAM OF SHOULDER: CPT

## 2018-03-06 PROCEDURE — 99285 EMERGENCY DEPT VISIT HI MDM: CPT

## 2018-03-06 PROCEDURE — 85610 PROTHROMBIN TIME: CPT | Performed by: NURSE PRACTITIONER

## 2018-03-06 PROCEDURE — 85014 HEMATOCRIT: CPT

## 2018-03-06 PROCEDURE — 80306 DRUG TEST PRSMV INSTRMNT: CPT | Performed by: EMERGENCY MEDICINE

## 2018-03-06 PROCEDURE — 25010000002 LORAZEPAM PER 2 MG: Performed by: EMERGENCY MEDICINE

## 2018-03-06 RX ORDER — LEVETIRACETAM 500 MG/1
500 TABLET ORAL EVERY 12 HOURS SCHEDULED
Status: DISCONTINUED | OUTPATIENT
Start: 2018-03-06 | End: 2018-03-11 | Stop reason: HOSPADM

## 2018-03-06 RX ORDER — RANOLAZINE 500 MG/1
500 TABLET, EXTENDED RELEASE ORAL EVERY 12 HOURS SCHEDULED
Status: DISCONTINUED | OUTPATIENT
Start: 2018-03-06 | End: 2018-03-11 | Stop reason: HOSPADM

## 2018-03-06 RX ORDER — ACETAMINOPHEN 325 MG/1
650 TABLET ORAL EVERY 4 HOURS PRN
Status: DISCONTINUED | OUTPATIENT
Start: 2018-03-06 | End: 2018-03-11 | Stop reason: HOSPADM

## 2018-03-06 RX ORDER — LORAZEPAM 2 MG/ML
1 INJECTION INTRAMUSCULAR ONCE
Status: COMPLETED | OUTPATIENT
Start: 2018-03-06 | End: 2018-03-06

## 2018-03-06 RX ORDER — SODIUM CHLORIDE 0.9 % (FLUSH) 0.9 %
10 SYRINGE (ML) INJECTION AS NEEDED
Status: DISCONTINUED | OUTPATIENT
Start: 2018-03-06 | End: 2018-03-11 | Stop reason: HOSPADM

## 2018-03-06 RX ORDER — HYDROCODONE BITARTRATE AND ACETAMINOPHEN 5; 325 MG/1; MG/1
1 TABLET ORAL EVERY 6 HOURS PRN
Status: DISCONTINUED | OUTPATIENT
Start: 2018-03-06 | End: 2018-03-09

## 2018-03-06 RX ORDER — ATORVASTATIN CALCIUM 40 MG/1
80 TABLET, FILM COATED ORAL NIGHTLY
Status: DISCONTINUED | OUTPATIENT
Start: 2018-03-06 | End: 2018-03-11 | Stop reason: HOSPADM

## 2018-03-06 RX ORDER — ASPIRIN 325 MG
325 TABLET ORAL DAILY
Status: DISCONTINUED | OUTPATIENT
Start: 2018-03-06 | End: 2018-03-11 | Stop reason: HOSPADM

## 2018-03-06 RX ORDER — SODIUM CHLORIDE 0.9 % (FLUSH) 0.9 %
1-10 SYRINGE (ML) INJECTION AS NEEDED
Status: DISCONTINUED | OUTPATIENT
Start: 2018-03-06 | End: 2018-03-11 | Stop reason: HOSPADM

## 2018-03-06 RX ORDER — LEVETIRACETAM 10 MG/ML
1000 INJECTION INTRAVASCULAR ONCE
Status: COMPLETED | OUTPATIENT
Start: 2018-03-06 | End: 2018-03-06

## 2018-03-06 RX ORDER — SENNA AND DOCUSATE SODIUM 50; 8.6 MG/1; MG/1
2 TABLET, FILM COATED ORAL 2 TIMES DAILY PRN
Status: DISCONTINUED | OUTPATIENT
Start: 2018-03-06 | End: 2018-03-11 | Stop reason: HOSPADM

## 2018-03-06 RX ORDER — ONDANSETRON 2 MG/ML
4 INJECTION INTRAMUSCULAR; INTRAVENOUS EVERY 6 HOURS PRN
Status: DISCONTINUED | OUTPATIENT
Start: 2018-03-06 | End: 2018-03-11 | Stop reason: HOSPADM

## 2018-03-06 RX ADMIN — LORAZEPAM 1 MG: 2 INJECTION INTRAMUSCULAR; INTRAVENOUS at 14:23

## 2018-03-06 RX ADMIN — QUETIAPINE FUMARATE 150 MG: 100 TABLET ORAL at 23:16

## 2018-03-06 RX ADMIN — RANOLAZINE 500 MG: 500 TABLET, FILM COATED, EXTENDED RELEASE ORAL at 23:14

## 2018-03-06 RX ADMIN — LORAZEPAM 1 MG: 2 INJECTION, SOLUTION INTRAMUSCULAR; INTRAVENOUS at 14:24

## 2018-03-06 RX ADMIN — HYDROCODONE BITARTRATE AND ACETAMINOPHEN 1 TABLET: 5; 325 TABLET ORAL at 22:14

## 2018-03-06 RX ADMIN — ATORVASTATIN CALCIUM 80 MG: 40 TABLET, FILM COATED ORAL at 22:15

## 2018-03-06 RX ADMIN — LEVETIRACETAM 1000 MG: 10 INJECTION INTRAVENOUS at 14:48

## 2018-03-06 RX ADMIN — LEVETIRACETAM 500 MG: 500 TABLET, FILM COATED ORAL at 22:15

## 2018-03-06 RX ADMIN — HEPARIN SODIUM 11.6 UNITS/KG/HR: 10000 INJECTION, SOLUTION INTRAVENOUS at 17:37

## 2018-03-06 RX ADMIN — LORAZEPAM 1 MG: 2 INJECTION, SOLUTION INTRAMUSCULAR; INTRAVENOUS at 14:08

## 2018-03-06 NOTE — H&P
[facility] Medicine Services  HISTORY AND PHYSICAL    Patient Name: Bahman Banks  : 1975  MRN: [id number]  Primary Care Physician: No Known Provider    Subjective   Subjective     Chief Complaint:  Altered mental status, slurred speech    HPI:  Bahman Banks is a 42 y.o. 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 He was last known well at 0800 this morning. Around noon his wife found him laying on the floor. Speech was slurred, she noticed a facial droop, and he was not making sense when speaking. She called EMS. He was flown to [facility] ED as a code stroke. While having a CT head perfusion performed he started having a witness grand mal seizure. CT head perfusion was aborted. He was given Ativan and Keppra.     On my exam he says he has not been feeling well for the past few days. Does not recall today's events. He follows with Dr. Pastrana. He stopped Coumadin about a month ago for reasons unknown. He complains of pulling left sided chest pain. He complains of left bicep pain that radiates into left breast. He has baseline dysarthria however currently reports his speech is worse.  He also has baseline right arm weakness and vision impairment since his last stroke.    Denies any Fever or chills.  Denies palpitations.  Denies shortness of air cough.  Denies abdominal pain, nausea, vomiting or diarrhea.  Denies dysuria or urinary frequency.  Denies edema.    Review of Systems     Otherwise 10-system ROS reviewed and is negative except as mentioned in the HPI.    Personal History     Past Medical History:   Diagnosis Date   • Aortic stenosis    • Atrial fibrillation    • CAD (coronary artery disease)    • Chronic back pain    • Hypertension    • Stroke     affected speech,  vision, right side weakness       Past Surgical History:   Procedure Laterality Date   • AORTIC VALVE REPAIR/REPLACEMENT  2016    tissue valve, performed in [place]   • APPENDECTOMY     • CHOLECYSTECTOMY     • HIATAL HERNIA REPAIR     • KNEE ARTHROSCOPY     • SHOULDER ARTHROSCOPY         Family History: family history includes No Known Problems in his father and mother.     Social History:  reports that he has been smoking Cigarettes.  He does not have any smokeless tobacco history on file. He reports that he does not drink alcohol or use illicit drugs.  Social History     Social History Narrative    Retired , independentt of [employer], lives with wife       Medications:    (Not in a hospital admission)    Not on File    Objective   Objective     Vital Signs:   Temp:  [99.3 °F (37.4 °C)] 99.3 °F (37.4 °C)  Heart Rate:  [] 123  Resp:  [20] 20  BP: (127-147)/(86-91) 127/88   Total (NIH Stroke Scale): 4    Physical Exam   Constitutional: No acute distress, drowsy  Eyes: PERRLA, sclerae anicteric, no conjunctival injection  HENT: NCAT, mucous membranes dry  Neck: Supple, trachea midline  Respiratory: Clear to auscultation bilaterally, nonlabored respirations   Cardiovascular: tachycardia low 100's, grade III continuous blowing murmur heard throughout, PPP  Gastrointestinal: Positive bowel sounds, soft, nontender, nondistended  Musculoskeletal: No bilateral ankle edema, no clubbing or cyanosis to extremities  Psychiatric: flat affect, cooperative  Neurologic: Oriented x 3, weak right , unable to lift left arm due to pain, minimal right facial droop, equal lower extremity pushes and pulls  Skin: several tattoos noted    Results Reviewed:  I have personally reviewed current lab, radiology, and data and agree.      Results from last 7 days  Lab Units 03/06/18  1435   WBC 10*3/mm3 13.94*   HEMOGLOBIN g/dL 14.1   HEMATOCRIT % 43.1   PLATELETS 10*3/mm3 261   INR  0.96       Results from last 7 days  Lab Units  03/06/18  1435 03/06/18  1410   CREATININE mg/dL  --  1.00   TROPONIN I ng/mL <0.006  --      Estimated Creatinine Clearance: 116.1 mL/min (by C-G formula based on Cr of 1).      Imaging Results (last 24 hours)     Procedure Component Value Units Date/Time    CT Head Without Contrast Stroke Protocol [[id number]] Collected:  03/06/18 1415     Updated:  03/06/18 1442    Narrative:       EXAMINATION: CT HEAD WO CONTRAST, STROKE PROTOCOL-03/06/2018:      INDICATION: Stroke, aphagia, patient found unresponsive.     TECHNIQUE:  CT data set of the brain was performed without intravenous  contrast as a code 19.     The radiation dose reduction device was turned on for each scan per the  ALARA (As Low as Reasonably Achievable) protocol.     COMPARISON: NONE.     FINDINGS:   1. The foramen magnum is clear. The basal cisterns are clear. There is  no subarachnoid bleed. Intra-axial hemorrhage is not currently  identified.     2. There is no evidence of acute evolving low attenuation ischemic  insult or infarct.     3. There is developmental asymmetry of the frontal sinus producing  prominence of the central and leftward aspect of the forehead which is  not acute. There is no extracerebral collection or midline shift and  there is no mass effect.     4.  Ocular lens are intact and well aligned. The conal contents are  normal. The ventricular system is unremarkable. There is no atrophy.     5. There is a small collection of soft tissue density in the leftward  sphenoid sinus compartment. The remainder of the paranasal sinuses are  clear and the mastoids are unremarkable. Calvarium is intact.       Impression:       1.  Developmental asymmetry of the central and leftward frontal sinus  producing a bulging asymmetry which is not acute or pathologic.  2.  Negative CT data set of the brain. Evidence of evolving infarct,  hemorrhage, edema, subarachnoid bleed or hyperdense clot sign is not  currently identified.  3.  Data sets were  complete at 1:56 PM and the report rendered at 2:11  PM.     D:  03/06/2018  E:  03/06/2018           This report was finalized on 3/6/2018 2:40 PM by Dr. Rock Matias MD.       XR Chest 1 View [[id number]] Collected:  03/06/18 1514     Updated:  03/06/18 1514    Narrative:       EXAMINATION: XR CHEST 1 VW-03/06/2018:      INDICATION: Acute Stroke Protocol (onset < 12 hrs).      COMPARISON: NONE.     FINDINGS:   1.  A dual-lead pacemaker is in place from the left. The heart size is  normal. The heart is compensated.  2.  There is mild hazy atelectasis left base.  3.  The remainder of the chest is clear.           Impression:       1.  Mild hazy atelectasis left base with minimal volume loss.  2.  No active disease in the chest otherwise.     D:  03/06/2018  E:  03/06/2018                      Assessment/Plan   Assessment / Plan     Hospital Problem List     * (Principal)Seizure    Right sided weakness    Altered mental status    Aortic valve disease    Hypertension    Atrial fibrillation    Overview Signed 3/6/2018  4:19 PM by LARRY Austin     Not on anti-coagulation         Chronic back pain    Chest pain    Overview Signed 3/6/2018 10:38 PM by Terrence Wilhelm MD     11/2017:  Interpretation Summary   · Impressions are consistent with a low risk study.  · Left ventricular ejection fraction is normal (Calculated EF = 60%).  · Myocardial perfusion imaging indicates a normal myocardial perfusion study with no evidence of ischemia.  · TID is 1.1.              CAD (coronary artery disease)    Hyperlipidemia    Left shoulder pain    On high dose antipsychotic drug therapy            Assessment & Plan:  --CT head unremarkable, unable to obtain MRI due to pacemaker  --per the patient his deficits are worse, this could mean he has had another stroke  --unsure of exact etiology of seizure, ?? Due to stroke  --with his hx of a fib and valvular disease it is prudent to anticoagulate him at this time with Heparin  gtt (no bolus) as he is at risk for another/potential thromboembolic event  --HD statin, ECHO, Carotid duplex, EEG, repeat CT head in AM, PT/OT  --given his valvular disease he may need cardiology consult  --Dr. Wilhelm has spoken with Dr. Norris, plan of care was formulated with him, he will see patient in consultation  --left shoulder x-ray  --trend troponin  --restart home meds as appropriate     DVT prophylaxis:    CODE STATUS:  Full Code    Admission Status:  I believe this patient meets INPATIENT status due to the need for care which can only be reasonably provided in an hospital setting such as aggressive/expedited ancillary services and/or consultation services, the necessity for IV medications, close physician monitoring and/or the possible need for procedures.  In such, I feel patient’s risk for adverse outcomes and need for care warrant INPATIENT evaluation and predict the patient’s care encounter to likely last beyond 2 midnights.      Electronically signed by LARRY Austin, 03/06/18, 4:18 PM.      Brief Attending Admission Attestation     I have seen and examined the patient, performing an independent face-to-face diagnostic evaluation with plan of care reviewed and developed with the advanced practice clinician (APC).      Brief Summary Statement/HPI:   Bahman Banks is a 42 y.o. male with history tissue AoVR (2016 in [place]), Afib/SSS w/ pacemaker, prior cva (per chart)chronic seroquel use, chronic chest pain (negative stress test November 2017 with normal EF). Presented via air lift after wife found him on floor with slurred speech. In ct scanner was witnessed having tonic clonic seizure. CT perfusion aborted. Given ativan and loaded with keppra. Also complains of chronic chest pain (same as prior presentation to [facility] in November 2017 at which time stress test negative). Also 2 days left arm/shoulder pain. Denies falls or trauma. He is a poor historian and slightly drowsy after today's  "events but states stopped coumadin about a month ago but cannot state why.    Of note, in November 2017: admitted at outside hospital for chest pain and had negative stress test      Attending Physical Exam:  Constitutional:alert, nontoxic appearing, did not entirely cooperate with exam or history but did participate somewhat.   HEENT:Ncat, oroph clear  Neck: neck supple, full range of motion  Neuro: inconsistent exam; at times 3/5 right , other times 5/5; 5/5 leg raise and extension. The left arm unable to elbow/shoulder flex due to humerus pain. No warmth or redness.  intact. Speech clear, face symmetric  Psych: flat affect  Cardiac: Rrr; No pretibial pitting edema  Resp: Ctab, normal effort  GI: abd soft, nontender  Skin: No extremity rash  Musculoskeletal/extremities: no cyanosis extremities; no significant ankle edema      Echo bioprosthetic AoV, normal EV  Carotids no obstructive dz    Brief Assessment/Plan :  See above for further detailed assessment and plan developed with APC which I have reviewed and/or edited.    *Seizure (witnessed after arrival)  *Questionable right sided weakness/Neurologic spell  *Chronic, recurrent chest pain (followed in [place] by cardiology there)   -negative stress test November 2017 (in other chart which is to be merged)  *Hx prior porcine Aortic valve replacement  *Hx Afib/SSS/Pacer   -recently \"taken off coumadin\" per patient  *Left arm/shoulder pain  *Suspect psychiatric illness (says on seroquel 300mg daily)  --------------------------------------------------  -discussed w/ dr. Norris; neuro to see in a.m.  -no bolus heparin drip for now (in case cardioembolic cva & until rules out by negative troponins)   -was previously on coumadin but 'taken off\" by his cardiologist recently, presumably for noncompliance   -negative stress test in November 2017  -trend troponins (negative with ekg unchanged, no hypoxia)  -neuro checks; continue stroke workup  -repeat CT head in " "a.m. (unable get mri)  -eeg in a.m.  -continue keppra/seizure precautions  -continue seroquel (? Psychiatric illness, other \"merged\" chart confirms use  -left shoulder/humerus xray (pain)   -consider orthopedic consultation if continued pain  -if chest pain continues, which appears a chronic/recurrent issue, could consider d-dimer/ct angio but defer for now as inconsistent/atypical nature of chest pain        Electronically signed by Terrence Wilhelm MD, 03/06/18, 10:32 PM.       "
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
## 2018-03-07 ENCOUNTER — APPOINTMENT (OUTPATIENT)
Dept: NEUROLOGY | Facility: HOSPITAL | Age: 43
End: 2018-03-07

## 2018-03-07 ENCOUNTER — APPOINTMENT (OUTPATIENT)
Dept: CT IMAGING | Facility: HOSPITAL | Age: 43
End: 2018-03-07

## 2018-03-07 LAB
ANION GAP SERPL CALCULATED.3IONS-SCNC: 4 MMOL/L (ref 3–11)
APTT PPP: 29.2 SECONDS (ref 55–70)
APTT PPP: 30.8 SECONDS (ref 55–70)
APTT PPP: 36.9 SECONDS (ref 55–70)
APTT PPP: 41.5 SECONDS (ref 55–70)
BASOPHILS # BLD AUTO: 0.02 10*3/MM3 (ref 0–0.2)
BASOPHILS NFR BLD AUTO: 0.2 % (ref 0–1)
BUN BLD-MCNC: 10 MG/DL (ref 9–23)
BUN/CREAT SERPL: 12.5 (ref 7–25)
CALCIUM SPEC-SCNC: 8.9 MG/DL (ref 8.7–10.4)
CHLORIDE SERPL-SCNC: 111 MMOL/L (ref 99–109)
CO2 SERPL-SCNC: 24 MMOL/L (ref 20–31)
CREAT BLD-MCNC: 0.8 MG/DL (ref 0.6–1.3)
DEPRECATED RDW RBC AUTO: 44.7 FL (ref 37–54)
EOSINOPHIL # BLD AUTO: 0.08 10*3/MM3 (ref 0–0.3)
EOSINOPHIL NFR BLD AUTO: 0.7 % (ref 0–3)
ERYTHROCYTE [DISTWIDTH] IN BLOOD BY AUTOMATED COUNT: 13.5 % (ref 11.3–14.5)
GFR SERPL CREATININE-BSD FRML MDRD: 106 ML/MIN/1.73
GLUCOSE BLD-MCNC: 122 MG/DL (ref 70–100)
HCT VFR BLD AUTO: 40.3 % (ref 38.9–50.9)
HGB BLD-MCNC: 13.1 G/DL (ref 13.1–17.5)
IMM GRANULOCYTES # BLD: 0.02 10*3/MM3 (ref 0–0.03)
IMM GRANULOCYTES NFR BLD: 0.2 % (ref 0–0.6)
LYMPHOCYTES # BLD AUTO: 1.47 10*3/MM3 (ref 0.6–4.8)
LYMPHOCYTES NFR BLD AUTO: 12.2 % (ref 24–44)
MAGNESIUM SERPL-MCNC: 2.3 MG/DL (ref 1.3–2.7)
MCH RBC QN AUTO: 29.5 PG (ref 27–31)
MCHC RBC AUTO-ENTMCNC: 32.5 G/DL (ref 32–36)
MCV RBC AUTO: 90.8 FL (ref 80–99)
MONOCYTES # BLD AUTO: 1.01 10*3/MM3 (ref 0–1)
MONOCYTES NFR BLD AUTO: 8.4 % (ref 0–12)
NEUTROPHILS # BLD AUTO: 9.44 10*3/MM3 (ref 1.5–8.3)
NEUTROPHILS NFR BLD AUTO: 78.3 % (ref 41–71)
PLATELET # BLD AUTO: 236 10*3/MM3 (ref 150–450)
PMV BLD AUTO: 10.4 FL (ref 6–12)
POTASSIUM BLD-SCNC: 3.8 MMOL/L (ref 3.5–5.5)
RBC # BLD AUTO: 4.44 10*6/MM3 (ref 4.2–5.76)
SODIUM BLD-SCNC: 139 MMOL/L (ref 132–146)
TROPONIN I SERPL-MCNC: <0.006 NG/ML
WBC NRBC COR # BLD: 12.04 10*3/MM3 (ref 3.5–10.8)

## 2018-03-07 PROCEDURE — 80048 BASIC METABOLIC PNL TOTAL CA: CPT | Performed by: NURSE PRACTITIONER

## 2018-03-07 PROCEDURE — 25010000002 HYDRALAZINE PER 20 MG: Performed by: NURSE PRACTITIONER

## 2018-03-07 PROCEDURE — 97165 OT EVAL LOW COMPLEX 30 MIN: CPT

## 2018-03-07 PROCEDURE — 70450 CT HEAD/BRAIN W/O DYE: CPT

## 2018-03-07 PROCEDURE — 97162 PT EVAL MOD COMPLEX 30 MIN: CPT

## 2018-03-07 PROCEDURE — 99222 1ST HOSP IP/OBS MODERATE 55: CPT | Performed by: PSYCHIATRY & NEUROLOGY

## 2018-03-07 PROCEDURE — 85730 THROMBOPLASTIN TIME PARTIAL: CPT | Performed by: NURSE PRACTITIONER

## 2018-03-07 PROCEDURE — 84484 ASSAY OF TROPONIN QUANT: CPT | Performed by: NURSE PRACTITIONER

## 2018-03-07 PROCEDURE — 95816 EEG AWAKE AND DROWSY: CPT

## 2018-03-07 PROCEDURE — 85025 COMPLETE CBC W/AUTO DIFF WBC: CPT | Performed by: NURSE PRACTITIONER

## 2018-03-07 PROCEDURE — 85730 THROMBOPLASTIN TIME PARTIAL: CPT

## 2018-03-07 PROCEDURE — 25010000002 HEPARIN (PORCINE) PER 1000 UNITS

## 2018-03-07 PROCEDURE — 83735 ASSAY OF MAGNESIUM: CPT | Performed by: NURSE PRACTITIONER

## 2018-03-07 PROCEDURE — 99233 SBSQ HOSP IP/OBS HIGH 50: CPT | Performed by: HOSPITALIST

## 2018-03-07 RX ORDER — CYCLOBENZAPRINE HCL 10 MG
5 TABLET ORAL 3 TIMES DAILY PRN
Status: DISCONTINUED | OUTPATIENT
Start: 2018-03-07 | End: 2018-03-11 | Stop reason: HOSPADM

## 2018-03-07 RX ORDER — HYDRALAZINE HYDROCHLORIDE 20 MG/ML
10 INJECTION INTRAMUSCULAR; INTRAVENOUS EVERY 6 HOURS PRN
Status: DISCONTINUED | OUTPATIENT
Start: 2018-03-07 | End: 2018-03-07

## 2018-03-07 RX ORDER — LOXAPINE SUCCINATE 25 MG/1
75 TABLET ORAL NIGHTLY
COMMUNITY
End: 2018-08-22 | Stop reason: HOSPADM

## 2018-03-07 RX ORDER — HYDRALAZINE HYDROCHLORIDE 20 MG/ML
10 INJECTION INTRAMUSCULAR; INTRAVENOUS ONCE
Status: COMPLETED | OUTPATIENT
Start: 2018-03-07 | End: 2018-03-07

## 2018-03-07 RX ADMIN — ASPIRIN 325 MG ORAL TABLET 325 MG: 325 PILL ORAL at 08:36

## 2018-03-07 RX ADMIN — RANOLAZINE 500 MG: 500 TABLET, FILM COATED, EXTENDED RELEASE ORAL at 21:01

## 2018-03-07 RX ADMIN — CYCLOBENZAPRINE HYDROCHLORIDE 5 MG: 10 TABLET, FILM COATED ORAL at 15:07

## 2018-03-07 RX ADMIN — LEVETIRACETAM 500 MG: 500 TABLET, FILM COATED ORAL at 08:36

## 2018-03-07 RX ADMIN — LEVETIRACETAM 500 MG: 500 TABLET, FILM COATED ORAL at 21:02

## 2018-03-07 RX ADMIN — ATORVASTATIN CALCIUM 80 MG: 40 TABLET, FILM COATED ORAL at 21:02

## 2018-03-07 RX ADMIN — HEPARIN SODIUM 20 UNITS/KG/HR: 10000 INJECTION, SOLUTION INTRAVENOUS at 12:15

## 2018-03-07 RX ADMIN — HYDROCODONE BITARTRATE AND ACETAMINOPHEN 1 TABLET: 5; 325 TABLET ORAL at 14:30

## 2018-03-07 RX ADMIN — Medication 10 MG: at 06:50

## 2018-03-07 RX ADMIN — QUETIAPINE FUMARATE 150 MG: 100 TABLET ORAL at 21:02

## 2018-03-07 RX ADMIN — RANOLAZINE 500 MG: 500 TABLET, FILM COATED, EXTENDED RELEASE ORAL at 08:36

## 2018-03-07 NOTE — THERAPY EVALUATION
Acute Care - Physical Therapy Initial Evaluation  [facility]     Patient Name: Bahman Banks  : 1975  MRN: [id number]  Today's Date: 3/7/2018   Onset of Illness/Injury or Date of Surgery Date: 18  Date of Referral to PT: 18  Referring Physician: LARRY KELLY      Admit Date: 3/6/2018     Visit Dx:    ICD-10-CM ICD-9-CM   1. Altered mental status, unspecified altered mental status type R41.82 780.97   2. Observed seizure-like activity R56.9 780.39   3. Leukocytosis, unspecified type D72.829 288.60   4. Facial asymmetry Q67.0 754.0   5. Impaired mobility and ADLs Z74.09 799.89   6. Impaired functional mobility, balance, gait, and endurance Z74.09 V49.89     Patient Active Problem List   Diagnosis   • Seizure   • Altered mental status   • Aortic valve disease   • Hypertension   • Atrial fibrillation   • Chronic back pain   • Chest pain   • CAD (coronary artery disease)   • Hyperlipidemia   • Left shoulder pain   • Right sided weakness   • On high dose antipsychotic drug therapy     Past Medical History:   Diagnosis Date   • Aortic stenosis    • Atrial fibrillation    • CAD (coronary artery disease)    • Chronic back pain    • Hypertension    • Stroke     affected speech, vision, right side weakness     Past Surgical History:   Procedure Laterality Date   • AORTIC VALVE REPAIR/REPLACEMENT      tissue valve, performed in [place]   • APPENDECTOMY     • CHOLECYSTECTOMY     • HIATAL HERNIA REPAIR     • KNEE ARTHROSCOPY     • SHOULDER ARTHROSCOPY            PT ASSESSMENT (last 72 hours)      PT Evaluation       18 1102 18 1100    Rehab Evaluation    Document Type  evaluation   COMPLETED CHART REVIEW [id number]  -CD    Subjective Information  agree to therapy;complains of;pain   L SHOULDER PAIN- X-RAY NEG, CT PENDING.   -CD    Patient Effort, Rehab Treatment  good  -CD    Symptoms Noted During/After Treatment  other (see comments);increased pain   PAIN IN R BICEPS WITH RUE IN DEPENDENT  POSITION IN STANDING.  -CD    General Information    Patient Profile Review  yes  -CD    Onset of Illness/Injury or Date of Surgery Date  03/06/18  -CD    Referring Physician  LARRY KELLY  -CD    General Observations  PT SLEEPING, R SIDELYING UPON ARRIVAL ON RA AND WITH IV.   -CD    Pertinent History Of Current Problem  TO OSH WITH AMS. PT FLOWN TO [facility], WHILE IN CT SCANNER, OBSERVED TO HAVE GRAND MAL SEIZURE. PT HAS H/O R SIDED WEAKNESS FROM PRIOR CVA, AVR, AFIB AND PPM. UNABLE TO HAVE MRI.PT C/O L SHOULDER/UE PAIN, CT SHOULDER IS PENDING.    -CD    Precautions/Limitations  fall precautions  -CD    Prior Level of Function  independent:;ADL's;all household mobility;community mobility;driving  -CD    Equipment Currently Used at Home none  -SP none  -CD    Plans/Goals Discussed With  patient;agreed upon  -CD    Risks Reviewed  patient:;LOB;change in vital signs;dizziness;increased discomfort  -CD    Benefits Reviewed  patient:;improve function;increase balance;increase knowledge  -CD    Barriers to Rehab  medically complex  -CD    Living Environment    Lives With spouse;child(dina), dependent  -SP spouse;child(dina), dependent  -CD    Living Arrangements house  -SP house  -CD    Home Accessibility no concerns  -SP no concerns  -CD    Stair Railings at Home none  -SP     Type of Financial/Environmental Concern none  -SP     Transportation Available family or friend will provide  -SP     Living Environment Comment  PT AND SPOUSE ARE UNEMPLOYED PER CM NOTE.   -CD    Clinical Impression    Date of Referral to PT  03/06/18  -CD    PT Diagnosis  IMPAIRED FUNCTIONAL MOBILITY, SEIZURE, R/O CVA.   -CD    Patient/Family Goals Statement  DID NOT STATE.   -CD    Criteria for Skilled Therapeutic Interventions Met  yes  -CD    Rehab Potential  good, to achieve stated therapy goals  -CD    Vital Signs    Pre Systolic BP Rehab  --   NSG CLEARED FOR TREATMENT, VSS.   -CD    Pain Assessment    Pain Assessment  0-10  -CD    Pain Score   9  -CD    Post Pain Score  9  -CD    Pain Type  Acute pain  -CD    Pain Location  Shoulder  -CD    Pain Orientation  --   L BICEPS  -CD    Pain Intervention(s)  Rest;Repositioned  -CD    Response to Interventions  TOLERATED.   -CD    Vision Assessment/Intervention    Vision Comment  PER O.T. NOTE.   -CD    Cognitive Assessment/Intervention    Current Cognitive/Communication Assessment  impaired   SLURRED SPEECH.   -CD    Orientation Status  oriented to;person   LETHARGIC.   -CD    Follows Commands/Answers Questions  100% of the time  -CD    ROM (Range of Motion)    General ROM  no range of motion deficits identified   B LE' S  -CD    MMT (Manual Muscle Testing)    General MMT Assessment  lower extremity strength deficits identified   R LE GROSSLY 4/5, L LE 5/5.   -CD    Bed Mobility, Assessment/Treatment    Bed Mob, Supine to Sit, [place]  independent  -CD    Transfer Assessment/Treatment    Transfers, Sit-Stand [place]  supervision required  -CD    Transfers, Stand-Sit [place]  supervision required  -CD    Transfers, Sit-Stand-Sit, Assist Device  --   GAIT BELT FOR EVAL SAFETY.   -CD    Gait Assessment/Treatment    Gait, [place] Level  contact guard assist  -CD    Gait, Assistive Device  --   GAIT BELT FOR EVAL SAFETY.   -CD    Gait, Distance (Feet)  150   DECLINED FURTHER DISTANCE DUE TO FATIGUE/L SHOULDER PAIN.   -CD    Gait, Gait Deviations  stride width increased   SWAY L/R.   -CD    Gait, Impairments  impaired balance  -CD    Gait, Comment  MILDLY UNSTEADY REQUIRING CGA FOR SAFETY. PT ALSO SOMEWHAT LETHARGIC.   -CD    Motor Skills/Interventions    Additional Documentation  Balance Skills Training (Group)  -CD    Balance Skills Training    Sitting-Level of Assistance  Independent  -CD    Standing-Level of Assistance  Contact guard  -CD    Gait Balance-Level of Assistance  Contact guard  -CD    Sensory Assessment/Intervention    Light Touch  LLE;RLE   INTACT.   -CD    Positioning and  Restraints    Pre-Treatment Position  in bed  -CD    Post Treatment Position  bed   PT DECLINED [facility], WANTED TO GO BACK TO SLEEP.   -CD    In Bed  supine;call light within reach;exit alarm on;notified nsg;encouraged to call for assist  -CD      03/07/18 1005 03/07/18 0836    Rehab Evaluation    Document Type evaluation  -AN     Subjective Information complains of;pain  -AN     Patient Effort, Rehab Treatment fair  -AN     Symptoms Noted During/After Treatment increased pain  -AN     General Information    Patient Profile Review yes  -AN     Onset of Illness/Injury or Date of Surgery Date 03/06/18  -AN     Referring Physician LARRY Fernandes  -AN     General Observations Pt supine in bed, IV intact  -AN     Pertinent History Of Current Problem Pt found on floor by spouse with slurred speech AMS; chart reports seizure with CT attempt. Pt with hx of CVA with R side weakness; hx of AVR, Afib, PPM  -AN     Precautions/Limitations fall precautions  -AN     Prior Level of Function independent:;ADL's;all household mobility;community mobility;driving  -AN     Equipment Currently Used at Home none  -AN     Plans/Goals Discussed With patient;agreed upon  -AN     Risks Reviewed patient:;LOB;dizziness;increased discomfort;change in vital signs  -AN     Benefits Reviewed patient:;improve function;increase independence;increase strength;increase balance  -AN     Barriers to Rehab medically complex  -AN     Living Environment    Lives With child(dina), dependent;spouse  -AN     Living Arrangements mobile home  -AN     Home Accessibility tub/shower is not walk in  -AN     Transportation Available family or friend will provide  -AN     Living Environment Comment pt and spouse do not work per pt report  -AN     Pain Assessment    Pain Assessment 0-10  -AN     Pain Score 9  -AN     Post Pain Score 10  -AN     Pain Type Acute pain  -AN     Pain Location Shoulder  -AN     Pain Orientation Left  -AN     Pain Intervention(s) Rest   RN aware   -AN     Response to Interventions increased with elbow ROM, deferred shoulder  -AN     Vision Assessment/Intervention    Visual Impairment WFL  -AN     Cognitive Assessment/Intervention    Current Cognitive/Communication Assessment impaired   moderately slurred speech  -AN     Orientation Status oriented to;person;place   knew month with extra time, says its 2012  -AN     Follows Commands/Answers Questions 75% of the time  -AN     Short/Long Term Memory --   pt reports no memory of admit symptoms episode or CT  -AN     ROM (Range of Motion)    General ROM upper extremity range of motion deficits identified  -AN     General ROM Detail R WFL; unable to test L with severe shoulder pain  -AN     MMT (Manual Muscle Testing)    General MMT Assessment upper extremity strength deficits identified  -AN     General MMT Assessment Detail L deferred; R 3+/5  -AN     Muscle Tone Assessment    Muscle Tone Assessment  --  -KW    LUE Muscle Tone Assessment  severely decreased tone   reports a torn bicept  -KW    RUE Muscle Tone Assessment  --  -KW    Bilateral Lower Extremities Muscle Tone Assessment  --  -KW    Bed Mobility, Assessment/Treatment    Bed Mobility, Comment pt declined  -AN     Transfer Assessment/Treatment    Transfer, Comment pt declined  -AN     Motor Skills/Interventions    Additional Documentation Balance Skills Training (Group);Fine Motor Coordination Training (Group);Gross Motor Coordination Training (Group)  -AN     Balance Skills Training    Sitting-Level of Assistance --  -CD     Standing-Level of Assistance --  -CD     Gait Balance-Level of Assistance --  -CD     Fine Motor Coordination Training    Detail (Fine Motor Coordination Training) difficult to assess L   -AN     Gross Motor Coordination Training    Gross Motor Skill, Impairments Detail unable to test L with pain; R WFl with finger to nose  -AN     Sensory Assessment/Intervention    Light Touch --  -CD --   pt denies numbness ot tingling  -KW     Positioning and Restraints    Pre-Treatment Position in bed  -AN     Post Treatment Position bed  -AN     In Bed supine;call light within reach;encouraged to call for assist;exit alarm on  -AN       03/06/18 5054 03/06/18 183    General Information    Equipment Currently Used at Home  none  -TG    Living Environment    Lives With  spouse;child(dina), dependent  -TG    Living Arrangements  mobile home  -TG    Home Accessibility  no concerns  -TG    Stair Railings at Home  none  -TG    Type of Financial/Environmental Concern  none  -TG    Transportation Available  none  -TG    Muscle Tone Assessment    Muscle Tone Assessment LUE;RUE;Bilateral Lower Extremities  -AB     LUE Muscle Tone Assessment severely decreased tone  -AB     RUE Muscle Tone Assessment associated movements noted  -AB     Bilateral Lower Extremities Muscle Tone Assessment associated movements noted  -AB       User Key  (r) = Recorded By, (t) = Taken By, (c) = Cosigned By    Initials Name Provider Type    CD Barb Richter, PT Physical Therapist    EDIE Wong, OT Occupational Therapist    EVA Cervantes, RN Case Manager    AB Alley Owens RN Registered Nurse    KW Archana Wilhelm, MARINA Registered Nurse    TG Ernestine Barbour, RN Registered Nurse          Physical Therapy Education     Title: PT OT SLP Therapies (Active)     Topic: Physical Therapy (Done)     Point: Precautions (Done)    Learning Progress Summary    Learner Readiness Method Response Comment Documented by Status   Patient Acceptance E VU,NR BENEFITS OF OOB ACTIVITY, SAFETY WITH MOBILITY, PROGRESSION OF POC.  03/07/18 1156 Done                      User Key     Initials Effective Dates Name Provider Type Discipline     06/19/15 -  Barb Richter, PT Physical Therapist PT                PT Recommendation and Plan  Anticipated Equipment Needs At Discharge:  (TBD)  Anticipated Discharge Disposition: home with assist  PT Frequency: daily  Plan of Care Review  Plan Of Care Reviewed  With: patient  Outcome Summary/Follow up Plan: PT PRESENTS WITH EVOLVING SYMPTOMS TO INCLUDE L SIDED WEAKNESS, IMPAIRED BALANCE AND DECLINE IN FUNCTIONAL MOBILITY. RECOMMEND INPT P.T. TO ASSURE SAFETY AND INDEPENDENCE WITH MOBILITY PRIOR TO D/C HOME. PT CURRENTLY LIMITED BY C/O L SHOULDER/BICEPS PAIN AND CT IS PENDING. RECOMMEND HOME WITH ASSIST AT THIS TIME PENDING CONTINUED INPT PROGRESS WITH MOBILITY. .           IP PT Goals       03/07/18 1157          Transfer Training PT LTG    Transfer Training PT LTG, Time to Achieve 2 wks  -CD      Transfer Training PT LTG, Activity Type all transfers  -CD      Transfer Training PT LTG, [place] Level independent  -CD      Gait Training PT LTG    Gait Training Goal PT LTG, Time to Achieve 2 wks  -CD      Gait Training Goal PT LTG, [place] Level independent  -CD      Gait Training Goal PT LTG, Distance to Achieve 600  -CD      Dynamic Standing Balance PT LTG    Dynamic Standing Balance PT LTG, Time to Achieve 2 wks  -CD      Dynamic Standing Balance PT LTG, [place] Level independent  -CD        User Key  (r) = Recorded By, (t) = Taken By, (c) = Cosigned By    Initials Name Provider Type    CD Barb Richter, PT Physical Therapist                Outcome Measures       03/07/18 1100 03/07/18 1005       How much help from another person do you currently need...    Turning from your back to your side while in flat bed without using bedrails? 4  -CD      Moving from lying on back to sitting on the side of a flat bed without bedrails? 4  -CD      Moving to and from a bed to a chair (including a wheelchair)? 3  -CD      Standing up from a chair using your arms (e.g., wheelchair, bedside chair)? 3  -CD      Climbing 3-5 steps with a railing? 3  -CD      To walk in hospital room? 3  -CD      AM-PAC 6 Clicks Score 20  -CD      How much help from another is currently needed...    Putting on and taking off regular lower body clothing?  2  -AN     Bathing (including washing,  rinsing, and drying)  2  -AN     Toileting (which includes using toilet bed pan or urinal)  2  -AN     Putting on and taking off regular upper body clothing  2  -AN     Taking care of personal grooming (such as brushing teeth)  2  -AN     Eating meals  3  -AN     Score  13  -AN     Modified [place] Scale    Modified [place] Scale 3 - Moderate disability.  Requiring some help, but able to walk without assistance.   WITH A.D.   -CD 4 - Moderately severe disability.  Unable to walk without assistance, and unable to attend to own bodily needs without assistance.  -AN     Functional Assessment    Outcome Measure Options AM-PAC 6 Clicks Basic Mobility (PT);Modified [place]  -CD AM-PAC 6 Clicks Daily Activity (OT);Modified [place]  -AN       User Key  (r) = Recorded By, (t) = Taken By, (c) = Cosigned By    Initials Name Provider Type    BARTOLO Richter, PT Physical Therapist    EDIE Wong, OT Occupational Therapist           Time Calculation:         PT Charges       03/07/18 1201          Time Calculation    Start Time 1100  -CD      PT Received On 03/07/18  -CD      PT Goal Re-Cert Due Date 03/17/18  -CD        User Key  (r) = Recorded By, (t) = Taken By, (c) = Cosigned By    Initials Name Provider Type    BARTOLO Richter, TAN Physical Therapist          Therapy Charges for Today     Code Description Service Date Service Provider Modifiers Qty    [id number]  PT EVAL MOD COMPLEXITY 4 3/7/2018 Barb Richter, PT GP 1          PT G-Codes  Outcome Measure Options: AM-PAC 6 Clicks Basic Mobility (PT), Mayte Richter PT  3/7/2018

## 2018-03-07 NOTE — CONSULTS
"Neurology    Referring Provider: LARRY Austin    Reason for Consultation: seizure      Chief complaint: Altered mental status, slurred speech    Subjective .     History of present illness:  Mr. Banks is a 42-year-old male with a past medical history significant for aortic stenosis status post valve replacement, A. fib, CAD, hypertension, reported prior ischemic stroke who is admitted to the hospitalist service for altered mentation and reported slurred speech.  The patient does reportedly have a history of being on warfarin for his valve replacement and A. fib, but he reports his PCP to come off of this because he could not \"get his levels regulated.\"  On further questioning the patient does have a diet high in vitamin K (primarily [place] sprouts) but states he was not counseled about this.  He was reportedly found with slurred speech and questionable facial droop and not making sense when he was talking at home.  EMS was called and he was brought here for further evaluation.  He was sent for CT perfusion but was witnessed having a generalized tonic-clonic seizure on the scanner table so the perfusion scan was aborted.  He was loaded with Ativan and Keppra.  No further seizures noted overnight.  He reports feeling back to baseline currently but states that he has left arm pain.    Review of Systems:  Positive for slurred speech and weakness and seizure.Otherwise complete review of systems was discussed with the patient and found to be negative except for that mentioned in history of present illness.    History  Past Medical History:   Diagnosis Date   • Aortic stenosis    • Atrial fibrillation    • CAD (coronary artery disease)    • Chronic back pain    • Hypertension    • Stroke     affected speech, vision, right side weakness   ,   Past Surgical History:   Procedure Laterality Date   • AORTIC VALVE REPAIR/REPLACEMENT  2016    tissue valve, performed in [place]   • APPENDECTOMY     • CHOLECYSTECTOMY   " "  • HIATAL HERNIA REPAIR     • KNEE ARTHROSCOPY     • SHOULDER ARTHROSCOPY     ,   Family History   Problem Relation Age of Onset   • No Known Problems Mother    • No Known Problems Father    ,   Social History   Substance Use Topics   • Smoking status: Current Every Day Smoker     Types: Cigarettes   • Smokeless tobacco: None      Comment: 2-3 cigarettes   • Alcohol use No   ,   Prescriptions Prior to Admission   Medication Sig Dispense Refill Last Dose   • loxapine (LOXITANE) 25 MG capsule Take 75 mg by mouth Every Night.       and Allergies:  Review of patient's allergies indicates not on file.    Objective     Vital Signs   Blood pressure (!) 151/109, pulse 87, temperature 98.4 °F (36.9 °C), resp. rate 16, height 182.9 cm (72\"), weight 86.2 kg (190 lb), SpO2 95 %.    Physical Exam:      Gen: Lying in bed with eyes open. In NAD. Appears stated age   Eyes: PERRL, conjuntivae/lids normal   ENT: External canals normal bilaterally. Oropharynx normal.    Neck: Supple. No thyroid enlargement noted   Respiratory: CTA bilaterally. Respirations unlabored   CV: RRR, S1 and S2 nml. Radial pulses 2+ bilaterally.    Skin: No rashes noted on exposed skin. Normal tugor.   MSK: Normal bulk and tone. Nml ROM     Neurologic:   Mental status: Awake, alert, oriented x4. Follows commands. Speech fluent.    CN: PERRL, EOM intact, sensation intact in upper/mid/lower face bilaterally, facial movements symmetric, hearing intact to finger rub bilaterally, palate elevates symmetrically, tongue movements and SCMs strong bilaterally    Motor: Mild baseline right arm weakness, otherwise strength appears full throughout    Reflexes: 2+ throughout   Sensory: Intact to LT throughout   Coordination: No dysmetria noted   Gait: Not tested        Results Reviewed:     Labs reviewed  CT head personally reviewed.  No acute process seen.  Agree with the report  Carotid duplex and TTE reports reviewed  EEG report reviewed and discussed with interpreting " neurologist  EKG report reviewed                 Assessment/Plan     1.  Generalized tonic-clonic seizure = patient was given Ativan and Keppra.  Currently on Keppra 500 mg twice a day, recommend continuing this.  Recommend no driving for 90 days since his last seizure.  CT head unremarkable.  Unable to obtain MRI due to having pacemaker.  EEG negative for epileptiform activity.    2.  A. Fib = previously on warfarin but was taken off of this due to uncontrollable INR.  Likely this may have been related to his diet as he states that his diet varies in eating [place] sprouts.  He is currently on heparin drip.  Recommend restarting warfarin with either heparin or Lovenox bridge and more appropriate Coumadin counseling.    Nothing further to add. Will sign off.  Follow-up in neurology clinic, any provider, first available appointment      Ernestine Norris MD  03/07/18  3:47 PM

## 2018-03-07 NOTE — THERAPY EVALUATION
Acute Care - Occupational Therapy Initial Evaluation  [facility]     Patient Name: Bahman Banks  : 1975  MRN: [id number]  Today's Date: 3/7/2018  Onset of Illness/Injury or Date of Surgery Date: 18  Date of Referral to OT: 18  Referring Physician: LARRY Fernandes    Admit Date: 3/6/2018       ICD-10-CM ICD-9-CM   1. Altered mental status, unspecified altered mental status type R41.82 780.97   2. Observed seizure-like activity R56.9 780.39   3. Leukocytosis, unspecified type D72.829 288.60   4. Facial asymmetry Q67.0 754.0   5. Impaired mobility and ADLs Z74.09 799.89     Patient Active Problem List   Diagnosis   • Seizure   • Altered mental status   • Aortic valve disease   • Hypertension   • Atrial fibrillation   • Chronic back pain   • Chest pain   • CAD (coronary artery disease)   • Hyperlipidemia   • Left shoulder pain   • Right sided weakness   • On high dose antipsychotic drug therapy     Past Medical History:   Diagnosis Date   • Aortic stenosis    • Atrial fibrillation    • CAD (coronary artery disease)    • Chronic back pain    • Hypertension    • Stroke     affected speech, vision, right side weakness     Past Surgical History:   Procedure Laterality Date   • AORTIC VALVE REPAIR/REPLACEMENT  2016    tissue valve, performed in [place]   • APPENDECTOMY     • CHOLECYSTECTOMY     • HIATAL HERNIA REPAIR     • KNEE ARTHROSCOPY     • SHOULDER ARTHROSCOPY            OT ASSESSMENT FLOWSHEET (last 72 hours)      OT Evaluation       18 1005 18 2214 18 1830          Rehab Evaluation    Document Type evaluation  -AN        Subjective Information complains of;pain  -AN        Patient Effort, Rehab Treatment fair  -AN        Symptoms Noted During/After Treatment increased pain  -AN        General Information    Patient Profile Review yes  -AN        Onset of Illness/Injury or Date of Surgery Date 18  -AN        Referring Physician LARRY Fernandes  -AN        General Observations  Pt supine in bed, IV intact  -AN        Pertinent History Of Current Problem Pt found on floor by spouse with slurred speech AMS; chart reports seizure with CT attempt. Pt with hx of CVA with R side weakness; hx of AVR, Afib, PPM  -AN        Precautions/Limitations fall precautions  -AN        Prior Level of Function independent:;ADL's;all household mobility;community mobility;driving  -AN        Equipment Currently Used at Home none  -AN  none  -TG      Plans/Goals Discussed With patient;agreed upon  -AN        Risks Reviewed patient:;LOB;dizziness;increased discomfort;change in vital signs  -AN        Benefits Reviewed patient:;improve function;increase independence;increase strength;increase balance  -AN        Barriers to Rehab medically complex  -AN        Living Environment    Lives With child(dina), dependent;spouse  -AN  spouse;child(dina), dependent  -TG      Living Arrangements mobile home  -AN  mobile home  -TG      Home Accessibility tub/shower is not walk in  -AN  no concerns  -TG      Stair Railings at Home   none  -TG      Type of Financial/Environmental Concern   none  -TG      Transportation Available family or friend will provide  -AN  none  -TG      Living Environment Comment pt and spouse do not work per pt report  -AN        Clinical Impression    Date of Referral to OT 03/06/18  -AN        OT Diagnosis Decreased ADL I  -AN        Impairments Found (describe specific impairments) arousal, attention, and cognition;joint integrity and mobility;muscle performance;motor function  -AN        Patient/Family Goals Statement Agreeable to OT tx but declined mobility  -AN        Criteria for Skilled Therapeutic Interventions Met yes;treatment indicated  -AN        Rehab Potential good, to achieve stated therapy goals  -AN        Therapy Frequency daily  -AN        Anticipated Equipment Needs At Discharge --   TBD  -AN        Anticipated Discharge Disposition inpatient rehabilitation facility  -AN         Functional Level Prior    Ambulation   0-->independent  -TG      Transferring   0-->independent  -TG      Toileting   0-->independent  -TG      Bathing   0-->independent  -TG      Dressing   0-->independent  -TG      Eating   0-->independent  -TG      Communication   0-->understands/communicates without difficulty  -TG      Swallowing   0-->swallows foods/liquids without difficulty  -TG      Prior Functional Level Comment   independent  -TG      Pain Assessment    Pain Assessment 0-10  -AN        Pain Score 9  -AN        Post Pain Score 10  -AN        Pain Type Acute pain  -AN        Pain Location Shoulder  -AN        Pain Orientation Left  -AN        Pain Intervention(s) Rest   RN aware  -AN        Response to Interventions increased with elbow ROM, deferred shoulder  -AN        Vision Assessment/Intervention    Visual Impairment WFL  -AN        Cognitive Assessment/Intervention    Current Cognitive/Communication Assessment impaired   moderately slurred speech  -AN        Orientation Status oriented to;person;place   knew month with extra time, says its 2012  -AN        Follows Commands/Answers Questions 75% of the time  -AN        Short/Long Term Memory --   pt reports no memory of admit symptoms episode or CT  -AN        ROM (Range of Motion)    General ROM upper extremity range of motion deficits identified  -AN        General ROM Detail R WFL; unable to test L with severe shoulder pain  -AN        MMT (Manual Muscle Testing)    General MMT Assessment upper extremity strength deficits identified  -AN        General MMT Assessment Detail L deferred; R 3+/5  -AN        Muscle Tone Assessment    Muscle Tone Assessment  LUE;RUE;Bilateral Lower Extremities  -AB       LUE Muscle Tone Assessment  severely decreased tone  -AB       RUE Muscle Tone Assessment  associated movements noted  -AB       Bilateral Lower Extremities Muscle Tone Assessment  associated movements noted  -AB       Bed Mobility, Assessment/Treatment     Bed Mobility, Comment pt declined  -AN        Transfer Assessment/Treatment    Transfer, Comment pt declined  -AN        Functional Mobility    Functional Mobility- Comment pt declined  -AN        Upper Body Bathing Assessment/Training    UB Bathing Assess/Train, Comment simulate mod assist with L shoulder pain  -AN        Lower Body Bathing Assessment/Training    LB Bathing Assess/Train, Comment expected mod with L shoulder pain  -AN        Upper Body Dressing Assessment/Training    UB Dressing Assess/Train, Comment expected max with L shoulder   -AN        Motor Skills/Interventions    Additional Documentation Balance Skills Training (Group);Fine Motor Coordination Training (Group);Gross Motor Coordination Training (Group)  -AN        Gross Motor Coordination Training    Gross Motor Skill, Impairments Detail unable to test L with pain; R WFl with finger to nose  -AN        Fine Motor Coordination Training    Detail (Fine Motor Coordination Training) difficult to assess L   -AN        General Therapy Interventions    Planned Therapy Interventions activity intolerance;ADL retraining;balance training;bed mobility training;strengthening;transfer training;visual retraining  -AN        Positioning and Restraints    Pre-Treatment Position in bed  -AN        Post Treatment Position bed  -AN        In Bed supine;call light within reach;encouraged to call for assist;exit alarm on  -AN          User Key  (r) = Recorded By, (t) = Taken By, (c) = Cosigned By    Initials Name Effective Dates    AN Amada Wong OT 06/22/15 -     AB Alley Owens RN 06/16/16 -     TG Ernestine Barbour, MARINA 02/17/17 -            Occupational Therapy Education     Title: PT OT SLP Therapies (Active)     Topic: Occupational Therapy (Active)     Point: ADL training (Active)    Description: Instruct learner(s) on proper safety adaptation and remediation techniques during self care or transfers.   Instruct in proper use of assistive devices.     Learning Progress Summary    Learner Readiness Method Response Comment Documented by Status   Patient Acceptance E NR Addressed current deficits, OT role and importance of daily therapeutic activites and getting OOB. AN 03/07/18 1038 Active                      User Key     Initials Effective Dates Name Provider Type Discipline    AN 06/22/15 -  Amada Wong, OT Occupational Therapist OT                  OT Recommendation and Plan  Anticipated Equipment Needs At Discharge:  (TBD)  Anticipated Discharge Disposition: inpatient rehabilitation facility  Planned Therapy Interventions: activity intolerance, ADL retraining, balance training, bed mobility training, strengthening, transfer training, visual retraining  Therapy Frequency: daily  Plan of Care Review  Plan Of Care Reviewed With: patient  Outcome Summary/Follow up Plan: Pt affected by PMH and current symptoms, resulting in decrease in ADL I and participation. Pt. declined mobility due to severe c/o L shoulder pain and unable to perform AROM on L side. Pt will need IRF if pt does not progress to PLOF.           OT Goals       03/07/18 1041          Transfer Training OT LTG    Transfer Training OT LTG, Date Established 03/07/18  -AN      Transfer Training OT LTG, Time to Achieve 1 wk  -AN      Transfer Training OT LTG, Activity Type toilet;sit to stand/stand to sit;bed to chair /chair to bed  -AN      Transfer Training OT LTG, [place] Level minimum assist (75% patient effort)  -AN      Range of Motion OT LTG    Range of Motion Goal OT LTG, Date Established 03/07/18  -AN      Range of Motion Goal OT LTG, Time to Achieve 1 wk  -AN      Range of Motion Goal OT LTG, AROM Measure Pt will complete UE HEP daily to increase functional use of Vivek UE's and complete ADL.  -AN      Bathing OT LTG    Bathing Goal OT LTG, Date Established 03/07/18  -AN      Bathing Goal OT LTG, Time to Achieve 1 wk  -AN      Bathing Goal OT LTG, Activity Type simulates;upper body  bathing;lower body bathing  -AN      Bathing Goal OT LTG, [place] Level moderate assist (50% patient effort)  -AN        User Key  (r) = Recorded By, (t) = Taken By, (c) = Cosigned By    Initials Name Provider Type    EDIE Wong OT Occupational Therapist                Outcome Measures       03/07/18 1005          How much help from another is currently needed...    Putting on and taking off regular lower body clothing? 2  -AN      Bathing (including washing, rinsing, and drying) 2  -AN      Toileting (which includes using toilet bed pan or urinal) 2  -AN      Putting on and taking off regular upper body clothing 2  -AN      Taking care of personal grooming (such as brushing teeth) 2  -AN      Eating meals 3  -AN      Score 13  -AN      Modified Ha Scale    Modified [place] Scale 4 - Moderately severe disability.  Unable to walk without assistance, and unable to attend to own bodily needs without assistance.  -AN      Functional Assessment    Outcome Measure Options AM-PAC 6 Clicks Daily Activity (OT);Modified [place]  -AN        User Key  (r) = Recorded By, (t) = Taken By, (c) = Cosigned By    Initials Name Provider Type    EDIE Wong OT Occupational Therapist          Time Calculation:   OT Start Time: 1002    Therapy Charges for Today     Code Description Service Date Service Provider Modifiers Qty    [id number]  OT EVAL LOW COMPLEXITY 4 3/7/2018 Amada Wong OT GO 1               Amada Wong OT  3/7/2018

## 2018-03-07 NOTE — PLAN OF CARE
Problem: Patient Care Overview (Adult)  Goal: Plan of Care Review  Outcome: Ongoing (interventions implemented as appropriate)   03/07/18 1157   Coping/Psychosocial Response Interventions   Plan Of Care Reviewed With patient   Outcome Evaluation   Outcome Summary/Follow up Plan PT PRESENTS WITH EVOLVING SYMPTOMS TO INCLUDE L SIDED WEAKNESS, IMPAIRED BALANCE AND DECLINE IN FUNCTIONAL MOBILITY. RECOMMEND INPT P.T. TO ASSURE SAFETY AND INDEPENDENCE WITH MOBILITY PRIOR TO D/C HOME. PT CURRENTLY LIMITED BY C/O L SHOULDER/BICEPS PAIN AND CT IS PENDING. RECOMMEND HOME WITH ASSIST AT THIS TIME PENDING CONTINUED INPT PROGRESS WITH MOBILITY. .        Problem: Inpatient Physical Therapy  Goal: Transfer Training Goal 1 LTG- PT  Outcome: Ongoing (interventions implemented as appropriate)   03/07/18 1157   Transfer Training PT LTG   Transfer Training PT LTG, Time to Achieve 2 wks   Transfer Training PT LTG, Activity Type all transfers   Transfer Training PT LTG, [place] Level independent     Goal: Gait Training Goal LTG- PT  Outcome: Ongoing (interventions implemented as appropriate)   03/07/18 1157   Gait Training PT LTG   Gait Training Goal PT LTG, Time to Achieve 2 wks   Gait Training Goal PT LTG, [place] Level independent   Gait Training Goal PT LTG, Distance to Achieve 600     Goal: Dynamic Standing Balance Goal LTG- PT  Outcome: Ongoing (interventions implemented as appropriate)

## 2018-03-07 NOTE — PROGRESS NOTES
[facility]  PROGRESS NOTE    Patient Name: Bahman Banks  : 1975  MRN: [id number]    Date of Admission: 3/6/2018  Length of Stay: 1  Primary Care Physician: No Known Provider    Subjective   Subjective     CC:  F/U AMS, seizure    HPI:  Patient seen this morning. Feels tired. Laying on his side trying to sleep. Says his left arm/shoulder hurts, denies any injury.    Review of Systems  Gen-no fevers, no chills  CV-no chest pain, no palpitations  Resp-no cough, no dyspnea  GI-no N/V/D, no abd pain      Otherwise ROS is negative except as mentioned in the HPI.    Objective   Objective     Vital Signs:   Temp:  [98 °F (36.7 °C)-99.3 °F (37.4 °C)] 98.4 °F (36.9 °C)  Heart Rate:  [] 104  Resp:  [16-20] 16  BP: (107-147)/() 138/93  Total (NIH Stroke Scale): 4     Physical Exam:  Gen-no acute distress  CV-tachycardic, S1 S2 normal, no m/r/g  Resp-CTAB, no wheezes  Abd-soft, NT, ND, +BS  Ext-no edema  Neuro-A&Ox3, weak  on the right, lower extremities intact strength; his speech seems clear to me  MSK-pain with moving left arm/shoulder  Psych-flat      Results Reviewed:  I have personally reviewed current lab, radiology, and data and agree.      Results from last 7 days  Lab Units 18  0818  1435 18  1410   WBC 10*3/mm3 12.04* 13.94*  --    HEMOGLOBIN g/dL 13.1 14.1  --    HEMOGLOBIN, POC g/dL  --   --  14.3   HEMATOCRIT % 40.3 43.1  --    HEMATOCRIT POC %  --   --  42   PLATELETS 10*3/mm3 236 261  --    INR   --  0.96 1.1       Results from last 7 days  Lab Units 18  0800 18  1435 18  1410   SODIUM mmol/L 139  --  141  --    POTASSIUM mmol/L 3.8  --  4.1  --    CHLORIDE mmol/L 111*  --  104  --    CO2 mmol/L 24.0  --  26.0  --    BUN mg/dL 10  --  8*  --    CREATININE mg/dL 0.80  --  1.00 1.00   GLUCOSE mg/dL 122*  --  128*  --    CALCIUM mg/dL 8.9  --  9.2  --    ALT (SGPT) U/L  --   --  46*  --    AST  (SGOT) U/L  --   --  25  --    TROPONIN I ng/mL <0.006 <0.006 <0.006  --      Estimated Creatinine Clearance: 145.2 mL/min (by C-G formula based on Cr of 0.8).  No results found for: BNP  No results found for: PHART    Microbiology Results Abnormal     None          Imaging Results (last 24 hours)     Procedure Component Value Units Date/Time    CT Head Without Contrast Stroke Protocol [[id number]] Collected:  03/06/18 1415     Updated:  03/06/18 1442    Narrative:       EXAMINATION: CT HEAD WO CONTRAST, STROKE PROTOCOL-03/06/2018:      INDICATION: Stroke, aphagia, patient found unresponsive.     TECHNIQUE:  CT data set of the brain was performed without intravenous  contrast as a code 19.     The radiation dose reduction device was turned on for each scan per the  ALARA (As Low as Reasonably Achievable) protocol.     COMPARISON: NONE.     FINDINGS:   1. The foramen magnum is clear. The basal cisterns are clear. There is  no subarachnoid bleed. Intra-axial hemorrhage is not currently  identified.     2. There is no evidence of acute evolving low attenuation ischemic  insult or infarct.     3. There is developmental asymmetry of the frontal sinus producing  prominence of the central and leftward aspect of the forehead which is  not acute. There is no extracerebral collection or midline shift and  there is no mass effect.     4.  Ocular lens are intact and well aligned. The conal contents are  normal. The ventricular system is unremarkable. There is no atrophy.     5. There is a small collection of soft tissue density in the leftward  sphenoid sinus compartment. The remainder of the paranasal sinuses are  clear and the mastoids are unremarkable. Calvarium is intact.       Impression:       1.  Developmental asymmetry of the central and leftward frontal sinus  producing a bulging asymmetry which is not acute or pathologic.  2.  Negative CT data set of the brain. Evidence of evolving infarct,  hemorrhage, edema,  subarachnoid bleed or hyperdense clot sign is not  currently identified.  3.  Data sets were complete at 1:56 PM and the report rendered at 2:11  PM.     D:  03/06/2018  E:  03/06/2018           This report was finalized on 3/6/2018 2:40 PM by Dr. Rock Matias MD.       XR Chest 1 View [[id number]] Collected:  03/06/18 1514     Updated:  03/06/18 1628    Narrative:       EXAMINATION: XR CHEST 1 VW-03/06/2018:      INDICATION: Acute Stroke Protocol (onset < 12 hrs).      COMPARISON: NONE.     FINDINGS:   1.  A dual-lead pacemaker is in place from the left. The heart size is  normal. The heart is compensated.  2.  There is mild hazy atelectasis left base.  3.  The remainder of the chest is clear.           Impression:       1.  Mild hazy atelectasis left base with minimal volume loss.  2.  No active disease in the chest otherwise.     D:  03/06/2018  E:  03/06/2018     This report was finalized on 3/6/2018 4:26 PM by Dr. Rock Matias MD.       XR Humerus Left [[id number]] Collected:  03/06/18 2018     Updated:  03/07/18 0208    Narrative:       EXAM:    XR Left Humerus, 2 or More Views    CLINICAL HISTORY:    42 years old, male; Elevated white blood cell count, unspecified; Congenital   facial asymmetry; Altered mental status, unspecified; Unspecified convulsions;   Pain; Upper arm; Left; Additional info: Left arm pain    TECHNIQUE:    Frontal and lateral views of the left humerus.    COMPARISON:    No relevant prior studies available.    FINDINGS:    Bones/joints:  Unremarkable.  No acute fracture.  No dislocation.    Soft tissues:  Unremarkable.      Impression:         Normal left humerus x-rays.    THIS DOCUMENT HAS BEEN ELECTRONICALLY SIGNED BY RODNEY MCCAULEY MD    XR Shoulder 2+ View Left [[id number]] Collected:  03/07/18 1016     Updated:  03/07/18 1016    Narrative:       EXAMINATION: XR SHOULDER 2+ VW, LEFT-03/06/2018:      INDICATION: Shoulder pain S/P seizure; R41.82-Altered mental status,  unspecified;  R56.9-Unspecified convulsions; D72.829-Elevated white blood  cell count, unspecified; Q67.0-Congenital facial asymmetry.      COMPARISON: NONE.     FINDINGS:   1. The alignment of the glenohumeral joint is somewhat indistinct. This  finding is worrisome for a posterior dislocation which can be very  occult and typically follow seizures.  2. The left clavicle, AC joint and acromion are normal. Scapula is  unremarkable. The proximal humerus is intact.       Impression:       1.  There is an abnormal alignment of the glenohumeral apposition. There  is also a cleavage in the left humeral head. I have no comparison  images.  2.  Therefore, occult posterior dislocation or impaction of the humeral  head on the posterior lip of the glenoid cannot be excluded. If the  patient has limited range of motion and pain with range of motion,  consider CT data sets.     D:  03/06/2018  E:  03/07/2018          CT Head Without Contrast [[id number]] Collected:  03/07/18 1036     Updated:  03/07/18 1036    Narrative:       EXAMINATION: CT HEAD WO CONTRAST-03/07/2018:      INDICATION: Stroke; R41.82-Altered mental status, unspecified;  R56.9-Unspecified convulsions; D72.829-Elevated white blood cell count,  unspecified; Q67.0-Congenital facial asymmetry.         TECHNIQUE: 5 mm unenhanced images through the brain.     The radiation dose reduction device was turned on for each scan per the  ALARA (As Low as Reasonably Achievable) protocol.     COMPARISON: 03/06/2018.     FINDINGS: The patient history indicates right-sided weakness and slurred  speech. The calvarium appears intact. There is a 1 cm mucous cyst in the  left sphenoid sinus. The paranasal sinuses and mastoids otherwise appear  clear. Soft tissue window images show no evidence of hemorrhage,  contusion, edema, mass or mass effect, infarct, hydrocephalus, or  abnormal extra-axial collection.       Impression:       No evidence of acute intracranial disease.     D:  03/07/2018  E:   03/07/2018                 Results for orders placed during the hospital encounter of 03/06/18   Adult Transthoracic Echo Complete W/ Cont if Necessary Per Protocol    Narrative · Left ventricular systolic function is normal. Estimated EF = 60%.  · Left ventricular wall thickness is consistent with mild-to-moderate   concentric hypertrophy.  · Left ventricular diastolic dysfunction (grade I a) consistent with   impaired relaxation.  · There is a prosthetic aortic valve  · Ao mean PG 22.1 mmHg  · Mild tricuspid valve regurgitation is present.  · RVSP(TR) 20.0 mmHg          I have reviewed the medications.    Assessment/Plan   Assessment / Plan     Hospital Problem List     * (Principal)Seizure    Altered mental status    Aortic valve disease    Hypertension    Atrial fibrillation    Overview Signed 3/6/2018  4:19 PM by LARRY Austin     Not on anti-coagulation         Chronic back pain    Chest pain    Overview Signed 3/6/2018 10:38 PM by Terrence Wilhelm MD     11/2017:  Interpretation Summary   · Impressions are consistent with a low risk study.  · Left ventricular ejection fraction is normal (Calculated EF = 60%).  · Myocardial perfusion imaging indicates a normal myocardial perfusion study with no evidence of ischemia.  · TID is 1.1.              CAD (coronary artery disease)    Hyperlipidemia    Left shoulder pain    Right sided weakness    On high dose antipsychotic drug therapy             Brief Hospital Course to date:  Bahman Banks is a 42 y.o. 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 Wife found him laying on the floor with slurred speech. Flown to [facility] ER for stroke evaluation. While having CT perfusion, he had a witnessed seizure, so the CT was aborted and he was given Ativan and Keppra. Admitted to hospitalists for further management.       Assessment &  Plan:  --Repeat CT head this morning no evidence of acute infarction. Unable to do MRI due to pacemaker.   --Neurology consulted. Continue on Keppra. EEG did not show epileptiform activity.   --Continue heparin drip for now until further evaluation completed, given Afib and valvular disease he is at risk for thromboembolic event. Uncertain as to why patient was taken off Coumadin. May need Cardiology input.   --Left shoulder x-ray unremarkable. Likely muscular in nature. Will continue pain control. Add heating pad, PRN Flexeril.     DVT Prophylaxis:  Heparin drip    CODE STATUS: Full Code    Disposition: I expect the patient to be discharged in ~2 days      Electronically signed by Gala Mcclellan MD, 03/07/18, 11:51 AM.

## 2018-03-07 NOTE — PLAN OF CARE
Problem: Patient Care Overview (Adult)  Goal: Plan of Care Review  Outcome: Ongoing (interventions implemented as appropriate)   03/07/18 1041   Coping/Psychosocial Response Interventions   Plan Of Care Reviewed With patient   Outcome Evaluation   Outcome Summary/Follow up Plan Pt affected by PMH and current symptoms, resulting in decrease in ADL I and participation. Pt. declined mobility due to severe c/o L shoulder pain and unable to perform AROM on L side. Pt will need IRF if pt does not progress to PLOF.        Problem: Inpatient Occupational Therapy  Goal: Transfer Training Goal 1 LTG- OT  Outcome: Ongoing (interventions implemented as appropriate)   03/07/18 1041   Transfer Training OT LTG   Transfer Training OT LTG, Date Established 03/07/18   Transfer Training OT LTG, Time to Achieve 1 wk   Transfer Training OT LTG, Activity Type toilet;sit to stand/stand to sit;bed to chair /chair to bed   Transfer Training OT LTG, [place] Level minimum assist (75% patient effort)     Goal: Range of Motion Goal LTG- OT  Outcome: Ongoing (interventions implemented as appropriate)   03/07/18 1041   Range of Motion OT LTG   Range of Motion Goal OT LTG, Date Established 03/07/18   Range of Motion Goal OT LTG, Time to Achieve 1 wk   Range of Motion Goal OT LTG, AROM Measure Pt will complete UE HEP daily to increase functional use of Vivek UE's and complete ADL.     Goal: Bathing Goal LTG- OT  Outcome: Ongoing (interventions implemented as appropriate)   03/07/18 1041   Bathing OT LTG   Bathing Goal OT LTG, Date Established 03/07/18   Bathing Goal OT LTG, Time to Achieve 1 wk   Bathing Goal OT LTG, Activity Type simulates;upper body bathing;lower body bathing   Bathing Goal OT LTG, [place] Level moderate assist (50% patient effort)

## 2018-03-07 NOTE — PROGRESS NOTES
Discharge Planning Assessment  [facility]     Patient Name: Bahman Banks  MRN: [id number]  Today's Date: 3/7/2018    Admit Date: 3/6/2018          Discharge Needs Assessment       03/07/18 1102    Living Environment    Lives With spouse;child(dina), dependent    Living Arrangements house    Home Accessibility no concerns    Stair Railings at Home none    Type of Financial/Environmental Concern none    Transportation Available family or friend will provide    Living Environment    Provides Primary Care For no one    Quality Of Family Relationships supportive;helpful;involved    Able to Return to Prior Living Arrangements yes    Discharge Needs Assessment    Concerns To Be Addressed discharge planning concerns    Readmission Within The Last 30 Days no previous admission in last 30 days    Anticipated Changes Related to Illness inability to care for self    Equipment Currently Used at Home none    Equipment Needed After Discharge walker, rolling    Discharge Contact Information if Applicable Kasandra Banks [phone]            Discharge Plan       03/07/18 1103    Case Management/Social Work Plan    Plan home    Patient/Family In Agreement With Plan yes    Additional Comments Pt lives in [place] with his wife and daughter. He reports he is independent with ADLs prior to admit. He further reports he has a PCP but cannot currenlty recall their name. His medications are covered under his insurnace. His goal for dicharge is to return home. CM to follow.        Discharge Placement     No information found                Demographic Summary       03/07/18 1102    Referral Information    Admission Type inpatient    Referral Source physician    Reason For Consult discharge planning    Contact Information    Permission Granted to Share Information With ;family/designee            Functional Status       03/07/18 1102    Functional Status Current    Current Functional Level Comment See PT eval    Functional  Status Prior    Ambulation 0-->independent    Transferring 0-->independent    Toileting 0-->independent    Bathing 0-->independent    Dressing 0-->independent    Eating 0-->independent    Communication 0-->understands/communicates without difficulty    Swallowing 0-->swallows foods/liquids without difficulty    IADL    Medications independent    Meal Preparation independent    Housekeeping independent    Laundry independent    Shopping independent    Oral Care independent            Psychosocial     None            Abuse/Neglect     None            Legal     None            Substance Abuse     None            Patient Forms     None          Katarina Cervantes RN

## 2018-03-08 ENCOUNTER — APPOINTMENT (OUTPATIENT)
Dept: CT IMAGING | Facility: HOSPITAL | Age: 43
End: 2018-03-08

## 2018-03-08 ENCOUNTER — ANESTHESIA EVENT (OUTPATIENT)
Dept: PERIOP | Facility: HOSPITAL | Age: 43
End: 2018-03-08

## 2018-03-08 ENCOUNTER — APPOINTMENT (OUTPATIENT)
Dept: GENERAL RADIOLOGY | Facility: HOSPITAL | Age: 43
End: 2018-03-08

## 2018-03-08 ENCOUNTER — ANESTHESIA (OUTPATIENT)
Dept: PERIOP | Facility: HOSPITAL | Age: 43
End: 2018-03-08

## 2018-03-08 PROBLEM — S43.025A: Status: ACTIVE | Noted: 2018-03-08

## 2018-03-08 PROBLEM — R53.1 RIGHT SIDED WEAKNESS: Status: ACTIVE | Noted: 2018-03-08

## 2018-03-08 LAB
ANION GAP SERPL CALCULATED.3IONS-SCNC: 1 MMOL/L (ref 3–11)
APTT PPP: 26.6 SECONDS (ref 55–70)
BUN BLD-MCNC: 13 MG/DL (ref 9–23)
BUN/CREAT SERPL: 14.4 (ref 7–25)
CALCIUM SPEC-SCNC: 9.1 MG/DL (ref 8.7–10.4)
CHLORIDE SERPL-SCNC: 110 MMOL/L (ref 99–109)
CO2 SERPL-SCNC: 28 MMOL/L (ref 20–31)
CREAT BLD-MCNC: 0.9 MG/DL (ref 0.6–1.3)
DEPRECATED RDW RBC AUTO: 43.9 FL (ref 37–54)
ERYTHROCYTE [DISTWIDTH] IN BLOOD BY AUTOMATED COUNT: 13.3 % (ref 11.3–14.5)
GFR SERPL CREATININE-BSD FRML MDRD: 92 ML/MIN/1.73
GLUCOSE BLD-MCNC: 114 MG/DL (ref 70–100)
HCT VFR BLD AUTO: 39.3 % (ref 38.9–50.9)
HGB BLD-MCNC: 12.8 G/DL (ref 13.1–17.5)
MCH RBC QN AUTO: 29.5 PG (ref 27–31)
MCHC RBC AUTO-ENTMCNC: 32.6 G/DL (ref 32–36)
MCV RBC AUTO: 90.6 FL (ref 80–99)
PLATELET # BLD AUTO: 260 10*3/MM3 (ref 150–450)
PMV BLD AUTO: 10.8 FL (ref 6–12)
POTASSIUM BLD-SCNC: 3.6 MMOL/L (ref 3.5–5.5)
RBC # BLD AUTO: 4.34 10*6/MM3 (ref 4.2–5.76)
SODIUM BLD-SCNC: 139 MMOL/L (ref 132–146)
WBC NRBC COR # BLD: 11 10*3/MM3 (ref 3.5–10.8)

## 2018-03-08 PROCEDURE — 25010000002 ENOXAPARIN PER 10 MG: Performed by: HOSPITALIST

## 2018-03-08 PROCEDURE — 85730 THROMBOPLASTIN TIME PARTIAL: CPT | Performed by: HOSPITALIST

## 2018-03-08 PROCEDURE — 73200 CT UPPER EXTREMITY W/O DYE: CPT

## 2018-03-08 PROCEDURE — 80048 BASIC METABOLIC PNL TOTAL CA: CPT | Performed by: HOSPITALIST

## 2018-03-08 PROCEDURE — 73030 X-RAY EXAM OF SHOULDER: CPT

## 2018-03-08 PROCEDURE — 97116 GAIT TRAINING THERAPY: CPT

## 2018-03-08 PROCEDURE — 25010000002 HEPARIN (PORCINE) PER 1000 UNITS: Performed by: HOSPITALIST

## 2018-03-08 PROCEDURE — 97530 THERAPEUTIC ACTIVITIES: CPT

## 2018-03-08 PROCEDURE — 85027 COMPLETE CBC AUTOMATED: CPT | Performed by: HOSPITALIST

## 2018-03-08 PROCEDURE — 99233 SBSQ HOSP IP/OBS HIGH 50: CPT | Performed by: HOSPITALIST

## 2018-03-08 RX ORDER — PANTOPRAZOLE SODIUM 40 MG/10ML
40 INJECTION, POWDER, LYOPHILIZED, FOR SOLUTION INTRAVENOUS ONCE
Status: COMPLETED | OUTPATIENT
Start: 2018-03-08 | End: 2018-03-08

## 2018-03-08 RX ADMIN — LEVETIRACETAM 500 MG: 500 TABLET, FILM COATED ORAL at 08:54

## 2018-03-08 RX ADMIN — METOPROLOL TARTRATE 12.5 MG: 25 TABLET, FILM COATED ORAL at 20:26

## 2018-03-08 RX ADMIN — ASPIRIN 325 MG ORAL TABLET 325 MG: 325 PILL ORAL at 08:54

## 2018-03-08 RX ADMIN — HYDROCODONE BITARTRATE AND ACETAMINOPHEN 1 TABLET: 5; 325 TABLET ORAL at 15:58

## 2018-03-08 RX ADMIN — LEVETIRACETAM 500 MG: 500 TABLET, FILM COATED ORAL at 20:59

## 2018-03-08 RX ADMIN — RANOLAZINE 500 MG: 500 TABLET, FILM COATED, EXTENDED RELEASE ORAL at 20:26

## 2018-03-08 RX ADMIN — RANOLAZINE 500 MG: 500 TABLET, FILM COATED, EXTENDED RELEASE ORAL at 08:54

## 2018-03-08 RX ADMIN — ENOXAPARIN SODIUM 80 MG: 80 INJECTION SUBCUTANEOUS at 08:53

## 2018-03-08 RX ADMIN — ENOXAPARIN SODIUM 80 MG: 80 INJECTION SUBCUTANEOUS at 20:26

## 2018-03-08 RX ADMIN — HEPARIN SODIUM 28 UNITS/KG/HR: 10000 INJECTION, SOLUTION INTRAVENOUS at 02:37

## 2018-03-08 RX ADMIN — PANTOPRAZOLE SODIUM 40 MG: 40 INJECTION, POWDER, FOR SOLUTION INTRAVENOUS at 17:09

## 2018-03-08 RX ADMIN — METOPROLOL TARTRATE 12.5 MG: 25 TABLET, FILM COATED ORAL at 08:53

## 2018-03-08 RX ADMIN — ATORVASTATIN CALCIUM 80 MG: 40 TABLET, FILM COATED ORAL at 20:26

## 2018-03-08 RX ADMIN — QUETIAPINE FUMARATE 150 MG: 100 TABLET ORAL at 20:26

## 2018-03-08 NOTE — THERAPY TREATMENT NOTE
Acute Care - Physical Therapy Treatment Note  [facility]     Patient Name: Bahman Banks  : 1975  MRN: [id number]  Today's Date: 3/8/2018  Onset of Illness/Injury or Date of Surgery Date: 18  Date of Referral to PT: 18  Referring Physician: LARRY KELLY    Admit Date: 3/6/2018    Visit Dx:    ICD-10-CM ICD-9-CM   1. Altered mental status, unspecified altered mental status type R41.82 780.97   2. Observed seizure-like activity R56.9 780.39   3. Leukocytosis, unspecified type D72.829 288.60   4. Facial asymmetry Q67.0 754.0   5. Impaired mobility and ADLs Z74.09 799.89   6. Impaired functional mobility, balance, gait, and endurance Z74.09 V49.89     Patient Active Problem List   Diagnosis   • Seizure   • Altered mental status   • Aortic valve disease   • Hypertension   • Atrial fibrillation   • Chronic back pain   • Chest pain   • CAD (coronary artery disease)   • Hyperlipidemia   • Left shoulder pain   • On high dose antipsychotic drug therapy   • Posterior dislocation of left humerus   • Chronic right sided weakness from prior CVA               Adult Rehabilitation Note       18 1318 18 0858       Rehab Assessment/Intervention    Discipline physical therapist  - occupational therapist  -AN     Document Type therapy note (daily note)  - therapy note (daily note)  -AN     Subjective Information agree to therapy;no complaints  - agree to therapy;complains of;pain  -AN     Patient Effort, Rehab Treatment good  -EH good  -AN     Symptoms Noted During/After Treatment none  - none   pt with flat affect  -AN     Symptoms Noted Comment pt continues to have flat affect  -      Precautions/Limitations fall precautions;seizure precautions  - fall precautions;seizure precautions  -AN     Recorded by [EH] Celia Charles, PT [AN] Amada Wong OT     Vital Signs    Post Systolic BP Rehab  135  -AN     Post Treatment Diastolic BP  103  -AN     Pretreatment Heart Rate (beats/min)   90  -AN     Intratreatment Heart Rate (beats/min)  110  -AN     Posttreatment Heart Rate (beats/min)  93  -AN     Recorded by  [AN] Amada Wong, KARMEN     Pain Assessment    Pain Assessment Benavides-Barrientos FACES  - 0-10  -AN     Benavides-Barrientos FACES Pain Rating 6  -      Pain Score  8  -AN     Post Pain Score  8  -AN     Pain Type Acute pain  - Acute pain  -AN     Pain Location Shoulder  -EH Shoulder  -AN     Pain Orientation Left  -EH Left  -AN     Pain Intervention(s) Repositioned  -EH Repositioned  -AN     Response to Interventions tolerated, no change  -EH tolerated, no change  -AN     Recorded by [] Celia Charles, PT [AN] Amada Wong, OT     Cognitive Assessment/Intervention    Current Cognitive/Communication Assessment impaired  -EH impaired   slurred speech  -AN     Orientation Status oriented to;person;place;time  - oriented to;person;place;time  -AN     Follows Commands/Answers Questions 100% of the time;able to follow single-step instructions  - 100% of the time  -AN     Personal Safety WNL/WFL  - mild impairment  -AN     Recorded by [] Celia Charles, PT [AN] Amada Wong, OT     ROM (Range of Motion)    General ROM Detail  continued pain with L ROM; WFL elbow, shoulder IR/ER with pain, minimal abduction - pain  -AN     Recorded by  [AN] Amada Wong, OT     Bed Mobility, Assessment/Treatment    Bed Mobility, Assistive Device head of bed elevated  -EH head of bed elevated  -AN     Bed Mob, Supine to Sit, [place] conditional independence  - conditional independence  -AN     Recorded by [] Celia Charles, PT [AN] Amada Wong, OT     Transfer Assessment/Treatment    Transfers, Sit-Stand [place] independent  - conditional independence  -AN     Transfers, Stand-Sit [place] independent  - conditional independence  -AN     Bathtub Transfer, [place]  conditional independence   simulated  -AN     Recorded by [] Celia Charles, PT [AN] Amada Wong, OT      Gait Assessment/Treatment    Gait, [place] Level contact guard assist  -      Gait, Distance (Feet) 400  -      Gait, Gait Pattern Analysis swing-through gait  -      Gait, Gait Deviations katia decreased;forward flexed posture   BLE with external rotation.  -      Gait, Comment progressed to standby assist  -      Recorded by [] Celia Charles, PT      Stairs Assessment/Treatment    Number of Stairs 5  -      Stairs, Handrail Location both sides  -      Stairs, [place] Level conditional independence  -EH      Recorded by [] Celia Charles, PT      Functional Mobility    Functional Mobility- Ind. Level  conditional independence  -     Functional Mobility-Distance (Feet)  --   in room  -AN     Recorded by  [AN] Amada Wong OT     Lower Body Bathing Assessment/Training    LB Bathing Assess/Train, Comment  simulated cond I , sitting; pain with reaching L UE standing  -AN     Recorded by  [AN] Amada Wong, OT     Lower Body Dressing Assessment/Training    LB Dressing Assess/Train, Clothing Type  donning:;socks  -AN     LB Dressing Assess/Train, Position  sitting  -AN     LB Dressing Assess/Train, [place]  moderate assist (50% patient effort)  -AN     LB Dressing Assess/Train, Comment  difficulty due to decreased use of L UE with pain  -AN     Recorded by  [AN] Amada Wong OT     Motor Skills/Interventions    Additional Documentation  Fine Motor Coordination Training (Group);Gross Motor Coordination Training (Group);Balance Skills Training (Group)  -AN     Recorded by  [AN] Amada Wong OT     Balance Skills Training    Sitting-Level of Assistance Independent  - Independent  -     Standing-Level of Assistance Independent;Close supervision  - Independent  -AN     Static Standing Balance Support No upper extremity supported  - Right upper extremity supported   on sink  -AN     Standing-Balance Activities Retrieve object from floor  - Weight Shift A-P;Reaching  for objects;Weight Shift R-L;Single Limb Stance  -AN     Standing Balance # of Minutes 5  -EH 5  -AN     Gait Balance-Level of Assistance Close supervision  -      Gait Balance Support No upper extremity supported  -      Gait Balance Activities --   turning  -EH      Recorded by [] Celia Charles, PT [EDIE] Amada Wong OT     Therapy Exercises    Bilateral Lower Extremities standing;knee flexion;hip abduction/adduction;10 reps;AROM:  -EH      Recorded by [] Celia Charles PT      Gross Motor Coordination Training    Gross Motor Skill, Impairments Detail  WFL L, difficulty to further assess L with pain  -AN     Recorded by  [EDIE] Amada Wong OT     Fine Motor Coordination Training    Detail (Fine Motor Coordination Training)  WFL  -AN     Opposition  Right:;Left:;intact  -AN     Recorded by  [EDIE] Amada Wong OT     Sensory Assessment/Intervention    Light Touch --  -      LLE Light Touch WNL  -EH      Recorded by [] Celia Charles PT      Positioning and Restraints    Pre-Treatment Position in bed  - in bed  -AN     Post Treatment Position bed  - chair  -AN     In Bed supine;call light within reach;encouraged to call for assist  -      In Chair  sitting;call light within reach;encouraged to call for assist;exit alarm on  -AN     Recorded by [] Celia Charles PT [EDIE] Amada Wong OT       User Key  (r) = Recorded By, (t) = Taken By, (c) = Cosigned By    Initials Name Effective Dates     Celia Charles, PT 06/19/15 -     EDIE Wong OT 06/22/15 -                 IP PT Goals       03/08/18 1420 03/07/18 1157       Transfer Training PT LTG    Transfer Training PT LTG, Time to Achieve  2 wks  -CD     Transfer Training PT LTG, Activity Type  all transfers  -CD     Transfer Training PT LTG, [place] Level  independent  -CD     Transfer Training PT LTG, Outcome goal ongoing  -      Gait Training PT LTG    Gait Training Goal PT LTG, Time to Achieve  2 wks  -CD      Gait Training Goal PT LTG, [place] Level  independent  -CD     Gait Training Goal PT LTG, Distance to Achieve  600  -CD     Gait Training Goal PT LTG, Outcome goal ongoing  -      Dynamic Standing Balance PT LTG    Dynamic Standing Balance PT LTG, Time to Achieve  2 wks  -CD     Dynamic Standing Balance PT LTG, [place] Level  independent  -CD     Dynamic Standing Balance PT LTG, Outcome goal ongoing  -        User Key  (r) = Recorded By, (t) = Taken By, (c) = Cosigned By    Initials Name Provider Type     Barb Richter, PT Physical Therapist     Celia Charles, PT Physical Therapist          Physical Therapy Education     Title: PT OT SLP Therapies (Active)     Topic: Physical Therapy (Done)     Point: Precautions (Done)    Learning Progress Summary    Learner Readiness Method Response Comment Documented by Status   Patient Acceptance E TRE,NR   03/08/18 1419 Done    Acceptance E TRENR BENEFITS OF OOB ACTIVITY, SAFETY WITH MOBILITY, PROGRESSION OF POC.  03/07/18 1156 Done                      User Key     Initials Effective Dates Name Provider Type Discipline     06/19/15 -  Barb Richter, PT Physical Therapist PT     06/19/15 -  Celia Charles, PT Physical Therapist PT                    PT Recommendation and Plan  Anticipated Equipment Needs At Discharge:  (TBD)  Anticipated Discharge Disposition: home with assist  PT Frequency: daily  Plan of Care Review  Plan Of Care Reviewed With: patient  Progress: improving  Outcome Summary/Follow up Plan: progress made toward all goals.           Outcome Measures       03/08/18 1318 03/08/18 0858 03/07/18 1100    How much help from another person do you currently need...    Turning from your back to your side while in flat bed without using bedrails? 4  -EH  4  -CD    Moving from lying on back to sitting on the side of a flat bed without bedrails? 4  -EH  4  -CD    Moving to and from a bed to a chair (including a wheelchair)? 4  -EH  3   -CD    Standing up from a chair using your arms (e.g., wheelchair, bedside chair)? 4  -  3  -CD    Climbing 3-5 steps with a railing? 3  -  3  -CD    To walk in hospital room? 3  -  3  -CD    AM-PAC 6 Clicks Score 22  -  20  -CD    How much help from another is currently needed...    Putting on and taking off regular lower body clothing?  2  -AN     Bathing (including washing, rinsing, and drying)  3  -AN     Toileting (which includes using toilet bed pan or urinal)  3  -AN     Putting on and taking off regular upper body clothing  2  -AN     Taking care of personal grooming (such as brushing teeth)  3  -AN     Eating meals  4  -AN     Score  17  -AN     Modified [place] Scale    Modified [place] Scale  3 - Moderate disability.  Requiring some help, but able to walk without assistance.  -AN 3 - Moderate disability.  Requiring some help, but able to walk without assistance.   WITH A.D.   -CD    Functional Assessment    Outcome Measure Options [facility] 6 Clicks Basic Mobility (PT)  -[facility] 6 Clicks Basic Mobility (PT);Modified Ha  -CD      03/07/18 1005          How much help from another is currently needed...    Putting on and taking off regular lower body clothing? 2  -AN      Bathing (including washing, rinsing, and drying) 2  -AN      Toileting (which includes using toilet bed pan or urinal) 2  -AN      Putting on and taking off regular upper body clothing 2  -AN      Taking care of personal grooming (such as brushing teeth) 2  -AN      Eating meals 3  -AN      Score 13  -AN      Modified [place] Scale    Modified Milan Scale 4 - Moderately severe disability.  Unable to walk without assistance, and unable to attend to own bodily needs without assistance.  -AN      Functional Assessment    Outcome Measure Options [facility] 6 Clicks Daily Activity (OT);Modified [place]  -AN        User Key  (r) = Recorded By, (t) = Taken By, (c) = Cosigned By    Initials Name Provider Type    BARTOLO Richter, PT Physical  Therapist     Celia Charles, PT Physical Therapist    EDIE Wong, OT Occupational Therapist           Time Calculation:         PT Charges       03/08/18 1423          Time Calculation    Start Time 1318  -      PT Received On 03/08/18  -      PT Goal Re-Cert Due Date 03/17/18  -      Time Calculation- PT    Total Timed Code Minutes- PT 13 minute(s)  -        User Key  (r) = Recorded By, (t) = Taken By, (c) = Cosigned By    Initials Name Provider Type     Celia Charles, PT Physical Therapist          Therapy Charges for Today     Code Description Service Date Service Provider Modifiers Qty    [id number] HC GAIT TRAINING EA 15 MIN 3/8/2018 Celia Charles, PT GP 1          PT G-Codes  Outcome Measure Options: AM-PAC 6 Clicks Basic Mobility (PT)    Celia Charles, PT  3/8/2018

## 2018-03-08 NOTE — PLAN OF CARE
Problem: Patient Care Overview (Adult)  Goal: Plan of Care Review  Outcome: Ongoing (interventions implemented as appropriate)   03/08/18 1420   Coping/Psychosocial Response Interventions   Plan Of Care Reviewed With patient   Outcome Evaluation   Outcome Summary/Follow up Plan progress made toward all goals.    Patient Care Overview   Progress improving       Problem: Inpatient Physical Therapy  Goal: Transfer Training Goal 1 LTG- PT  Outcome: Ongoing (interventions implemented as appropriate)   03/07/18 1157 03/08/18 1420   Transfer Training PT LTG   Transfer Training PT LTG, Time to Achieve 2 wks --    Transfer Training PT LTG, Activity Type all transfers --    Transfer Training PT LTG, [place] Level independent --    Transfer Training PT LTG, Outcome --  goal ongoing     Goal: Gait Training Goal LTG- PT  Outcome: Ongoing (interventions implemented as appropriate)   03/07/18 1157 03/08/18 1420   Gait Training PT LTG   Gait Training Goal PT LTG, Time to Achieve 2 wks --    Gait Training Goal PT LTG, [place] Level independent --    Gait Training Goal PT LTG, Distance to Achieve 600 --    Gait Training Goal PT LTG, Outcome --  goal ongoing     Goal: Dynamic Standing Balance Goal LTG- PT  Outcome: Ongoing (interventions implemented as appropriate)   03/07/18 1157 03/08/18 1420   Dynamic Standing Balance PT LTG   Dynamic Standing Balance PT LTG, Time to Achieve 2 wks --    Dynamic Standing Balance PT LTG, [place] Level independent --    Dynamic Standing Balance PT LTG, Outcome --  goal ongoing

## 2018-03-08 NOTE — PLAN OF CARE
"Problem: Patient Care Overview (Adult)  Goal: Plan of Care Review  Outcome: Ongoing (interventions implemented as appropriate)   03/08/18 0923   Coping/Psychosocial Response Interventions   Plan Of Care Reviewed With patient   Outcome Evaluation   Outcome Summary/Follow up Plan Pt with Cond I with mobility and txfrs. Pt reports he feels \"funny\" with standing but no LOB or balance deficits noted. Pt with continued severe pain in R UE, may need further diaganositc assessment, limited ROm at this time. Pt to d/c home with spouse at discharge.    Patient Care Overview   Progress improving       Problem: Inpatient Occupational Therapy  Goal: Transfer Training Goal 1 LTG- OT  Outcome: Outcome(s) achieved Date Met: 03/08/18 03/07/18 1041 03/08/18 0923   Transfer Training OT LTG   Transfer Training OT LTG, Date Established 03/07/18 --    Transfer Training OT LTG, Time to Achieve 1 wk --    Transfer Training OT LTG, Activity Type toilet;sit to stand/stand to sit;bed to chair /chair to bed --    Transfer Training OT LTG, [place] Level minimum assist (75% patient effort) --    Transfer Training OT LTG, Outcome --  goal met     Goal: Range of Motion Goal LTG- OT  Outcome: Ongoing (interventions implemented as appropriate)   03/07/18 1041 03/08/18 0923   Range of Motion OT LTG   Range of Motion Goal OT LTG, Date Established 03/07/18 --    Range of Motion Goal OT LTG, Time to Achieve 1 wk --    Range of Motion Goal OT LTG, AROM Measure Pt will complete UE HEP daily to increase functional use of Vivek UE's and complete ADL. --    Range of Motion Goal OT LTG, Outcome --  goal ongoing     Goal: Bathing Goal LTG- OT  Outcome: Outcome(s) achieved Date Met: 03/08/18 03/08/18 0923   Bathing OT LTG   Bathing Goal OT LTG, Outcome goal met         "

## 2018-03-08 NOTE — PROGRESS NOTES
[facility] Medicine Services  PROGRESS NOTE    Patient Name: Bahman Banks  : 1975  MRN: [id number]    Date of Admission: 3/6/2018  Length of Stay: 2  Primary Care Physician: No Known Provider    Subjective   Subjective     CC:  F/U AMS, seizure    HPI:  Patient seen this morning. Feels better. Still with pain in his left shoulder.     Review of Systems  Gen-no fevers, no chills  CV-no chest pain, no palpitations  Resp-no cough, no dyspnea  GI-no N/V/D, no abd pain      Otherwise ROS is negative except as mentioned in the HPI.    Objective   Objective     Vital Signs:   Temp:  [97 °F (36.1 °C)-97.6 °F (36.4 °C)] 97.1 °F (36.2 °C)  Heart Rate:  [80-96] 80  Resp:  [16-20] 20  BP: (127-139)/() 129/95  Total (NIH Stroke Scale): 4     Physical Exam:  Gen-no acute distress  CV-RRR, S1 S2 normal, no m/r/g  Resp-CTAB, no wheezes  Abd-soft, NT, ND, +BS  Ext-no edema  Neuro-A&Ox3, weak  on the right, lower extremities intact strength; speech clear  MSK-pain with moving left arm/shoulder  Psych-flat      Results Reviewed:  I have personally reviewed current lab, radiology, and data and agree.      Results from last 7 days  Lab Units 18  1435 18  1410   WBC 10*3/mm3 11.00* 12.04* 13.94*  --    HEMOGLOBIN g/dL 12.8* 13.1 14.1  --    HEMOGLOBIN, POC g/dL  --   --   --  14.3   HEMATOCRIT % 39.3 40.3 43.1  --    HEMATOCRIT POC %  --   --   --  42   PLATELETS 10*3/mm3 260 236 261  --    INR   --   --  0.96 1.1       Results from last 7 days  Lab Units 18  0818  1435   SODIUM mmol/L 139 139  --  141   POTASSIUM mmol/L 3.6 3.8  --  4.1   CHLORIDE mmol/L 110* 111*  --  104   CO2 mmol/L 28.0 24.0  --  26.0   BUN mg/dL 13 10  --  8*   CREATININE mg/dL 0.90 0.80  --  1.00   GLUCOSE mg/dL 114* 122*  --  128*   CALCIUM mg/dL 9.1 8.9  --  9.2   ALT (SGPT) U/L  --   --   --  46*   AST (SGOT) U/L  --   --   --  25    TROPONIN I ng/mL  --  <0.006 <0.006 <0.006     Estimated Creatinine Clearance: 129 mL/min (by C-G formula based on Cr of 0.9).  No results found for: BNP  No results found for: PHART    Microbiology Results Abnormal     None          Imaging Results (last 24 hours)     Procedure Component Value Units Date/Time    CT Upper Extremity Left Without Contrast [[id number]] Collected:  03/08/18 1304     Updated:  03/08/18 1323    Narrative:       HISTORY: Left shoulder pain.     CT SCAN OF THE LEFT SHOULDER AND HUMERUS WITHOUT CONTRAST WITH 2-D  RECONSTRUCTIONS.     TECHNICAL: Exam consists of spiral acquisition 2 mm axial images through  the left shoulder and proximal humerus down to the level of the elbow  joint, with oblique sagittal and oblique coronal 2 mm reconstructions.        COMPARISON: Left humerus and shoulder plain films of 3/6/2018     FINDINGS:  There is posterior dislocation of the humerus, which is perched on the  posterior glenoid. There is apparent avulsion of the tuberosities. No  other fracture is identified. Remaining bony structures appear intact.  There is no obvious hematoma but probably a moderate joint effusion.       Impression:       Posterior dislocation of the left humerus, and tuberosity  avulsion.     This report was finalized on 3/8/2018 1:21 PM by DR. Griffin Lock MD.           Results for orders placed during the hospital encounter of 03/06/18   Adult Transthoracic Echo Complete W/ Cont if Necessary Per Protocol    Narrative · Left ventricular systolic function is normal. Estimated EF = 60%.  · Left ventricular wall thickness is consistent with mild-to-moderate   concentric hypertrophy.  · Left ventricular diastolic dysfunction (grade I a) consistent with   impaired relaxation.  · There is a prosthetic aortic valve  · Ao mean PG 22.1 mmHg  · Mild tricuspid valve regurgitation is present.  · RVSP(TR) 20.0 mmHg          I have reviewed the medications.    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 Wife found him laying on the floor with slurred speech. Flown to [facility] ER for stroke evaluation. While having CT perfusion, he had a witnessed seizure, so the CT was aborted and he was given Ativan and Keppra. Admitted to hospitalists for further management.       Assessment & Plan:  --Repeat CT head no evidence of acute infarction. Unable to do MRI due to pacemaker.   --Neurology following. Do not feel he suffered a stroke. Continue on Keppra. EEG did not show epileptiform activity.   --Given Afib and valvular disease he is at risk for thromboembolic event. Uncertain as to why patient was taken off Coumadin. He needs to go back on it, per Neurology recs. Will switch to Lovenox and Coumadin bridge. I thought about NOAC given his difficulties maintaining therapeutic INR, however these are not approved for valvular Afib so will stick with Coumadin.   --Left shoulder x-ray unremarkable. Due to persistent pain, a CT was obtained which shows a posterior left humerus dislocation. Will consult Ortho for recommendations.  --Probably d/c home tomorrow.     DVT Prophylaxis: Lovenox    CODE STATUS: Full Code    Disposition: I expect the patient to be discharged in 1 day      Electronically signed by Gala Mcclellan MD, 03/08/18, 2:26 PM.

## 2018-03-08 NOTE — NURSING NOTE
Upon entry to patient room he was eating a dinner tray. NPO status was clearly up on white boards, patient chart and patient was instructed not to eat. Phone call to Dr. Wang at [phone] he has been made aware and states to put patient NPO status at midnight. Signs  Will be hung in room as well due to patient forgetfulness.

## 2018-03-08 NOTE — PLAN OF CARE
"Problem: Patient Care Overview (Adult)  Goal: Plan of Care Review  Outcome: Ongoing (interventions implemented as appropriate)   03/07/18 1911   Coping/Psychosocial Response Interventions   Plan Of Care Reviewed With patient   Outcome Evaluation   Outcome Summary/Follow up Plan DBP elevated throughout shift ranging from 101-109. Dr. Mcclellan notified and ordered that we notifiy MD of multple DBP's greater than 110. Other vitals WNL. Pt is alert and oriented but does not give more than one or 2 word answers. Biggest complaint throughout shift is left shoulder pain. He rates pain 9-10/10 with kpad, norco, and flexeril. Pt tolerates ambulation with assist X 1. BM this AM. Heparin drip continues with dosage adjusted by pharmacy. Pt has pulled out multiple IVs this shift stating they \"just fell out\". Still awaiting neuro consultation. Anticipate D/C in 2 days per Dr. Mcclellan. Pt's spouse expresses concern over getting pt home as family does not have car.    Patient Care Overview   Progress no change       Problem: Seizure Disorder/Epilepsy (Adult)  Goal: Signs and Symptoms of Listed Potential Problems Will be Absent or Manageable (Seizure Disorder/Epilepsy)  Outcome: Ongoing (interventions implemented as appropriate)   03/07/18 1911   Seizure Disorder/Epilepsy   Problems Assessed (Seizure Disorder/Epilepsy) all   Problems Present (Seizure Disorder/Epilepsy) none       Problem: Pain, Acute (Adult)  Goal: Identify Related Risk Factors and Signs and Symptoms  Outcome: Ongoing (interventions implemented as appropriate)   03/07/18 1911   Pain, Acute   Related Risk Factors (Acute Pain) persistent pain;knowledge deficit;trauma   Signs and Symptoms (Acute Pain) alteration in muscle tone;BADLs/IADLs reluctance/inability to perform;fatigue/weakness;verbalization of pain descriptors     Goal: Acceptable Pain Control/Comfort Level  Outcome: Ongoing (interventions implemented as appropriate)   03/07/18 1911   Pain, Acute (Adult) "   Acceptable Pain Control/Comfort Level making progress toward outcome

## 2018-03-08 NOTE — CONSULTS
Orthopedic Consult      Patient: Bahman Banks    Date of Admission: 3/6/2018  2:01 PM    YOB: 1975    Medical Record Number: [id number]    Attending Physician: Gala CABRERA MD    Consulting Physician: Steven Wang Jr., MD      Chief Complaints: Seizure [R56.9]  Altered mental status, unspecified altered mental status type [R41.82]      History of Present Illness: 42 y.o. male admitted to [facility] with Seizure [R56.9]  Altered mental status, unspecified altered mental status type [R41.82]. I was consulted for further evaluation and treatment of left shoulder pain secondary to locked posterior shoulder dislocation. Onset of symptoms was abrupt starting 4 days ago.  Symptoms are associated with left shoulder pain with inability to move his left shoulder. Symptoms are aggravated by motion of his left upper extremity.   Symptoms improve with rest.      Not on File     Home Medications:  Prescriptions Prior to Admission   Medication Sig Dispense Refill Last Dose   • loxapine (LOXITANE) 25 MG capsule Take 75 mg by mouth Every Night.            Past Medical History:   Diagnosis Date   • Aortic stenosis    • Atrial fibrillation    • CAD (coronary artery disease)    • Chronic back pain    • Hypertension    • Stroke     affected speech, vision, right side weakness        Past Surgical History:   Procedure Laterality Date   • AORTIC VALVE REPAIR/REPLACEMENT  2016    tissue valve, performed in [place]   • APPENDECTOMY     • CHOLECYSTECTOMY     • HIATAL HERNIA REPAIR     • KNEE ARTHROSCOPY     • SHOULDER ARTHROSCOPY          Social History     Occupational History   • Not on file.     Social History Main Topics   • Smoking status: Current Every Day Smoker     Types: Cigarettes   • Smokeless tobacco: Not on file      Comment: 2-3 cigarettes   • Alcohol use No   • Drug use: No   • Sexual activity: Not on file    Social History     Social History Narrative    Retired , independentt of  ADL, lives with wife        Family History   Problem Relation Age of Onset   • No Known Problems Mother    • No Known Problems Father          Review of Systems:   HEENT: Patient denies any headaches, vision changes, change in hearing, or tinnitus, Patient denies any rhinorrhea,epistaxis, sinus pain, mouth or dental problems, sore throat or hoarseness, or dysphagia  Pulmonary: Patient denies any cough, congestion, SOA, or wheezing  Cardiovascular: Patient denies any chest pain, dyspnea, palpitations, weakness, intolerance of exercise, varicosities, swelling of extremities, known murmur  Gastrointestinal:  Patient denies nausea, vomiting, diarrhea, constipation, loss  of appetite, change in appetite, dysphagia, gas, heartburn, melena, change in bowel habits, use of laxatives or other drugs to alter the function of the gastrointestinal tract.  Genital/Urinary: Patient denies dysuria, change in color of urine, change in frequency of urination, pain with urgency, incontinence, retention, or nocturia.  Musculoskeletal: Patient denies increased warmth; redness; or swelling of joints; limitation of function; deformity; crepitation: pain in a joint or an extremity, the neck, or the back, especially with movement.  Neurological: Patient denies dizziness, tremor, ataxia, difficulty in speaking, change in speech, paresthesia, loss of sensation, seizures, syncope, changes in memory.  Endocrine system: Patient denies tremors, palpitations, intolerance of heat or cold, polyuria, polydipsia, polyphagia, diaphoresis, exophthalmos, or goiter.  Psychological: Patient denies thoughts/plans or harming self or other; depression,  insomnia, night terrors, chanell, memory loss, disorientation.  Skin: Patient denies any bruising, rashes, discoloration, pruritus, wounds, ulcers, decubiti, changes in the hair or nails  Hematopoietic: Patient denies history of spontaneous or excessive bleeding, epistaxis, hematuria, melena, fatigue, enlarged or  tender lymph nodes, pallor, history of anemia.    Physical Exam: 42 y.o. male  General Appearance:    Alert, cooperative, in no acute distress                 Vitals:    03/07/18 1927 03/08/18 0400 03/08/18 0730 03/08/18 1600   BP: (!) 139/103 137/98 129/95 (!) 149/104   BP Location:       Patient Position:       Pulse: 90 81 80 80   Resp: 16 20 18   Temp: 97 °F (36.1 °C)  97.1 °F (36.2 °C) 97.2 °F (36.2 °C)   TempSrc: Temporal Artery   Tympanic Tympanic   SpO2: 95%  96% 97%   Weight:       Height:            Head:    Normocephalic, without obvious abnormality, atraumatic      Right Upper Extremity:  No obvious deformity, painless ROM shoulder, elbow, wrist, no joint instability, normal distal strength and sensation to light touch, skin intact without cyanosis, clubbing, edema; +2 radial pulse  Left Upper Extremity:  No obvious deformity, painless ROM shoulder, elbow, wrist, no joint instability, normal distal strength and sensation to light touch, skin intact without cyanosis, clubbing, edema; +2 radial pulse  Right Lower Extremity:  No obvious deformity, painless ROM hip, knee, ankle, compartments soft, normal distal strength and sensation to light touch, skin intact without cyanosis, clubbing, edema; +2 dorsalis pedis pulse  Left Lower Extremity:  No obvious deformity, painless ROM hip, knee, ankle, compartments soft, normal distal strength and sensation to light touch, skin intact without cyanosis, clubbing, edema; +2 dorsalis pedis pulse         Diagnostic Tests:    I have reviewed the labs, radiology results and diagnostic studies:CT scan of L shoulder demonstrates a locked posterior shoulder dislocation with avulsion of lesser tuberosity. There is a large reverse hill-sachs lesion present involving a portion of the anterior articular surface      Results from last 7 days  Lab Units 03/08/18  0612   WBC 10*3/mm3 11.00*   HEMOGLOBIN g/dL 12.8*   PLATELETS 10*3/mm3 260       Results from last 7 days  Lab Units  03/08/18  0612   SODIUM mmol/L 139   POTASSIUM mmol/L 3.6   CO2 mmol/L 28.0   CREATININE mg/dL 0.90   GLUCOSE mg/dL 114*           Assessment: Left locked posterior shoulder dislocation with large reverse hill-sachs lesion and fracture of the lesser tuberosity, which occurred 4 days ago. Patient is currently anticoagulated and not a candidate for open surgery at this point.  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 The patient understands that this might not be successful, but given his inability to undergo an open procedure, we will first attempt a closed reduction in the OR.  He was made NPO today, but unfortunately, ate both lunch and dinner, thus we will plan on closed reduction on 3/9/18 in the AM.    With regards to his long term prognosis, there is a high likelihood he will need a formal open procedure performed, but this can be performed when the patient is medically stable and his seizures are well controlled.    NPO at midnight    RAFI BLANCO    Thank you for consult. Please call [phone] with any questions/concerns.            Steven Wang Jr., MD  03/08/18  5:19 PM

## 2018-03-08 NOTE — THERAPY TREATMENT NOTE
Acute Care - Occupational Therapy Treatment Note  [facility]     Patient Name: Bahman Banks  : 1975  MRN: [id number]  Today's Date: 3/8/2018  Onset of Illness/Injury or Date of Surgery Date: 18  Date of Referral to OT: 18  Referring Physician: LARRY KELLY      Admit Date: 3/6/2018    Visit Dx:     ICD-10-CM ICD-9-CM   1. Altered mental status, unspecified altered mental status type R41.82 780.97   2. Observed seizure-like activity R56.9 780.39   3. Leukocytosis, unspecified type D72.829 288.60   4. Facial asymmetry Q67.0 754.0   5. Impaired mobility and ADLs Z74.09 799.89   6. Impaired functional mobility, balance, gait, and endurance Z74.09 V49.89     Patient Active Problem List   Diagnosis   • Seizure   • Altered mental status   • Aortic valve disease   • Hypertension   • Atrial fibrillation   • Chronic back pain   • Chest pain   • CAD (coronary artery disease)   • Hyperlipidemia   • Left shoulder pain   • Right sided weakness   • On high dose antipsychotic drug therapy             Adult Rehabilitation Note       18 0858          Rehab Assessment/Intervention    Discipline occupational therapist  -AN      Document Type therapy note (daily note)  -AN      Subjective Information agree to therapy;complains of;pain  -AN      Patient Effort, Rehab Treatment good  -AN      Symptoms Noted During/After Treatment none   pt with flat affect  -AN      Precautions/Limitations fall precautions;seizure precautions  -AN      Recorded by [AN] Amada Wong OT      Vital Signs    Post Systolic BP Rehab 135  -AN      Post Treatment Diastolic   -AN      Pretreatment Heart Rate (beats/min) 90  -AN      Intratreatment Heart Rate (beats/min) 110  -AN      Posttreatment Heart Rate (beats/min) 93  -AN      Recorded by [EDIE] Amada Wong OT      Pain Assessment    Pain Assessment 0-10  -AN      Pain Score 8  -AN      Post Pain Score 8  -AN      Pain Type Acute pain  -AN      Pain Location Shoulder   -AN      Pain Orientation Left  -AN      Pain Intervention(s) Repositioned  -AN      Response to Interventions tolerated, no change  -AN      Recorded by [EDIE] Amada Wong OT      Cognitive Assessment/Intervention    Current Cognitive/Communication Assessment impaired   slurred speech  -AN      Orientation Status oriented to;person;place;time  -AN      Follows Commands/Answers Questions 100% of the time  -AN      Personal Safety mild impairment  -AN      Recorded by [AN] Amada Wong OT      ROM (Range of Motion)    General ROM Detail continued pain with L ROM; WFL elbow, shoulder IR/ER with pain, minimal abduction - pain  -AN      Recorded by [EDIE] Amada Wong OT      Bed Mobility, Assessment/Treatment    Bed Mobility, Assistive Device head of bed elevated  -AN      Bed Mob, Supine to Sit, [place] conditional independence  -AN      Recorded by [EDIE] Amada Wong OT      Transfer Assessment/Treatment    Transfers, Sit-Stand [place] conditional independence  -AN      Transfers, Stand-Sit [place] conditional independence  -AN      Bathtub Transfer, [place] conditional independence   simulated  -AN      Recorded by [EDIE] Amada Wong OT      Functional Mobility    Functional Mobility- Ind. Level conditional independence  -AN      Functional Mobility-Distance (Feet) --   in room  -AN      Recorded by [EDIE] Amada Wong OT      Lower Body Bathing Assessment/Training    LB Bathing Assess/Train, Comment simulated cond I , sitting; pain with reaching L UE standing  -AN      Recorded by [EDIE] Amada Wong OT      Lower Body Dressing Assessment/Training    LB Dressing Assess/Train, Clothing Type donning:;socks  -AN      LB Dressing Assess/Train, Position sitting  -AN      LB Dressing Assess/Train, [place] moderate assist (50% patient effort)  -AN      LB Dressing Assess/Train, Comment difficulty due to decreased use of L UE with pain  -AN      Recorded by [EDIE] Amada Wong OT      Motor  Skills/Interventions    Additional Documentation Fine Motor Coordination Training (Group);Gross Motor Coordination Training (Group);Balance Skills Training (Group)  -AN      Recorded by [EDIE] Amada Wong OT      Balance Skills Training    Sitting-Level of Assistance Independent  -AN      Standing-Level of Assistance Independent  -AN      Static Standing Balance Support Right upper extremity supported   on sink  -AN      Standing-Balance Activities Weight Shift A-P;Reaching for objects;Weight Shift R-L;Single Limb Stance  -AN      Standing Balance # of Minutes 5  -AN      Recorded by [EDIE] Amada Wong OT      Gross Motor Coordination Training    Gross Motor Skill, Impairments Detail WFL L, difficulty to further assess L with pain  -AN      Recorded by [EDIE] Amada Wong OT      Fine Motor Coordination Training    Detail (Fine Motor Coordination Training) WFL  -AN      Opposition Right:;Left:;intact  -AN      Recorded by [EDIE] Amada Wong OT      Positioning and Restraints    Pre-Treatment Position in bed  -AN      Post Treatment Position chair  -AN      In Chair sitting;call light within reach;encouraged to call for assist;exit alarm on  -AN      Recorded by [EDIE] Amada Wong OT        User Key  (r) = Recorded By, (t) = Taken By, (c) = Cosigned By    Initials Name Effective Dates    AN Amada Wong OT 06/22/15 -                 OT Goals       03/08/18 0923 03/07/18 1041       Transfer Training OT LTG    Transfer Training OT LTG, Date Established  03/07/18  -AN     Transfer Training OT LTG, Time to Achieve  1 wk  -AN     Transfer Training OT LTG, Activity Type  toilet;sit to stand/stand to sit;bed to chair /chair to bed  -AN     Transfer Training OT LTG, [place] Level  minimum assist (75% patient effort)  -AN     Transfer Training OT LTG, Outcome goal met  -AN      Range of Motion OT LTG    Range of Motion Goal OT LTG, Date Established  03/07/18  -AN     Range of Motion Goal OT LTG, Time to Achieve  1  wk  -AN     Range of Motion Goal OT LTG, AROM Measure  Pt will complete UE HEP daily to increase functional use of Vivek UE's and complete ADL.  -AN     Range of Motion Goal OT LTG, Outcome goal ongoing  -AN      Bathing OT LTG    Bathing Goal OT LTG, Date Established  03/07/18  -AN     Bathing Goal OT LTG, Time to Achieve  1 wk  -AN     Bathing Goal OT LTG, Activity Type  simulates;upper body bathing;lower body bathing  -AN     Bathing Goal OT LTG, [place] Level  moderate assist (50% patient effort)  -AN     Bathing Goal OT LTG, Outcome goal met  -AN        User Key  (r) = Recorded By, (t) = Taken By, (c) = Cosigned By    Initials Name Provider Type    EDIE Wong OT Occupational Therapist          Occupational Therapy Education     Title: PT OT SLP Therapies (Active)     Topic: Occupational Therapy (Active)     Point: ADL training (Done)    Description: Instruct learner(s) on proper safety adaptation and remediation techniques during self care or transfers.   Instruct in proper use of assistive devices.    Learning Progress Summary    Learner Readiness Method Response Comment Documented by Status   Patient Acceptance E,D VU,NR Discussed need for assist with ADL's with L UE pain. Discussed home safety. AN 03/08/18 0922 Done    Acceptance E NR Addressed current deficits, OT role and importance of daily therapeutic activites and getting OOB. AN 03/07/18 1038 Active                      User Key     Initials Effective Dates Name Provider Type Discipline    AN 06/22/15 -  Amada Wong OT Occupational Therapist OT                  OT Recommendation and Plan  Anticipated Equipment Needs At Discharge:  (TBD)  Anticipated Discharge Disposition: inpatient rehabilitation facility  Planned Therapy Interventions: activity intolerance, ADL retraining, balance training, bed mobility training, strengthening, transfer training, visual retraining  Therapy Frequency: daily  Plan of Care Review  Plan Of Care Reviewed With:  "patient  Progress: improving  Outcome Summary/Follow up Plan: Pt with Cond I with mobility and txfrs. Pt reports he feels \"funny\" with standing but no LOB or balance deficits noted. Pt with continued severe pain in R UE, may need further diaganositc assessment, limited ROm at this time. Pt to d/c home with spouse at discharge.         Outcome Measures       03/08/18 0858 03/07/18 1100 03/07/18 1005    How much help from another person do you currently need...    Turning from your back to your side while in flat bed without using bedrails?  4  -CD     Moving from lying on back to sitting on the side of a flat bed without bedrails?  4  -CD     Moving to and from a bed to a chair (including a wheelchair)?  3  -CD     Standing up from a chair using your arms (e.g., wheelchair, bedside chair)?  3  -CD     Climbing 3-5 steps with a railing?  3  -CD     To walk in hospital room?  3  -CD     AM-PAC 6 Clicks Score  20  -CD     How much help from another is currently needed...    Putting on and taking off regular lower body clothing? 2  -AN  2  -AN    Bathing (including washing, rinsing, and drying) 3  -AN  2  -AN    Toileting (which includes using toilet bed pan or urinal) 3  -AN  2  -AN    Putting on and taking off regular upper body clothing 2  -AN  2  -AN    Taking care of personal grooming (such as brushing teeth) 3  -AN  2  -AN    Eating meals 4  -AN  3  -AN    Score 17  -AN  13  -AN    Modified [place] Scale    Modified [place] Scale 3 - Moderate disability.  Requiring some help, but able to walk without assistance.  -AN 3 - Moderate disability.  Requiring some help, but able to walk without assistance.   WITH A.D.   -CD 4 - Moderately severe disability.  Unable to walk without assistance, and unable to attend to own bodily needs without assistance.  -AN    Functional Assessment    Outcome Measure Options  AM-PAC 6 Clicks Basic Mobility (PT);Modified [place]  -CD AM-PAC 6 Clicks Daily Activity (OT);Modified [place]  -AN    "   User Key  (r) = Recorded By, (t) = Taken By, (c) = Cosigned By    Initials Name Provider Type    BARTOLO Richter, PT Physical Therapist    EDIE Wong OT Occupational Therapist           Time Calculation:         Time Calculation- OT       03/08/18 0929          Time Calculation- OT    OT Start Time 0858  -AN      Total Timed Code Minutes- OT 15 minute(s)  -AN      OT Received On 03/08/18  -AN      OT Goal Re-Cert Due Date 03/17/18  -AN        User Key  (r) = Recorded By, (t) = Taken By, (c) = Cosigned By    Initials Name Provider Type    EDIE Wong OT Occupational Therapist           Therapy Charges for Today     Code Description Service Date Service Provider Modifiers Qty    [id number]  OT EVAL LOW COMPLEXITY 4 3/7/2018 Amada Wong OT GO 1    [id number]  OT THERAPEUTIC ACT EA 15 MIN 3/8/2018 Amada Wong OT GO 1               Amada Wong OT  3/8/2018

## 2018-03-09 ENCOUNTER — APPOINTMENT (OUTPATIENT)
Dept: CT IMAGING | Facility: HOSPITAL | Age: 43
End: 2018-03-09

## 2018-03-09 ENCOUNTER — APPOINTMENT (OUTPATIENT)
Dept: GENERAL RADIOLOGY | Facility: HOSPITAL | Age: 43
End: 2018-03-09

## 2018-03-09 LAB
ANION GAP SERPL CALCULATED.3IONS-SCNC: 10 MMOL/L (ref 3–11)
BUN BLD-MCNC: 13 MG/DL (ref 9–23)
BUN/CREAT SERPL: 16.3 (ref 7–25)
CALCIUM SPEC-SCNC: 9 MG/DL (ref 8.7–10.4)
CHLORIDE SERPL-SCNC: 106 MMOL/L (ref 99–109)
CO2 SERPL-SCNC: 24 MMOL/L (ref 20–31)
CREAT BLD-MCNC: 0.8 MG/DL (ref 0.6–1.3)
DEPRECATED RDW RBC AUTO: 43.6 FL (ref 37–54)
ERYTHROCYTE [DISTWIDTH] IN BLOOD BY AUTOMATED COUNT: 13.2 % (ref 11.3–14.5)
GFR SERPL CREATININE-BSD FRML MDRD: 106 ML/MIN/1.73
GLUCOSE BLD-MCNC: 111 MG/DL (ref 70–100)
HCT VFR BLD AUTO: 40.3 % (ref 38.9–50.9)
HGB BLD-MCNC: 13 G/DL (ref 13.1–17.5)
INR PPP: 0.96 (ref 0.91–1.09)
MCH RBC QN AUTO: 29.1 PG (ref 27–31)
MCHC RBC AUTO-ENTMCNC: 32.3 G/DL (ref 32–36)
MCV RBC AUTO: 90.4 FL (ref 80–99)
PLATELET # BLD AUTO: 270 10*3/MM3 (ref 150–450)
PMV BLD AUTO: 10.6 FL (ref 6–12)
POTASSIUM BLD-SCNC: 3.6 MMOL/L (ref 3.5–5.5)
PROTHROMBIN TIME: 10.1 SECONDS (ref 9.6–11.5)
RBC # BLD AUTO: 4.46 10*6/MM3 (ref 4.2–5.76)
SODIUM BLD-SCNC: 140 MMOL/L (ref 132–146)
WBC NRBC COR # BLD: 10.16 10*3/MM3 (ref 3.5–10.8)

## 2018-03-09 PROCEDURE — 25010000002 MORPHINE SULFATE (PF) 2 MG/ML SOLUTION: Performed by: HOSPITALIST

## 2018-03-09 PROCEDURE — 25010000002 ENOXAPARIN PER 10 MG: Performed by: HOSPITALIST

## 2018-03-09 PROCEDURE — 73200 CT UPPER EXTREMITY W/O DYE: CPT

## 2018-03-09 PROCEDURE — 85610 PROTHROMBIN TIME: CPT | Performed by: HOSPITALIST

## 2018-03-09 PROCEDURE — 25010000002 PROPOFOL 1000 MG/ML EMULSION: Performed by: NURSE ANESTHETIST, CERTIFIED REGISTERED

## 2018-03-09 PROCEDURE — 80048 BASIC METABOLIC PNL TOTAL CA: CPT | Performed by: HOSPITALIST

## 2018-03-09 PROCEDURE — 0PSGXZZ REPOSITION LEFT HUMERAL SHAFT, EXTERNAL APPROACH: ICD-10-PCS | Performed by: ORTHOPAEDIC SURGERY

## 2018-03-09 PROCEDURE — 25010000002 HYDROMORPHONE PER 4 MG: Performed by: NURSE ANESTHETIST, CERTIFIED REGISTERED

## 2018-03-09 PROCEDURE — 76000 FLUOROSCOPY <1 HR PHYS/QHP: CPT

## 2018-03-09 PROCEDURE — 85027 COMPLETE CBC AUTOMATED: CPT | Performed by: HOSPITALIST

## 2018-03-09 PROCEDURE — 97168 OT RE-EVAL EST PLAN CARE: CPT

## 2018-03-09 PROCEDURE — 99233 SBSQ HOSP IP/OBS HIGH 50: CPT | Performed by: HOSPITALIST

## 2018-03-09 PROCEDURE — 25010000002 PROPOFOL 10 MG/ML EMULSION: Performed by: NURSE ANESTHETIST, CERTIFIED REGISTERED

## 2018-03-09 PROCEDURE — 25010000002 HYDRALAZINE PER 20 MG: Performed by: HOSPITALIST

## 2018-03-09 RX ORDER — PROPOFOL 10 MG/ML
VIAL (ML) INTRAVENOUS AS NEEDED
Status: DISCONTINUED | OUTPATIENT
Start: 2018-03-09 | End: 2018-03-09 | Stop reason: SURG

## 2018-03-09 RX ORDER — MORPHINE SULFATE 2 MG/ML
2 INJECTION, SOLUTION INTRAMUSCULAR; INTRAVENOUS ONCE
Status: COMPLETED | OUTPATIENT
Start: 2018-03-09 | End: 2018-03-09

## 2018-03-09 RX ORDER — FAMOTIDINE 20 MG/1
20 TABLET, FILM COATED ORAL ONCE
Status: DISCONTINUED | OUTPATIENT
Start: 2018-03-09 | End: 2018-03-09 | Stop reason: HOSPADM

## 2018-03-09 RX ORDER — LIDOCAINE HYDROCHLORIDE 10 MG/ML
INJECTION, SOLUTION EPIDURAL; INFILTRATION; INTRACAUDAL; PERINEURAL AS NEEDED
Status: DISCONTINUED | OUTPATIENT
Start: 2018-03-09 | End: 2018-03-09 | Stop reason: SURG

## 2018-03-09 RX ORDER — OXYCODONE HYDROCHLORIDE AND ACETAMINOPHEN 5; 325 MG/1; MG/1
1 TABLET ORAL EVERY 4 HOURS PRN
Status: DISCONTINUED | OUTPATIENT
Start: 2018-03-09 | End: 2018-03-10

## 2018-03-09 RX ORDER — HYDROMORPHONE HYDROCHLORIDE 1 MG/ML
0.5 INJECTION, SOLUTION INTRAMUSCULAR; INTRAVENOUS; SUBCUTANEOUS
Status: DISCONTINUED | OUTPATIENT
Start: 2018-03-09 | End: 2018-03-09 | Stop reason: HOSPADM

## 2018-03-09 RX ORDER — SODIUM CHLORIDE 0.9 % (FLUSH) 0.9 %
1-10 SYRINGE (ML) INJECTION AS NEEDED
Status: DISCONTINUED | OUTPATIENT
Start: 2018-03-09 | End: 2018-03-09 | Stop reason: HOSPADM

## 2018-03-09 RX ORDER — HYDRALAZINE HYDROCHLORIDE 20 MG/ML
10 INJECTION INTRAMUSCULAR; INTRAVENOUS EVERY 6 HOURS PRN
Status: DISCONTINUED | OUTPATIENT
Start: 2018-03-09 | End: 2018-03-11 | Stop reason: HOSPADM

## 2018-03-09 RX ORDER — LIDOCAINE HYDROCHLORIDE 10 MG/ML
0.5 INJECTION, SOLUTION EPIDURAL; INFILTRATION; INTRACAUDAL; PERINEURAL ONCE AS NEEDED
Status: DISCONTINUED | OUTPATIENT
Start: 2018-03-09 | End: 2018-03-09 | Stop reason: HOSPADM

## 2018-03-09 RX ORDER — SODIUM CHLORIDE, SODIUM LACTATE, POTASSIUM CHLORIDE, CALCIUM CHLORIDE 600; 310; 30; 20 MG/100ML; MG/100ML; MG/100ML; MG/100ML
9 INJECTION, SOLUTION INTRAVENOUS CONTINUOUS
Status: DISCONTINUED | OUTPATIENT
Start: 2018-03-09 | End: 2018-03-11 | Stop reason: HOSPADM

## 2018-03-09 RX ORDER — HYDROMORPHONE HCL 110MG/55ML
PATIENT CONTROLLED ANALGESIA SYRINGE INTRAVENOUS AS NEEDED
Status: DISCONTINUED | OUTPATIENT
Start: 2018-03-09 | End: 2018-03-09 | Stop reason: SURG

## 2018-03-09 RX ADMIN — METOPROLOL TARTRATE 12.5 MG: 25 TABLET, FILM COATED ORAL at 09:25

## 2018-03-09 RX ADMIN — HYDROCODONE BITARTRATE AND ACETAMINOPHEN 1 TABLET: 5; 325 TABLET ORAL at 09:25

## 2018-03-09 RX ADMIN — LIDOCAINE HYDROCHLORIDE 50 MG: 10 INJECTION, SOLUTION EPIDURAL; INFILTRATION; INTRACAUDAL; PERINEURAL at 07:43

## 2018-03-09 RX ADMIN — HYDRALAZINE HYDROCHLORIDE 10 MG: 20 INJECTION INTRAMUSCULAR; INTRAVENOUS at 16:31

## 2018-03-09 RX ADMIN — HYDROMORPHONE HYDROCHLORIDE 0.5 MG: 2 INJECTION, SOLUTION INTRAMUSCULAR; INTRAVENOUS; SUBCUTANEOUS at 08:05

## 2018-03-09 RX ADMIN — MORPHINE SULFATE 2 MG: 2 INJECTION, SOLUTION INTRAMUSCULAR; INTRAVENOUS at 12:25

## 2018-03-09 RX ADMIN — OXYCODONE AND ACETAMINOPHEN 1 TABLET: 5; 325 TABLET ORAL at 18:20

## 2018-03-09 RX ADMIN — CYCLOBENZAPRINE HYDROCHLORIDE 5 MG: 10 TABLET, FILM COATED ORAL at 16:31

## 2018-03-09 RX ADMIN — HYDROMORPHONE HYDROCHLORIDE 0.5 MG: 2 INJECTION, SOLUTION INTRAMUSCULAR; INTRAVENOUS; SUBCUTANEOUS at 07:39

## 2018-03-09 RX ADMIN — HYDROMORPHONE HYDROCHLORIDE 0.5 MG: 10 INJECTION INTRAMUSCULAR; INTRAVENOUS; SUBCUTANEOUS at 08:16

## 2018-03-09 RX ADMIN — ENOXAPARIN SODIUM 80 MG: 80 INJECTION SUBCUTANEOUS at 20:57

## 2018-03-09 RX ADMIN — HYDROMORPHONE HYDROCHLORIDE 0.5 MG: 10 INJECTION INTRAMUSCULAR; INTRAVENOUS; SUBCUTANEOUS at 08:30

## 2018-03-09 RX ADMIN — QUETIAPINE FUMARATE 150 MG: 100 TABLET ORAL at 20:57

## 2018-03-09 RX ADMIN — HYDROMORPHONE HYDROCHLORIDE 0.5 MG: 2 INJECTION, SOLUTION INTRAMUSCULAR; INTRAVENOUS; SUBCUTANEOUS at 07:45

## 2018-03-09 RX ADMIN — ATORVASTATIN CALCIUM 80 MG: 40 TABLET, FILM COATED ORAL at 20:57

## 2018-03-09 RX ADMIN — PROPOFOL 100 MG: 10 INJECTION, EMULSION INTRAVENOUS at 07:43

## 2018-03-09 RX ADMIN — CYCLOBENZAPRINE HYDROCHLORIDE 5 MG: 10 TABLET, FILM COATED ORAL at 09:23

## 2018-03-09 RX ADMIN — RANOLAZINE 500 MG: 500 TABLET, FILM COATED, EXTENDED RELEASE ORAL at 20:57

## 2018-03-09 RX ADMIN — PROPOFOL 125 MCG/KG/MIN: 10 INJECTION, EMULSION INTRAVENOUS at 07:43

## 2018-03-09 RX ADMIN — OXYCODONE AND ACETAMINOPHEN 1 TABLET: 5; 325 TABLET ORAL at 15:14

## 2018-03-09 RX ADMIN — LEVETIRACETAM 500 MG: 500 TABLET, FILM COATED ORAL at 20:57

## 2018-03-09 RX ADMIN — METOPROLOL TARTRATE 25 MG: 25 TABLET ORAL at 20:58

## 2018-03-09 RX ADMIN — HYDROMORPHONE HYDROCHLORIDE 0.5 MG: 2 INJECTION, SOLUTION INTRAMUSCULAR; INTRAVENOUS; SUBCUTANEOUS at 07:55

## 2018-03-09 RX ADMIN — METOPROLOL TARTRATE 12.5 MG: 25 TABLET, FILM COATED ORAL at 12:25

## 2018-03-09 RX ADMIN — LEVETIRACETAM 500 MG: 500 TABLET, FILM COATED ORAL at 09:25

## 2018-03-09 RX ADMIN — HYDROMORPHONE HYDROCHLORIDE 0.5 MG: 10 INJECTION INTRAMUSCULAR; INTRAVENOUS; SUBCUTANEOUS at 08:43

## 2018-03-09 NOTE — PLAN OF CARE
Problem: Patient Care Overview (Adult)  Goal: Plan of Care Review  Outcome: Ongoing (interventions implemented as appropriate)   03/08/18 2819   Coping/Psychosocial Response Interventions   Plan Of Care Reviewed With patient   Outcome Evaluation   Outcome Summary/Follow up Plan Pt schd for L Shoulder closed reduction tomorrow morning PATIENT IS TO BE NPO AFTER MIDNIGHT!! Pt has flat affect. Seems to be unable to remember any questions I ask. Been in seizure precautions during shift. He is very impulsive and gets up despite being advised to call for help. Will continue to monitor.   Patient Care Overview   Progress no change

## 2018-03-09 NOTE — PROGRESS NOTES
Continued Stay Note  [facility]     Patient Name: Bahman Banks  MRN: [id number]  Today's Date: 3/9/2018    Admit Date: 3/6/2018          Discharge Plan       03/09/18 1515    Case Management/Social Work Plan    Plan Home    Patient/Family In Agreement With Plan yes    Additional Comments Mr. Banks had surgery today.  CM is following for any home health orders per ortho.  Please call weekend CM for discharge needs at .                Discharge Codes     None        Expected Discharge Date and Time     Expected Discharge Date Expected Discharge Time    Mar 10, 2018             Zainab Lara

## 2018-03-09 NOTE — PLAN OF CARE
Problem: Patient Care Overview (Adult)  Goal: Plan of Care Review  Outcome: Ongoing (interventions implemented as appropriate)   03/09/18 1447   Coping/Psychosocial Response Interventions   Plan Of Care Reviewed With patient   Outcome Evaluation   Outcome Summary/Follow up Plan Pt re-evaluation complete s/p L shoulder closed reduction. Pt conditionally I with bed mobility, transfers, and functional mobility. Pt has met prior goals. New goals established for pt education, UBD and UBB which are expected to be mod A. Recommend discharge home with outpatient services.        Problem: Inpatient Occupational Therapy  Goal: Range of Motion Goal LTG- OT  Outcome: Outcome(s) achieved Date Met: 03/09/18 03/07/18 1041 03/09/18 1447   Range of Motion OT LTG   Range of Motion Goal OT LTG, Date Established 03/07/18 --    Range of Motion Goal OT LTG, Time to Achieve 1 wk --    Range of Motion Goal OT LTG, AROM Measure Pt will complete UE HEP daily to increase functional use of Vivek UE's and complete ADL. --    Range of Motion Goal OT LTG, Outcome --  goal met     Goal: Patient Education Goal LTG- OT  Outcome: Ongoing (interventions implemented as appropriate)   03/09/18 1447   Patient Education OT LTG   Patient Education OT LTG, Date Established 03/09/18   Patient Education OT LTG, Time to Achieve 1 wk   Patient Education OT LTG, Education Type precautions per surgeon;brace use/care;positioning;posture/body mechanics;adaptive equipment mgmt   Patient Education OT LTG, Additional Goal Pt educated on shoulder sling and axillary care.     Goal: Bathing Goal LTG- OT  Outcome: Ongoing (interventions implemented as appropriate)   03/09/18 1447   Bathing OT LTG   Bathing Goal OT LTG, Date Established 03/09/18   Bathing Goal OT LTG, Time to Achieve 1 wk   Bathing Goal OT LTG, Activity Type upper body bathing   Bathing Goal OT LTG, [place] Level minimum assist (75% patient effort)   Bathing Goal OT LTG, Additional Goal While  maintaining shoulder precautions.   Bathing Goal OT LTG, Outcome goal ongoing     Goal: UB Dressing Goal LTG- OT  Outcome: Ongoing (interventions implemented as appropriate)   03/09/18 1447   UB Dressing OT LTG   UB Dressing Goal OT LTG, Date Established 03/09/18   UB Dressing Goal OT LTG, Time to Achieve 1 wk   UB Dressing Goal OT LTG, [place] Level minimum assist (75% patient effort)   UB Dressing Goal OT LTG, Additional Goal While maintaining shoulder precautions.    UB Dressing Goal OT LTG, Outcome goal ongoing

## 2018-03-09 NOTE — THERAPY RE-EVALUATION
Acute Care - Occupational Therapy Re-Evaluation  [facility]     Patient Name: Bahman Banks  : 1975  MRN: [id number]  Today's Date: 3/9/2018  Onset of Illness/Injury or Date of Surgery Date: (P) 18 (Surgery L shoulder closed reduction)  Date of Referral to OT: 18  Referring Physician: MD Wang (P)    Admit Date: 3/6/2018       ICD-10-CM ICD-9-CM   1. Altered mental status, unspecified altered mental status type R41.82 780.97   2. Observed seizure-like activity R56.9 780.39   3. Leukocytosis, unspecified type D72.829 288.60   4. Facial asymmetry Q67.0 754.0   5. Impaired mobility and ADLs Z74.09 799.89   6. Impaired functional mobility, balance, gait, and endurance Z74.09 V49.89     Patient Active Problem List   Diagnosis   • Seizure   • Altered mental status   • Aortic valve disease   • Hypertension   • Atrial fibrillation   • Chronic back pain   • Chest pain   • CAD (coronary artery disease)   • Hyperlipidemia   • Left shoulder pain   • On high dose antipsychotic drug therapy   • Posterior dislocation of left humerus   • Chronic right sided weakness from prior CVA     Past Medical History:   Diagnosis Date   • Aortic stenosis    • Atrial fibrillation    • CAD (coronary artery disease)    • Chronic back pain    • Hypertension    • Seizure    • Stroke     affected speech, vision, right side weakness     Past Surgical History:   Procedure Laterality Date   • AORTIC VALVE REPAIR/REPLACEMENT      tissue valve, performed in [place]   • APPENDECTOMY     • CHOLECYSTECTOMY     • HIATAL HERNIA REPAIR     • KNEE ARTHROSCOPY     • PACEMAKER IMPLANTATION Left 2014 OR .   [facility]. interrogated 3/9/18   • SHOULDER ARTHROSCOPY            OT ASSESSMENT FLOWSHEET (last 72 hours)      OT Evaluation       18 1134 18 2000 18 1318 18 1200 18 0858    Rehab Evaluation    Document Type (P)  re-evaluation   Re-eval s/p L shoulder closed reduction  -SS  therapy  note (daily note)  -  therapy note (daily note)  -AN    Subjective Information (P)  no complaints;agree to therapy  -SS  agree to therapy;no complaints  -  agree to therapy;complains of;pain  -AN    Patient Effort, Rehab Treatment (P)  good  -SS  good  -  good  -AN    Symptoms Noted During/After Treatment (P)  none  -SS  none  -EH  none   pt with flat affect  -AN    Symptoms Noted Comment   pt continues to have flat affect  -      General Information    Patient Profile Review (P)  yes  -SS        Onset of Illness/Injury or Date of Surgery Date (P)  03/09/18   Surgery L shoulder closed reduction  -        Referring Physician (P)  MD Kathleen  -        General Observations (P)  Pt supine in bed, HOB elevated, exit alarm off  -        Pertinent History Of Current Problem (P)  S/p L shoulder closed reducation. See intial eval note for more detail.  -        Precautions/Limitations (P)  non-weight bearing status   NWB L UE  -  fall precautions;seizure precautions  -  fall precautions;seizure precautions  -AN    Living Environment    Lives With (P)  --   Re-eval. See intial evaluation note for details  -        Clinical Impression    Criteria for Skilled Therapeutic Interventions Met (P)  yes  -        Rehab Potential (P)  good, to achieve stated therapy goals  -        Therapy Frequency (P)  daily  -        Anticipated Discharge Disposition (P)  home with outpatient services  -        Vital Signs    Pre Systolic BP Rehab (P)  --   RN approved re-eval. Vitals stable.   -        Post Systolic BP Rehab     135  -AN    Post Treatment Diastolic BP     103  -AN    Pretreatment Heart Rate (beats/min)     90  -AN    Intratreatment Heart Rate (beats/min)     110  -AN    Posttreatment Heart Rate (beats/min)     93  -AN    Pain Assessment    Pain Assessment (P)  Benavides-Baker FACES  -  Benavides-Barrientos FACES  -  0-10  -AN    Benavides-Barrientos FACES Pain Rating (P)  2  -SS  6  -EH      Pain Score (P)  8  -SS    8   -AN    Post Pain Score (P)  8  -    8  -AN    Pain Type (P)  Acute pain  -SS  Acute pain  -EH  Acute pain  -AN    Pain Location (P)  Shoulder  -SS  Shoulder  -EH  Shoulder  -AN    Pain Orientation (P)  Left  -SS  Left  -EH  Left  -AN    Pain Intervention(s) (P)  Ambulation/increased activity;Repositioned  -SS  Repositioned  -EH  Repositioned  -AN    Response to Interventions (P)  Tolerated.  -SS  tolerated, no change  -EH  tolerated, no change  -AN    Vision Assessment/Intervention    Visual Impairment (P)  WFL  -SS        Cognitive Assessment/Intervention    Current Cognitive/Communication Assessment (P)  functional  -SS  impaired  -EH  impaired   slurred speech  -AN    Orientation Status (P)  oriented x 4  -SS  oriented to;person;place;time  -  oriented to;person;place;time  -AN    Follows Commands/Answers Questions (P)  100% of the time;able to follow single-step instructions  -  100% of the time;able to follow single-step instructions  -  100% of the time  -AN    Personal Safety (P)  WNL/WFL  -SS  WNL/WFL  -EH  mild impairment  -AN    ROM (Range of Motion)    General ROM (P)  upper extremity range of motion deficits identified  -        General ROM Detail (P)  R UE WFL. L UE s/p closed reduction. L UE in sling.  -SS    continued pain with L ROM; WFL elbow, shoulder IR/ER with pain, minimal abduction - pain  -AN    MMT (Manual Muscle Testing)    General MMT Assessment (P)  upper extremity strength deficits identified  -        General MMT Assessment Detail (P)  R UE WFL. L UE s/p closed reduction. L UE in sling.  -SS        Muscle Tone Assessment    Muscle Tone Assessment    Left-Side Extremities;LUE  -TP     Left-Side Extremities Muscle Tone Assessment    moderately decreased tone  -TP     LUE Muscle Tone Assessment    unable/inappropriate to test  -TP     Bed Mobility, Assessment/Treatment    Bed Mobility, Assistive Device (P)  bed rails;head of bed elevated  -SS  head of bed elevated  -EH  head of  bed elevated  -AN    Bed Mob, Supine to Sit, [place] (P)  conditional independence  -SS  conditional independence  -EH  conditional independence  -AN    Bed Mob, Sit to Supine, [place] (P)  conditional independence  -SS        Transfer Assessment/Treatment    Transfers, Sit-Stand [place] (P)  conditional independence  -SS  independent  -EH  conditional independence  -AN    Transfers, Stand-Sit [place] (P)  conditional independence  -SS  independent  -EH  conditional independence  -AN    Toilet Transfer, [place] (P)  conditional independence  -SS        Toilet Transfer, Assistive Device (P)  elevated toilet seat  -SS        Bathtub Transfer, [place]     conditional independence   simulated  -AN    Functional Mobility    Functional Mobility- Ind. Level (P)  conditional independence  -SS    conditional independence  -AN    Functional Mobility-Distance (Feet)     --   in room  -AN    Stairs Assessment/Treatment    Number of Stairs   5  -      Stairs, Handrail Location   both sides  -      Stairs, [place] Level   conditional independence  -EH      Upper Body Bathing Assessment/Training    UB Bathing Assess/Train, Comment (P)  Expected Mod A   -SS        Lower Body Bathing Assessment/Training    LB Bathing Assess/Train, Comment     simulated cond I , sitting; pain with reaching L UE standing  -AN    Upper Body Dressing Assessment/Training    UB Dressing Assess/Train, Comment (P)  Expected Mod A   -SS        Lower Body Dressing Assessment/Training    LB Dressing Assess/Train, Clothing Type     donning:;socks  -AN    LB Dressing Assess/Train, Position     sitting  -AN    LB Dressing Assess/Train, [place]     moderate assist (50% patient effort)  -AN    LB Dressing Assess/Train, Comment     difficulty due to decreased use of L UE with pain  -AN    Motor Skills/Interventions    Additional Documentation     Fine Motor Coordination Training (Group);Gross Motor Coordination Training  (Group);Balance Skills Training (Group)  -AN    Balance Skills Training    Sitting-Level of Assistance (P)  Independent  -SS  Independent  -  Independent  -AN    Standing-Level of Assistance (P)  Independent  -SS  Independent;Close supervision  -  Independent  -AN    Static Standing Balance Support   No upper extremity supported  -  Right upper extremity supported   on sink  -AN    Standing-Balance Activities   Retrieve object from floor  -  Weight Shift A-P;Reaching for objects;Weight Shift R-L;Single Limb Stance  -AN    Standing Balance # of Minutes   5  -EH  5  -AN    Gait Balance-Level of Assistance   Close supervision  -      Gait Balance Support   No upper extremity supported  -      Gait Balance Activities   --   turning  -      Therapy Exercises    Bilateral Lower Extremities   standing;knee flexion;hip abduction/adduction;10 reps;AROM:  -      Gross Motor Coordination Training    Gross Motor Skill, Impairments Detail     WFL L, difficulty to further assess L with pain  -AN    Fine Motor Coordination Training    Detail (Fine Motor Coordination Training)     WFL  -AN    Opposition     Right:;Left:;intact  -AN    Sensory Assessment/Intervention    Light Touch   --  -      LLE Light Touch  WNL  -LOVE WNL  - WNL  -TP     Positioning and Restraints    Pre-Treatment Position (P)  in bed  -SS  in bed  -EH  in bed  -AN    Post Treatment Position (P)  bed  -SS  bed  -  chair  -AN    In Bed (P)  supine;call light within reach;encouraged to call for assist;exit alarm on  -SS  supine;call light within reach;encouraged to call for assist  -      In Chair     sitting;call light within reach;encouraged to call for assist;exit alarm on  -AN      03/08/18 0800 03/07/18 2000 03/07/18 1548 03/07/18 1215 03/07/18 1102    General Information    Equipment Currently Used at Home     none  -SP    Living Environment    Lives With     spouse;child(dina), dependent  -SP    Living Arrangements     house  -     Home Accessibility     no concerns  -SP    Stair Railings at Home     none  -SP    Type of Financial/Environmental Concern     none  -SP    Transportation Available     family or friend will provide  -SP    Functional Level Prior    Ambulation     0-->independent  -SP    Transferring     0-->independent  -SP    Toileting     0-->independent  -SP    Bathing     0-->independent  -SP    Dressing     0-->independent  -SP    Eating     0-->independent  -SP    Communication     0-->understands/communicates without difficulty  -SP    Swallowing     0-->swallows foods/liquids without difficulty  -SP    Muscle Tone Assessment    Muscle Tone Assessment  LUE;RUE;Bilateral Lower Extremities  -LOVE       LUE Muscle Tone Assessment  severely decreased tone   reports a torn bicept  -LOVE severely decreased tone  -KW severely decreased tone  -KW     Sensory Assessment/Intervention    Light Touch  --   pt denies numbness ot tingling  -LOVE --   pt denies numbness or tingling  -KW --   pt denies numbness or tingling  -KW     LLE Light Touch WNL  -TP          03/07/18 1100 03/07/18 1005 03/07/18 0836 03/06/18 2214 03/06/18 1830    Rehab Evaluation    Document Type evaluation   COMPLETED CHART REVIEW [id number]  -CD evaluation  -AN       Subjective Information agree to therapy;complains of;pain   L SHOULDER PAIN- X-RAY NEG, CT PENDING.   -CD complains of;pain  -AN       Patient Effort, Rehab Treatment good  -CD fair  -AN       Symptoms Noted During/After Treatment other (see comments);increased pain   PAIN IN R BICEPS WITH RUE IN DEPENDENT POSITION IN STANDING.  -CD increased pain  -AN       General Information    Patient Profile Review yes  -CD yes  -AN       Onset of Illness/Injury or Date of Surgery Date 03/06/18  -CD 03/06/18  -AN       Referring Physician LARRY KELLY  -LARRY Quiroz  -AN       General Observations PT SLEEPING, R SIDELYING UPON ARRIVAL ON RA AND WITH IV.   -CD Pt supine in bed, IV intact  -AN       Pertinent  History Of Current Problem TO OSH WITH AMS. PT FLOWN TO [facility], WHILE IN CT SCANNER, OBSERVED TO HAVE GRAND MAL SEIZURE. PT HAS H/O R SIDED WEAKNESS FROM PRIOR CVA, AVR, AFIB AND PPM. UNABLE TO HAVE MRI.PT C/O L SHOULDER/UE PAIN, CT SHOULDER IS PENDING.    -CD Pt found on floor by spouse with slurred speech AMS; chart reports seizure with CT attempt. Pt with hx of CVA with R side weakness; hx of AVR, Afib, PPM  -AN       Precautions/Limitations fall precautions  -CD fall precautions  -AN       Prior Level of Function independent:;ADL's;all household mobility;community mobility;driving  -CD independent:;ADL's;all household mobility;community mobility;driving  -AN       Equipment Currently Used at Home none  -CD none  -AN   none  -TG    Plans/Goals Discussed With patient;agreed upon  -CD patient;agreed upon  -AN       Risks Reviewed patient:;LOB;change in vital signs;dizziness;increased discomfort  -CD patient:;LOB;dizziness;increased discomfort;change in vital signs  -AN       Benefits Reviewed patient:;improve function;increase balance;increase knowledge  -CD patient:;improve function;increase independence;increase strength;increase balance  -AN       Barriers to Rehab medically complex  -CD medically complex  -AN       Living Environment    Lives With spouse;child(dina), dependent  -CD child(dina), dependent;spouse  -AN   spouse;child(dnia), dependent  -TG    Living Arrangements house  -CD mobile home  -AN   mobile home  -TG    Home Accessibility no concerns  -CD tub/shower is not walk in  -AN   no concerns  -TG    Stair Railings at Home     none  -TG    Type of Financial/Environmental Concern     none  -TG    Transportation Available  family or friend will provide  -AN   none  -TG    Living Environment Comment PT AND SPOUSE ARE UNEMPLOYED PER CM NOTE.   -CD pt and spouse do not work per pt report  -AN       Clinical Impression    Date of Referral to OT  03/06/18  -AN       OT Diagnosis  Decreased ADL I  -AN        Impairments Found (describe specific impairments)  arousal, attention, and cognition;joint integrity and mobility;muscle performance;motor function  -AN       Patient/Family Goals Statement  Agreeable to OT tx but declined mobility  -AN       Criteria for Skilled Therapeutic Interventions Met  yes;treatment indicated  -AN       Rehab Potential  good, to achieve stated therapy goals  -AN       Therapy Frequency  daily  -AN       Anticipated Equipment Needs At Discharge  --   TBD  -AN       Anticipated Discharge Disposition  inpatient rehabilitation facility  -AN       Functional Level Prior    Ambulation     0-->independent  -TG    Transferring     0-->independent  -TG    Toileting     0-->independent  -TG    Bathing     0-->independent  -TG    Dressing     0-->independent  -TG    Eating     0-->independent  -TG    Communication     0-->understands/communicates without difficulty  -TG    Swallowing     0-->swallows foods/liquids without difficulty  -TG    Prior Functional Level Comment     independent  -TG    Vital Signs    Pre Systolic BP Rehab --   NSG CLEARED FOR TREATMENT, VSS.   -CD        Pain Assessment    Pain Assessment 0-10  -CD 0-10  -AN       Pain Score 9  -CD 9  -AN       Post Pain Score 9  -CD 10  -AN       Pain Type Acute pain  -CD Acute pain  -AN       Pain Location Shoulder  -CD Shoulder  -AN       Pain Orientation --   L BICEPS  -CD Left  -AN       Pain Intervention(s) Rest;Repositioned  -CD Rest   RN aware  -AN       Response to Interventions TOLERATED.   -CD increased with elbow ROM, deferred shoulder  -AN       Vision Assessment/Intervention    Visual Impairment  WFL  -AN       Vision Comment PER O.T. NOTE.   -CD        Cognitive Assessment/Intervention    Current Cognitive/Communication Assessment impaired   SLURRED SPEECH.   -CD impaired   moderately slurred speech  -AN       Orientation Status oriented to;person   LETHARGIC.   -CD oriented to;person;place   knew month with extra time, says its  2012  -AN       Follows Commands/Answers Questions 100% of the time  -CD 75% of the time  -AN       Short/Long Term Memory  --   pt reports no memory of admit symptoms episode or CT  -AN       ROM (Range of Motion)    General ROM no range of motion deficits identified   B LE' S  -CD upper extremity range of motion deficits identified  -AN       General ROM Detail  R WFL; unable to test L with severe shoulder pain  -AN       MMT (Manual Muscle Testing)    General MMT Assessment lower extremity strength deficits identified   R LE GROSSLY 4/5, L LE 5/5.   -CD upper extremity strength deficits identified  -AN       General MMT Assessment Detail  L deferred; R 3+/5  -AN       Muscle Tone Assessment    Muscle Tone Assessment   --  -KW LUE;RUE;Bilateral Lower Extremities  -AB     LUE Muscle Tone Assessment   severely decreased tone   reports a torn bicept  -KW severely decreased tone  -AB     RUE Muscle Tone Assessment   --  -KW associated movements noted  -AB     Bilateral Lower Extremities Muscle Tone Assessment   --  -KW associated movements noted  -AB     Bed Mobility, Assessment/Treatment    Bed Mob, Supine to Sit, [place] independent  -CD        Bed Mobility, Comment  pt declined  -AN       Transfer Assessment/Treatment    Transfers, Sit-Stand [place] supervision required  -CD        Transfers, Stand-Sit [place] supervision required  -CD        Transfers, Sit-Stand-Sit, Assist Device --   GAIT BELT FOR EVAL SAFETY.   -CD        Transfer, Comment  pt declined  -AN       Functional Mobility    Functional Mobility- Comment  pt declined  -AN       Upper Body Bathing Assessment/Training    UB Bathing Assess/Train, Comment  simulate mod assist with L shoulder pain  -AN       Lower Body Bathing Assessment/Training    LB Bathing Assess/Train, Comment  expected mod with L shoulder pain  -AN       Upper Body Dressing Assessment/Training    UB Dressing Assess/Train, Comment  expected max with L shoulder   -AN        Motor Skills/Interventions    Additional Documentation Balance Skills Training (Group)  -CD Balance Skills Training (Group);Fine Motor Coordination Training (Group);Gross Motor Coordination Training (Group)  -AN       Balance Skills Training    Sitting-Level of Assistance Independent  -CD --  -CD       Standing-Level of Assistance Contact guard  -CD --  -CD       Gait Balance-Level of Assistance Contact guard  -CD --  -CD       Gross Motor Coordination Training    Gross Motor Skill, Impairments Detail  unable to test L with pain; R WFl with finger to nose  -AN       Fine Motor Coordination Training    Detail (Fine Motor Coordination Training)  difficult to assess L   -AN       Sensory Assessment/Intervention    Light Touch LLE;RLE   INTACT.   -CD --  -CD --   pt denies numbness ot tingling  -KW      General Therapy Interventions    Planned Therapy Interventions  activity intolerance;ADL retraining;balance training;bed mobility training;strengthening;transfer training;visual retraining  -AN       Positioning and Restraints    Pre-Treatment Position in bed  -CD in bed  -AN       Post Treatment Position bed   PT DECLINED [facility], WANTED TO GO BACK TO SLEEP.   -CD bed  -AN       In Bed supine;call light within reach;exit alarm on;notified nsg;encouraged to call for assist  -CD supine;call light within reach;encouraged to call for assist;exit alarm on  -AN         User Key  (r) = Recorded By, (t) = Taken By, (c) = Cosigned By    Initials Name Effective Dates    CD Barb Richter, PT 06/19/15 -     EH Celia Charles, PT 06/19/15 -     AN Amada Wong, OT 06/22/15 -     SP Katarina Cervantes, RN 03/14/16 -     AB Alley Owens, MARINA 06/16/16 -     KW Archana Wilhelm, MARINA 06/16/16 -     LOVE Kwabena Tirado, MARINA 08/22/16 -     TG Ernestine Barbour, MARINA 02/17/17 -     TP Kristen Loja, RN 07/10/17 -     SS Shivam Baca, OT Student 03/07/18 -            Occupational Therapy Education     Title: PT OT SLP Therapies (Done)      Topic: Occupational Therapy (Done)     Point: ADL training (Done)    Description: Instruct learner(s) on proper safety adaptation and remediation techniques during self care or transfers.   Instruct in proper use of assistive devices.    Learning Progress Summary    Learner Readiness Method Response Comment Documented by Status   Patient Acceptance E VU Pt educated on shoulder precautions to faciliate participation in ADL's.  03/09/18 1445 Done    Acceptance [facility] 03/08/18 1859 Done    Acceptance E,D VU,NR Discussed need for assist with ADL's with L UE pain. Discussed home safety. AN 03/08/18 0922 Done    Acceptance E NR Addressed current deficits, OT role and importance of daily therapeutic activites and getting OOB. AN 03/07/18 1038 Active               Point: Home exercise program (Done)    Description: Instruct learner(s) on appropriate technique for monitoring, assisting and/or progressing therapeutic exercises/activities.    Learning Progress Summary    Learner Readiness Method Response Comment Documented by Status   Patient Acceptance [facility]   03/08/18 1859 Done               Point: Precautions (Done)    Description: Instruct learner(s) on prescribed precautions during self-care and functional transfers.    Learning Progress Summary    Learner Readiness Method Response Comment Documented by Status   Patient Acceptance E VU Pt educated on shoulder precautions to faciliate participation in ADL's.  03/09/18 1445 Done    Acceptance [facility] 03/08/18 1859 Done               Point: Body mechanics (Done)    Description: Instruct learner(s) on proper positioning and spine alignment during self-care, functional mobility activities and/or exercises.    Learning Progress Summary    Learner Readiness Method Response Comment Documented by Status   Patient Acceptance [facility]   03/08/18 1859 Done                      User Key     Initials Effective Dates Name Provider Type Discipline    EDIE 06/22/15 [place] JIN  Marvin, OT Occupational Therapist OT    TP 07/10/17 -  Kristen Loja, RN Registered Nurse Nurse    SS 03/07/18 -  Shivam Baca, OT Student OT Student OT                  OT Recommendation and Plan  Anticipated Equipment Needs At Discharge:  (TBD)  Anticipated Discharge Disposition: (P) home with outpatient services  Planned Therapy Interventions: activity intolerance, ADL retraining, balance training, bed mobility training, strengthening, transfer training, visual retraining  Therapy Frequency: (P) daily  Plan of Care Review  Plan Of Care Reviewed With: (P) patient  Outcome Summary/Follow up Plan: (P) Pt re-evaluation complete s/p L shoulder closed reduction. Pt conditionally I with bed mobility, transfers, and functional mobility. Pt has met prior goals. New goals established for pt education, UBD and UBB which are expected to be mod A. Recommend discharge home with outpatient services.           OT Goals       03/09/18 1447 03/08/18 0923 03/07/18 1041    Transfer Training OT LTG    Transfer Training OT LTG, Date Established   03/07/18  -AN    Transfer Training OT LTG, Time to Achieve   1 wk  -AN    Transfer Training OT LTG, Activity Type   toilet;sit to stand/stand to sit;bed to chair /chair to bed  -AN    Transfer Training OT LTG, [place] Level   minimum assist (75% patient effort)  -AN    Transfer Training OT LTG, Outcome  goal met  -AN     Range of Motion OT LTG    Range of Motion Goal OT LTG, Date Established   03/07/18  -AN    Range of Motion Goal OT LTG, Time to Achieve   1 wk  -AN    Range of Motion Goal OT LTG, AROM Measure   Pt will complete UE HEP daily to increase functional use of Vivek UE's and complete ADL.  -AN    Range of Motion Goal OT LTG, Outcome (P)  goal met  -SS goal ongoing  -AN     Patient Education OT LTG    Patient Education OT LTG, Date Established (P)  03/09/18  -SS      Patient Education OT LTG, Time to Achieve (P)  1 wk  -SS      Patient Education OT LTG, Education Type (P)   precautions per surgeon;brace use/care;positioning;posture/body mechanics;adaptive equipment mgmt  -      Patient Education OT LTG, Additional Goal (P)  Pt educated on shoulder sling and axillary care.  -      Bathing OT LTG    Bathing Goal OT LTG, Date Established (P)  03/09/18  -  03/07/18  -AN    Bathing Goal OT LTG, Time to Achieve (P)  1 wk  -SS  1 wk  -AN    Bathing Goal OT LTG, Activity Type (P)  upper body bathing  -  simulates;upper body bathing;lower body bathing  -AN    Bathing Goal OT LTG, [place] Level (P)  minimum assist (75% patient effort)  -  moderate assist (50% patient effort)  -AN    Bathing Goal OT LTG, Additional Goal (P)  While maintaining shoulder precautions.  -      Bathing Goal OT LTG, Outcome (P)  goal ongoing  - goal met  -AN     UB Dressing OT LTG    UB Dressing Goal OT LTG, Date Established (P)  03/09/18  -      UB Dressing Goal OT LTG, Time to Achieve (P)  1 wk  -      UB Dressing Goal OT LTG, [place] Level (P)  minimum assist (75% patient effort)  -      UB Dressing Goal OT LTG, Additional Goal (P)  While maintaining shoulder precautions.   -      UB Dressing Goal OT LTG, Outcome (P)  goal ongoing  -        User Key  (r) = Recorded By, (t) = Taken By, (c) = Cosigned By    Initials Name Provider Type    EDIE Wong, OT Occupational Therapist     Shivam Baca, OT Student OT Student                Outcome Measures       03/09/18 1134 03/08/18 1318 03/08/18 0858    How much help from another person do you currently need...    Turning from your back to your side while in flat bed without using bedrails?  4  -EH     Moving from lying on back to sitting on the side of a flat bed without bedrails?  4  -EH     Moving to and from a bed to a chair (including a wheelchair)?  4  -EH     Standing up from a chair using your arms (e.g., wheelchair, bedside chair)?  4  -EH     Climbing 3-5 steps with a railing?  3  -EH     To walk in hospital room?  3   -[facility] 6 Clicks Score  22  -     How much help from another is currently needed...    Putting on and taking off regular lower body clothing? (P)  2  -SS  2  -AN    Bathing (including washing, rinsing, and drying) (P)  2  -SS  3  -AN    Toileting (which includes using toilet bed pan or urinal) (P)  3  -SS  3  -AN    Putting on and taking off regular upper body clothing (P)  2  -SS  2  -AN    Taking care of personal grooming (such as brushing teeth) (P)  4  -SS  3  -AN    Eating meals (P)  4  -SS  4  -AN    Score (P)  17  -SS  17  -AN    Modified [place] Scale    Modified [place] Scale (P)  3 - Moderate disability.  Requiring some help, but able to walk without assistance.  -SS  3 - Moderate disability.  Requiring some help, but able to walk without assistance.  -AN    Functional Assessment    Outcome Measure Options  -[facility] 6 Clicks Basic Mobility (PT)  -       03/07/18 1100 03/07/18 1005       How much help from another person do you currently need...    Turning from your back to your side while in flat bed without using bedrails? 4  -CD      Moving from lying on back to sitting on the side of a flat bed without bedrails? 4  -CD      Moving to and from a bed to a chair (including a wheelchair)? 3  -CD      Standing up from a chair using your arms (e.g., wheelchair, bedside chair)? 3  -CD      Climbing 3-5 steps with a railing? 3  -CD      To walk in hospital room? 3  -CD      AM-PAC 6 Clicks Score 20  -CD      How much help from another is currently needed...    Putting on and taking off regular lower body clothing?  2  -AN     Bathing (including washing, rinsing, and drying)  2  -AN     Toileting (which includes using toilet bed pan or urinal)  2  -AN     Putting on and taking off regular upper body clothing  2  -AN     Taking care of personal grooming (such as brushing teeth)  2  -AN     Eating meals  3  -AN     Score  13  -AN     Modified [place] Scale    Modified Ha Scale 3 - Moderate disability.   Requiring some help, but able to walk without assistance.   WITH A.D.   -CD 4 - Moderately severe disability.  Unable to walk without assistance, and unable to attend to own bodily needs without assistance.  -AN     Functional Assessment    Outcome Measure Options AM-PAC 6 Clicks Basic Mobility (PT);Modified [place]  -CD AM-PAC 6 Clicks Daily Activity (OT);Modified Ha  -AN       User Key  (r) = Recorded By, (t) = Taken By, (c) = Cosigned By    Initials Name Provider Type    CD Barb Richter, PT Physical Therapist    JORGE Charles, PT Physical Therapist    EDIE Wong, OT Occupational Therapist    SS Shivam Baca, OT Student OT Student          Time Calculation:   OT Start Time: (P) 1134    Therapy Charges for Today     Code Description Service Date Service Provider Modifiers Qty    [id number] HC OT RE-EVAL 2 3/9/2018 Shivam Baca, OT Student GO 1               Shivam Baca OT Student  3/9/2018

## 2018-03-09 NOTE — ANESTHESIA POSTPROCEDURE EVALUATION
Patient: Bahman Banks    Procedure Summary     Date Anesthesia Start Anesthesia Stop Room / Location    03/09/18 0739 0818 [facility] 10 / [facility]       Procedure Diagnosis Surgeon Provider    LEFT SHOULDER CLOSED REDUCTION (Left Shoulder) No diagnosis on file. MD Darian Hall Jr., MD          Anesthesia Type: general  Last vitals  BP   170/95 (03/09/18 0818)   Temp   97.9 °F (36.6 °C) (03/09/18 0818)   Pulse   89 (03/09/18 0818)   Resp   14 (03/09/18 0818)     SpO2   96 % (03/09/18 0818)     Post Anesthesia Care and Evaluation    Patient location during evaluation: PACU  Patient participation: complete - patient participated  Level of consciousness: awake and alert  Pain score: 0  Pain management: adequate  Airway patency: patent  Anesthetic complications: No anesthetic complications  PONV Status: none  Cardiovascular status: hemodynamically stable and acceptable  Respiratory status: nonlabored ventilation, acceptable and nasal cannula  Hydration status: acceptable

## 2018-03-09 NOTE — PROGRESS NOTES
Orthopedic Daily Progress Note      CC: VINOD overnight. NPO after MN    Pain well controlled  General: no fevers, chills  Abdomen: no nausea, vomiting, or diarrhea    No other complaints    Physical Exam:  I have reviewed the vital signs.  Temp:  [97.2 °F (36.2 °C)-99 °F (37.2 °C)] 98 °F (36.7 °C)  Heart Rate:  [74-91] 81  Resp:  [17-18] 18  BP: (129-149)/() 130/91    Objective  General Appearance:    Alert, cooperative, no distress  Extremities: No clubbing, cyanosis, or edema to lower extremities  Pulses:  2+ in distal surgical extremity  LUE: internally rotated. NV intact distally. Sensation intact in axillary distribution    Results Review:    I have reviewed the labs, radiology results and diagnostic studies:XR L shoulder. L posterior dx of shoulder with reverse hill sachs impaction fracture      Results from last 7 days  Lab Units 03/09/18  0519   WBC 10*3/mm3 10.16   HEMOGLOBIN g/dL 13.0*   PLATELETS 10*3/mm3 270       Results from last 7 days  Lab Units 03/09/18  0519   SODIUM mmol/L 140   POTASSIUM mmol/L 3.6   CO2 mmol/L 24.0   CREATININE mg/dL 0.80   GLUCOSE mg/dL 111*       I have reviewed the medications.    Assessment/Problem List  DOS for L shoulder closed reduction    Plan  NPO  Posted/consented/marked  To OR today      Discharge Planning: I expect patient to be discharged to TBD.    Steven Wang Jr., MD  03/09/18  7:52 AM

## 2018-03-09 NOTE — OP NOTE
SHOULDER CLOSED REDUCTION  Procedure Report    Patient Name:  Bahman Banks  YOB: 1975    Date of Surgery:  3/9/2018     Indications:  41 yo M 5 days s/p L shoulder fracture-dislocation secandary to seizure. XR and CT findings were consistent with L shoulderf fracture/dislocation. He had a large reverse hill-sachs impaction fracture, as well. Due to his anti-coagulation and his ongoing seizure work-up, I did not feel it safe to perform an open procedure, thus we elected to perform a closed reduction and bracing. The risks were discussed with the patient who expressed his understanding and wished to proceed with the procedure. The risks and benefits were discussed with the patient to include:nerve injury,need for further procedures, loss of limb or life.       Pre-op Diagnosis:     L shoulder posterior fracture/dislocation      Post-op Diagnosis:       same    Procedure/CPT® Codes:      Procedure(s):  LEFT SHOULDER CLOSED REDUCTION    Staff:  Surgeon(s):  Steven Wang Jr., MD         Anesthesia: Choice    Estimated Blood Loss: none    Implants:    Nothing was implanted during the procedure    Specimen:                None      Findings: L shoulder posterior fracture-dislocation with large reverse hill-sachs impaction fracture    Complications: none apparent    Description of Procedure: After informed consent had been obtained,  The L upper extremity was marked. The patient was then taken back to the OR where a timeout was performed. The patient then underwent MAC. Under fluoroscopic guidance, the L shoulder was reduced. The patient was then awakened from anesthesia,transferred to the [facility], and transferred to the post anesthesia care unit in stable condition.    He will be placed in an external rotation brace and remain NWB LUE. Due to the large Hill-Sachs impaction fracture, he will likely need a future procedure to afford him improved shoulder stability. We will wait until he is  medically more stable to perform that procedure.        Steven Wang Jr., MD     Date: 3/9/2018  Time: 8:15 AM

## 2018-03-09 NOTE — PROGRESS NOTES
[facility] Medicine Services  PROGRESS NOTE    Patient Name: Bahman Banks  : 1975  MRN: [id number]    Date of Admission: 3/6/2018  Length of Stay: 3  Primary Care Physician: No Known Provider    Subjective   Subjective     CC:  F/U AMS, seizure    HPI:  Patient seen this afternoon after the OR. Sleeping. No complaints. Had a lot of pain earlier but better with one time dose of morphine.    Review of Systems  Gen-no fevers, no chills  CV-no chest pain, no palpitations  Resp-no cough, no dyspnea  GI-no N/V/D, no abd pain      Otherwise ROS is negative except as mentioned in the HPI.    Objective   Objective     Vital Signs:   Temp:  [97.2 °F (36.2 °C)-99 °F (37.2 °C)] 98 °F (36.7 °C)  Heart Rate:  [74-91] 76  Resp:  [14-18] 18  BP: (129-170)/() 144/103  Total (NIH Stroke Scale): 4     Physical Exam:  Gen-no acute distress  CV-RRR, S1 S2 normal, no m/r/g  Resp-CTAB, no wheezes  Abd-soft, NT, ND, +BS  Ext-no edema  Neuro-A&Ox3, weak  on the right, lower extremities intact strength; speech clear  MSK-left arm in sling  Psych-flat      Results Reviewed:  I have personally reviewed current lab, radiology, and data and agree.      Results from last 7 days  Lab Units 18  0519 18  0612 18  0800 18  1435  18  1410   WBC 10*3/mm3 10.16 11.00* 12.04* 13.94*  < >  --    HEMOGLOBIN g/dL 13.0* 12.8* 13.1 14.1  < >  --    HEMOGLOBIN, POC g/dL  --   --   --   --   --  14.3   HEMATOCRIT % 40.3 39.3 40.3 43.1  < >  --    HEMATOCRIT POC %  --   --   --   --   --  42   PLATELETS 10*3/mm3 270 260 236 261  < >  --    INR  0.96  --   --  0.96  --  1.1   < > = values in this interval not displayed.    Results from last 7 days  Lab Units 18  0519 18  0612 18  0800 18  1435   SODIUM mmol/L 140 139 139  --  141   POTASSIUM mmol/L 3.6 3.6 3.8  --  4.1   CHLORIDE mmol/L 106 110* 111*  --  104   CO2 mmol/L 24.0 28.0 24.0  --  26.0   BUN  mg/dL 13 13 10  --  8*   CREATININE mg/dL 0.80 0.90 0.80  --  1.00   GLUCOSE mg/dL 111* 114* 122*  --  128*   CALCIUM mg/dL 9.0 9.1 8.9  --  9.2   ALT (SGPT) U/L  --   --   --   --  46*   AST (SGOT) U/L  --   --   --   --  25   TROPONIN I ng/mL  --   --  <0.006 <0.006 <0.006     Estimated Creatinine Clearance: 145.2 mL/min (by C-G formula based on Cr of 0.8).  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      COMPARISON: NONE.     FINDINGS: Images of the left shoulder demonstrate posterior dislocation  with comminuted fracture of the humeral head.           Impression:       Posterior left shoulder dislocation with comminuted fracture  of the humeral head.     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         TECHNIQUE: 5 mm unenhanced images through the brain.     The radiation dose reduction device was turned on for each scan per the  ALARA (As Low as Reasonably Achievable) protocol.     COMPARISON: 03/06/2018.     FINDINGS: The patient history indicates right-sided weakness and slurred  speech. The calvarium  appears intact. There is a 1 cm mucous cyst in the  left sphenoid sinus. The paranasal sinuses and mastoids otherwise appear  clear. Soft tissue window images show no evidence of hemorrhage,  contusion, edema, mass or mass effect, infarct, hydrocephalus, or  abnormal extra-axial collection.       Impression:       No evidence of acute intracranial disease.     D:  03/07/2018  E:  03/07/2018           This report was finalized on 3/8/2018 10:03 PM by DR. Griffin Lock MD.       FL C Arm During Surgery [[id number]] Updated:  03/09/18 0828        Results for orders placed during the hospital encounter of 03/06/18   Adult Transthoracic Echo Complete W/ Cont if Necessary Per Protocol    Narrative · Left ventricular systolic function is normal. Estimated EF = 60%.  · Left ventricular wall thickness is consistent with mild-to-moderate   concentric hypertrophy.  · Left ventricular diastolic dysfunction (grade I a) consistent with   impaired relaxation.  · There is a prosthetic aortic valve  · Ao mean PG 22.1 mmHg  · Mild tricuspid valve regurgitation is present.  · RVSP(TR) 20.0 mmHg          I have reviewed the medications.    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 Wife found him laying on the floor with slurred speech. Flown to [facility] ER for stroke evaluation. While having CT perfusion, he had a witnessed seizure, so the CT was  aborted and he was given Ativan and Keppra. Admitted to hospitalists for further management.       Assessment & Plan:  --Repeat CT head no evidence of acute infarction. Unable to do MRI due to pacemaker. Neurology has seen. Do not feel he suffered a stroke. Continue on Keppra. EEG did not show epileptiform activity.   --Left shoulder x-ray was unremarkable. Due to persistent pain, a CT was obtained which shows a posterior left humerus fracture- dislocation. Dr. Wang took to OR today for closed reduction. NWRONNIE BLANCO, placed in external rotation brace. He may need a future procedure to improve shoulder stability.   --Given Afib and valvular disease he is at risk for thromboembolic events. Uncertain as to why patient was taken off Coumadin. He needs to go back on it, per Neurology recs. Will switch to Lovenox and Coumadin bridge. I thought about NOAC given his difficulties maintaining therapeutic INR, however these are not approved for valvular Afib so will stick with Coumadin. Start Coumadin tomorrow? if okay with Ortho.   --Work on pain control today. Switched to Percocet. D/C home tomorrow if no further issues.    DVT Prophylaxis: Lovenox    CODE STATUS: Full Code    Disposition: I expect the patient to be discharged in 1 day      Electronically signed by Gala Mcclellan MD, 03/09/18, 2:44 PM.

## 2018-03-09 NOTE — ANESTHESIA PREPROCEDURE EVALUATION
Anesthesia Evaluation     Patient summary reviewed and Nursing notes reviewed                Airway   Mallampati: II  TM distance: >3 FB  Neck ROM: full  no difficulty expected  Dental - normal exam     Pulmonary - normal exam   (+) a smoker Current,   Cardiovascular - normal exam    (+) pacemaker (interrogated this am) pacemaker, hypertension, valvular problems/murmurs (s/p AVR) AS, dysrhythmias (paroxysmal) Atrial Fib, hyperlipidemia,     ROS comment: Normal EF; prosthetic aortic valve (3 years ago secondary bicuspid valve)    Neuro/Psych  (+) seizures, CVA (chronic right side weakness; speach and vision affected) residual symptoms,     GI/Hepatic/Renal/Endo - negative ROS     Musculoskeletal (-) negative ROS    Abdominal  - normal exam    Bowel sounds: normal.   Substance History - negative use     OB/GYN negative ob/gyn ROS         Other                        Anesthesia Plan    ASA 4     general   (Propofol)  intravenous induction   Anesthetic plan and risks discussed with patient.    Plan discussed with CRNA.

## 2018-03-10 LAB
ANION GAP SERPL CALCULATED.3IONS-SCNC: 4 MMOL/L (ref 3–11)
BUN BLD-MCNC: 12 MG/DL (ref 9–23)
BUN/CREAT SERPL: 15 (ref 7–25)
CALCIUM SPEC-SCNC: 8.7 MG/DL (ref 8.7–10.4)
CHLORIDE SERPL-SCNC: 109 MMOL/L (ref 99–109)
CO2 SERPL-SCNC: 26 MMOL/L (ref 20–31)
CREAT BLD-MCNC: 0.8 MG/DL (ref 0.6–1.3)
DEPRECATED RDW RBC AUTO: 43.9 FL (ref 37–54)
ERYTHROCYTE [DISTWIDTH] IN BLOOD BY AUTOMATED COUNT: 13.1 % (ref 11.3–14.5)
GFR SERPL CREATININE-BSD FRML MDRD: 106 ML/MIN/1.73
GLUCOSE BLD-MCNC: 100 MG/DL (ref 70–100)
HCT VFR BLD AUTO: 39.7 % (ref 38.9–50.9)
HGB BLD-MCNC: 12.9 G/DL (ref 13.1–17.5)
INR PPP: 0.95 (ref 0.91–1.09)
MCH RBC QN AUTO: 29.5 PG (ref 27–31)
MCHC RBC AUTO-ENTMCNC: 32.5 G/DL (ref 32–36)
MCV RBC AUTO: 90.8 FL (ref 80–99)
PLATELET # BLD AUTO: 275 10*3/MM3 (ref 150–450)
PMV BLD AUTO: 10.3 FL (ref 6–12)
POTASSIUM BLD-SCNC: 3.6 MMOL/L (ref 3.5–5.5)
PROTHROMBIN TIME: 10 SECONDS (ref 9.6–11.5)
RBC # BLD AUTO: 4.37 10*6/MM3 (ref 4.2–5.76)
SODIUM BLD-SCNC: 139 MMOL/L (ref 132–146)
WBC NRBC COR # BLD: 10.41 10*3/MM3 (ref 3.5–10.8)

## 2018-03-10 PROCEDURE — 25010000002 ENOXAPARIN PER 10 MG: Performed by: HOSPITALIST

## 2018-03-10 PROCEDURE — 80048 BASIC METABOLIC PNL TOTAL CA: CPT | Performed by: HOSPITALIST

## 2018-03-10 PROCEDURE — 85610 PROTHROMBIN TIME: CPT | Performed by: INTERNAL MEDICINE

## 2018-03-10 PROCEDURE — 85027 COMPLETE CBC AUTOMATED: CPT | Performed by: HOSPITALIST

## 2018-03-10 PROCEDURE — 99233 SBSQ HOSP IP/OBS HIGH 50: CPT | Performed by: INTERNAL MEDICINE

## 2018-03-10 PROCEDURE — 97110 THERAPEUTIC EXERCISES: CPT

## 2018-03-10 RX ORDER — WARFARIN SODIUM 2.5 MG/1
5 TABLET ORAL
Status: DISCONTINUED | OUTPATIENT
Start: 2018-03-10 | End: 2018-03-11 | Stop reason: HOSPADM

## 2018-03-10 RX ORDER — OXYCODONE HYDROCHLORIDE AND ACETAMINOPHEN 5; 325 MG/1; MG/1
2 TABLET ORAL EVERY 4 HOURS PRN
Status: DISCONTINUED | OUTPATIENT
Start: 2018-03-10 | End: 2018-03-11 | Stop reason: HOSPADM

## 2018-03-10 RX ADMIN — ATORVASTATIN CALCIUM 80 MG: 40 TABLET, FILM COATED ORAL at 20:12

## 2018-03-10 RX ADMIN — OXYCODONE AND ACETAMINOPHEN 1 TABLET: 5; 325 TABLET ORAL at 06:37

## 2018-03-10 RX ADMIN — METOPROLOL TARTRATE 25 MG: 25 TABLET ORAL at 20:12

## 2018-03-10 RX ADMIN — RANOLAZINE 500 MG: 500 TABLET, FILM COATED, EXTENDED RELEASE ORAL at 08:40

## 2018-03-10 RX ADMIN — LEVETIRACETAM 500 MG: 500 TABLET, FILM COATED ORAL at 08:40

## 2018-03-10 RX ADMIN — ENOXAPARIN SODIUM 80 MG: 80 INJECTION SUBCUTANEOUS at 08:40

## 2018-03-10 RX ADMIN — OXYCODONE AND ACETAMINOPHEN 1 TABLET: 5; 325 TABLET ORAL at 10:22

## 2018-03-10 RX ADMIN — LEVETIRACETAM 500 MG: 500 TABLET, FILM COATED ORAL at 20:12

## 2018-03-10 RX ADMIN — RANOLAZINE 500 MG: 500 TABLET, FILM COATED, EXTENDED RELEASE ORAL at 20:12

## 2018-03-10 RX ADMIN — METOPROLOL TARTRATE 25 MG: 25 TABLET ORAL at 08:40

## 2018-03-10 RX ADMIN — OXYCODONE AND ACETAMINOPHEN 2 TABLET: 5; 325 TABLET ORAL at 17:35

## 2018-03-10 RX ADMIN — QUETIAPINE FUMARATE 150 MG: 100 TABLET ORAL at 20:12

## 2018-03-10 RX ADMIN — ASPIRIN 325 MG ORAL TABLET 325 MG: 325 PILL ORAL at 08:40

## 2018-03-10 RX ADMIN — CYCLOBENZAPRINE HYDROCHLORIDE 5 MG: 10 TABLET, FILM COATED ORAL at 12:35

## 2018-03-10 RX ADMIN — ENOXAPARIN SODIUM 80 MG: 80 INJECTION SUBCUTANEOUS at 20:11

## 2018-03-10 RX ADMIN — WARFARIN SODIUM 5 MG: 2.5 TABLET ORAL at 17:23

## 2018-03-10 RX ADMIN — OXYCODONE AND ACETAMINOPHEN 2 TABLET: 5; 325 TABLET ORAL at 14:05

## 2018-03-10 RX ADMIN — OXYCODONE AND ACETAMINOPHEN 2 TABLET: 5; 325 TABLET ORAL at 21:36

## 2018-03-10 NOTE — PLAN OF CARE
Problem: Patient Care Overview (Adult)  Goal: Plan of Care Review  Outcome: Ongoing (interventions implemented as appropriate)   03/09/18 1900   Coping/Psychosocial Response Interventions   Plan Of Care Reviewed With patient   Outcome Evaluation   Outcome Summary/Follow up Plan Pt complains of pain throughout shift. Hypertensive throughout shift doctor aware. will continue to monitor   Patient Care Overview   Progress improving

## 2018-03-10 NOTE — PLAN OF CARE
Problem: Patient Care Overview (Adult)  Goal: Plan of Care Review  Outcome: Ongoing (interventions implemented as appropriate)   03/10/18 0035   Coping/Psychosocial Response Interventions   Plan Of Care Reviewed With patient   Outcome Evaluation   Outcome Summary/Follow up Plan pt resting well; paced rhythm; left arm in sling; vss; pain managed with prn meds   Patient Care Overview   Progress improving     Goal: Adult Individualization and Mutuality  Outcome: Ongoing (interventions implemented as appropriate)    Goal: Discharge Needs Assessment  Outcome: Ongoing (interventions implemented as appropriate)      Problem: Seizure Disorder/Epilepsy (Adult)  Goal: Signs and Symptoms of Listed Potential Problems Will be Absent or Manageable (Seizure Disorder/Epilepsy)  Outcome: Ongoing (interventions implemented as appropriate)      Problem: Pain, Acute (Adult)  Goal: Identify Related Risk Factors and Signs and Symptoms  Outcome: Ongoing (interventions implemented as appropriate)    Goal: Acceptable Pain Control/Comfort Level  Outcome: Ongoing (interventions implemented as appropriate)

## 2018-03-10 NOTE — PLAN OF CARE
Problem: Patient Care Overview  Goal: Plan of Care Review  Outcome: Ongoing (interventions implemented as appropriate)   03/10/18 1805   Coping/Psychosocial   Plan of Care Reviewed With patient   Plan of Care Review   Progress improving   OTHER   Outcome Summary Patient states that the percocet 10mg has helped with his pain control. Ambulated well with PT, Wearing sling to right arm, fingers w/p/m, palpable pulses.       03/10/18 1805   Coping/Psychosocial   Plan of Care Reviewed With patient   Plan of Care Review   Progress improving   OTHER   Outcome Summary Patient states that the percocet 10mg has helped with his pain control. Ambulated well with PT, Wearing sling to right arm, fingers w/p/m, palpable pulses.        Problem: Seizure Disorder/Epilepsy (Adult)  Goal: Signs and Symptoms of Listed Potential Problems Will be Absent, Minimized or Managed (Seizure Disorder/Epilepsy)  Outcome: Ongoing (interventions implemented as appropriate)     03/10/18 1805   Goal/Outcome Evaluation   Problems Assessed (Seizure Disorder/Epilepsy) all   Problems Present (Seizure/Epilepsy) none       Problem: Pain, Acute (Adult)  Goal: Identify Related Risk Factors and Signs and Symptoms  Outcome: Ongoing (interventions implemented as appropriate)   03/10/18 1805   Pain, Acute (Adult)   Related Risk Factors (Acute Pain) procedure/treatment   Signs and Symptoms (Acute Pain) verbalization of pain descriptors     Goal: Acceptable Pain Control/Comfort Level  Outcome: Ongoing (interventions implemented as appropriate)   03/10/18 1805   Pain, Acute (Adult)   Acceptable Pain Control/Comfort Level making progress toward outcome

## 2018-03-10 NOTE — THERAPY DISCHARGE NOTE
Acute Care - Physical Therapy Treatment Note/Discharge  [facility]     Patient Name: Bahman Banks  : 1975  MRN: [id number]  Today's Date: 3/10/2018     Date of Referral to PT: 18       Admit Date: 3/6/2018    Visit Dx:    ICD-10-CM ICD-9-CM   1. Altered mental status, unspecified altered mental status type R41.82 780.97   2. Observed seizure-like activity R56.9 780.39   3. Leukocytosis, unspecified type D72.829 288.60   4. Facial asymmetry Q67.0 754.0   5. Impaired mobility and ADLs Z74.09 799.89   6. 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 IMPORTANCE OF WEARING BRACE AS INSTRUCTED BY MD.  03/10/18 1043     Done Acceptance TB DU   18 1900     Done Acceptance TB DU   18 1859     Done Acceptance E VU,NR   18 1419     Done Acceptance E VU,NR BENEFITS OF OOB ACTIVITY, SAFETY WITH MOBILITY, PROGRESSION OF POC.  18 1156                      User Key     Initials Effective Dates Name Provider Type Discipline     06/19/15 -  Barb Richter, PT Physical Therapist PT     06/19/15 -  Celia Charles, PT Physical Therapist PT     07/10/17 -  Kristen Loja, RN Registered Nurse Nurse                    PT Rehab Goals     Row Name  03/10/18 1000             Transfer Goal 1 (PT)    Activity/Assistive Device (Transfer Goal 1, PT) transfers, all  -ES      [place] Level/Cues Needed (Transfer Goal 1, PT) independent  -ES      Time Frame (Transfer Goal 1, PT) 2 weeks;long term goal (LTG)  -ES      Progress/Outcome (Transfer Goal 1, PT) goal ongoing  -ES         Gait Training Goal 1 (PT)    Activity/Assistive Device (Gait Training Goal 1, PT) gait (walking locomotion)  -ES      [place] Level (Gait Training Goal 1, PT) independent  -ES      Distance (Gait Goal 1, PT) 600  -ES      Time Frame (Gait Training Goal 1, PT) 2 weeks;long term goal (LTG)  -ES      Progress/Outcome (Gait Training Goal 1, PT) goal ongoing  -ES        User Key  (r) = Recorded By, (t) = Taken By, (c) = Cosigned By    Initials Name Provider Type    ES Eloisa Sy, PT Physical Therapist        Therapy Treatment    Therapy Treatment / Health Promotion    Treatment Time/Intention  Discipline: physical therapist  Document Type: discharge evaluation/summary (re-eval)  Subjective Information: no complaints  Mode of Treatment: physical therapy  Patient/Family Observations: PT SUPINE UPON ARRIVAL WEARING SLING L UE SLING. PT IS NOW S/P CLOSED REDUCTION OF L SHOULDER [place] SACHS FX/DISLOCATION ON 3/9/18. PT IS NWB L UE AND TO WEAR SLING.   Care Plan Review: care plan/treatment goals reviewed  Therapy Frequency (PT Clinical Impression): other (see comments) (RE-EVAL POST PROCEDURE AND D/C TODAY. )  Patient Effort: good  Plan of Care Review  Plan of Care Reviewed With: patient    Vitals/Pain/Safety  Vital Signs  Pre Systolic BP Rehab:  (VSS. PT CLEARED FOR TREATMENT. )  Pain Assessment  Additional Documentation: Pain Scale: Numbers Pre/Post-Treatment (Group)  Pain Scale: Numbers Pre/Post-Treatment  Pain Scale: Numbers, Pretreatment: 8/10  Pain Scale: Numbers, Post-Treatment: 9/10  Pain Location - Side: Left  Pain Location: shoulder  Pain Intervention(s): Repositioned,  Ambulation/increased activity (NSG TO BRING PAIN MEDS. MD INCREASED DOSAGE. )  Pain Scale: Word Pre/Post-Treatment  Pain Location - Side: Left  Pain Location: shoulder  Pain Intervention(s): Repositioned, Ambulation/increased activity (NSG TO BRING PAIN MEDS. MD INCREASED DOSAGE. )  Pain Scale: FACES Pre/Post-Treatment  Pain Location - Side: Left  Pain Location: shoulder  Pain Intervention(s): Repositioned, Ambulation/increased activity (NSG TO BRING PAIN MEDS. MD INCREASED DOSAGE. )  Safety Issues, Functional Mobility  Safety Issues Affecting Function (Mobility): ability to follow commands  Positioning and Restraints  Pre-Treatment Position: in bed  Post Treatment Position: bed  In Bed: supine, call light within reach (NSG NOTIFIED PT SAFE TO BE UP AD BUDDY. )    Mobility,ADL,Motor, Modality  Bed Mobility Assessment/Treatment  Supine-Sit [place] (Bed Mobility): conditional independence  Assistive Device (Bed Mobility): head of bed elevated  Transfer Assessment/Treatment  Transfer Assessment/Treatment: sit-stand transfer, stand-sit transfer  Sit-Stand Transfer  Sit-Stand [place] (Transfers): independent  Stand-Sit Transfer  Stand-Sit [place] (Transfers): independent  Gait/Stairs Assessment/Training  Gait/Stairs Assessment/Training: gait/ambulation independence  [place] Level (Gait): independent (NO LOB WITH BACWARDS, TURNING 360, RETRIEVING ITEM OFF FLOOR)  Distance in Feet (Gait): 600  Pattern (Gait): step-through                 ROM/MMT             Sensory, Edema, Orthotics          Cognition, Communication, Swallow  Cognitive Assessment Intervention- SLP  Cognitive Function (Cognition): WFL  Orientation Status (Cognition): person, place, time    Outcome Summary  Outcome Summary/Treatment Plan (PT)  Daily Summary of Progress (PT): prepare for discharge (ALL P.T. GOALS MET. WILL DEFER TO O.T. FOR L SHOULDER)  Anticipated Discharge Disposition (PT): home or self care    PT Recommendation and  Plan  Anticipated Discharge Disposition (PT): home or self care  Therapy Frequency (PT Clinical Impression): other (see comments) (RE-EVAL POST PROCEDURE AND D/C TODAY. )  Daily Summary of Progress (PT): prepare for discharge (ALL P.T. GOALS MET. WILL DEFER TO O.T. FOR L SHOULDER)  Plan of Care Reviewed With: patient  Progress: improving  Outcome Summary: PT HAS MET P.T. GOALS. WILL D/C P.T. AT THIS TIME. PT IS SAFE TO BE UP AD BUDDY FOR GAIT IN ANDERS WEARING L UE SLING. WILL DEFER TO O.T. FOR L UE DEFICITS/ADL'S.           Outcome Measures     Row Name 03/10/18 1008 03/09/18 1134 03/08/18 1318       How much help from another person do you currently need...    Turning from your back to your side while in flat bed without using bedrails? 4  -CD  -- 4  -EH    Moving from lying on back to sitting on the side of a flat bed without bedrails? 4  -CD  -- 4  -EH    Moving to and from a bed to a chair (including a wheelchair)? 4  -CD  -- 4  -EH    Standing up from a chair using your arms (e.g., wheelchair, bedside chair)? 4  -CD  -- 4  -EH    Climbing 3-5 steps with a railing? 4   EXPECTED INDEPENDENCE.   -CD  -- 3  -EH    To walk in hospital room? 4  -CD  -- 3  -EH    AM-PAC 6 Clicks Score 24  -CD  -- 22  -EH       How much help from another is currently needed...    Putting on and taking off regular lower body clothing?  -- 2  -AN (r) SS (t) AN (c)  --    Bathing (including washing, rinsing, and drying)  -- 2  -AN (r) SS (t) AN (c)  --    Toileting (which includes using toilet bed pan or urinal)  -- 3  -AN (r) SS (t) AN (c)  --    Putting on and taking off regular upper body clothing  -- 2  -AN (r) SS (t) AN (c)  --    Taking care of personal grooming (such as brushing teeth)  -- 4  -AN (r) SS (t) AN (c)  --    Eating meals  -- 4  -AN (r) SS (t) AN (c)  --    Score  -- 17  -AN (r) SS (t)  --       Modified [place] Scale    Modified [place] Scale 1 - No significant disability despite symptoms.  Able to carry out all usual  duties and activities.  -CD 3 - Moderate disability.  Requiring some help, but able to walk without assistance.  -AN (r) SS (t) AN (c)  --       Functional Assessment    Outcome Measure Options AM-[facility] 6 Clicks Basic Mobility (PT);Modified [place]  -CD  -- -[facility] 6 Clicks Basic Mobility (PT)  -    Row Name 03/08/18 0858 03/07/18 1100          How much help from another person do you currently need...    Turning from your back to your side while in flat bed without using bedrails?  -- 4  -CD     Moving from lying on back to sitting on the side of a flat bed without bedrails?  -- 4  -CD     Moving to and from a bed to a chair (including a wheelchair)?  -- 3  -CD     Standing up from a chair using your arms (e.g., wheelchair, bedside chair)?  -- 3  -CD     Climbing 3-5 steps with a railing?  -- 3  -CD     To walk in hospital room?  -- 3  -CD     AM-[facility] 6 Clicks Score  -- 20  -CD        How much help from another is currently needed...    Putting on and taking off regular lower body clothing? 2  -AN  --     Bathing (including washing, rinsing, and drying) 3  -AN  --     Toileting (which includes using toilet bed pan or urinal) 3  -AN  --     Putting on and taking off regular upper body clothing 2  -AN  --     Taking care of personal grooming (such as brushing teeth) 3  -AN  --     Eating meals 4  -AN  --     Score 17  -AN  --        Modified [place] Scale    Modified [place] Scale 3 - Moderate disability.  Requiring some help, but able to walk without assistance.  -AN 3 - Moderate disability.  Requiring some help, but able to walk without assistance.   WITH A.D.   -CD        Functional Assessment    Outcome Measure Options  -- AM-PAC 6 Clicks Basic Mobility (PT);Modified [place]  -CD       User Key  (r) = Recorded By, (t) = Taken By, (c) = Cosigned By    Initials Name Provider Type    CD Barb Richter, PT Physical Therapist     Celia Charles, PT Physical Therapist    EDIE Wong, OT Occupational Therapist    SS  Shivam Baca, OT Student OT Student           Time Calculation:         PT Charges     Row Name 03/10/18 1049             Time Calculation    Start Time 1008  -CD      PT Received On 03/10/18  -CD         Time Calculation- PT    Total Timed Code Minutes- PT 23 minute(s)  -CD        User Key  (r) = Recorded By, (t) = Taken By, (c) = Cosigned By    Initials Name Provider Type    CD Barb Richter, PT Physical Therapist          Therapy Charges for Today     Code Description Service Date Service Provider Modifiers Qty    58693020298 HC PT THER PROC EA 15 MIN 3/10/2018 Barb Richter, PT GP 2          PT G-Codes  Outcome Measure Options: AM-PAC 6 Clicks Basic Mobility (PT), Modified Ha    PT Discharge Summary  Anticipated Discharge Disposition (PT): home or self care  Reason for Discharge: All goals achieved  Outcomes Achieved: Able to achieve all goals within established timeline  Discharge Destination: Home with assist    Barb Richter, PT  3/10/2018

## 2018-03-10 NOTE — PLAN OF CARE
Problem: Patient Care Overview  Goal: Plan of Care Review  Outcome: Outcome(s) achieved Date Met: 03/10/18   03/10/18 1044   Coping/Psychosocial   Plan of Care Reviewed With patient   Plan of Care Review   Progress improving   OTHER   Outcome Summary PT HAS MET P.T. GOALS. WILL D/C P.T. AT THIS TIME. PT IS SAFE TO BE UP AD BUDDY FOR GAIT IN ANDERS WEARING L UE SLING. WILL DEFER TO O.T. FOR L UE DEFICITS/ADL'S.

## 2018-03-10 NOTE — PROGRESS NOTES
[facility] Medicine Services  PROGRESS NOTE    Patient Name: Bahman Banks  : 1975  MRN: [id number]    Date of Admission: 3/6/2018  Length of Stay: 4  Primary Care Physician: No Known Provider    Subjective   Subjective     CC:  F/U AMS, seizure    HPI:  No issues overnight, pain is not well controlled with Percocet- thinks it is not strong enough.     Review of Systems  Gen-no fevers, no chills  CV-no chest pain, no palpitations  Resp-no cough, no dyspnea  GI-no N/V/D, no abd pain      Otherwise ROS is negative except as mentioned in the HPI.    Objective   Objective     Vital Signs:   Temp:  [97.6 °F (36.4 °C)-99 °F (37.2 °C)] 98.3 °F (36.8 °C)  Heart Rate:  [70-85] 85  Resp:  [14-18] 18  BP: (116-141)/() 126/96  Total (NIH Stroke Scale): 4     Physical Exam:  Gen-no acute distress  CV-RRR, S1 S2 normal, no m/r/g  Resp-CTAB, no wheezes  Abd-soft, NT, ND, +BS  Ext-no edema  Neuro-A&Ox3, weak  on the right, lower extremities intact strength; speech clear  MSK-left arm in sling  Psych-flat      Results Reviewed:  I have personally reviewed current lab, radiology, and data and agree.      Results from last 7 days  Lab Units 03/10/18  0505 18  0519 18  0612  18  1435 18  1410   WBC 10*3/mm3 10.41 10.16 11.00*  < > 13.94*  --    HEMOGLOBIN g/dL 12.9* 13.0* 12.8*  < > 14.1  --    HEMOGLOBIN, POC g/dL  --   --   --   --   --  14.3   HEMATOCRIT % 39.7 40.3 39.3  < > 43.1  --    HEMATOCRIT POC %  --   --   --   --   --  42   PLATELETS 10*3/mm3 275 270 260  < > 261  --    INR   --  0.96  --   --  0.96 1.1   < > = values in this interval not displayed.    Results from last 7 days  Lab Units 03/10/18  0505 18  0519 18  0612 18  0800 18  1435   SODIUM mmol/L 139 140 139 139  --  141   POTASSIUM mmol/L 3.6 3.6 3.6 3.8  --  4.1   CHLORIDE mmol/L 109 106 110* 111*  --  104   CO2 mmol/L 26.0 24.0 28.0 24.0  --  26.0   BUN mg/dL  12 13 13 10  --  8*   CREATININE mg/dL 0.80 0.80 0.90 0.80  --  1.00   GLUCOSE mg/dL 100 111* 114* 122*  --  128*   CALCIUM mg/dL 8.7 9.0 9.1 8.9  --  9.2   ALT (SGPT) U/L  --   --   --   --   --  46*   AST (SGOT) U/L  --   --   --   --   --  25   TROPONIN I ng/mL  --   --   --  <0.006 <0.006 <0.006     Estimated Creatinine Clearance: 145.2 mL/min (by C-G formula based on SCr of 0.8 mg/dL).  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     TECHNIQUE: CT datasets of the left shoulder were performed without  intravenous or intra-articular contrast.     Datasets were acquired in the transaxial planes.     2D reconstructive reformatted images were formulated at the workstation  in the coronal and sagittal planes. Following this, Three-dimensional  shaded-surface display color mapped and  rotational-capable reconstructed images were formulated at the  workstation.     The radiation dose reduction device was turned on for each scan per the  ALARA (As Low as Reasonably Achievable) protocol.     COMPARISON: None.     FINDINGS:   1. There is a comminuted fracture of the left humeral head with a large  avulsion of bone fragment from the anterior aspect of the left humeral  head. This is a multidirectional fracture through the   left humeral head which includes the apex of the left humeral head as  well as the central head and the anatomic neck of the left humerus.  There is no evidence of  dislocation of the largest portion of the  humeral head from the glenoid which is well aligned.     2. The distal clavicle is intact. The acromion on is intact. Evidence of  scapular fracture is not identified. There is no evidence of fracture of  the glenoid. Rib cage of the left upper chest is unremarkable and  intact. The left upper lung is clear. Patient has a pacemaker generator  in place in the left upper anterior chest wall.     3. There is soft tissue swelling around the deltoid muscle. There is  joint effusion associated with this fracture which could be hematoma  although it is low-attenuation. Course of the brachial plexus is  unaffected and unremarkable.       Impression:          Multidirectional comminuted fracture left humeral head extending from  the apex of the left humeral head down through the humeral head and  approaching the anatomic neck of the proximal left humerus. There is a  large a avulsed fragment from the anterior portion of the mid left  humeral head. Humeral head dislocation from the glenoid is otherwise not  identified. The scapula, acromion and distal clavicle are intact.     Soft tissue windows demonstrate joint effusion and edema of the  surrounding deep muscle structures, especially the deltoid. There is no  evidence of embarrassment or hematoma along the course of the brachial  plexus. There is mild stranding in the fat around the brachial plexus.  The left upper chest is clear and the rib cage on the left is intact to  the extent that it is included on the data set.     DICTATED:     03/10/2018  EDITED/ls :     03/10/2018         This report was finalized on 3/10/2018 12:11 PM by Dr. Rock Matias MD.       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     COMPARISON: NONE.     FINDINGS: Nine seconds of fluoroscopy used for postreduction imaging and  2 images available for closed reduction of a comminuted left shoulder  fracture or dislocation. There is satisfactory positioning of the  shoulder. Comminuted fracture fragments are identified of the humeral  head.       Impression:       Satisfactory position with fluoroscopy for closed  postreduction imaging of a posterior shoulder dislocation.     D:  03/09/2018  E:  03/09/2018                  Results for orders placed during the hospital encounter of 03/06/18   Adult Transthoracic Echo Complete W/ Cont if Necessary Per Protocol    Narrative · Left ventricular systolic function is normal. Estimated EF = 60%.  · Left ventricular wall thickness is consistent with mild-to-moderate   concentric hypertrophy.  · Left ventricular diastolic dysfunction (grade I a) consistent with   impaired relaxation.  · There is a prosthetic aortic valve  · Ao mean PG 22.1 mmHg  · Mild tricuspid valve regurgitation is present.  · RVSP(TR) 20.0 mmHg          I have reviewed the medications.    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 male with hx of tissue aortic valve replacement in 2016, Afib, SSS s/p PPM, CVA with residual dysarthria and right sided weakness, and chronic pain who presents  with AMS. Wife found him laying on the floor with slurred speech. Flown to [facility] ER for stroke evaluation. While having CT perfusion, he had a witnessed seizure, so the CT was aborted and he was given Ativan and Keppra. Admitted to hospitalists for further management.       Assessment & Plan:  --Repeat CT head no evidence of acute infarction. Unable to do MRI due to pacemaker. Neurology has seen. Do not feel he suffered a stroke. Continue on Keppra. EEG did not show epileptiform activity.   --Left shoulder x-ray was unremarkable. Due to persistent pain, a CT was obtained which shows a posterior left humerus fracture- dislocation. Dr. Wang took to OR 2/9 for closed reduction. NWB LUE, placed in external rotation brace. He may need a future procedure to improve shoulder stability.   --Given Afib and valvular disease he is at risk for thromboembolic events. Uncertain as to why patient was taken off Coumadin. He needs to go back on it, per Neurology recs. Will switch to Lovenox and Coumadin bridge. I thought about NOAC given his difficulties maintaining therapeutic INR, however these are not approved for valvular Afib so will stick with Coumadin. Start Coumadin today  --Work on pain control today. Switched to Percocet but pt states that it is not alleviating pain- will increase to 10mg and, if well controlled, d/c home tomorrow.     DVT Prophylaxis: Lovenox    CODE STATUS: Full Code    Disposition: I expect the patient to be discharged in 1 day      Electronically signed by Ayah Larson MD, 03/10/18, 12:50 PM.

## 2018-03-11 VITALS
TEMPERATURE: 97.9 F | WEIGHT: 190 LBS | RESPIRATION RATE: 16 BRPM | HEART RATE: 82 BPM | BODY MASS INDEX: 25.73 KG/M2 | HEIGHT: 72 IN | OXYGEN SATURATION: 93 % | DIASTOLIC BLOOD PRESSURE: 95 MMHG | SYSTOLIC BLOOD PRESSURE: 122 MMHG

## 2018-03-11 PROBLEM — R41.82 ALTERED MENTAL STATUS: Status: RESOLVED | Noted: 2018-03-06 | Resolved: 2018-03-11

## 2018-03-11 PROBLEM — R07.9 CHEST PAIN: Status: RESOLVED | Noted: 2018-03-06 | Resolved: 2018-03-11

## 2018-03-11 LAB
INR PPP: 0.99 (ref 0.91–1.09)
PROTHROMBIN TIME: 10.4 SECONDS (ref 9.6–11.5)

## 2018-03-11 PROCEDURE — 99239 HOSP IP/OBS DSCHRG MGMT >30: CPT | Performed by: NURSE PRACTITIONER

## 2018-03-11 PROCEDURE — 25010000002 ENOXAPARIN PER 10 MG: Performed by: HOSPITALIST

## 2018-03-11 PROCEDURE — 85610 PROTHROMBIN TIME: CPT | Performed by: INTERNAL MEDICINE

## 2018-03-11 RX ORDER — OXYCODONE HYDROCHLORIDE AND ACETAMINOPHEN 5; 325 MG/1; MG/1
2 TABLET ORAL EVERY 4 HOURS PRN
Qty: 36 TABLET | Refills: 0 | Status: SHIPPED | OUTPATIENT
Start: 2018-03-11 | End: 2018-03-14

## 2018-03-11 RX ORDER — LEVETIRACETAM 500 MG/1
500 TABLET ORAL EVERY 12 HOURS SCHEDULED
Qty: 60 TABLET | Refills: 0 | Status: ON HOLD | OUTPATIENT
Start: 2018-03-11 | End: 2018-06-11

## 2018-03-11 RX ORDER — RANOLAZINE 500 MG/1
500 TABLET, EXTENDED RELEASE ORAL EVERY 12 HOURS SCHEDULED
Qty: 60 TABLET | Refills: 0 | Status: ON HOLD | OUTPATIENT
Start: 2018-03-11 | End: 2018-06-11

## 2018-03-11 RX ORDER — WARFARIN SODIUM 2.5 MG/1
5 TABLET ORAL
Qty: 60 TABLET | Refills: 0 | Status: ON HOLD | OUTPATIENT
Start: 2018-03-11 | End: 2018-06-11

## 2018-03-11 RX ORDER — ACETAMINOPHEN 325 MG/1
650 TABLET ORAL EVERY 4 HOURS PRN
Status: ON HOLD
Start: 2018-03-11 | End: 2018-06-11

## 2018-03-11 RX ORDER — CYCLOBENZAPRINE HCL 5 MG
5 TABLET ORAL 3 TIMES DAILY PRN
Qty: 40 TABLET | Refills: 0 | Status: ON HOLD | OUTPATIENT
Start: 2018-03-11 | End: 2018-06-11

## 2018-03-11 RX ORDER — ATORVASTATIN CALCIUM 80 MG/1
40 TABLET, FILM COATED ORAL NIGHTLY
Qty: 30 TABLET | Refills: 0 | Status: ON HOLD | OUTPATIENT
Start: 2018-03-11 | End: 2018-06-11

## 2018-03-11 RX ADMIN — CYCLOBENZAPRINE HYDROCHLORIDE 5 MG: 10 TABLET, FILM COATED ORAL at 08:39

## 2018-03-11 RX ADMIN — LEVETIRACETAM 500 MG: 500 TABLET, FILM COATED ORAL at 08:39

## 2018-03-11 RX ADMIN — OXYCODONE AND ACETAMINOPHEN 2 TABLET: 5; 325 TABLET ORAL at 15:09

## 2018-03-11 RX ADMIN — ASPIRIN 325 MG ORAL TABLET 325 MG: 325 PILL ORAL at 08:40

## 2018-03-11 RX ADMIN — METOPROLOL TARTRATE 25 MG: 25 TABLET ORAL at 08:39

## 2018-03-11 RX ADMIN — ENOXAPARIN SODIUM 80 MG: 80 INJECTION SUBCUTANEOUS at 08:40

## 2018-03-11 RX ADMIN — OXYCODONE AND ACETAMINOPHEN 2 TABLET: 5; 325 TABLET ORAL at 10:27

## 2018-03-11 RX ADMIN — RANOLAZINE 500 MG: 500 TABLET, FILM COATED, EXTENDED RELEASE ORAL at 08:40

## 2018-03-11 RX ADMIN — OXYCODONE AND ACETAMINOPHEN 2 TABLET: 5; 325 TABLET ORAL at 06:42

## 2018-03-11 NOTE — PROGRESS NOTES
Continued Stay Note  [facility]     Patient Name: Bahman Banks  MRN: [id number]  Today's Date: 3/11/2018    Admit Date: 3/6/2018          Discharge Plan     Row Name 03/11/18 1604       Plan    Plan Home with [facility]    Patient/Family in Agreement with Plan yes    Plan Comments Met with patient in the room to discuss the discharge plan. Patient will need a cab voucher for transportation home to [place].  has provided a cab voucher with permission from the  director, [employer]. Per APRN, patient will also need home health for SN, PT, and OT. Patient will require INR checks q 2 days. CM confirmed with patient that he does have a PCP, LARRY Alfaro, in [place]. CM attempted to call [facility], . [phone], but never received a return call from the on-call RN. CM will have to f/u with [facility] on Monday morning. Faxed order and referral to [phone]. CM also included instructions in the AVS for patient to call [facility] to schedule f/u with the Valve and Coumadin Clinics per discharge instructions once patient is able to transition from home health. CM spoke with patient's pharmacy, [facility] in [place], to confirm that his medications are covered by insurance. They will not have the correct dose of Lovenox until Monday morning, and patient has been informed that he will need to  that medication tomorrow. Patient denies other discharge needs.     Final Discharge Disposition Code 06 - home with home health care              Discharge Codes    No documentation.       Expected Discharge Date and Time     Expected Discharge Date Expected Discharge Time    Mar 11, 2018             Luli Khalil

## 2018-03-11 NOTE — PROGRESS NOTES
"Pharmacy Consult  -  Warfarin    Bahman Banks is a  42 y.o. male   Height - 182.9 cm (72\")  Weight - 86.2 kg (190 lb)    Consulting Provider: - Hospitalist  Indication: - Atrial fibrillation  Goal INR: - 2-3  Home Regimen:   - Discontinued by patient's cardiologist some months ago, unsure of    Bridge Therapy: Yes   Enoxaparin 80 mg q12h      Drug-Drug Interactions with current regimen:  Enoxaparin, risk of bleeding  Aspirin, risk of bleeding     Warfarin Dosing During Admission:    Date  3/10 0.99          INR  0.95 0.99          Dose  5 mg (5 mg)             Education Provided: Spoke with patient who has taken warfarin before. Patient reports difficulty maintaining INR within goal range.    Labs:      Results from last 7 days     Lab Units 03/11/18  0729 03/10/18  1339 03/10/18  0505 03/09/18  0519 03/08/18  0612 03/07/18  0800 03/06/18  1435   INR  0.99 0.95 --  0.96 --  --  0.96   HEMOGLOBIN g/dL --  --  12.9* 13.0* 12.8* 13.1 14.1   HEMATOCRIT % --  --  39.7 40.3 39.3 40.3 43.1   PLATELETS 10*3/mm3 --  --  275 270 260 236 261     Current dietary intake:   Diet Order   Procedures   • Diet Regular       Assessment/Plan:   -INR = 0.9 today . Will continue warfarin 5 mg daily  -Bridge therapy with Lovenox 80mg q12h  -Daily INR ordered, pharmacy will continue to follow and adjust as appropriate   -Monitor for drug-drug interactions and signs/symptoms of bleeding    Thank you  Kwabena Barriga [facility]  3/10/2018  1:29 PM          "

## 2018-03-11 NOTE — PROGRESS NOTES
[facility] Medicine Services  DISCHARGE SUMMARY    Patient Name: Bahman Banks  : 1975  MRN: [id number]    Date of Admission: 3/6/2018  Date of Discharge: 3/11/18  Primary Care Physician: No Known Provider    Consults     Date and Time Order Name Status Description    3/8/2018 1406 Inpatient Orthopedic Surgery Consult Completed     3/6/2018 1613 Inpatient Consult to Neurology Completed     3/6/2018 1414 Consult Interventional Neurologist and/or Stroke Team Completed         Hospital Course     Presenting Problem:   Seizure [R56.9]  Altered mental status, unspecified altered mental status type [R41.82]    Active Hospital Problems (** Indicates Principal Problem)    Diagnosis Date Noted   • **Seizure [R56.9] 2018   • Posterior dislocation of left humerus [S43.025A] 2018   • Chronic right sided weakness from prior CVA [R53.1] 2018   • Aortic valve disease [I35.9] 2018   • Hypertension [I10] 2018   • Atrial fibrillation [I48.91] 2018   • Chronic back pain [M54.9, G89.29] 2018   • CAD (coronary artery disease) [I25.10] 2018   • Hyperlipidemia [E78.5] 2018   • Left shoulder pain [M25.512] 2018   • On high dose antipsychotic drug therapy [Z79.899] 2018      Resolved Hospital Problems    Diagnosis Date Noted Date Resolved   • Altered mental status [R41.82] 2018   • Chest pain [R07.9] 2018          Hospital Course:  Bahman Banks is a 42 y.o. 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 Wife found him laying on the floor with slurred speech. Flown to [facility] ER for stroke evaluation. While having CT perfusion, he had a witnessed seizure, so the CT was aborted and he was given Ativan and Keppra. Admitted to hospitalists for further management.     He CT head did not reveal  evidence of acute infarction.  MRI unattainable due to pacemaker placement.  Neurology consulted and evaluated patient.  EEG did not reveal epileptiform activity.  Patient has continued on Keppra twice daily.  No further seizure activity noted.  Patient to follow-up with neurology clinic in one month and continue Keppra twice a day.  No driving ×90 days.    Patient had continued complaints of left shoulder/upper extremity pain with fall prior to arrival.  Left shoulder x-ray was unremarkable however due to persistent pain, CT was obtained which did reveal posterior left humeral fracture/dislocation.  Dr. Wang with orthopedic surgery was consulted and patient taken to the OR on 3/9/2018 for closed reduction.  Patient is to continue no weightbearing to left upper extremity with external rotation brace in place.  Follow-up with Dr. Wang in 2 weeks.    Given patient's past history of proximal atrial fibrillation and aortic valve replacement, patient is at risk for thromboembolic events.  Patient previously was on Coumadin therapy with difficulties maintaining a therapeutic INR.  This is relieved to be likely secondary to dietary factors.  Coumadin has been restarted with Lovenox bridge.  This will be prescribed at discharge.  Home health skilled nursing has been set up for INR checks until patient able to get to heart and valve clinic at [facility] for further management and establish care.  We will continue Coumadin 5 mg each evening with therapeutic Lovenox injections.  He'll also continue on Lipitor at discharge.  Injury to set up home health for PT/OT and skilled nursing.  Patient unable to drive for 90 days and has limited transportation access.  We will set him up with [facility] heart valve clinic.  Would also recommend referral to cardiology to reestablish care in the next 4-6 weeks.  Follow-up with primary care 1 week.    Procedure(s):  LEFT SHOULDER CLOSED REDUCTION       Day of Discharge      HPI:   Patient sitting up in bed. No events overnight. No further seizure activity. Denies n/v/d. Some LUE pain, but controlled with percocet. No CP/palpiations/ soa. No s/s bleeding    Review of Systems  Gen- No fevers, chills  CV- No chest pain, palpitations  Resp- No cough, dyspnea  GI- No N/V/D, abd pain      Otherwise ROS is negative except as mentioned in the HPI.    Vital Signs:   Temp:  [97.5 °F (36.4 °C)-98.7 °F (37.1 °C)] 97.9 °F (36.6 °C)  Heart Rate:  [60-82] 82  Resp:  [14-17] 16  BP: (101-127)/(74-99) 122/95     Physical Exam:  Constitutional: No acute distress, awake, alert- sitting up in bed  HENT: NCAT, mucous membranes moist  Respiratory: Clear to auscultation bilaterally, respiratory effort normal   Cardiovascular: RRR, + systolic murmur, rubs, or gallops, palpable pedal pulses bilaterally  Gastrointestinal: Positive bowel sounds, soft, nontender, nondistended  Musculoskeletal: No bilateral ankle edema. LUE immobilzer in place  Psychiatric: Appropriate affect, cooperative  Neurologic: Oriented x 3, strength symmetric in all extremities, Cranial Nerves grossly intact to confrontation, speech clear  Skin: No rashes      Pertinent  and/or Most Recent Results       Results from last 7 days  Lab Units 03/10/18  0505 03/09/18  0519 03/08/18  0612 03/07/18  0800 03/06/18  1435 03/06/18  1410   WBC 10*3/mm3 10.41 10.16 11.00* 12.04* 13.94*  --    HEMOGLOBIN g/dL 12.9* 13.0* 12.8* 13.1 14.1  --    HEMOGLOBIN, POC g/dL  --   --   --   --   --  14.3   HEMATOCRIT % 39.7 40.3 39.3 40.3 43.1  --    HEMATOCRIT POC %  --   --   --   --   --  42   PLATELETS 10*3/mm3 275 270 260 236 261  --    SODIUM mmol/L 139 140 139 139 141  --    POTASSIUM mmol/L 3.6 3.6 3.6 3.8 4.1  --    CHLORIDE mmol/L 109 106 110* 111* 104  --    CO2 mmol/L 26.0 24.0 28.0 24.0 26.0  --    BUN mg/dL 12 13 13 10 8*  --    CREATININE mg/dL 0.80 0.80 0.90 0.80 1.00 1.00   GLUCOSE mg/dL 100 111* 114* 122* 128*  --    CALCIUM mg/dL 8.7 9.0  9.1 8.9 9.2  --        Results from last 7 days  Lab Units 03/11/18  0729 03/10/18  1339 03/09/18  0519 03/08/18  0612 03/07/18  2159 03/07/18  1459 03/07/18  0800 03/06/18  2333 03/06/18  1435 03/06/18  1410   BILIRUBIN mg/dL  --   --   --   --   --   --   --   --  0.4  --    ALK PHOS U/L  --   --   --   --   --   --   --   --  112*  --    ALT (SGPT) U/L  --   --   --   --   --   --   --   --  46*  --    AST (SGOT) U/L  --   --   --   --   --   --   --   --  25  --    PROTIME Seconds 10.4 10.0 10.1  --   --   --   --   --  10.1 13.5   INR  0.99 0.95 0.96  --   --   --   --   --  0.96 1.1   APTT seconds  --   --   --  26.6* 41.5* 36.9* 30.8* 29.2* 26.0  --            Invalid input(s): TG, LDLCALC, LDLREALC    Results from last 7 days  Lab Units 03/07/18  0800 03/06/18 2006 03/06/18  1435   TROPONIN I ng/mL <0.006 <0.006 <0.006     Brief Urine Lab Results     None          Microbiology Results Abnormal     None          Imaging Results (all)     Procedure Component Value Units Date/Time    CT Upper Extremity Left Without Contrast [[id number]] Collected:  03/10/18 1158     Updated:  03/10/18 1213    Narrative:       EXAMINATION: CT LEFT UPPER EXTREMITY WO CONTRAST - 03/09/2018     INDICATION: R41.82-Altered mental status, unspecified; R56.9-Unspecified  convulsions; D72.829-Elevated white blood cell count, unspecified;  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     TECHNIQUE: CT datasets of the left shoulder were performed without  intravenous or intra-articular contrast.     Datasets were acquired in the transaxial planes.     2D reconstructive reformatted images were formulated at the workstation  in the coronal and sagittal planes. Following this, Three-dimensional  shaded-surface display color mapped and  rotational-capable reconstructed images were formulated at the  workstation.     The radiation dose reduction device was turned on for each scan per the  ALARA (As Low as Reasonably  Achievable) protocol.     COMPARISON: None.     FINDINGS:   1. There is a comminuted fracture of the left humeral head with a large  avulsion of bone fragment from the anterior aspect of the left humeral  head. This is a multidirectional fracture through the   left humeral head which includes the apex of the left humeral head as  well as the central head and the anatomic neck of the left humerus.  There is no evidence of dislocation of the largest portion of the  humeral head from the glenoid which is well aligned.     2. The distal clavicle is intact. The acromion on is intact. Evidence of  scapular fracture is not identified. There is no evidence of fracture of  the glenoid. Rib cage of the left upper chest is unremarkable and  intact. The left upper lung is clear. Patient has a pacemaker generator  in place in the left upper anterior chest wall.     3. There is soft tissue swelling around the deltoid muscle. There is  joint effusion associated with this fracture which could be hematoma  although it is low-attenuation. Course of the brachial plexus is  unaffected and unremarkable.       Impression:          Multidirectional comminuted fracture left humeral head extending from  the apex of the left humeral head down through the humeral head and  approaching the anatomic neck of the proximal left humerus. There is a  large a avulsed fragment from the anterior portion of the mid left  humeral head. Humeral head dislocation from the glenoid is otherwise not  identified. The scapula, acromion and distal clavicle are intact.     Soft tissue windows demonstrate joint effusion and edema of the  surrounding deep muscle structures, especially the deltoid. There is no  evidence of embarrassment or hematoma along the course of the brachial  plexus. There is mild stranding in the fat around the brachial plexus.  The left upper chest is clear and the rib cage on the left is intact to  the extent that it is included on the data  set.     DICTATED:     03/10/2018  EDITED/ls :     03/10/2018         This report was finalized on 3/10/2018 12:11 PM by Dr. Rock Matias MD.       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     COMPARISON: NONE.     FINDINGS: Nine seconds of fluoroscopy used for postreduction imaging and  2 images available for closed reduction of a comminuted left shoulder  fracture or dislocation. There is satisfactory positioning of the  shoulder. Comminuted fracture fragments are identified of the humeral  head.       Impression:       Satisfactory position with fluoroscopy for closed  postreduction imaging of a posterior shoulder dislocation.     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         TECHNIQUE: 5 mm unenhanced images through the brain.     The radiation dose reduction device was turned on for each scan per the  ALARA (As Low as Reasonably Achievable) protocol.     COMPARISON: 03/06/2018.     FINDINGS: The patient history indicates right-sided weakness and slurred  speech. The calvarium appears intact. There is a 1 cm mucous cyst in the  left sphenoid sinus. The paranasal sinuses and mastoids otherwise appear  clear. Soft tissue window images  show no evidence of hemorrhage,  contusion, edema, mass or mass effect, infarct, hydrocephalus, or  abnormal extra-axial collection.       Impression:       No evidence of acute intracranial disease.     D:  03/07/2018  E:  03/07/2018           This report was finalized on 3/8/2018 10:03 PM by DR. Griffin Lock MD.       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      COMPARISON: NONE.     FINDINGS: Images of the left shoulder demonstrate posterior dislocation  with comminuted fracture of the humeral head.           Impression:       Posterior left shoulder dislocation with comminuted fracture  of the humeral head.     D:  03/08/2018  E:  03/08/2018             CT Upper Extremity Left Without Contrast [[id number]] Collected:  03/08/18 1304     Updated:  03/08/18 1323    Narrative:       HISTORY: Left shoulder pain.     CT SCAN OF THE LEFT SHOULDER AND HUMERUS WITHOUT CONTRAST WITH 2-D  RECONSTRUCTIONS.     TECHNICAL: Exam consists of spiral acquisition 2 mm axial images through  the left shoulder and proximal humerus down to the level of the elbow  joint, with oblique sagittal and oblique coronal 2 mm reconstructions.        COMPARISON: Left humerus and shoulder plain films of 3/6/2018     FINDINGS:  There is posterior dislocation of the humerus, which is perched on the  posterior glenoid. There is apparent avulsion of the tuberosities. No  other fracture is identified. Remaining bony structures appear intact.  There is no obvious hematoma but probably a moderate joint effusion.       Impression:       Posterior dislocation of the left  humerus, and tuberosity  avulsion.     This report was finalized on 3/8/2018 1:21 PM by DR. Griffin Lock MD.       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      COMPARISON: NONE.     FINDINGS:   1. The alignment of the glenohumeral joint is somewhat indistinct. This  finding is worrisome for a posterior dislocation which can be very  occult and typically follow seizures.  2. The left clavicle, AC joint and acromion are normal. Scapula is  unremarkable. The proximal humerus is intact.       Impression:       1.  There is an abnormal alignment of the glenohumeral apposition. There  is also a cleavage in the left humeral head. I have no comparison  images.  2.  Therefore, occult posterior dislocation or impaction of the humeral  head on the posterior lip of the glenoid cannot be excluded. If the  patient has limited range of motion and pain with range of motion,  consider CT data sets.     D:  03/06/2018  E:  03/07/2018     This report was finalized on 3/7/2018 12:24 PM by Dr. Rock Matias MD.       XR Humerus Left [[id number]] Collected:  03/06/18 2018     Updated:  03/07/18 0208    Narrative:       EXAM:    XR Left Humerus, 2 or More Views    CLINICAL HISTORY:    42 years old, male; Elevated white blood cell count, unspecified; Congenital   facial asymmetry; Altered mental status, unspecified; Unspecified convulsions;   Pain; Upper arm; Left; Additional info: Left arm pain    TECHNIQUE:    Frontal and lateral views of the left humerus.    COMPARISON:    No relevant prior studies available.    FINDINGS:    Bones/joints:  Unremarkable.  No acute fracture.  No dislocation.    Soft tissues:   Unremarkable.      Impression:         Normal left humerus x-rays.    THIS DOCUMENT HAS BEEN ELECTRONICALLY SIGNED BY RODNEY MCCAULEY MD    XR Chest 1 View [[id number]] Collected:  03/06/18 1514     Updated:  03/06/18 1628    Narrative:       EXAMINATION: XR CHEST 1 VW-03/06/2018:      INDICATION: Acute Stroke Protocol (onset < 12 hrs).      COMPARISON: NONE.     FINDINGS:   1.  A dual-lead pacemaker is in place from the left. The heart size is  normal. The heart is compensated.  2.  There is mild hazy atelectasis left base.  3.  The remainder of the chest is clear.           Impression:       1.  Mild hazy atelectasis left base with minimal volume loss.  2.  No active disease in the chest otherwise.     D:  03/06/2018  E:  03/06/2018     This report was finalized on 3/6/2018 4:26 PM by Dr. Rock Matias MD.       CT Head Without Contrast Stroke Protocol [[id number]] Collected:  03/06/18 1415     Updated:  03/06/18 1442    Narrative:       EXAMINATION: CT HEAD WO CONTRAST, STROKE PROTOCOL-03/06/2018:      INDICATION: Stroke, aphagia, patient found unresponsive.     TECHNIQUE:  CT data set of the brain was performed without intravenous  contrast as a code 19.     The radiation dose reduction device was turned on for each scan per the  ALARA (As Low as Reasonably Achievable) protocol.     COMPARISON: NONE.     FINDINGS:   1. The foramen magnum is clear. The basal cisterns are clear. There is  no subarachnoid bleed. Intra-axial hemorrhage is not currently  identified.     2. There is no evidence of acute evolving low attenuation ischemic  insult or infarct.     3. There is developmental asymmetry of the frontal sinus producing  prominence of the central and leftward aspect of the forehead which is  not acute. There is no extracerebral collection or midline shift and  there is no mass effect.     4.  Ocular lens are intact and well aligned. The conal contents are  normal. The ventricular system is unremarkable. There is  no atrophy.     5. There is a small collection of soft tissue density in the leftward  sphenoid sinus compartment. The remainder of the paranasal sinuses are  clear and the mastoids are unremarkable. Calvarium is intact.       Impression:       1.  Developmental asymmetry of the central and leftward frontal sinus  producing a bulging asymmetry which is not acute or pathologic.  2.  Negative CT data set of the brain. Evidence of evolving infarct,  hemorrhage, edema, subarachnoid bleed or hyperdense clot sign is not  currently identified.  3.  Data sets were complete at 1:56 PM and the report rendered at 2:11  PM.     D:  03/06/2018  E:  03/06/2018           This report was finalized on 3/6/2018 2:40 PM by Dr. Rock Matias MD.             Results for orders placed during the hospital encounter of 03/06/18   Duplex Carotid Ultrasound CAR    Narrative · No obstructive carotid stenosis bilaterally  · Antegrade bilateral vertebral flow.          Results for orders placed during the hospital encounter of 03/06/18   Duplex Carotid Ultrasound CAR    Narrative · No obstructive carotid stenosis bilaterally  · Antegrade bilateral vertebral flow.          Results for orders placed during the hospital encounter of 03/06/18   Adult Transthoracic Echo Complete W/ Cont if Necessary Per Protocol    Narrative · Left ventricular systolic function is normal. Estimated EF = 60%.  · Left ventricular wall thickness is consistent with mild-to-moderate   concentric hypertrophy.  · Left ventricular diastolic dysfunction (grade I a) consistent with   impaired relaxation.  · There is a prosthetic aortic valve  · Ao mean PG 22.1 mmHg  · Mild tricuspid valve regurgitation is present.  · RVSP(TR) 20.0 mmHg          [unfilled]  Discharge Details      Bahman Banks   Home Medication Instructions CARLOS:[id number]    Printed on:03/11/18 0158   Medication Information                      acetaminophen (TYLENOL) 325 MG tablet  Take 2 tablets by mouth  Every 4 (Four) Hours As Needed for Mild Pain .             atorvastatin (LIPITOR) 80 MG tablet  Take 0.5 tablets by mouth Every Night.             cyclobenzaprine (FLEXERIL) 5 MG tablet  Take 1 tablet by mouth 3 (Three) Times a Day As Needed for Muscle Spasms.             enoxaparin (LOVENOX) 80 MG/0.8ML solution syringe  Inject 0.8 mL under the skin Every 12 (Twelve) Hours for 5 days.             levETIRAcetam (KEPPRA) 500 MG tablet  Take 1 tablet by mouth Every 12 (Twelve) Hours.             loxapine (LOXITANE) 25 MG capsule  Take 75 mg by mouth Every Night.             metoprolol tartrate (LOPRESSOR) 25 MG tablet  Take 1 tablet by mouth Every 12 (Twelve) Hours.             oxyCODONE-acetaminophen (PERCOCET) 5-325 MG per tablet  Take 2 tablets by mouth Every 4 (Four) Hours As Needed for Moderate Pain  or Severe Pain  for up to 3 days.             ranolazine (RANEXA) 500 MG 12 hr tablet  Take 1 tablet by mouth Every 12 (Twelve) Hours.             warfarin (COUMADIN) 2.5 MG tablet  Take 2 tablets by mouth Daily.                   Discharge Disposition:  Home or Self Care    Discharge Diet:  Diet Instructions     Advance Diet As Tolerated             Discharge Activity:   Activity Instructions     Activity as Tolerated       Discharge Activity Restrictions       1) No driving for 90 days  2) immobilize arm until follow up with Dr. Wang    Additional Activity Instructions:      Non weight bearing to Left Upper Extremity. Wear external rotation brace.                    Special Instructions:  Follow up pcp 1 week  Follow up neurology 1month ([facility])  Follow up Dr. Wang 2 weeks  Home health to follow for PT/INR checks and physical therapy. Patient to be followed by Heart and valve clinic [facility] for coumadin management.   Defer to PCP for cardiology referral    No future appointments.        Time Spent on Discharge:  46 minutes    Electronically signed by LARRY Beatty, 03/11/18, 1:44  PM.

## 2018-03-11 NOTE — DISCHARGE PLACEMENT REQUEST
"Zeynep Stewart (42 y.o. Male)     From [place],   [phone]      Date of Birth Social Security Number Address Home Phone MRN    1975  [address] [place] [address] [phone] [id number]    Restoration[employer] Marital Status          None        Admission Date Admission Type Admitting Provider Attending Provider Department, Room/Bed    3/6/18 Emergency Ayah Larson MD [place], Ayah Patel MD [facility] 3G, S365/1    Discharge Date Discharge Disposition Discharge Destination         Home or Self Care              Attending Provider:  Ayah Larson MD    Allergies:  Celexa [Citalopram], Fentanyl, Haldol [Haloperidol], Risperdal [Risperidone]    Isolation:  None   Infection:  None   Code Status:  FULL    Ht:  182.9 cm (72\")   Wt:  86.2 kg (190 lb)    Admission Cmt:  None   Principal Problem:  Seizure [R56.9]                 Active Insurance as of 3/6/2018     Primary Coverage     Payor Plan Insurance Group Employer/Plan Group    MEDICARE MEDICARE A & B      Payor Plan Address Payor Plan Phone Number Effective From Effective To    [address] [phone] 2014     [place] [address]       Subscriber Name Subscriber Birth Date Member ID       ZEYNEP STEWART 1975 [id number]                 Emergency Contacts      (Rel.) Home Phone Work Phone [place] Phone    Kasandra Stewart (Spouse) [phone] -- --          [address] [address]  Phone:  [phone]  Fax:   Date: Mar 11, 2018      Referral to Home Health     Patient:  Zeynep Stewart MRN:  [id number]   [address] [address] :  1975  SSN:    Phone: [phone] Sex:  M      INSURANCE PAYOR PLAN GROUP # SUBSCRIBER ID   Primary: MEDICARE [id number]   [id number]      Referring Provider Information:  DESTINY CHIRINOS Phone: [phone] Fax:       Referral Information:   # Visits:  1 Referral Type: Home " Health [42]   Urgency:  Routine Referral Reason: Specialty Services Required   Start Date: Mar 11, 2018 End Date:  To be determined by Insurer   Diagnosis: Impaired mobility and ADLs (Z74.09 [ICD-10-CM] 799.89 [ICD-9-CM])  Seizure (R56.9 [ICD-10-CM] 780.39 [ICD-9-CM])  Aortic valve disease (I35.9 [ICD-10-CM] 424.1 [ICD-9-CM])  Paroxysmal atrial fibrillation (I48.0 [ICD-10-CM] 427.31 [ICD-9-CM])      Refer to Dept:   Refer to Provider:   Refer to Facility:       Face to Face Visit Date: 3/11/2018  Follow-up Provider for Plan of Care? I treated the patient in an acute care facility and will not continue treatment after discharge.  Follow-up Provider: [facility] [[id number]]  Reason/Clinical Findings: inr with initiation of coumadin. can not drive- fall arm fracture  Describe mobility limitations that make leaving home difficult: can not drive/ limited access to transport- new onset seizure  Nursing/Therapeutic Services Requested: Skilled Nursing  Nursing/Therapeutic Services Requested: Physical Therapy  Nursing/Therapeutic Services Requested: Occupational Therapy  Skilled nursing orders: Medication education (pt/ inr every 2 days)  Skilled nursing orders: Neurovascular assessments  PT orders: Therapeutic exercise  PT orders: Strengthening  PT orders: Home safety assessment  Occupational orders: Activities of daily living  Occupational orders: Strengthening  Occupational orders: Home safety assessment  Frequency: 1 Week 1     This document serves as a request of services and does not constitute Insurance authorization or approval of services.  To determine eligibility, please contact the members Insurance carrier to verify and review coverage.     If you have medical questions regarding this request for services. Please contact [address] at [phone] between the hours of 8:00am - 5:00pm (Mon-Fri).             Authorizing Provider:LARRY Olmos  Authorizing Provider's  NPI: [id number]  Order Entered By: LARRY Olmos 3/11/2018  1:44 PM     Electronically signed by: LARRY Olmos 3/11/2018  1:44 PM                History & Physical      Terrence Wilhelm MD at 3/6/2018  4:18 PM              [facility] Medicine Services  HISTORY AND PHYSICAL    Patient Name: Bahman Banks  : 1975  MRN: [id number]  Primary Care Physician: No Known Provider    Subjective   Subjective     Chief Complaint:  Altered mental status, slurred speech    HPI:  Bahman Banks is a 42 y.o. 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 He was last known well at 0800 this morning. Around noon his wife found him laying on the floor. Speech was slurred, she noticed a facial droop, and he was not making sense when speaking. She called EMS. He was flown to [facility] ED as a code stroke. While having a CT head perfusion performed he started having a witness grand mal seizure. CT head perfusion was aborted. He was given Ativan and Keppra.     On my exam he says he has not been feeling well for the past few days. Does not recall today's events. He follows with Dr. Pastrana. He stopped Coumadin about a month ago for reasons unknown. He complains of pulling left sided chest pain. He complains of left bicep pain that radiates into left breast. He has baseline dysarthria however currently reports his speech is worse.  He also has baseline right arm weakness and vision impairment since his last stroke.    Denies any Fever or chills.  Denies palpitations.  Denies shortness of air cough.  Denies abdominal pain, nausea, vomiting or diarrhea.  Denies dysuria or urinary frequency.  Denies edema.    Review of Systems     Otherwise 10-system ROS reviewed and is negative except as  mentioned in the HPI.    Personal History     Past Medical History:   Diagnosis Date   • Aortic stenosis    • Atrial fibrillation    • CAD (coronary artery disease)    • Chronic back pain    • Hypertension    • Stroke     affected speech, vision, right side weakness       Past Surgical History:   Procedure Laterality Date   • AORTIC VALVE REPAIR/REPLACEMENT  2016    tissue valve, performed in [place]   • APPENDECTOMY     • CHOLECYSTECTOMY     • HIATAL HERNIA REPAIR     • KNEE ARTHROSCOPY     • SHOULDER ARTHROSCOPY         Family History: family history includes No Known Problems in his father and mother.     Social History:  reports that he has been smoking Cigarettes.  He does not have any smokeless tobacco history on file. He reports that he does not drink alcohol or use illicit drugs.  Social History     Social History Narrative    Retired , independentt of ADL, lives with wife       Medications:    (Not in a hospital admission)    Not on File    Objective   Objective     Vital Signs:   Temp:  [99.3 °F (37.4 °C)] 99.3 °F (37.4 °C)  Heart Rate:  [] 123  Resp:  [20] 20  BP: (127-147)/(86-91) 127/88   Total (NIH Stroke Scale): 4    Physical Exam   Constitutional: No acute distress, drowsy  Eyes: PERRLA, sclerae anicteric, no conjunctival injection  HENT: NCAT, mucous membranes dry  Neck: Supple, trachea midline  Respiratory: Clear to auscultation bilaterally, nonlabored respirations   Cardiovascular: tachycardia low 100's, grade III continuous blowing murmur heard throughout, PPP  Gastrointestinal: Positive bowel sounds, soft, nontender, nondistended  Musculoskeletal: No bilateral ankle edema, no clubbing or cyanosis to extremities  Psychiatric: flat affect, cooperative  Neurologic: Oriented x 3, weak right , unable to lift left arm due to pain, minimal right facial droop, equal lower extremity pushes and pulls  Skin: several tattoos noted    Results Reviewed:  I have personally reviewed  current lab, radiology, and data and agree.      Results from last 7 days  Lab Units 03/06/18  1435   WBC 10*3/mm3 13.94*   HEMOGLOBIN g/dL 14.1   HEMATOCRIT % 43.1   PLATELETS 10*3/mm3 261   INR  0.96       Results from last 7 days  Lab Units 03/06/18  1435 03/06/18  1410   CREATININE mg/dL  --  1.00   TROPONIN I ng/mL <0.006  --      Estimated Creatinine Clearance: 116.1 mL/min (by C-G formula based on Cr of 1).      Imaging Results (last 24 hours)     Procedure Component Value Units Date/Time    CT Head Without Contrast Stroke Protocol [[id number]] Collected:  03/06/18 1415     Updated:  03/06/18 1442    Narrative:       EXAMINATION: CT HEAD WO CONTRAST, STROKE PROTOCOL-03/06/2018:      INDICATION: Stroke, aphagia, patient found unresponsive.     TECHNIQUE:  CT data set of the brain was performed without intravenous  contrast as a code 19.     The radiation dose reduction device was turned on for each scan per the  ALARA (As Low as Reasonably Achievable) protocol.     COMPARISON: NONE.     FINDINGS:   1. The foramen magnum is clear. The basal cisterns are clear. There is  no subarachnoid bleed. Intra-axial hemorrhage is not currently  identified.     2. There is no evidence of acute evolving low attenuation ischemic  insult or infarct.     3. There is developmental asymmetry of the frontal sinus producing  prominence of the central and leftward aspect of the forehead which is  not acute. There is no extracerebral collection or midline shift and  there is no mass effect.     4.  Ocular lens are intact and well aligned. The conal contents are  normal. The ventricular system is unremarkable. There is no atrophy.     5. There is a small collection of soft tissue density in the leftward  sphenoid sinus compartment. The remainder of the paranasal sinuses are  clear and the mastoids are unremarkable. Calvarium is intact.       Impression:       1.  Developmental asymmetry of the central and leftward frontal  sinus  producing a bulging asymmetry which is not acute or pathologic.  2.  Negative CT data set of the brain. Evidence of evolving infarct,  hemorrhage, edema, subarachnoid bleed or hyperdense clot sign is not  currently identified.  3.  Data sets were complete at 1:56 PM and the report rendered at 2:11  PM.     D:  03/06/2018  E:  03/06/2018           This report was finalized on 3/6/2018 2:40 PM by Dr. Rock Matias MD.       XR Chest 1 View [[id number]] Collected:  03/06/18 1514     Updated:  03/06/18 1514    Narrative:       EXAMINATION: XR CHEST 1 VW-03/06/2018:      INDICATION: Acute Stroke Protocol (onset < 12 hrs).      COMPARISON: NONE.     FINDINGS:   1.  A dual-lead pacemaker is in place from the left. The heart size is  normal. The heart is compensated.  2.  There is mild hazy atelectasis left base.  3.  The remainder of the chest is clear.           Impression:       1.  Mild hazy atelectasis left base with minimal volume loss.  2.  No active disease in the chest otherwise.     D:  03/06/2018  E:  03/06/2018                      Assessment/Plan   Assessment / Plan     Hospital Problem List     * (Principal)Seizure    Right sided weakness    Altered mental status    Aortic valve disease    Hypertension    Atrial fibrillation    Overview Signed 3/6/2018  4:19 PM by LARRY Austin     Not on anti-coagulation         Chronic back pain    Chest pain    Overview Signed 3/6/2018 10:38 PM by Terrence Wilhelm MD     11/2017:  Interpretation Summary   · Impressions are consistent with a low risk study.  · Left ventricular ejection fraction is normal (Calculated EF = 60%).  · Myocardial perfusion imaging indicates a normal myocardial perfusion study with no evidence of ischemia.  · TID is 1.1.              CAD (coronary artery disease)    Hyperlipidemia    Left shoulder pain    On high dose antipsychotic drug therapy            Assessment & Plan:  --CT head unremarkable, unable to obtain MRI due to  pacemaker  --per the patient his deficits are worse, this could mean he has had another stroke  --unsure of exact etiology of seizure, ?? Due to stroke  --with his hx of a fib and valvular disease it is prudent to anticoagulate him at this time with Heparin gtt (no bolus) as he is at risk for another/potential thromboembolic event  --HD statin, ECHO, Carotid duplex, EEG, repeat CT head in AM, PT/OT  --given his valvular disease he may need cardiology consult  --Dr. Wilhelm has spoken with Dr. Norris, plan of care was formulated with him, he will see patient in consultation  --left shoulder x-ray  --trend troponin  --restart home meds as appropriate     DVT prophylaxis:    CODE STATUS:  Full Code    Admission Status:  I believe this patient meets INPATIENT status due to the need for care which can only be reasonably provided in an hospital setting such as aggressive/expedited ancillary services and/or consultation services, the necessity for IV medications, close physician monitoring and/or the possible need for procedures.  In such, I feel patient’s risk for adverse outcomes and need for care warrant INPATIENT evaluation and predict the patient’s care encounter to likely last beyond 2 midnights.      Electronically signed by LARRY Austin, 03/06/18, 4:18 PM.      Brief Attending Admission Attestation     I have seen and examined the patient, performing an independent face-to-face diagnostic evaluation with plan of care reviewed and developed with the advanced practice clinician (APC).      Brief Summary Statement/HPI:   Bahman Banks is a 42 y.o. male with history tissue AoVR (2016 in [place]), Afib/SSS w/ pacemaker, prior cva (per chart)chronic seroquel use, chronic chest pain (negative stress test November 2017 with normal EF). Presented via air lift after wife found him on floor with slurred speech. In ct scanner was witnessed having tonic clonic seizure. CT perfusion aborted. Given ativan and loaded with  "keppra. Also complains of chronic chest pain (same as prior presentation to [place] in November 2017 at which time stress test negative). Also 2 days left arm/shoulder pain. Denies falls or trauma. He is a poor historian and slightly drowsy after today's events but states stopped coumadin about a month ago but cannot state why.    Of note, in November 2017: admitted at outside hospital for chest pain and had negative stress test      Attending Physical Exam:  Constitutional:alert, nontoxic appearing, did not entirely cooperate with exam or history but did participate somewhat.   HEENT:Ncat, oroph clear  Neck: neck supple, full range of motion  Neuro: inconsistent exam; at times 3/5 right , other times 5/5; 5/5 leg raise and extension. The left arm unable to elbow/shoulder flex due to humerus pain. No warmth or redness.  intact. Speech clear, face symmetric  Psych: flat affect  Cardiac: Rrr; No pretibial pitting edema  Resp: Ctab, normal effort  GI: abd soft, nontender  Skin: No extremity rash  Musculoskeletal/extremities: no cyanosis extremities; no significant ankle edema      Echo bioprosthetic AoV, normal EV  Carotids no obstructive dz    Brief Assessment/Plan :  See above for further detailed assessment and plan developed with [facility] which I have reviewed and/or edited.    *Seizure (witnessed after arrival)  *Questionable right sided weakness/Neurologic spell  *Chronic, recurrent chest pain (followed in [place] by cardiology there)   -negative stress test November 2017 (in other chart which is to be merged)  *Hx prior porcine Aortic valve replacement  *Hx Afib/SSS/Pacer   -recently \"taken off coumadin\" per patient  *Left arm/shoulder pain  *Suspect psychiatric illness (says on seroquel 300mg daily)  --------------------------------------------------  -discussed w/ dr. Norris; neuro to see in a.m.  -no bolus heparin drip for now (in case cardioembolic cva & until rules out by negative troponins)   -was " "previously on coumadin but 'taken off\" by his cardiologist recently, presumably for noncompliance   -negative stress test in November 2017  -trend troponins (negative with ekg unchanged, no hypoxia)  -neuro checks; continue stroke workup  -repeat CT head in a.m. (unable get mri)  -eeg in a.m.  -continue keppra/seizure precautions  -continue seroquel (? Psychiatric illness, other \"merged\" chart confirms use  -left shoulder/humerus xray (pain)   -consider orthopedic consultation if continued pain  -if chest pain continues, which appears a chronic/recurrent issue, could consider d-dimer/ct angio but defer for now as inconsistent/atypical nature of chest pain        Electronically signed by Terrence Wilhelm MD, 03/06/18, 10:32 PM.         Electronically signed by Terrence Wilhelm MD at 3/6/2018 10:55 PM         Discharge Summary     No notes of this type exist for this encounter.        Discharge Order     Start     Ordered    03/11/18 1340  Discharge patient  Once     Expected Discharge Date:  03/11/18    Discharge Disposition:  Home or Self Care        03/11/18 1344          "

## 2018-03-12 NOTE — PROGRESS NOTES
Continued Stay Note  [facility]     Patient Name: Bahman Banks  MRN: [id number]  Today's Date: 3/12/2018    Admit Date: 3/6/2018          Discharge Plan     Row Name 03/12/18 0920       Plan    Plan Home with [facility]    Final Note Unable to reach [facility] on day of discharge, Sunday, 3/11/18. Spoke with [place] with [facility] today, and she states they received the fax referral and will see pt for HH.  also informed her of patient's PCP, LARRY Alfaro, to that they could have her sign patient's continuing POC orders.               Discharge Codes    No documentation.       Expected Discharge Date and Time     Expected Discharge Date Expected Discharge Time    Mar 11, 2018             Luli Khalil

## 2018-03-13 ENCOUNTER — LAB REQUISITION (OUTPATIENT)
Dept: LAB | Facility: HOSPITAL | Age: 43
End: 2018-03-13

## 2018-03-13 DIAGNOSIS — Z51.81 ENCOUNTER FOR THERAPEUTIC DRUG LEVEL MONITORING: ICD-10-CM

## 2018-03-13 DIAGNOSIS — Z79.01 LONG TERM CURRENT USE OF ANTICOAGULANT: ICD-10-CM

## 2018-03-13 DIAGNOSIS — I48.91 ATRIAL FIBRILLATION (HCC): ICD-10-CM

## 2018-03-13 LAB
INR PPP: 1.13 (ref 0.9–1.1)
PROTHROMBIN TIME: 14.7 SECONDS (ref 11–15.4)

## 2018-03-13 PROCEDURE — 85610 PROTHROMBIN TIME: CPT | Performed by: FAMILY MEDICINE

## 2018-03-16 ENCOUNTER — HOSPITAL ENCOUNTER (EMERGENCY)
Facility: HOSPITAL | Age: 43
Discharge: HOME | End: 2018-03-16
Attending: EMERGENCY MEDICINE | Admitting: EMERGENCY MEDICINE

## 2018-03-16 VITALS
SYSTOLIC BLOOD PRESSURE: 133 MMHG | DIASTOLIC BLOOD PRESSURE: 81 MMHG | RESPIRATION RATE: 20 BRPM | HEART RATE: 98 BPM | TEMPERATURE: 98.5 F | HEIGHT: 74 IN | BODY MASS INDEX: 26.95 KG/M2 | OXYGEN SATURATION: 98 % | WEIGHT: 210 LBS

## 2018-03-16 DIAGNOSIS — S42.202A CLOSED FRACTURE OF PROXIMAL END OF LEFT HUMERUS, UNSPECIFIED FRACTURE MORPHOLOGY, INITIAL ENCOUNTER: Primary | ICD-10-CM

## 2018-03-16 PROCEDURE — 99283 EMERGENCY DEPT VISIT LOW MDM: CPT

## 2018-03-16 RX ORDER — HYDROCODONE BITARTRATE AND ACETAMINOPHEN 7.5; 325 MG/1; MG/1
1 TABLET ORAL EVERY 6 HOURS PRN
Qty: 10 TABLET | Refills: 0 | Status: ON HOLD | OUTPATIENT
Start: 2018-03-16 | End: 2018-06-11

## 2018-03-16 NOTE — ED PROVIDER NOTES
"Subjective   This is a 42-year-old male that presents with chief complaint \"left shoulder pain\" X one week.  Patient reports that he had a seizure one week ago which caused him to fall dislocating his left shoulder and fracturing his proximal humerus.  Patient states he has some surgery scheduled with a Dr. Wang orthopedics out of [facility].  Dr. Wang did call this facility earlier today and discussed care with Dr. Saavedra and asked that we help with pain management and he will see the patient Monday.  Patient does have significant history for aortic stenosis, A. fib, coronary artery disease, hypertension, seizure and stroke.        History provided by:  Patient   used: No    Shoulder Problem   Location:  Arm  Arm location:  L arm  Injury: no    Pain details:     Quality:  Aching    Severity:  Moderate    Onset quality:  Sudden    Duration:  2 days    Timing:  Intermittent    Progression:  Worsening  Dislocation: no    Foreign body present:  No foreign bodies  Tetanus status:  Unknown  Prior injury to area:  No  Relieved by:  Nothing  Worsened by:  Nothing  Ineffective treatments:  None tried  Associated symptoms: fatigue and swelling    Associated symptoms: no back pain, no muscle weakness, no numbness and no stiffness        Review of Systems   Constitutional: Positive for fatigue.   Musculoskeletal: Positive for arthralgias, joint swelling and myalgias. Negative for back pain, gait problem and stiffness.   All other systems reviewed and are negative.      Past Medical History:   Diagnosis Date   • Aortic stenosis    • Atrial fibrillation    • CAD (coronary artery disease)    • Chronic back pain    • Hallucinations    • Heart attack    • Hyperlipidemia    • Hypertension    • Injury of back    • Nervous breakdown     12 years ago   • Seizure    • Stroke    • Stroke     affected speech, vision, right side weakness       Allergies   Allergen Reactions   • Bee Venom Anaphylaxis   • " Celexa [Citalopram] Shortness Of Breath   • Fentanyl      Pt states that he stops breathing   • Fentanyl Shortness Of Breath   • Haldol [Haloperidol Lactate] Anaphylaxis   • Haldol [Haloperidol] Shortness Of Breath   • Nitroglycerin Anaphylaxis   • Risperdal [Risperidone] Shortness Of Breath   • Risperidone And Related Anaphylaxis   • Celexa [Citalopram Hydrobromide] Other (See Comments)     Pt states that it causes him to sweat and a severe headache.        Past Surgical History:   Procedure Laterality Date   • AORTIC VALVE REPAIR/REPLACEMENT  2016    tissue valve, performed in [place]   • AORTIC VALVE REPAIR/REPLACEMENT MITRAL VALVE REPAIR/REPLACEMENT     • APPENDECTOMY     • CARDIAC CATHETERIZATION     • CHOLECYSTECTOMY     • HERNIA REPAIR      umbilical   • HIATAL HERNIA REPAIR     • KNEE ARTHROSCOPY     • PACEMAKER IMPLANTATION     • PACEMAKER IMPLANTATION Left 2014 2014 OR 2015.   Jiberish. interrogated 3/9/18   • PATELLAR RECONSTRUCTION     • QUADRICEPS REPAIR     • SHOULDER ARTHROSCOPY     • SHOULDER CLOSED REDUCTION Left 3/9/2018    Procedure: LEFT SHOULDER CLOSED REDUCTION;  Surgeon: Steven Wang Jr., MD;  Location: [facility];  Service: Orthopedics       Family History   Problem Relation Age of Onset   • No Known Problems Mother    • No Known Problems Father        Social History     Social History   • Marital status:      Social History Main Topics   • Smoking status: Current Every Day Smoker     Packs/day: 0.50     Types: Cigarettes      Comment: 2-3 cigarettes   • Alcohol use No   • Drug use: No   • Sexual activity: Defer     Other Topics Concern   • Not on file     Social History Narrative    ** Merged History Encounter **         Retired , independentt of ADL, lives with wife           Objective   Physical Exam   Constitutional: He is oriented to person, place, and time. He appears well-developed and well-nourished.   HENT:   Head: Normocephalic.   Right Ear:  External ear normal.   Left Ear: External ear normal.   Nose: Nose normal.   Mouth/Throat: Oropharynx is clear and moist.   Eyes: Conjunctivae and EOM are normal. Pupils are equal, round, and reactive to light.   Neck: Normal range of motion. Neck supple. No thyromegaly present.   Cardiovascular: Normal rate, regular rhythm, normal heart sounds and intact distal pulses.    Pulmonary/Chest: Effort normal and breath sounds normal.   Abdominal: Soft. Bowel sounds are normal.   Musculoskeletal: He exhibits tenderness.        Left shoulder: He exhibits decreased range of motion, tenderness, swelling and pain.   Left long arm splint in place. No N/V compromise.    Lymphadenopathy:     He has no cervical adenopathy.   Neurological: He is alert and oriented to person, place, and time. He has normal reflexes.   Skin: Skin is warm and dry. Bruising and ecchymosis noted.   Psychiatric: He has a normal mood and affect. His behavior is normal. Judgment and thought content normal.   Nursing note and vitals reviewed.      Procedures         ED Course  ED Course   Comment By Time   Louis [id number] Ezra Alejo PA-C 03/16 1948                  MDM  Number of Diagnoses or Management Options  Closed fracture of proximal end of left humerus, unspecified fracture morphology, initial encounter: new and requires workup     Amount and/or Complexity of Data Reviewed  Clinical lab tests: reviewed and ordered    Risk of Complications, Morbidity, and/or Mortality  Presenting problems: moderate  Diagnostic procedures: moderate  Management options: moderate    Patient Progress  Patient progress: stable      Final diagnoses:   Closed fracture of proximal end of left humerus, unspecified fracture morphology, initial encounter            Ezra Alejo PA-C  03/16/18 1952       Ezra Alejo PA-C  03/16/18 2005

## 2018-03-19 ENCOUNTER — HOSPITAL ENCOUNTER (EMERGENCY)
Facility: HOSPITAL | Age: 43
Discharge: HOME | End: 2018-03-20
Attending: EMERGENCY MEDICINE | Admitting: EMERGENCY MEDICINE

## 2018-03-19 DIAGNOSIS — R56.9 CONVULSIONS, UNSPECIFIED CONVULSION TYPE (HCC): Primary | ICD-10-CM

## 2018-03-19 DIAGNOSIS — M25.512 LEFT SHOULDER PAIN, UNSPECIFIED CHRONICITY: ICD-10-CM

## 2018-03-19 LAB
BASOPHILS # BLD AUTO: 0.07 10*3/MM3 (ref 0–0.3)
BASOPHILS NFR BLD AUTO: 0.6 % (ref 0–2)
CK SERPL-CCNC: 121 U/L (ref 24–204)
D-LACTATE SERPL-SCNC: 1.6 MMOL/L (ref 0.5–2)
DEPRECATED RDW RBC AUTO: 41.9 FL (ref 37–54)
EOSINOPHIL # BLD AUTO: 0.37 10*3/MM3 (ref 0–0.7)
EOSINOPHIL NFR BLD AUTO: 3.3 % (ref 0–5)
ERYTHROCYTE [DISTWIDTH] IN BLOOD BY AUTOMATED COUNT: 13.1 % (ref 11.5–14.5)
HCT VFR BLD AUTO: 42.6 % (ref 42–52)
HGB BLD-MCNC: 13.9 G/DL (ref 14–18)
IMM GRANULOCYTES # BLD: 0.02 10*3/MM3 (ref 0–0.03)
IMM GRANULOCYTES NFR BLD: 0.2 % (ref 0–0.5)
INR PPP: 1.42 (ref 0.9–1.1)
LYMPHOCYTES # BLD AUTO: 2.6 10*3/MM3 (ref 1–3)
LYMPHOCYTES NFR BLD AUTO: 22.9 % (ref 21–51)
MCH RBC QN AUTO: 28.8 PG (ref 27–33)
MCHC RBC AUTO-ENTMCNC: 32.6 G/DL (ref 33–37)
MCV RBC AUTO: 88.4 FL (ref 80–94)
MONOCYTES # BLD AUTO: 0.83 10*3/MM3 (ref 0.1–0.9)
MONOCYTES NFR BLD AUTO: 7.3 % (ref 0–10)
NEUTROPHILS # BLD AUTO: 7.47 10*3/MM3 (ref 1.4–6.5)
NEUTROPHILS NFR BLD AUTO: 65.7 % (ref 30–70)
PLATELET # BLD AUTO: 579 10*3/MM3 (ref 130–400)
PMV BLD AUTO: 9.8 FL (ref 6–10)
PROTHROMBIN TIME: 17.5 SECONDS (ref 11–15.4)
RBC # BLD AUTO: 4.82 10*6/MM3 (ref 4.7–6.1)
WBC NRBC COR # BLD: 11.36 10*3/MM3 (ref 4.5–12.5)

## 2018-03-19 PROCEDURE — 85610 PROTHROMBIN TIME: CPT | Performed by: EMERGENCY MEDICINE

## 2018-03-19 PROCEDURE — 99284 EMERGENCY DEPT VISIT MOD MDM: CPT

## 2018-03-19 PROCEDURE — 85025 COMPLETE CBC W/AUTO DIFF WBC: CPT | Performed by: EMERGENCY MEDICINE

## 2018-03-19 PROCEDURE — 82550 ASSAY OF CK (CPK): CPT | Performed by: EMERGENCY MEDICINE

## 2018-03-19 PROCEDURE — 80053 COMPREHEN METABOLIC PANEL: CPT | Performed by: EMERGENCY MEDICINE

## 2018-03-19 PROCEDURE — 96374 THER/PROPH/DIAG INJ IV PUSH: CPT

## 2018-03-19 PROCEDURE — 25010000002 LEVETRIRACETAM PER 10 MG: Performed by: EMERGENCY MEDICINE

## 2018-03-19 PROCEDURE — 83605 ASSAY OF LACTIC ACID: CPT | Performed by: EMERGENCY MEDICINE

## 2018-03-19 PROCEDURE — 96361 HYDRATE IV INFUSION ADD-ON: CPT

## 2018-03-19 RX ORDER — SODIUM CHLORIDE 9 MG/ML
125 INJECTION, SOLUTION INTRAVENOUS CONTINUOUS
Status: DISCONTINUED | OUTPATIENT
Start: 2018-03-19 | End: 2018-03-20 | Stop reason: HOSPADM

## 2018-03-19 RX ORDER — SODIUM CHLORIDE 0.9 % (FLUSH) 0.9 %
10 SYRINGE (ML) INJECTION AS NEEDED
Status: DISCONTINUED | OUTPATIENT
Start: 2018-03-19 | End: 2018-03-20 | Stop reason: HOSPADM

## 2018-03-19 RX ADMIN — SODIUM CHLORIDE 1000 MG: 9 INJECTION, SOLUTION INTRAVENOUS at 23:58

## 2018-03-19 RX ADMIN — SODIUM CHLORIDE 125 ML/HR: 9 INJECTION, SOLUTION INTRAVENOUS at 23:25

## 2018-03-20 VITALS
DIASTOLIC BLOOD PRESSURE: 90 MMHG | WEIGHT: 210 LBS | OXYGEN SATURATION: 95 % | RESPIRATION RATE: 18 BRPM | HEART RATE: 94 BPM | BODY MASS INDEX: 26.95 KG/M2 | SYSTOLIC BLOOD PRESSURE: 124 MMHG | TEMPERATURE: 98 F | HEIGHT: 74 IN

## 2018-03-20 LAB
ALBUMIN SERPL-MCNC: 4.6 G/DL (ref 3.5–5)
ALBUMIN/GLOB SERPL: 1.6 G/DL (ref 1.5–2.5)
ALP SERPL-CCNC: 188 U/L (ref 40–129)
ALT SERPL W P-5'-P-CCNC: 39 U/L (ref 10–44)
ANION GAP SERPL CALCULATED.3IONS-SCNC: 6.6 MMOL/L (ref 3.6–11.2)
AST SERPL-CCNC: 22 U/L (ref 10–34)
BILIRUB SERPL-MCNC: 0.3 MG/DL (ref 0.2–1.8)
BUN BLD-MCNC: 9 MG/DL (ref 7–21)
BUN/CREAT SERPL: 9.2 (ref 7–25)
CALCIUM SPEC-SCNC: 9.3 MG/DL (ref 7.7–10)
CHLORIDE SERPL-SCNC: 109 MMOL/L (ref 99–112)
CO2 SERPL-SCNC: 24.4 MMOL/L (ref 24.3–31.9)
CREAT BLD-MCNC: 0.98 MG/DL (ref 0.43–1.29)
GFR SERPL CREATININE-BSD FRML MDRD: 83 ML/MIN/1.73
GLOBULIN UR ELPH-MCNC: 2.9 GM/DL
GLUCOSE BLD-MCNC: 109 MG/DL (ref 70–110)
OSMOLALITY SERPL CALC.SUM OF ELEC: 278.7 MOSM/KG (ref 273–305)
POTASSIUM BLD-SCNC: 3.7 MMOL/L (ref 3.5–5.3)
PROT SERPL-MCNC: 7.5 G/DL (ref 6–8)
SODIUM BLD-SCNC: 140 MMOL/L (ref 135–153)

## 2018-03-20 NOTE — ED NOTES
While patient was leaving the treatment room at this time, patient noted to have thrown discharge instructions that had been provided in the trash at this time. Witnessed by two RN's.     Crystal Adamson RN  03/20/18 0133

## 2018-03-20 NOTE — ED NOTES
"Patient noted to be ambulatory in room at this time. Noted to be attempting to answer phone. Patient noted to have an arm immobilizer to left arm and shoulder. States that he is suppose to be at  in the morning to have surgery. States that \"we need to ship him to  because all his doctors said that if he has another seizure that he needs to be evaluated up there and they can also do his surgery\". Patient is also upset at this time related to MD declined patient to have any type of pain medication that is narcotic, related to patient has plenty of narcotic pain medication at the house.      Crystal Adamson RN  03/20/18 0033    "

## 2018-03-20 NOTE — ED NOTES
"MD, Primary RN and Float RN went into patient room at this time to discuss with patient related to labs and tests performed in the ED, as well as MD speaking with the on call surgeon in [place] who states that it was not an emergency to be sent to [place] from the ED if all tests were normal. Patient set in the chair looking at the television not paying any attention to the MD while speaking. MD exited the room and patient noted to be angry at this time. Patient states that it was a bunch of \"bullshit\" and that they knew better not to come here in the first place.      Crystal Adamson RN  03/20/18 0551    "

## 2018-03-20 NOTE — ED NOTES
"Patient wife called the ED at this time asking if patient has had any more convulsions why in the ED, made aware that patient has not had any convulsions at this time or since he has been in the ED. Asks if patient was being \"hard to deal with\" made aware that patient has mad accusations while in the ED, states that is part of the patient's seizures and that it not like him. Made MD as well as house supervisor aware at this time.      Crystal Adamson RN  03/19/18 9120    "

## 2018-03-20 NOTE — ED PROVIDER NOTES
"Subjective   Pt brought from home by local EMS for seizure.  Pt reports he had recurrent seizure today.  Pt reports he was \"life flighted\" to [facility] [place] earlier this month for seizure.  Pt reports he is having strokes and seizures, but he is not sure if strokes are triggering his seizures or seizures are triggering strokes.  During this seizure patient had a posterior left shoulder dislocation with reverse Hill-Sacks deformity; treated by Dr Salomon at [facility].  Pt is scheduled to go to [facility] tomorrow for surgery tomorrow, but he has nop way to get there.  Pt reports he is out of his pain medication.  He has been here for refills of his pain medication since his release from [facility].  Pt seen by neurology at [facility].  CT and EEG neg.  Unable to do MRI due to pacemaker.  Neurology signed off.        History provided by:  EMS personnel, patient and medical records  Seizures   Seizure activity on arrival: no    Seizure type:  Grand mal  Preceding symptoms: no sensation of an aura present, no dizziness, no euphoria, no headache, no hyperventilation, no nausea, no numbness, no panic and no vision change    Initial focality:  None  Episode characteristics: abnormal movements and generalized shaking    Episode characteristics: no apnea, no combativeness, no confusion, no disorientation, no eye deviation, no focal shaking, no incontinence, no limpness, fully responsive, no stiffening and responsive    Postictal symptoms: no confusion, no memory loss and no somnolence    Return to baseline: yes    Severity:  Severe  Timing:  Unable to specify  Progression:  Resolved  Context: not alcohol withdrawal, not cerebral palsy, not change in medication, not sleeping less, not developmental delay, not emotional upset, not family hx of seizures, not fever, not flashing visual stimuli, not hydrocephalus, not intracranial lesion, not intracranial shunt, medical compliance, not possible hypoglycemia, not possible medication ingestion, not " pregnant, not previous head injury and not stress        Review of Systems   Constitutional: Negative.    HENT: Negative.    Eyes: Negative.    Respiratory: Negative.    Cardiovascular: Negative.    Gastrointestinal: Negative.    Endocrine: Negative.    Genitourinary: Negative.    Musculoskeletal: Positive for arthralgias and myalgias.   Skin: Negative.    Allergic/Immunologic: Negative.    Neurological: Positive for seizures and speech difficulty.   Hematological: Negative.    Psychiatric/Behavioral: Negative.    All other systems reviewed and are negative.      Past Medical History:   Diagnosis Date   • Aortic stenosis    • Atrial fibrillation    • CAD (coronary artery disease)    • Chronic back pain    • Hallucinations    • Heart attack    • Hyperlipidemia    • Hypertension    • Injury of back    • Nervous breakdown     12 years ago   • Seizure    • Stroke    • Stroke     affected speech, vision, right side weakness       Allergies   Allergen Reactions   • Bee Venom Anaphylaxis   • Celexa [Citalopram] Shortness Of Breath   • Fentanyl      Pt states that he stops breathing   • Fentanyl Shortness Of Breath   • Haldol [Haloperidol Lactate] Anaphylaxis   • Haldol [Haloperidol] Shortness Of Breath   • Nitroglycerin Anaphylaxis   • Risperdal [Risperidone] Shortness Of Breath   • Risperidone And Related Anaphylaxis   • Celexa [Citalopram Hydrobromide] Other (See Comments)     Pt states that it causes him to sweat and a severe headache.        Past Surgical History:   Procedure Laterality Date   • AORTIC VALVE REPAIR/REPLACEMENT  2016    tissue valve, performed in [place]   • AORTIC VALVE REPAIR/REPLACEMENT MITRAL VALVE REPAIR/REPLACEMENT     • APPENDECTOMY     • CARDIAC CATHETERIZATION     • CHOLECYSTECTOMY     • HERNIA REPAIR      umbilical   • HIATAL HERNIA REPAIR     • KNEE ARTHROSCOPY     • PACEMAKER IMPLANTATION     • PACEMAKER IMPLANTATION Left 2014 2014 OR 2015.   [employer]. interrogated 3/9/18   •  PATELLAR RECONSTRUCTION     • QUADRICEPS REPAIR     • SHOULDER ARTHROSCOPY     • SHOULDER CLOSED REDUCTION Left 3/9/2018    Procedure: LEFT SHOULDER CLOSED REDUCTION;  Surgeon: Steven Wang Jr., MD;  Location: [facility];  Service: Orthopedics       Family History   Problem Relation Age of Onset   • No Known Problems Mother    • No Known Problems Father        Social History     Social History   • Marital status:      Social History Main Topics   • Smoking status: Current Every Day Smoker     Packs/day: 0.50     Types: Cigarettes      Comment: 2-3 cigarettes   • Alcohol use No   • Drug use: No   • Sexual activity: Defer     Other Topics Concern   • Not on file     Social History Narrative    ** Merged History Encounter **         Retired , independentt of ADL, lives with wife           Objective   Physical Exam   Constitutional: He is oriented to person, place, and time. He appears well-developed and well-nourished. No distress.   HENT:   Head: Normocephalic and atraumatic.   Nose: Nose normal.   Moist mucus membranes   Eyes: Conjunctivae and EOM are normal. Pupils are equal, round, and reactive to light. Right eye exhibits no discharge. Left eye exhibits no discharge. No scleral icterus.   9mm equal and reactive   Neck: Normal range of motion. Neck supple. No tracheal deviation present. No thyromegaly present.   Cardiovascular: Normal rate, regular rhythm, normal heart sounds and intact distal pulses.  Exam reveals no gallop and no friction rub.    No murmur heard.  Pulmonary/Chest: Effort normal and breath sounds normal. No stridor. No respiratory distress. He has no wheezes. He has no rales. He exhibits no tenderness.   Abdominal: Soft. Bowel sounds are normal. He exhibits no distension and no mass. There is no tenderness. There is no guarding.   Musculoskeletal: He exhibits no deformity.   Left shoulder immobilizer, NVI   Lymphadenopathy:     He has no cervical adenopathy.   Neurological:  "He is alert and oriented to person, place, and time. He exhibits normal muscle tone. Coordination normal.   Pt has a stutter  Not present when he is on phone in room   Skin: Skin is warm and dry. Capillary refill takes less than 2 seconds.   Psychiatric:   Anxious   Nursing note and vitals reviewed.      Procedures         ED Course  ED Course   Comment By Time   Notified by nursing staff that pt threatened to \"Knock (this physician) the fuck out\". Nima Sandoval MD 03/19 2327   [facility] report [id number] requested received and reviewed.  68 active cumulative morphine equivalent.  Patient has an ongoing history of prescriptions for narcotics from multiple providers in multiple locations filled at multiple pharmacies Nima Sandoval MD 03/19 2328   Pt remains confrontational and combative. Nima Sandoval MD 03/19 9451   D/W Dr Patel, no medical indication for transfer by ambulance from their standpoint. Nima Sandoval MD 03/20 0018      No orders to display     Labs Reviewed   COMPREHENSIVE METABOLIC PANEL - Abnormal; Notable for the following:        Result Value    Alkaline Phosphatase 188 (*)     All other components within normal limits   PROTIME-INR - Abnormal; Notable for the following:     Protime 17.5 (*)     INR 1.42 (*)     All other components within normal limits    Narrative:     Suggested INR therapeutic range for stable oral anticoagulant therapy:    Low Intensity therapy:   1.5-2.0  Moderate Intensity therapy:   2.0-3.0  High Intensity therapy:   2.5-4.0   CBC WITH AUTO DIFFERENTIAL - Abnormal; Notable for the following:     Hemoglobin 13.9 (*)     MCHC 32.6 (*)     Platelets 579 (*)     Neutrophils, Absolute 7.47 (*)     All other components within normal limits   LACTIC ACID, PLASMA - Normal   CK - Normal   OSMOLALITY, CALCULATED - Normal   CBC AND DIFFERENTIAL    Narrative:     The following orders were created for panel order CBC & Differential.  Procedure                     "           Abnormality         Status                     ---------                               -----------         ------                     CBC Auto Differential[[id number]]        Abnormal            Final result                 Please view results for these tests on the individual orders.        Medication List      CHANGE how you take these medications    lisinopril 5 MG tablet  Commonly known as:  PRINIVIL,ZESTRIL  Take 1 tablet by mouth Daily.  What changed:  how much to take        CONTINUE taking these medications    acetaminophen 325 MG tablet  Commonly known as:  TYLENOL  Take 2 tablets by mouth Every 4 (Four) Hours As Needed for Mild Pain .     apixaban 5 MG tablet tablet  Commonly known as:  ELIQUIS     aspirin 81 MG EC tablet  Take 1 tablet by mouth Daily.     * atorvastatin 80 MG tablet  Commonly known as:  LIPITOR  Take 1 tablet by mouth Every Night.     * atorvastatin 80 MG tablet  Commonly known as:  LIPITOR  Take 0.5 tablets by mouth Every Night.     cyclobenzaprine 5 MG tablet  Commonly known as:  FLEXERIL  Take 1 tablet by mouth 3 (Three) Times a Day As Needed for Muscle Spasms.     enoxaparin 100 MG/ML solution syringe  Commonly known as:  LOVENOX  Inject 1 mL under the skin 2 (Two) Times a Day.     FLUoxetine 20 MG capsule  Commonly known as:  PROzac     * HYDROcodone-acetaminophen  MG per tablet  Commonly known as:  NORCO     * HYDROcodone-acetaminophen 7.5-325 MG per tablet  Commonly known as:  NORCO  Take 1 tablet by mouth Every 6 (Six) Hours As Needed for Moderate Pain .     levETIRAcetam 500 MG tablet  Commonly known as:  KEPPRA  Take 1 tablet by mouth Every 12 (Twelve) Hours.     * loxapine 10 MG capsule  Commonly known as:  LOXITANE     * loxapine 25 MG capsule  Commonly known as:  LOXITANE     * metoprolol tartrate 25 MG tablet  Commonly known as:  LOPRESSOR  Take 1/2 tablet by mouth Every 12 (Twelve) Hours.     * metoprolol tartrate 25 MG tablet  Commonly known as:   LOPRESSOR  Take 1 tablet by mouth Every 12 (Twelve) Hours.     * RANEXA 500 MG 12 hr tablet  Generic drug:  ranolazine  Take 1 tablet by mouth Every 12 (Twelve) Hours.     * ranolazine 500 MG 12 hr tablet  Commonly known as:  RANEXA  Take 1 tablet by mouth Every 12 (Twelve) Hours.     rivaroxaban 20 MG tablet  Commonly known as:  XARELTO     SEROQUEL 300 MG tablet  Generic drug:  QUEtiapine     traZODone 150 MG tablet  Commonly known as:  DESYREL     * warfarin 5 MG tablet  Commonly known as:  COUMADIN  TAKE BY MOUTH AS DIRECTED     * warfarin 2.5 MG tablet  Commonly known as:  COUMADIN  Take 2 tablets by mouth Daily.        * This list has 12 medication(s) that are the same as other medications   prescribed for you. Read the directions carefully, and ask your doctor or   other care provider to review them with you.                        MDM  Number of Diagnoses or Management Options  Convulsions, unspecified convulsion type: established and worsening  Left shoulder pain, unspecified chronicity: established and worsening     Amount and/or Complexity of Data Reviewed  Clinical lab tests: ordered and reviewed  Tests in the radiology section of CPT®: ordered and reviewed    Risk of Complications, Morbidity, and/or Mortality  Presenting problems: moderate  Diagnostic procedures: moderate  Management options: moderate    Patient Progress  Patient progress: stable      Final diagnoses:   Convulsions, unspecified convulsion type   Left shoulder pain, unspecified chronicity            Nima Sandoval MD  03/20/18 0459

## 2018-03-20 NOTE — ED NOTES
Entered patient room to administer medications at this time, patient verbalizes that Dr. Sandoval, that mayra, doesn't want to do anything for me, states that he hasn't performed a CT scan of my head, and that is the only way to see that I am having seizures. Patient also states his wife has called and spoke with the surgeon in [place] 20 minutes ago related to patient is suppose to have surgery in the morning in [place] and has no way there, and that the surgeon wants the ED MD to call related to having patient shipped to [place]. Discussed that I would have to speak with MD and awaiting lab results at this time.      Crystal Adamson, MARINA  03/20/18 0005

## 2018-03-20 NOTE — ED NOTES
"Patient on phone with spouse again at this time, overheard patient tell spouse on the phone :I told you that I would be stuck here with no ride home if you called EMS, they won't send me because I didn't have a seizure.\" MD made aware at this time.      Crystal Adamson RN  03/20/18 0021    "

## 2018-03-20 NOTE — ED NOTES
House Supervisor noted to be in patient room at this time speaking with patient, who denies making those accusations. Patient continues to have no convulsions and no stutter at this time. Patient remains ambulatory and noted to be making a phone call at this time.      Crystal Adamson RN  03/20/18 0578

## 2018-03-20 NOTE — ED NOTES
Patient arrived by EMS with complaints of convulsions. EMS reports that patient and spouse states that he was having seizures, states that patient was moving lower extremities voluntarily. Patient also able to speak to staff but was forcing self to have a stutter.      Crystal Adamson RN  03/19/18 1048

## 2018-03-20 NOTE — ED NOTES
"Patient on phone with spouse once again in room ambulating, no convulsions noted. Patient noted to be yelling \"How the hell am I going to get home?\", I told you that this would not work. Also overheard patient telling spouse that he is not getting anything for pain, and that with his injury, he should get \"top of the line\". Made MD aware at this time of patient verbalizations.      Crystal Adamson RN  03/20/18 0552    "

## 2018-03-20 NOTE — ED NOTES
"While another RN was in patient room at this time, patient made verbalizations that \"He was going to knock Dr. Sandoval the fuck out, and that when the police got called, he was going to tell the police that he was having a psychiatric breakdown so he would not get into trouble\". House Supervisor made aware of it.      Crystal Adamson RN  03/20/18 0131    "

## 2018-04-19 ENCOUNTER — APPOINTMENT (OUTPATIENT)
Dept: CT IMAGING | Facility: HOSPITAL | Age: 43
End: 2018-04-19

## 2018-04-19 ENCOUNTER — HOSPITAL ENCOUNTER (EMERGENCY)
Facility: HOSPITAL | Age: 43
Discharge: LEFT BEFORE DISCHARGE | End: 2018-04-19
Attending: EMERGENCY MEDICINE | Admitting: EMERGENCY MEDICINE

## 2018-04-19 DIAGNOSIS — R07.89 ATYPICAL CHEST PAIN: Primary | ICD-10-CM

## 2018-04-19 LAB
INR PPP: 0.95 (ref 0.9–1.1)
PROTHROMBIN TIME: 12.8 SECONDS (ref 11–15.4)
TROPONIN I SERPL-MCNC: <0.006 NG/ML

## 2018-04-19 PROCEDURE — 70450 CT HEAD/BRAIN W/O DYE: CPT

## 2018-04-19 PROCEDURE — 84484 ASSAY OF TROPONIN QUANT: CPT | Performed by: EMERGENCY MEDICINE

## 2018-04-19 PROCEDURE — 99285 EMERGENCY DEPT VISIT HI MDM: CPT

## 2018-04-19 PROCEDURE — 85610 PROTHROMBIN TIME: CPT | Performed by: EMERGENCY MEDICINE

## 2018-04-19 PROCEDURE — 93010 ELECTROCARDIOGRAM REPORT: CPT | Performed by: INTERNAL MEDICINE

## 2018-04-19 PROCEDURE — 80177 DRUG SCRN QUAN LEVETIRACETAM: CPT | Performed by: EMERGENCY MEDICINE

## 2018-04-19 PROCEDURE — 70450 CT HEAD/BRAIN W/O DYE: CPT | Performed by: RADIOLOGY

## 2018-04-19 PROCEDURE — 93005 ELECTROCARDIOGRAM TRACING: CPT | Performed by: EMERGENCY MEDICINE

## 2018-04-19 PROCEDURE — 36415 COLL VENOUS BLD VENIPUNCTURE: CPT

## 2018-04-19 RX ORDER — KETOROLAC TROMETHAMINE 10 MG/1
TABLET, FILM COATED ORAL
Status: DISCONTINUED
Start: 2018-04-19 | End: 2018-04-19 | Stop reason: HOSPADM

## 2018-04-19 RX ORDER — NICOTINE 21 MG/24HR
1 PATCH, TRANSDERMAL 24 HOURS TRANSDERMAL ONCE
Status: DISCONTINUED | OUTPATIENT
Start: 2018-04-19 | End: 2018-04-19 | Stop reason: HOSPADM

## 2018-04-19 RX ORDER — LEVETIRACETAM 250 MG/1
500 TABLET ORAL ONCE
Status: COMPLETED | OUTPATIENT
Start: 2018-04-19 | End: 2018-04-19

## 2018-04-19 RX ORDER — KETOROLAC TROMETHAMINE 10 MG/1
20 TABLET, FILM COATED ORAL ONCE
Status: COMPLETED | OUTPATIENT
Start: 2018-04-19 | End: 2018-04-19

## 2018-04-19 RX ADMIN — NICOTINE 1 PATCH: 21 PATCH TRANSDERMAL at 19:26

## 2018-04-19 RX ADMIN — KETOROLAC TROMETHAMINE 20 MG: 10 TABLET, FILM COATED ORAL at 19:32

## 2018-04-19 RX ADMIN — LEVETIRACETAM 500 MG: 250 TABLET, FILM COATED ORAL at 18:36

## 2018-04-19 NOTE — ED NOTES
Unable to obtain blood samples and EKG  at this time.  Patient not in room.     Jerrica Lira  04/19/18 4947

## 2018-04-20 VITALS
TEMPERATURE: 98.6 F | WEIGHT: 210 LBS | RESPIRATION RATE: 18 BRPM | HEIGHT: 74 IN | DIASTOLIC BLOOD PRESSURE: 82 MMHG | SYSTOLIC BLOOD PRESSURE: 134 MMHG | OXYGEN SATURATION: 100 % | BODY MASS INDEX: 26.95 KG/M2 | HEART RATE: 90 BPM

## 2018-04-20 NOTE — ED PROVIDER NOTES
Subjective     Seizures   Seizure type:  Partial simple  Initial focality:  None  Episode characteristics: abnormal movements and partial responsiveness    Postictal symptoms: no somnolence    Timing:  Intermittent      Review of Systems   Eyes: Negative.    Respiratory: Positive for chest tightness and wheezing.    Endocrine: Negative.    Musculoskeletal: Negative.    Allergic/Immunologic: Negative.    Neurological: Positive for seizures.   All other systems reviewed and are negative.      Past Medical History:   Diagnosis Date   • Anxiety    • Aortic stenosis    • Atrial fibrillation    • CAD (coronary artery disease)    • Chronic back pain    • Hallucinations    • Heart attack    • Hyperlipidemia    • Hypertension    • Injury of back    • Nervous breakdown     12 years ago   • Seizure    • Stroke    • Stroke     affected speech, vision, right side weakness   • Tobacco abuse        Allergies   Allergen Reactions   • Bee Venom Anaphylaxis   • Celexa [Citalopram] Shortness Of Breath   • Fentanyl      Pt states that he stops breathing   • Fentanyl Shortness Of Breath   • Haldol [Haloperidol Lactate] Anaphylaxis   • Haldol [Haloperidol] Shortness Of Breath   • Nitroglycerin Anaphylaxis   • Risperdal [Risperidone] Shortness Of Breath   • Risperidone And Related Anaphylaxis   • Celexa [Citalopram Hydrobromide] Other (See Comments)     Pt states that it causes him to sweat and a severe headache.        Past Surgical History:   Procedure Laterality Date   • AORTIC VALVE REPAIR/REPLACEMENT  2016    tissue valve, performed in [place]   • AORTIC VALVE REPAIR/REPLACEMENT MITRAL VALVE REPAIR/REPLACEMENT     • APPENDECTOMY     • CARDIAC CATHETERIZATION     • CHOLECYSTECTOMY     • HERNIA REPAIR      umbilical   • HIATAL HERNIA REPAIR     • KNEE ARTHROSCOPY     • PACEMAKER IMPLANTATION     • PACEMAKER IMPLANTATION Left 2014 2014 OR 2015.   [employer]. interrogated 3/9/18   • PATELLAR RECONSTRUCTION     • QUADRICEPS  REPAIR     • SHOULDER ARTHROSCOPY     • SHOULDER CLOSED REDUCTION Left 3/9/2018    Procedure: LEFT SHOULDER CLOSED REDUCTION;  Surgeon: Steven Wang Jr., MD;  Location: [facility];  Service: Orthopedics       Family History   Problem Relation Age of Onset   • No Known Problems Mother    • No Known Problems Father        Social History     Social History   • Marital status:      Social History Main Topics   • Smoking status: Current Every Day Smoker     Packs/day: 0.50     Types: Cigarettes      Comment: 2-3 cigarettes   • Alcohol use No   • Drug use: No   • Sexual activity: Defer     Other Topics Concern   • Not on file     Social History Narrative    ** Merged History Encounter **         Retired , independentt of ADL, lives with wife           Objective   Physical Exam   Constitutional: He appears well-developed and well-nourished.   HENT:   Head: Normocephalic.   Eyes: Pupils are equal, round, and reactive to light.   Neck: Normal range of motion.   Cardiovascular: Normal rate.    Pulmonary/Chest: He has wheezes.   Abdominal: Soft.   Musculoskeletal: Normal range of motion.   Neurological: He is alert.   Skin: Skin is warm.   Psychiatric: He has a normal mood and affect.   Nursing note and vitals reviewed.      Procedures         ED Course  ED Course   Comment By Time   CT head negative per Megan Dela Cruz MD 04/19 1936                  [facility]    Final diagnoses:   Atypical chest pain            Tavares Dela Cruz MD  04/25/18 9917

## 2018-04-22 LAB — LEVETIRACETAM SERPL-MCNC: 10.8 UG/ML (ref 10–40)

## 2018-04-25 ENCOUNTER — HOSPITAL ENCOUNTER (EMERGENCY)
Facility: HOSPITAL | Age: 43
Discharge: ANOTHER HOSPITAL | End: 2018-04-25
Attending: EMERGENCY MEDICINE | Admitting: EMERGENCY MEDICINE

## 2018-04-25 ENCOUNTER — APPOINTMENT (OUTPATIENT)
Dept: CT IMAGING | Facility: HOSPITAL | Age: 43
End: 2018-04-25

## 2018-04-25 ENCOUNTER — APPOINTMENT (OUTPATIENT)
Dept: GENERAL RADIOLOGY | Facility: HOSPITAL | Age: 43
End: 2018-04-25

## 2018-04-25 DIAGNOSIS — G40.901 STATUS EPILEPTICUS (HCC): Primary | ICD-10-CM

## 2018-04-25 LAB
6-ACETYL MORPHINE: NEGATIVE
A-A DO2: 210 MMHG (ref 0–300)
ALBUMIN SERPL-MCNC: 4.5 G/DL (ref 3.5–5)
ALBUMIN/GLOB SERPL: 1.6 G/DL (ref 1.5–2.5)
ALP SERPL-CCNC: 136 U/L (ref 40–129)
ALT SERPL W P-5'-P-CCNC: 20 U/L (ref 10–44)
AMPHET+METHAMPHET UR QL: NEGATIVE
ANION GAP SERPL CALCULATED.3IONS-SCNC: 21.6 MMOL/L (ref 3.6–11.2)
ARTERIAL PATENCY WRIST A: ABNORMAL
AST SERPL-CCNC: 21 U/L (ref 10–34)
ATMOSPHERIC PRESS: 724 MMHG
BACTERIA UR QL AUTO: ABNORMAL /HPF
BARBITURATES UR QL SCN: NEGATIVE
BASE EXCESS BLDA CALC-SCNC: -9.5 MMOL/L
BDY SITE: ABNORMAL
BENZODIAZ UR QL SCN: NEGATIVE
BILIRUB SERPL-MCNC: 0.2 MG/DL (ref 0.2–1.8)
BILIRUB UR QL STRIP: NEGATIVE
BNP SERPL-MCNC: 6 PG/ML (ref 0–100)
BODY TEMPERATURE: 98.6 C
BUN BLD-MCNC: 13 MG/DL (ref 7–21)
BUN/CREAT SERPL: 7.4 (ref 7–25)
BUPRENORPHINE SERPL-MCNC: NEGATIVE NG/ML
CALCIUM SPEC-SCNC: 9 MG/DL (ref 7.7–10)
CANNABINOIDS SERPL QL: NEGATIVE
CHLORIDE SERPL-SCNC: 111 MMOL/L (ref 99–112)
CLARITY UR: CLEAR
CO2 SERPL-SCNC: 12.4 MMOL/L (ref 24.3–31.9)
COCAINE UR QL: NEGATIVE
COHGB MFR BLD: 1.2 % (ref 0–5)
COLOR UR: YELLOW
CREAT BLD-MCNC: 1.76 MG/DL (ref 0.43–1.29)
D-LACTATE SERPL-SCNC: 14.7 MMOL/L (ref 0.5–2)
DEPRECATED RDW RBC AUTO: 44.1 FL (ref 37–54)
ERYTHROCYTE [DISTWIDTH] IN BLOOD BY AUTOMATED COUNT: 13.5 % (ref 11.5–14.5)
ETHANOL BLD-MCNC: <10 MG/DL
ETHANOL UR QL: <0.01 %
GFR SERPL CREATININE-BSD FRML MDRD: 42 ML/MIN/1.73
GLOBULIN UR ELPH-MCNC: 2.8 GM/DL
GLUCOSE BLD-MCNC: 271 MG/DL (ref 70–110)
GLUCOSE UR STRIP-MCNC: NEGATIVE MG/DL
HCO3 BLDA-SCNC: 17.1 MMOL/L (ref 22–26)
HCT VFR BLD AUTO: 41.5 % (ref 42–52)
HCT VFR BLD CALC: 38 % (ref 42–52)
HGB BLD-MCNC: 13.4 G/DL (ref 14–18)
HGB BLDA-MCNC: 12.8 G/DL (ref 12–16)
HGB UR QL STRIP.AUTO: ABNORMAL
HOLD SPECIMEN: NORMAL
HOROWITZ INDEX BLD+IHG-RTO: 50 %
HYALINE CASTS UR QL AUTO: ABNORMAL /LPF
KETONES UR QL STRIP: NEGATIVE
LEUKOCYTE ESTERASE UR QL STRIP.AUTO: NEGATIVE
LIPASE SERPL-CCNC: 47 U/L (ref 13–60)
LYMPHOCYTES # BLD MANUAL: 1.56 10*3/MM3 (ref 1–3)
LYMPHOCYTES NFR BLD MANUAL: 5 % (ref 0–10)
LYMPHOCYTES NFR BLD MANUAL: 5 % (ref 21–51)
MCH RBC QN AUTO: 29.1 PG (ref 27–33)
MCHC RBC AUTO-ENTMCNC: 32.3 G/DL (ref 33–37)
MCV RBC AUTO: 90.2 FL (ref 80–94)
METHADONE UR QL SCN: NEGATIVE
METHGB BLD QL: 0.4 % (ref 0–3)
MODALITY: ABNORMAL
MONOCYTES # BLD AUTO: 1.56 10*3/MM3 (ref 0.1–0.9)
NEUTROPHILS # BLD AUTO: 28.16 10*3/MM3 (ref 1.4–6.5)
NEUTROPHILS NFR BLD MANUAL: 86 % (ref 30–70)
NEUTS BAND NFR BLD MANUAL: 4 % (ref 4–12)
NITRITE UR QL STRIP: NEGATIVE
OPIATES UR QL: NEGATIVE
OSMOLALITY SERPL CALC.SUM OF ELEC: 298.4 MOSM/KG (ref 273–305)
OXYCODONE UR QL SCN: NEGATIVE
OXYHGB MFR BLDV: 92.8 % (ref 85–100)
PCO2 BLDA: 39.8 MM HG (ref 35–45)
PCP UR QL SCN: NEGATIVE
PEEP RESPIRATORY: 5 CM[H2O]
PH BLDA: 7.25 PH UNITS (ref 7.35–7.45)
PH UR STRIP.AUTO: <=5 [PH] (ref 5–8)
PLAT MORPH BLD: NORMAL
PLATELET # BLD AUTO: 355 10*3/MM3 (ref 130–400)
PMV BLD AUTO: 10.1 FL (ref 6–10)
PO2 BLDA: 83.7 MM HG (ref 80–100)
POTASSIUM BLD-SCNC: 3.8 MMOL/L (ref 3.5–5.3)
PROT SERPL-MCNC: 7.3 G/DL (ref 6–8)
PROT UR QL STRIP: ABNORMAL
RBC # BLD AUTO: 4.6 10*6/MM3 (ref 4.7–6.1)
RBC # UR: ABNORMAL /HPF
RBC MORPH BLD: NORMAL
REF LAB TEST METHOD: ABNORMAL
SAO2 % BLDCOA: 94.3 % (ref 90–100)
SCAN SLIDE: NORMAL
SET MECH RESP RATE: 20
SODIUM BLD-SCNC: 145 MMOL/L (ref 135–153)
SP GR UR STRIP: 1.02 (ref 1–1.03)
SQUAMOUS #/AREA URNS HPF: ABNORMAL /HPF
TROPONIN I SERPL-MCNC: 0.03 NG/ML
UROBILINOGEN UR QL STRIP: ABNORMAL
VENTILATOR MODE: ABNORMAL
VT ON VENT VENT: 500 ML
WBC NRBC COR # BLD: 31.29 10*3/MM3 (ref 4.5–12.5)
WBC UR QL AUTO: ABNORMAL /HPF

## 2018-04-25 PROCEDURE — 80307 DRUG TEST PRSMV CHEM ANLYZR: CPT | Performed by: EMERGENCY MEDICINE

## 2018-04-25 PROCEDURE — 36415 COLL VENOUS BLD VENIPUNCTURE: CPT

## 2018-04-25 PROCEDURE — 70450 CT HEAD/BRAIN W/O DYE: CPT

## 2018-04-25 PROCEDURE — 99285 EMERGENCY DEPT VISIT HI MDM: CPT

## 2018-04-25 PROCEDURE — 87040 BLOOD CULTURE FOR BACTERIA: CPT | Performed by: EMERGENCY MEDICINE

## 2018-04-25 PROCEDURE — 25010000002 VANCOMYCIN PER 500 MG: Performed by: EMERGENCY MEDICINE

## 2018-04-25 PROCEDURE — 84484 ASSAY OF TROPONIN QUANT: CPT | Performed by: EMERGENCY MEDICINE

## 2018-04-25 PROCEDURE — 71045 X-RAY EXAM CHEST 1 VIEW: CPT

## 2018-04-25 PROCEDURE — 94799 UNLISTED PULMONARY SVC/PX: CPT

## 2018-04-25 PROCEDURE — 82805 BLOOD GASES W/O2 SATURATION: CPT | Performed by: EMERGENCY MEDICINE

## 2018-04-25 PROCEDURE — 70450 CT HEAD/BRAIN W/O DYE: CPT | Performed by: RADIOLOGY

## 2018-04-25 PROCEDURE — 85007 BL SMEAR W/DIFF WBC COUNT: CPT | Performed by: EMERGENCY MEDICINE

## 2018-04-25 PROCEDURE — 36600 WITHDRAWAL OF ARTERIAL BLOOD: CPT | Performed by: EMERGENCY MEDICINE

## 2018-04-25 PROCEDURE — 80177 DRUG SCRN QUAN LEVETIRACETAM: CPT | Performed by: EMERGENCY MEDICINE

## 2018-04-25 PROCEDURE — 96365 THER/PROPH/DIAG IV INF INIT: CPT

## 2018-04-25 PROCEDURE — 80053 COMPREHEN METABOLIC PANEL: CPT | Performed by: EMERGENCY MEDICINE

## 2018-04-25 PROCEDURE — 31500 INSERT EMERGENCY AIRWAY: CPT | Performed by: EMERGENCY MEDICINE

## 2018-04-25 PROCEDURE — 25010000002 MIDAZOLAM PER 1 MG

## 2018-04-25 PROCEDURE — 25010000002 MIDAZOLAM 0.5 MG/ML 100 ML NS: Performed by: EMERGENCY MEDICINE

## 2018-04-25 PROCEDURE — 25010000002 PIPERACILLIN-TAZOBACTAM: Performed by: EMERGENCY MEDICINE

## 2018-04-25 PROCEDURE — 94002 VENT MGMT INPAT INIT DAY: CPT

## 2018-04-25 PROCEDURE — 96368 THER/DIAG CONCURRENT INF: CPT

## 2018-04-25 PROCEDURE — 83880 ASSAY OF NATRIURETIC PEPTIDE: CPT | Performed by: EMERGENCY MEDICINE

## 2018-04-25 PROCEDURE — 83050 HGB METHEMOGLOBIN QUAN: CPT | Performed by: EMERGENCY MEDICINE

## 2018-04-25 PROCEDURE — 83690 ASSAY OF LIPASE: CPT | Performed by: EMERGENCY MEDICINE

## 2018-04-25 PROCEDURE — 87086 URINE CULTURE/COLONY COUNT: CPT | Performed by: EMERGENCY MEDICINE

## 2018-04-25 PROCEDURE — 85025 COMPLETE CBC W/AUTO DIFF WBC: CPT | Performed by: EMERGENCY MEDICINE

## 2018-04-25 PROCEDURE — 96376 TX/PRO/DX INJ SAME DRUG ADON: CPT

## 2018-04-25 PROCEDURE — 25010000002 LORAZEPAM PER 2 MG

## 2018-04-25 PROCEDURE — 31500 INSERT EMERGENCY AIRWAY: CPT

## 2018-04-25 PROCEDURE — 96366 THER/PROPH/DIAG IV INF ADDON: CPT

## 2018-04-25 PROCEDURE — 96361 HYDRATE IV INFUSION ADD-ON: CPT

## 2018-04-25 PROCEDURE — 83605 ASSAY OF LACTIC ACID: CPT | Performed by: EMERGENCY MEDICINE

## 2018-04-25 PROCEDURE — 96375 TX/PRO/DX INJ NEW DRUG ADDON: CPT

## 2018-04-25 PROCEDURE — P9612 CATHETERIZE FOR URINE SPEC: HCPCS

## 2018-04-25 PROCEDURE — 93005 ELECTROCARDIOGRAM TRACING: CPT | Performed by: EMERGENCY MEDICINE

## 2018-04-25 PROCEDURE — 25010000002 PROPOFOL 10 MG/ML EMULSION

## 2018-04-25 PROCEDURE — 82375 ASSAY CARBOXYHB QUANT: CPT | Performed by: EMERGENCY MEDICINE

## 2018-04-25 PROCEDURE — 81001 URINALYSIS AUTO W/SCOPE: CPT | Performed by: EMERGENCY MEDICINE

## 2018-04-25 PROCEDURE — 71045 X-RAY EXAM CHEST 1 VIEW: CPT | Performed by: RADIOLOGY

## 2018-04-25 RX ORDER — VECURONIUM BROMIDE 1 MG/ML
0.1 INJECTION, POWDER, LYOPHILIZED, FOR SOLUTION INTRAVENOUS ONCE
Status: COMPLETED | OUTPATIENT
Start: 2018-04-25 | End: 2018-04-25

## 2018-04-25 RX ORDER — NALOXONE HYDROCHLORIDE 1 MG/ML
2 INJECTION INTRAMUSCULAR; INTRAVENOUS; SUBCUTANEOUS ONCE
Status: COMPLETED | OUTPATIENT
Start: 2018-04-25 | End: 2018-04-25

## 2018-04-25 RX ORDER — LORAZEPAM 2 MG/ML
2 INJECTION INTRAMUSCULAR ONCE
Status: COMPLETED | OUTPATIENT
Start: 2018-04-25 | End: 2018-04-25

## 2018-04-25 RX ORDER — PROPOFOL 10 MG/ML
VIAL (ML) INTRAVENOUS
Status: DISCONTINUED
Start: 2018-04-25 | End: 2018-04-25 | Stop reason: HOSPADM

## 2018-04-25 RX ORDER — DILTIAZEM HYDROCHLORIDE 5 MG/ML
20 INJECTION INTRAVENOUS ONCE
Status: COMPLETED | OUTPATIENT
Start: 2018-04-25 | End: 2018-04-25

## 2018-04-25 RX ORDER — PROPOFOL 10 MG/ML
VIAL (ML) INTRAVENOUS
Status: COMPLETED
Start: 2018-04-25 | End: 2018-04-25

## 2018-04-25 RX ORDER — LORAZEPAM 2 MG/ML
INJECTION INTRAMUSCULAR
Status: COMPLETED
Start: 2018-04-25 | End: 2018-04-25

## 2018-04-25 RX ORDER — MIDAZOLAM HYDROCHLORIDE 1 MG/ML
2 INJECTION INTRAMUSCULAR; INTRAVENOUS ONCE
Status: COMPLETED | OUTPATIENT
Start: 2018-04-25 | End: 2018-04-25

## 2018-04-25 RX ORDER — SODIUM CHLORIDE 9 MG/ML
INJECTION, SOLUTION INTRAVENOUS
Status: COMPLETED
Start: 2018-04-25 | End: 2018-04-25

## 2018-04-25 RX ORDER — MIDAZOLAM IN 0.9 % SOD.CHLORID 1 MG/ML
1-10 PLASTIC BAG, INJECTION (ML) INTRAVENOUS
Status: DISCONTINUED | OUTPATIENT
Start: 2018-04-25 | End: 2018-04-25 | Stop reason: HOSPADM

## 2018-04-25 RX ORDER — MIDAZOLAM HYDROCHLORIDE 1 MG/ML
INJECTION INTRAMUSCULAR; INTRAVENOUS
Status: COMPLETED
Start: 2018-04-25 | End: 2018-04-25

## 2018-04-25 RX ORDER — PROPOFOL 10 MG/ML
2 VIAL (ML) INTRAVENOUS ONCE
Status: COMPLETED | OUTPATIENT
Start: 2018-04-25 | End: 2018-04-25

## 2018-04-25 RX ORDER — ETOMIDATE 2 MG/ML
15 INJECTION INTRAVENOUS ONCE
Status: COMPLETED | OUTPATIENT
Start: 2018-04-25 | End: 2018-04-25

## 2018-04-25 RX ADMIN — NALOXONE HYDROCHLORIDE 2 MG: 1 INJECTION PARENTERAL at 17:14

## 2018-04-25 RX ADMIN — MIDAZOLAM HYDROCHLORIDE 2 MG: 1 INJECTION INTRAMUSCULAR; INTRAVENOUS at 19:12

## 2018-04-25 RX ADMIN — PROPOFOL 190.6 MG: 10 INJECTION, EMULSION INTRAVENOUS at 18:48

## 2018-04-25 RX ADMIN — Medication 190.6 MG: at 18:48

## 2018-04-25 RX ADMIN — SODIUM CHLORIDE 1000 ML: 9 INJECTION, SOLUTION INTRAVENOUS at 17:17

## 2018-04-25 RX ADMIN — ETOMIDATE 15 MG: 2 INJECTION, SOLUTION INTRAVENOUS at 17:26

## 2018-04-25 RX ADMIN — TAZOBACTAM SODIUM AND PIPERACILLIN SODIUM 4.5 G: .5; 4 INJECTION, POWDER, LYOPHILIZED, FOR SOLUTION INTRAVENOUS at 18:49

## 2018-04-25 RX ADMIN — DILTIAZEM HYDROCHLORIDE 20 MG: 5 INJECTION INTRAVENOUS at 17:11

## 2018-04-25 RX ADMIN — LORAZEPAM 2 MG: 2 INJECTION INTRAMUSCULAR at 17:23

## 2018-04-25 RX ADMIN — Medication 1 MG/HR: at 19:40

## 2018-04-25 RX ADMIN — VANCOMYCIN HYDROCHLORIDE 2000 MG: 5 INJECTION, POWDER, LYOPHILIZED, FOR SOLUTION INTRAVENOUS at 18:50

## 2018-04-25 RX ADMIN — LORAZEPAM 2 MG: 2 INJECTION INTRAMUSCULAR; INTRAVENOUS at 17:23

## 2018-04-25 RX ADMIN — VECURONIUM BROMIDE 9.5 MG: 1 INJECTION, POWDER, LYOPHILIZED, FOR SOLUTION INTRAVENOUS at 17:28

## 2018-04-25 RX ADMIN — MIDAZOLAM HYDROCHLORIDE 2 MG: 1 INJECTION, SOLUTION INTRAMUSCULAR; INTRAVENOUS at 19:12

## 2018-04-25 NOTE — ED NOTES
Patient transported to CT via stretcher with RT and RN      Clinton Rodriguez, RN  04/25/18 2927

## 2018-04-25 NOTE — ED NOTES
Carlee called back with [facility], she advised that they only have 1 ALS truck and can't take the transfer.      Heather Symes  04/25/18 1952

## 2018-04-25 NOTE — ED PROVIDER NOTES
Subjective     Seizures   Seizure activity on arrival: no    Seizure type:  Grand mal  Preceding symptoms: aura    Episode characteristics: abnormal movements, combativeness, confusion and eye deviation    Postictal symptoms: confusion    Return to baseline: no    Severity:  Severe  Number of seizures this episode:  Then had seizure in ER, turned blue sat in 50s  Progression:  Worsening  Context: decreased sleep, fever and stress        Review of Systems   Constitutional: Positive for activity change, appetite change, diaphoresis and fever.   HENT: Negative.    Eyes: Negative.    Respiratory: Positive for apnea, shortness of breath and wheezing.    Cardiovascular: Positive for palpitations.   Gastrointestinal: Positive for nausea and vomiting.   Endocrine: Negative.    Genitourinary: Negative.    Musculoskeletal: Positive for back pain and myalgias.   Skin: Positive for pallor.   Allergic/Immunologic: Negative.    Neurological: Positive for dizziness and seizures.   Hematological: Negative.    Psychiatric/Behavioral: Positive for agitation, confusion and decreased concentration. The patient is nervous/anxious.    All other systems reviewed and are negative.      Past Medical History:   Diagnosis Date   • Anxiety    • Aortic stenosis    • Atrial fibrillation    • CAD (coronary artery disease)    • Chronic back pain    • Hallucinations    • Heart attack    • Hyperlipidemia    • Hypertension    • Injury of back    • Nervous breakdown     12 years ago   • Seizure    • Stroke    • Stroke     affected speech, vision, right side weakness   • Tobacco abuse        Allergies   Allergen Reactions   • Bee Venom Anaphylaxis   • Celexa [Citalopram] Shortness Of Breath   • Fentanyl      Pt states that he stops breathing   • Fentanyl Shortness Of Breath   • Haldol [Haloperidol Lactate] Anaphylaxis   • Haldol [Haloperidol] Shortness Of Breath   • Nitroglycerin Anaphylaxis   • Risperdal [Risperidone] Shortness Of Breath   •  Risperidone And Related Anaphylaxis   • Celexa [Citalopram Hydrobromide] Other (See Comments)     Pt states that it causes him to sweat and a severe headache.        Past Surgical History:   Procedure Laterality Date   • AORTIC VALVE REPAIR/REPLACEMENT  2016    tissue valve, performed in [place]   • AORTIC VALVE REPAIR/REPLACEMENT MITRAL VALVE REPAIR/REPLACEMENT     • APPENDECTOMY     • CARDIAC CATHETERIZATION     • CHOLECYSTECTOMY     • HERNIA REPAIR      umbilical   • HIATAL HERNIA REPAIR     • KNEE ARTHROSCOPY     • PACEMAKER IMPLANTATION     • PACEMAKER IMPLANTATION Left 2014 2014 OR 2015.   [id number]. interrogated 3/9/18   • PATELLAR RECONSTRUCTION     • QUADRICEPS REPAIR     • SHOULDER ARTHROSCOPY     • SHOULDER CLOSED REDUCTION Left 3/9/2018    Procedure: LEFT SHOULDER CLOSED REDUCTION;  Surgeon: Steven Wang Jr., MD;  Location: [facility];  Service: Orthopedics       Family History   Problem Relation Age of Onset   • No Known Problems Mother    • No Known Problems Father        Social History     Social History   • Marital status:      Social History Main Topics   • Smoking status: Current Every Day Smoker     Packs/day: 0.50     Types: Cigarettes      Comment: 2-3 cigarettes   • Alcohol use No   • Drug use: No   • Sexual activity: Defer     Other Topics Concern   • Not on file     Social History Narrative    ** Merged History Encounter **         Retired , independentt of ADL, lives with wife           Objective   Physical Exam   Constitutional: He appears well-nourished.   HENT:   Head: Normocephalic.   Eyes:   Pupils constricted   Neck: Neck supple.   Cardiovascular:   Tachycardic, rate 180   Pulmonary/Chest: He has wheezes. He has rales.   Abdominal: Soft.   Neurological:   Obtunded upon arrival, then had gran mal seizure   Skin:   Cyanotic, diaphoretic   Nursing note and vitals reviewed.      Intubation  Date/Time: 4/25/2018 6:18 PM  Performed by: ANABELA VILLALTA  GERALDO  Authorized by: TAVARES DELA CRUZ     Consent:     Consent obtained:  Verbal and written    Consent given by:  Patient  Pre-procedure details:     Patient status:  Unresponsive    Mallampati score:  II    Pretreatment meds: etomidate.    Paralytics:  Succinylcholine  Procedure details:     Preoxygenation:  Bag valve mask    CPR in progress: no      Intubation method:  Oral    Laryngoscope blade:  Luong 3    Tube size (mm):  8.0    Tube type:  Cuffed    Number of attempts:  1  Post-procedure details:     Patient tolerance of procedure:  Tolerated well, no immediate complications             ED Course  ED Course                  MDM    Final diagnoses:   Status epilepticus            Tavares Dela Cruz MD  04/25/18 5788

## 2018-04-25 NOTE — ED NOTES
Called EMS spoke with Carlee requested ALS transfer advised patient is on a Vent. She will call [facility] and call me back.      Heather Symes  04/25/18 1949

## 2018-04-25 NOTE — ED NOTES
Marcy with  is on the line to get report. WILLIAM Lira RN is aware.      Heather Symes  04/25/18 1942

## 2018-04-25 NOTE — ED NOTES
Andres [facility] called back. He is making sure they have the vent to transfer patient. He will call me back      Heather Symes  04/25/18 1958

## 2018-04-25 NOTE — ED NOTES
Hudson GRAY from [facility] gave Bed assignment: [facility] [place] [facility], 9th Floor ICU Bed 134.     Sandee Lira RN  04/25/18 1954

## 2018-04-25 NOTE — ED NOTES
Contacted [facility] for Dr. Dela Cruz , advised they would have the Dr Rai call us back shortly that they were in the middle of shift change      Domenico Stover  04/25/18 1750       Domenico Stover  04/25/18 1802

## 2018-04-25 NOTE — ED NOTES
Called [facility] spoke with Alvarado. Requested ALS transfer to [facility]. He will have [facility] call back.      Heather Symes  04/25/18 1955

## 2018-04-26 VITALS
SYSTOLIC BLOOD PRESSURE: 114 MMHG | WEIGHT: 210 LBS | TEMPERATURE: 98.3 F | HEART RATE: 89 BPM | DIASTOLIC BLOOD PRESSURE: 72 MMHG | OXYGEN SATURATION: 100 % | RESPIRATION RATE: 20 BRPM | BODY MASS INDEX: 26.95 KG/M2 | HEIGHT: 74 IN

## 2018-04-26 PROCEDURE — 93005 ELECTROCARDIOGRAM TRACING: CPT | Performed by: EMERGENCY MEDICINE

## 2018-04-26 NOTE — ED NOTES
Called [facility], spoke with Miky. They accepted transfer. ETA 15-20 minutes. WILLIAM Lira RN is aware.      Heather Symes  04/25/18 2019

## 2018-04-26 NOTE — ED NOTES
Spoke with Andres [facility] with [facility] canceled truck due to patient going by air.      Heather Symes  04/25/18 2023

## 2018-04-27 LAB — BACTERIA SPEC AEROBE CULT: NO GROWTH

## 2018-04-29 LAB — LEVETIRACETAM SERPL-MCNC: 6.3 UG/ML (ref 10–40)

## 2018-04-30 LAB
BACTERIA SPEC AEROBE CULT: NORMAL
BACTERIA SPEC AEROBE CULT: NORMAL

## 2018-05-17 ENCOUNTER — HOSPITAL ENCOUNTER (EMERGENCY)
Facility: HOSPITAL | Age: 43
Discharge: HOME | End: 2018-05-18
Attending: EMERGENCY MEDICINE | Admitting: EMERGENCY MEDICINE

## 2018-05-17 DIAGNOSIS — R56.9 SEIZURE (HCC): Primary | ICD-10-CM

## 2018-05-17 PROCEDURE — 99284 EMERGENCY DEPT VISIT MOD MDM: CPT

## 2018-05-17 PROCEDURE — 93005 ELECTROCARDIOGRAM TRACING: CPT | Performed by: EMERGENCY MEDICINE

## 2018-05-18 ENCOUNTER — APPOINTMENT (OUTPATIENT)
Dept: GENERAL RADIOLOGY | Facility: HOSPITAL | Age: 43
End: 2018-05-18

## 2018-05-18 VITALS
WEIGHT: 202 LBS | OXYGEN SATURATION: 98 % | HEIGHT: 74 IN | BODY MASS INDEX: 25.93 KG/M2 | HEART RATE: 98 BPM | RESPIRATION RATE: 18 BRPM | DIASTOLIC BLOOD PRESSURE: 74 MMHG | SYSTOLIC BLOOD PRESSURE: 118 MMHG | TEMPERATURE: 98.8 F

## 2018-05-18 LAB
ALBUMIN SERPL-MCNC: 4.8 G/DL (ref 3.5–5)
ALBUMIN/GLOB SERPL: 1.8 G/DL (ref 1.5–2.5)
ALP SERPL-CCNC: 147 U/L (ref 40–129)
ALT SERPL W P-5'-P-CCNC: 29 U/L (ref 10–44)
ANION GAP SERPL CALCULATED.3IONS-SCNC: 7.6 MMOL/L (ref 3.6–11.2)
AST SERPL-CCNC: 22 U/L (ref 10–34)
BASOPHILS # BLD AUTO: 0.13 10*3/MM3 (ref 0–0.3)
BASOPHILS NFR BLD AUTO: 1.1 % (ref 0–2)
BILIRUB SERPL-MCNC: 0.3 MG/DL (ref 0.2–1.8)
BUN BLD-MCNC: 12 MG/DL (ref 7–21)
BUN/CREAT SERPL: 11 (ref 7–25)
CALCIUM SPEC-SCNC: 8.9 MG/DL (ref 7.7–10)
CHLORIDE SERPL-SCNC: 107 MMOL/L (ref 99–112)
CO2 SERPL-SCNC: 24.4 MMOL/L (ref 24.3–31.9)
CREAT BLD-MCNC: 1.09 MG/DL (ref 0.43–1.29)
DEPRECATED RDW RBC AUTO: 43 FL (ref 37–54)
EOSINOPHIL # BLD AUTO: 0.33 10*3/MM3 (ref 0–0.7)
EOSINOPHIL NFR BLD AUTO: 2.9 % (ref 0–5)
ERYTHROCYTE [DISTWIDTH] IN BLOOD BY AUTOMATED COUNT: 13.6 % (ref 11.5–14.5)
GFR SERPL CREATININE-BSD FRML MDRD: 73 ML/MIN/1.73
GLOBULIN UR ELPH-MCNC: 2.6 GM/DL
GLUCOSE BLD-MCNC: 112 MG/DL (ref 70–110)
HCT VFR BLD AUTO: 42.3 % (ref 42–52)
HGB BLD-MCNC: 14 G/DL (ref 14–18)
IMM GRANULOCYTES # BLD: 0.02 10*3/MM3 (ref 0–0.03)
IMM GRANULOCYTES NFR BLD: 0.2 % (ref 0–0.5)
INR PPP: 1.05 (ref 0.9–1.1)
LYMPHOCYTES # BLD AUTO: 2.74 10*3/MM3 (ref 1–3)
LYMPHOCYTES NFR BLD AUTO: 24.2 % (ref 21–51)
MCH RBC QN AUTO: 29.2 PG (ref 27–33)
MCHC RBC AUTO-ENTMCNC: 33.1 G/DL (ref 33–37)
MCV RBC AUTO: 88.3 FL (ref 80–94)
MONOCYTES # BLD AUTO: 1.03 10*3/MM3 (ref 0.1–0.9)
MONOCYTES NFR BLD AUTO: 9.1 % (ref 0–10)
NEUTROPHILS # BLD AUTO: 7.07 10*3/MM3 (ref 1.4–6.5)
NEUTROPHILS NFR BLD AUTO: 62.5 % (ref 30–70)
OSMOLALITY SERPL CALC.SUM OF ELEC: 278 MOSM/KG (ref 273–305)
PLATELET # BLD AUTO: 306 10*3/MM3 (ref 130–400)
PMV BLD AUTO: 10.3 FL (ref 6–10)
POTASSIUM BLD-SCNC: 4 MMOL/L (ref 3.5–5.3)
PROT SERPL-MCNC: 7.4 G/DL (ref 6–8)
PROTHROMBIN TIME: 13.9 SECONDS (ref 11–15.4)
RBC # BLD AUTO: 4.79 10*6/MM3 (ref 4.7–6.1)
SODIUM BLD-SCNC: 139 MMOL/L (ref 135–153)
WBC NRBC COR # BLD: 11.32 10*3/MM3 (ref 4.5–12.5)

## 2018-05-18 PROCEDURE — 80053 COMPREHEN METABOLIC PANEL: CPT | Performed by: EMERGENCY MEDICINE

## 2018-05-18 PROCEDURE — 73030 X-RAY EXAM OF SHOULDER: CPT | Performed by: RADIOLOGY

## 2018-05-18 PROCEDURE — 73030 X-RAY EXAM OF SHOULDER: CPT

## 2018-05-18 PROCEDURE — 85025 COMPLETE CBC W/AUTO DIFF WBC: CPT | Performed by: EMERGENCY MEDICINE

## 2018-05-18 PROCEDURE — 85610 PROTHROMBIN TIME: CPT | Performed by: EMERGENCY MEDICINE

## 2018-05-18 RX ORDER — HYDROCODONE BITARTRATE AND ACETAMINOPHEN 7.5; 325 MG/1; MG/1
1 TABLET ORAL ONCE
Status: COMPLETED | OUTPATIENT
Start: 2018-05-18 | End: 2018-05-18

## 2018-05-18 RX ADMIN — HYDROCODONE BITARTRATE AND ACETAMINOPHEN 1 TABLET: 7.5; 325 TABLET ORAL at 02:01

## 2018-05-18 NOTE — ED PROVIDER NOTES
Subjective   Seizure, brought in by EMS.        Seizures   Seizure activity on arrival: no    Seizure type:  Grand mal  Preceding symptoms: headache    Return to baseline: yes    Severity:  Moderate  Timing:  Once      Review of Systems   Constitutional: Positive for activity change and appetite change.   HENT: Negative.    Eyes: Negative.    Respiratory: Negative.    Cardiovascular: Negative.    Gastrointestinal: Negative.    Endocrine: Negative.    Genitourinary: Negative.    Musculoskeletal: Positive for back pain and myalgias.   Skin: Negative.    Allergic/Immunologic: Negative.    Neurological: Positive for seizures.   Hematological: Negative.    Psychiatric/Behavioral: Negative.        Past Medical History:   Diagnosis Date   • Anxiety    • Aortic stenosis    • Atrial fibrillation    • CAD (coronary artery disease)    • Chronic back pain    • Hallucinations    • Heart attack    • Hyperlipidemia    • Hypertension    • Injury of back    • Nervous breakdown     12 years ago   • Seizure    • Stroke    • Stroke     affected speech, vision, right side weakness   • Tobacco abuse        Allergies   Allergen Reactions   • Bee Venom Anaphylaxis   • Celexa [Citalopram] Shortness Of Breath   • Fentanyl      Pt states that he stops breathing   • Fentanyl Shortness Of Breath   • Haldol [Haloperidol Lactate] Anaphylaxis   • Haldol [Haloperidol] Shortness Of Breath   • Nitroglycerin Anaphylaxis   • Risperdal [Risperidone] Shortness Of Breath   • Risperidone And Related Anaphylaxis   • Celexa [Citalopram Hydrobromide] Other (See Comments)     Pt states that it causes him to sweat and a severe headache.        Past Surgical History:   Procedure Laterality Date   • AORTIC VALVE REPAIR/REPLACEMENT  2016    tissue valve, performed in [place]   • AORTIC VALVE REPAIR/REPLACEMENT MITRAL VALVE REPAIR/REPLACEMENT     • APPENDECTOMY     • CARDIAC CATHETERIZATION     • CHOLECYSTECTOMY     • HERNIA REPAIR      umbilical   • HIATAL HERNIA  REPAIR     • KNEE ARTHROSCOPY     • PACEMAKER IMPLANTATION     • PACEMAKER IMPLANTATION Left 2014 2014 OR 2015.   [employer]. interrogated 3/9/18   • PATELLAR RECONSTRUCTION     • QUADRICEPS REPAIR     • SHOULDER ARTHROSCOPY     • SHOULDER CLOSED REDUCTION Left 3/9/2018    Procedure: LEFT SHOULDER CLOSED REDUCTION;  Surgeon: Steven Wang Jr., MD;  Location: [facility] OR;  Service: Orthopedics       Family History   Problem Relation Age of Onset   • No Known Problems Mother    • No Known Problems Father        Social History     Social History   • Marital status:      Social History Main Topics   • Smoking status: Current Every Day Smoker     Packs/day: 0.50     Types: Cigarettes      Comment: 2-3 cigarettes   • Alcohol use No   • Drug use: No   • Sexual activity: Defer     Other Topics Concern   • Not on file     Social History Narrative    ** Merged History Encounter **         Retired , independentt of ADL, lives with wife           Objective   Physical Exam   Constitutional: He appears well-developed and well-nourished.   HENT:   Head: Normocephalic and atraumatic.   Eyes: Pupils are equal, round, and reactive to light.   Neck: Normal range of motion.   Cardiovascular: Normal rate.    Pulmonary/Chest: Effort normal.   Abdominal: Soft.   Musculoskeletal:    to palpation     Neurological: He is alert.   Skin: Skin is warm and dry.   Psychiatric: He has a normal mood and affect.   Nursing note and vitals reviewed.      Procedures           ED Course                  MDM      Final diagnoses:   Seizure            Tavares Dela Cruz MD  05/25/18 1400       Tavares Dela Cruz MD  05/25/18 1402

## 2018-05-18 NOTE — ED NOTES
Patient informed of X-rays negative for new fracture to shoulder, showed old fracture only and that MD was unable to provide a prescription for narcotics that he takes on a daily basis, that he would have to follow up with PCP. Patient verbalized understanding.      Crystal Adamson RN  05/18/18 0219

## 2018-06-11 ENCOUNTER — APPOINTMENT (OUTPATIENT)
Dept: CT IMAGING | Facility: HOSPITAL | Age: 43
End: 2018-06-11

## 2018-06-11 ENCOUNTER — APPOINTMENT (OUTPATIENT)
Dept: GENERAL RADIOLOGY | Facility: HOSPITAL | Age: 43
End: 2018-06-11

## 2018-06-11 ENCOUNTER — HOSPITAL ENCOUNTER (INPATIENT)
Facility: HOSPITAL | Age: 43
LOS: 1 days | Discharge: LEFT BEFORE DISCHARGE | End: 2018-06-11
Attending: EMERGENCY MEDICINE | Admitting: INTERNAL MEDICINE

## 2018-06-11 VITALS
SYSTOLIC BLOOD PRESSURE: 154 MMHG | HEIGHT: 74 IN | WEIGHT: 192 LBS | DIASTOLIC BLOOD PRESSURE: 107 MMHG | RESPIRATION RATE: 21 BRPM | BODY MASS INDEX: 24.64 KG/M2 | TEMPERATURE: 98.3 F | HEART RATE: 100 BPM | OXYGEN SATURATION: 98 %

## 2018-06-11 DIAGNOSIS — I16.1 HYPERTENSIVE EMERGENCY: Primary | ICD-10-CM

## 2018-06-11 PROBLEM — E78.5 DYSLIPIDEMIA: Status: ACTIVE | Noted: 2018-06-11

## 2018-06-11 LAB
6-ACETYL MORPHINE: NEGATIVE
ALBUMIN SERPL-MCNC: 4.5 G/DL (ref 3.5–5)
ALBUMIN/GLOB SERPL: 1.7 G/DL (ref 1.5–2.5)
ALP SERPL-CCNC: 112 U/L (ref 40–129)
ALT SERPL W P-5'-P-CCNC: 27 U/L (ref 10–44)
AMPHET+METHAMPHET UR QL: NEGATIVE
ANION GAP SERPL CALCULATED.3IONS-SCNC: 3.3 MMOL/L (ref 3.6–11.2)
ANION GAP SERPL CALCULATED.3IONS-SCNC: 6.6 MMOL/L (ref 3.6–11.2)
AST SERPL-CCNC: 17 U/L (ref 10–34)
BARBITURATES UR QL SCN: NEGATIVE
BASOPHILS # BLD AUTO: 0.04 10*3/MM3 (ref 0–0.3)
BASOPHILS NFR BLD AUTO: 0.4 % (ref 0–2)
BENZODIAZ UR QL SCN: NEGATIVE
BILIRUB SERPL-MCNC: 0.2 MG/DL (ref 0.2–1.8)
BUN BLD-MCNC: <5 MG/DL (ref 7–21)
BUN BLD-MCNC: <5 MG/DL (ref 7–21)
BUN/CREAT SERPL: ABNORMAL (ref 7–25)
BUN/CREAT SERPL: ABNORMAL (ref 7–25)
BUPRENORPHINE SERPL-MCNC: NEGATIVE NG/ML
CALCIUM SPEC-SCNC: 9.3 MG/DL (ref 7.7–10)
CALCIUM SPEC-SCNC: 9.4 MG/DL (ref 7.7–10)
CANNABINOIDS SERPL QL: NEGATIVE
CHLORIDE SERPL-SCNC: 109 MMOL/L (ref 99–112)
CHLORIDE SERPL-SCNC: 110 MMOL/L (ref 99–112)
CK MB SERPL-CCNC: 1.88 NG/ML (ref 0–5)
CK MB SERPL-CCNC: 1.96 NG/ML (ref 0–5)
CK MB SERPL-RTO: 1.6 % (ref 0–3)
CK MB SERPL-RTO: 1.6 % (ref 0–3)
CK SERPL-CCNC: 119 U/L (ref 24–204)
CK SERPL-CCNC: 121 U/L (ref 24–204)
CO2 SERPL-SCNC: 22.4 MMOL/L (ref 24.3–31.9)
CO2 SERPL-SCNC: 24.7 MMOL/L (ref 24.3–31.9)
COCAINE UR QL: NEGATIVE
CREAT BLD-MCNC: 1 MG/DL (ref 0.43–1.29)
CREAT BLD-MCNC: 1.03 MG/DL (ref 0.43–1.29)
D DIMER PPP FEU-MCNC: 0.44 MCGFEU/ML (ref 0–0.5)
DEPRECATED RDW RBC AUTO: 46.3 FL (ref 37–54)
EOSINOPHIL # BLD AUTO: 0.24 10*3/MM3 (ref 0–0.7)
EOSINOPHIL NFR BLD AUTO: 2.3 % (ref 0–5)
ERYTHROCYTE [DISTWIDTH] IN BLOOD BY AUTOMATED COUNT: 14.1 % (ref 11.5–14.5)
ETHANOL BLD-MCNC: <10 MG/DL
ETHANOL UR QL: <0.01 %
GFR SERPL CREATININE-BSD FRML MDRD: 78 ML/MIN/1.73
GFR SERPL CREATININE-BSD FRML MDRD: 81 ML/MIN/1.73
GLOBULIN UR ELPH-MCNC: 2.6 GM/DL
GLUCOSE BLD-MCNC: 109 MG/DL (ref 70–110)
GLUCOSE BLD-MCNC: 132 MG/DL (ref 70–110)
HCT VFR BLD AUTO: 41.4 % (ref 42–52)
HGB BLD-MCNC: 13 G/DL (ref 14–18)
HOLD SPECIMEN: NORMAL
HOLD SPECIMEN: NORMAL
IMM GRANULOCYTES # BLD: 0.02 10*3/MM3 (ref 0–0.03)
IMM GRANULOCYTES NFR BLD: 0.2 % (ref 0–0.5)
INR PPP: 0.93 (ref 0.9–1.1)
INR PPP: 0.93 (ref 0.9–1.1)
LYMPHOCYTES # BLD AUTO: 1.88 10*3/MM3 (ref 1–3)
LYMPHOCYTES NFR BLD AUTO: 17.8 % (ref 21–51)
MAGNESIUM SERPL-MCNC: 2.2 MG/DL (ref 1.7–2.6)
MCH RBC QN AUTO: 28.4 PG (ref 27–33)
MCHC RBC AUTO-ENTMCNC: 31.4 G/DL (ref 33–37)
MCV RBC AUTO: 90.4 FL (ref 80–94)
METHADONE UR QL SCN: NEGATIVE
MONOCYTES # BLD AUTO: 1.03 10*3/MM3 (ref 0.1–0.9)
MONOCYTES NFR BLD AUTO: 9.7 % (ref 0–10)
MYOGLOBIN SERPL-MCNC: 31 NG/ML (ref 0–109)
NEUTROPHILS # BLD AUTO: 7.36 10*3/MM3 (ref 1.4–6.5)
NEUTROPHILS NFR BLD AUTO: 69.6 % (ref 30–70)
OPIATES UR QL: NEGATIVE
OSMOLALITY SERPL CALC.SUM OF ELEC: NORMAL MOSM/KG (ref 273–305)
OSMOLALITY SERPL CALC.SUM OF ELEC: NORMAL MOSM/KG (ref 273–305)
OXYCODONE UR QL SCN: NEGATIVE
PCP UR QL SCN: NEGATIVE
PLATELET # BLD AUTO: 288 10*3/MM3 (ref 130–400)
PMV BLD AUTO: 10.5 FL (ref 6–10)
POTASSIUM BLD-SCNC: 3.5 MMOL/L (ref 3.5–5.3)
POTASSIUM BLD-SCNC: 3.9 MMOL/L (ref 3.5–5.3)
PROT SERPL-MCNC: 7.1 G/DL (ref 6–8)
PROTHROMBIN TIME: 12.7 SECONDS (ref 11–15.4)
PROTHROMBIN TIME: 12.7 SECONDS (ref 11–15.4)
RBC # BLD AUTO: 4.58 10*6/MM3 (ref 4.7–6.1)
SODIUM BLD-SCNC: 138 MMOL/L (ref 135–153)
SODIUM BLD-SCNC: 138 MMOL/L (ref 135–153)
TROPONIN I SERPL-MCNC: 0.01 NG/ML
TSH SERPL DL<=0.05 MIU/L-ACNC: 0.77 MIU/ML (ref 0.55–4.78)
WBC NRBC COR # BLD: 10.57 10*3/MM3 (ref 4.5–12.5)
WHOLE BLOOD HOLD SPECIMEN: NORMAL
WHOLE BLOOD HOLD SPECIMEN: NORMAL

## 2018-06-11 PROCEDURE — 85025 COMPLETE CBC W/AUTO DIFF WBC: CPT | Performed by: EMERGENCY MEDICINE

## 2018-06-11 PROCEDURE — 84484 ASSAY OF TROPONIN QUANT: CPT | Performed by: PHYSICIAN ASSISTANT

## 2018-06-11 PROCEDURE — 93005 ELECTROCARDIOGRAM TRACING: CPT | Performed by: EMERGENCY MEDICINE

## 2018-06-11 PROCEDURE — 36415 COLL VENOUS BLD VENIPUNCTURE: CPT

## 2018-06-11 PROCEDURE — 82550 ASSAY OF CK (CPK): CPT | Performed by: PHYSICIAN ASSISTANT

## 2018-06-11 PROCEDURE — 93005 ELECTROCARDIOGRAM TRACING: CPT | Performed by: FAMILY MEDICINE

## 2018-06-11 PROCEDURE — 80307 DRUG TEST PRSMV CHEM ANLYZR: CPT | Performed by: EMERGENCY MEDICINE

## 2018-06-11 PROCEDURE — 25010000002 ONDANSETRON PER 1 MG: Performed by: FAMILY MEDICINE

## 2018-06-11 PROCEDURE — 82553 CREATINE MB FRACTION: CPT | Performed by: PHYSICIAN ASSISTANT

## 2018-06-11 PROCEDURE — 83735 ASSAY OF MAGNESIUM: CPT | Performed by: PHYSICIAN ASSISTANT

## 2018-06-11 PROCEDURE — 71045 X-RAY EXAM CHEST 1 VIEW: CPT

## 2018-06-11 PROCEDURE — 84484 ASSAY OF TROPONIN QUANT: CPT | Performed by: EMERGENCY MEDICINE

## 2018-06-11 PROCEDURE — 70450 CT HEAD/BRAIN W/O DYE: CPT | Performed by: RADIOLOGY

## 2018-06-11 PROCEDURE — 99285 EMERGENCY DEPT VISIT HI MDM: CPT

## 2018-06-11 PROCEDURE — 25010000002 LORAZEPAM PER 2 MG: Performed by: EMERGENCY MEDICINE

## 2018-06-11 PROCEDURE — 84443 ASSAY THYROID STIM HORMONE: CPT | Performed by: PHYSICIAN ASSISTANT

## 2018-06-11 PROCEDURE — 85610 PROTHROMBIN TIME: CPT | Performed by: FAMILY MEDICINE

## 2018-06-11 PROCEDURE — 82553 CREATINE MB FRACTION: CPT | Performed by: FAMILY MEDICINE

## 2018-06-11 PROCEDURE — 70450 CT HEAD/BRAIN W/O DYE: CPT

## 2018-06-11 PROCEDURE — 82550 ASSAY OF CK (CPK): CPT | Performed by: FAMILY MEDICINE

## 2018-06-11 PROCEDURE — 80177 DRUG SCRN QUAN LEVETIRACETAM: CPT | Performed by: EMERGENCY MEDICINE

## 2018-06-11 PROCEDURE — 84484 ASSAY OF TROPONIN QUANT: CPT | Performed by: FAMILY MEDICINE

## 2018-06-11 PROCEDURE — 85379 FIBRIN DEGRADATION QUANT: CPT | Performed by: PHYSICIAN ASSISTANT

## 2018-06-11 PROCEDURE — 94799 UNLISTED PULMONARY SVC/PX: CPT

## 2018-06-11 PROCEDURE — 25010000002 MORPHINE PER 10 MG: Performed by: EMERGENCY MEDICINE

## 2018-06-11 PROCEDURE — 99222 1ST HOSP IP/OBS MODERATE 55: CPT | Performed by: INTERNAL MEDICINE

## 2018-06-11 PROCEDURE — 71045 X-RAY EXAM CHEST 1 VIEW: CPT | Performed by: RADIOLOGY

## 2018-06-11 PROCEDURE — 80053 COMPREHEN METABOLIC PANEL: CPT | Performed by: EMERGENCY MEDICINE

## 2018-06-11 PROCEDURE — 85610 PROTHROMBIN TIME: CPT | Performed by: EMERGENCY MEDICINE

## 2018-06-11 PROCEDURE — 83874 ASSAY OF MYOGLOBIN: CPT | Performed by: PHYSICIAN ASSISTANT

## 2018-06-11 PROCEDURE — 25010000002 MORPHINE PER 10 MG: Performed by: FAMILY MEDICINE

## 2018-06-11 RX ORDER — LORAZEPAM 2 MG/ML
1 INJECTION INTRAMUSCULAR ONCE
Status: COMPLETED | OUTPATIENT
Start: 2018-06-11 | End: 2018-06-11

## 2018-06-11 RX ORDER — LEVETIRACETAM 500 MG/1
1000 TABLET ORAL EVERY 12 HOURS SCHEDULED
Status: CANCELLED | OUTPATIENT
Start: 2018-06-11

## 2018-06-11 RX ORDER — SODIUM CHLORIDE 0.9 % (FLUSH) 0.9 %
10 SYRINGE (ML) INJECTION AS NEEDED
Status: DISCONTINUED | OUTPATIENT
Start: 2018-06-11 | End: 2018-06-11 | Stop reason: HOSPADM

## 2018-06-11 RX ORDER — ASPIRIN 325 MG
325 TABLET ORAL ONCE
Status: COMPLETED | OUTPATIENT
Start: 2018-06-11 | End: 2018-06-11

## 2018-06-11 RX ORDER — METOPROLOL TARTRATE 5 MG/5ML
2.5 INJECTION INTRAVENOUS ONCE
Status: COMPLETED | OUTPATIENT
Start: 2018-06-11 | End: 2018-06-11

## 2018-06-11 RX ORDER — MORPHINE SULFATE 10 MG/ML
4 INJECTION INTRAMUSCULAR; INTRAVENOUS; SUBCUTANEOUS ONCE
Status: COMPLETED | OUTPATIENT
Start: 2018-06-11 | End: 2018-06-11

## 2018-06-11 RX ORDER — SODIUM CHLORIDE 9 MG/ML
75 INJECTION, SOLUTION INTRAVENOUS CONTINUOUS
Status: DISCONTINUED | OUTPATIENT
Start: 2018-06-11 | End: 2018-06-11 | Stop reason: HOSPADM

## 2018-06-11 RX ORDER — ONDANSETRON 2 MG/ML
4 INJECTION INTRAMUSCULAR; INTRAVENOUS ONCE
Status: COMPLETED | OUTPATIENT
Start: 2018-06-11 | End: 2018-06-11

## 2018-06-11 RX ORDER — SODIUM CHLORIDE 0.9 % (FLUSH) 0.9 %
1-10 SYRINGE (ML) INJECTION AS NEEDED
Status: DISCONTINUED | OUTPATIENT
Start: 2018-06-11 | End: 2018-06-11 | Stop reason: HOSPADM

## 2018-06-11 RX ORDER — LEVETIRACETAM 1000 MG/1
1000 TABLET ORAL 2 TIMES DAILY
COMMUNITY

## 2018-06-11 RX ADMIN — LORAZEPAM 1 MG: 2 INJECTION INTRAMUSCULAR; INTRAVENOUS at 07:09

## 2018-06-11 RX ADMIN — METOROPROLOL TARTRATE 2.5 MG: 5 INJECTION, SOLUTION INTRAVENOUS at 10:35

## 2018-06-11 RX ADMIN — ASPIRIN 325 MG: 325 TABLET ORAL at 07:10

## 2018-06-11 RX ADMIN — ONDANSETRON 4 MG: 2 INJECTION, SOLUTION INTRAMUSCULAR; INTRAVENOUS at 08:39

## 2018-06-11 RX ADMIN — MORPHINE SULFATE 4 MG: 10 INJECTION, SOLUTION INTRAMUSCULAR; INTRAVENOUS at 08:39

## 2018-06-11 RX ADMIN — MORPHINE SULFATE 4 MG: 10 INJECTION INTRAVENOUS at 10:38

## 2018-06-11 RX ADMIN — SODIUM CHLORIDE 500 ML: 9 INJECTION, SOLUTION INTRAVENOUS at 07:07

## 2018-06-11 RX ADMIN — Medication 10 ML: at 10:38

## 2018-06-11 RX ADMIN — SODIUM CHLORIDE 5 MG/HR: 9 INJECTION, SOLUTION INTRAVENOUS at 10:48

## 2018-06-11 NOTE — PLAN OF CARE
Problem: Patient Care Overview  Goal: Plan of Care Review  Outcome: Ongoing (interventions implemented as appropriate)    Goal: Individualization and Mutuality  Outcome: Ongoing (interventions implemented as appropriate)    Goal: Discharge Needs Assessment  Outcome: Ongoing (interventions implemented as appropriate)    Goal: Interprofessional Rounds/Family Conf  Outcome: Ongoing (interventions implemented as appropriate)      Problem: Skin Injury Risk (Adult)  Goal: Identify Related Risk Factors and Signs and Symptoms  Outcome: Ongoing (interventions implemented as appropriate)    Goal: Skin Health and Integrity  Outcome: Ongoing (interventions implemented as appropriate)      Problem: Hypertensive Disease/Crisis (Arterial) (Adult)  Goal: Signs and Symptoms of Listed Potential Problems Will be Absent, Minimized or Managed (Hypertensive Disease/Crisis)  Outcome: Ongoing (interventions implemented as appropriate)      Problem: Cardiac: ACS (Acute Coronary Syndrome) (Adult)  Goal: Signs and Symptoms of Listed Potential Problems Will be Absent, Minimized or Managed (Cardiac: ACS)  Outcome: Ongoing (interventions implemented as appropriate)

## 2018-06-11 NOTE — H&P
[facility] HOSPITALIST HISTORY AND PHYSICAL    Patient Identification:  Name:  Bahman Banks  Age:  43 y.o.  Sex:  male  :  1975  MRN:  [id number]   Visit Number:  [id number]  Primary Care Physician:  LARRY Carroll     Subjective     Chief complaint:   Chief Complaint   Patient presents with   • Chest Pain     History of presenting illness:   Patient is a 43 y.o. male with past medical history significant for hypertension, dyslipidemia, history of aortic valve replacement in 2016, history of bradycardia status post permanent pacemaker implantation, paroxysmal atrial fibrillation, chronic oral anticoagulation with Eliquis, history of CVA, and recently diagnosed seizure disorder.  Patient presented to the emergency department [facility] on 18 with multiple complaints.  He reports that he was doing well yesterday morning and afternoon with no significant reported symptoms.  Last evening, at about 7:30 to 8:00 PM, he developed generalized weakness, shortness of breath, staggering gait, and fell to his knees.  He got back up and went outside.  When he returned inside he had a similar episode and reports that he passed out.  When he came to, his wife was trying to wake him up.  He did not bite his tongue or lose control of his bowels or bladder.  No associated unilateral extremity weakness or slurred speech. No reported convulsive activity. He did have a feeling of confusion and he could not recall the events of the day.  He states he was unable to speak for about 10 minutes.  He also had significant blurring of his vision bilaterally. He developed some watery diarrhea which has continued into today. No recent fever, chills, abdominal pains, antibiotics, hiking/camping, drinking from streams, or eating undercooked meats. Other than the diarrhea, these symptoms gradually improved.    Patient had previously presented to the emergency department of [facility] in 2018 and was  "transferred to [facility] and was diagnosed with grand mal and generalized tonic-clonic seizure.  He was initiated on Keppra. He presented back to our ED on 04/25/18 and was transferred to [facility] with status epilepticus. He reports having had an EEG at . Complete details unavailable. He states that he has continued to have small seizures about 2 days a week since that admission. He does not generally have convulsive activity with these.     This morning, at approximately 5:00 AM, he developed \"horrible\" central and left-sided chest pain.  It did not increase with deep inspiration, but he did have some associated shortness of breath. No associated nausea and vomiting.  No radiation of the pain into the arm, or neck, but it did radiate into his back. No tenderness of the chest wall or recent heavy lifting. No known DVT or PE. No increase with deep inspiration. He labels it as a 10 out of 10 on a pain scale.  He has had chest pains off and on for quite some time, but this is the worst episode he has ever had in the chest. He also checked his pulse and noted that it was running fast and felt like his BP was up as well. BP has been running in the 160s/100 at home. He states that he sees Edith Hurtado for PCP and she has been adjusting his BP medications. He has not kept follow up appointments which were previously set up with Cardiology regarding his aortic valve, atrial fibrillation, pacemaker, and chest pains.     Upon arrival to the ED this morning, vitals were blood pressure 187/134, pulse 1:30, respirations 20, temperature 97.9, SPO2 100% on room air.  EKG did not reveal any acute changes concerning for ischemia or infarction.  Serial cardiac enzymes have revealed troponin I ultra levels negative ×2.  Urine drug screen was negative as well as ethanol level.  CT of the head without contrast revealed no acute intracranial abnormality.  Chest x-ray was also unremarkable per radiology. He " received some IV morphine which took it down to an 8 out of 10.  However, he states that this samuel back to a 10 out of 10 after about 30 minutes. He has a nitroglycerin allergy. His shortness of breath has improved with O2 via nasal cannula. He is not on chronic O2 at home. Patient has been admitted to the critical care unit of [facility] for further evaluation and therapy.   ---------------------------------------------------------------------------------------------------------------------   Review of Systems   Constitutional: Positive for fatigue. Negative for chills, diaphoresis and fever.   HENT: Negative for congestion, sinus pain and sinus pressure.    Respiratory: Positive for shortness of breath. Negative for cough and wheezing.    Cardiovascular: Positive for chest pain. Negative for leg swelling.   Gastrointestinal: Positive for diarrhea. Negative for abdominal pain, anal bleeding, blood in stool, constipation, nausea and vomiting.   Genitourinary: Negative for decreased urine volume, difficulty urinating, dysuria, hematuria and urgency.   Musculoskeletal: Positive for back pain.        Chronic right knee pain.    Skin: Negative for color change, pallor, rash and wound.   Neurological: Positive for seizures, syncope, speech difficulty and weakness (Generalized. ). Negative for dizziness, tremors, numbness and headaches.   Hematological: Bruises/bleeds easily.   Psychiatric/Behavioral: Positive for confusion and sleep disturbance. The patient is not nervous/anxious.     ---------------------------------------------------------------------------------------------------------------------   Past Medical History:   Diagnosis Date   • Anxiety    • Aortic stenosis    • Atrial fibrillation    • CAD (coronary artery disease)    • Chronic back pain    • Hallucinations    • Heart attack    • Hyperlipidemia    • Hypertension    • Injury of back    • Nervous breakdown     12 years ago   • Seizure    • Stroke    • Stroke      affected speech, vision, right side weakness   • Tobacco abuse      Past Surgical History:   Procedure Laterality Date   • AORTIC VALVE REPAIR/REPLACEMENT  2016    tissue valve, performed in [place]   • AORTIC VALVE REPAIR/REPLACEMENT MITRAL VALVE REPAIR/REPLACEMENT     • APPENDECTOMY     • CARDIAC CATHETERIZATION     • CHOLECYSTECTOMY     • HERNIA REPAIR      umbilical   • HIATAL HERNIA REPAIR     • KNEE ARTHROSCOPY     • PACEMAKER IMPLANTATION     • PACEMAKER IMPLANTATION Left 2014 2014 OR 2015.   Aerob. interrogated 3/9/18   • PATELLAR RECONSTRUCTION     • QUADRICEPS REPAIR     • SHOULDER ARTHROSCOPY     • SHOULDER CLOSED REDUCTION Left 3/9/2018    Procedure: LEFT SHOULDER CLOSED REDUCTION;  Surgeon: Steven Wang Jr., MD;  Location: [facility];  Service: Orthopedics     Family History   Problem Relation Age of Onset   • No Known Problems Mother    • No Known Problems Father      Social History     Social History   • Marital status:      Social History Main Topics   • Smoking status: Current Every Day Smoker     Packs/day: 0.50     Types: Cigarettes      Comment: 2-3 cigarettes   • Alcohol use No   • Drug use: No   • Sexual activity: Defer     Other Topics Concern   • Not on file     Social History Narrative    ** Merged History Encounter **         Retired , independentt of ADL, lives with wife   --------------------------------------------------------------------------------------------------------------------   Allergies:  Bee venom; Celexa [citalopram]; Fentanyl; Fentanyl; Haldol [haloperidol lactate]; Haldol [haloperidol]; Nitroglycerin; Risperdal [risperidone]; Risperidone and related; and Celexa [citalopram hydrobromide]  ---------------------------------------------------------------------------------------------------------------------   Prior to Admission Medications     Prescriptions Last Dose Informant Patient Reported? Taking?    acetaminophen (TYLENOL) 325 MG  tablet   No No    Take 2 tablets by mouth Every 4 (Four) Hours As Needed for Mild Pain .    apixaban (ELIQUIS) 5 MG tablet tablet   Yes No    Take 1 tablet by mouth 2 (Two) Times a Day.    aspirin 81 MG EC tablet   No No    Take 1 tablet by mouth Daily.    atorvastatin (LIPITOR) 80 MG tablet   No No    Take 1 tablet by mouth Every Night.    atorvastatin (LIPITOR) 80 MG tablet   No No    Take 0.5 tablets by mouth Every Night.    cyclobenzaprine (FLEXERIL) 5 MG tablet   No No    Take 1 tablet by mouth 3 (Three) Times a Day As Needed for Muscle Spasms.    enoxaparin (LOVENOX) 100 MG/ML solution syringe   No No    Inject 1 mL under the skin 2 (Two) Times a Day.    FLUoxetine (PROzac) 20 MG capsule  Self Yes No    Take 20 mg by mouth Every Night.    HYDROcodone-acetaminophen (NORCO)  MG per tablet  Self Yes No    Take 1 tablet by mouth Every 8 (Eight) Hours.    HYDROcodone-acetaminophen (NORCO) 7.5-325 MG per tablet   No No    Take 1 tablet by mouth Every 6 (Six) Hours As Needed for Moderate Pain .    levETIRAcetam (KEPPRA) 500 MG tablet   No No    Take 1 tablet by mouth Every 12 (Twelve) Hours.    lisinopril (PRINIVIL,ZESTRIL) 5 MG tablet   No No    Take 1 tablet by mouth Daily.    Patient taking differently:  Take 10 mg by mouth Daily.    loxapine (LOXITANE) 10 MG capsule  Self Yes No    Take 75 mg by mouth Every Night.    loxapine (LOXITANE) 25 MG capsule   Yes No    Take 75 mg by mouth Every Night.    metoprolol tartrate (LOPRESSOR) 25 MG tablet   No No    Take 1/2 tablet by mouth Every 12 (Twelve) Hours.    metoprolol tartrate (LOPRESSOR) 25 MG tablet   No No    Take 1 tablet by mouth Every 12 (Twelve) Hours.    QUEtiapine (SEROQUEL) 300 MG tablet   Yes No    Take 300 mg by mouth Every Night.    ranolazine (RANEXA) 500 MG 12 hr tablet   No No    Take 1 tablet by mouth Every 12 (Twelve) Hours.    ranolazine (RANEXA) 500 MG 12 hr tablet   No No    Take 1 tablet by mouth Every 12 (Twelve) Hours.    rivaroxaban  (XARELTO) 20 MG tablet   Yes No    Take 1 tablet by mouth Daily.    traZODone (DESYREL) 150 MG tablet  Self Yes No    Take 150 mg by mouth Every Night.    warfarin (COUMADIN) 2.5 MG tablet   No No    Take 2 tablets by mouth Daily.    warfarin (COUMADIN) 5 MG tablet   No No    TAKE BY MOUTH AS DIRECTED      ---------------------------------------------------------------------------------------------------------------------  Objective   Hospital Scheduled Meds:       niCARdipine 5 mg/hr Last Rate: 10 mg/hr (06/11/18 1202)   ---------------------------------------------------------------------------------------------------------------------   Vital Signs:  Temp:  [97.9 °F (36.6 °C)] 97.9 °F (36.6 °C)  Heart Rate:  [] 88  Resp:  [20] 20  BP: (138-187)/() 139/88  Mean Arterial Pressure (Non-Invasive) for the past 24 hrs (Last 3 readings):   Noninvasive MAP (mmHg)   06/11/18 1317 109   06/11/18 1217 102   06/11/18 1202 119     SpO2 Percentage    06/11/18 1229 06/11/18 1317 06/11/18 1343   SpO2: 95% 99% 99%     SpO2:  [90 %-100 %] 99 %  on  Flow (L/min):  [2] 2;   Device (Oxygen Therapy): nasal cannula    Body mass index is 24.65 kg/m².  Wt Readings from Last 3 Encounters:   06/11/18 87.1 kg (192 lb)   05/17/18 91.6 kg (202 lb)   04/25/18 95.3 kg (210 lb)     ---------------------------------------------------------------------------------------------------------------------   Physical Exam:  Constitutional:  Well-developed and well-nourished.  No respiratory distress.      HENT:  Head: Normocephalic and atraumatic.  Mouth:  Moist mucous membranes.    Eyes:  Conjunctivae and EOM are normal.  Pupils are equal, round, and reactive to light.  No scleral icterus.  Neck:  Neck supple.  No JVD present. No carotid bruits.   Cardiovascular: Mildly tachycardic. Regular. Systolic murmur, 2/6 right second IC space and LLSB.  Pulmonary/Chest:  No respiratory distress, no wheezes, no crackles, with normal breath sounds and  good air movement.   Abdominal:  Soft.  Bowel sounds are normal x 4 quadrants.  No distension and no tenderness.   Musculoskeletal:  No edema, no tenderness, and no deformity.  No red or swollen joints anywhere.    Neurological:  Alert and oriented to person, place, and time.  No cranial nerve deficit.  No tongue deviation.  No facial droop.  No slurred speech.   Skin:  Skin is warm and dry. No pallor. He has some scabs on his head which he reports to be pruritic.   Psychiatric:  Normal mood and affect.  Behavior is normal.  Judgment and thought content normal.   Peripheral vascular:  No edema. Strong pulses x 4 extremities.   Genitourinary: No smith catheter in place.   ---------------------------------------------------------------------------------------------------------------------  EKG:    Narrative     Test Reason : Chest Pain  Blood Pressure : **/** mmHG  Vent. Rate : 132 BPM     Atrial Rate : 132 BPM     P-R Int : 138 ms          QRS Dur : 118 ms      QT Int : 318 ms       P-R-T Axes : 041 270 052 degrees     QTc Int : 471 ms    Sinus tachycardia  Left axis deviation  Right bundle branch block  Inferior infarct , age undetermined  Anterolateral infarct (cited on or before 17-MAY-2018)  Abnormal ECG  When compared with ECG of 17-MAY-2018 21:37,  No significant change was found  Confirmed by Jose Parada ([id number]) on 6/11/2018 9:53:31 AM    Referred By:  SAMEER           Confirmed By:Jose Parada      Specimen Collected: 06/11/18 06:33 Last Resulted: 06/11/18 09:53                Scans on Order [id number]     Scan on 6/11/2018  6:33 AM : ECG 12-LEAD        Telemetry:  Sinus tachycardia 110-120s with artifact in ED.     I have personally reviewed the EKG/Telemetry strip  ---------------------------------------------------------------------------------------------------------------------     Results from last 7 days  Lab Units 06/11/18  0951 06/11/18  0655   CK TOTAL U/L 119  --    CKMB ng/mL 1.96  --    CK  MB INDEX % 1.6  --    TROPONIN I ng/mL 0.007 0.008             Results from last 7 days  Lab Units 06/11/18  1244 06/11/18  0655   WBC 10*3/mm3  --  10.57   HEMOGLOBIN g/dL  --  13.0*   HEMATOCRIT %  --  41.4*   MCV fL  --  90.4   MCHC g/dL  --  31.4*   PLATELETS 10*3/mm3  --  288   INR  0.93 0.93       Results from last 7 days  Lab Units 06/11/18  1244 06/11/18  0655   SODIUM mmol/L 138 138   POTASSIUM mmol/L 3.9 3.5   CHLORIDE mmol/L 110 109   CO2 mmol/L 24.7 22.4*   BUN mg/dL <5* <5*   CREATININE mg/dL 1.00 1.03   EGFR IF NONAFRICN AM mL/min/1.73 81 78   CALCIUM mg/dL 9.3 9.4   GLUCOSE mg/dL 109 132*   ALBUMIN g/dL  --  4.50   BILIRUBIN mg/dL  --  0.2   ALK PHOS U/L  --  112   AST (SGOT) U/L  --  17   ALT (SGPT) U/L  --  27   Estimated Creatinine Clearance: 116.1 mL/min (by C-G formula based on SCr of 1 mg/dL).    Lab Results   Component Value Date    HGBA1C 5.60 11/25/2017     Pain Management Panel     Pain Management Panel Latest Ref Rng & Units 6/11/2018 4/25/2018    AMPHETAMINES SCREEN, URINE Negative Negative Negative    BARBITURATES SCREEN Negative Negative Negative    BENZODIAZEPINE SCREEN, URINE Negative Negative Negative    BUPRENORPHINE Negative Negative Negative    COCAINE SCREEN, URINE Negative Negative Negative    METHADONE SCREEN, URINE Negative Negative Negative    METHAMPHETAMINE UR Negative - -      I have personally reviewed the above laboratory results.   ---------------------------------------------------------------------------------------------------------------------  Imaging Results (last 7 days)     Procedure Component Value Units Date/Time    XR Chest 1 View [[id number]] Collected:  06/11/18 0810     Updated:  06/11/18 0813    Narrative:       EXAMINATION: XR CHEST 1 VW-      CLINICAL INDICATION:     Chest pain protocol     TECHNIQUE:  XR CHEST 1 VW-      COMPARISON: NONE      FINDINGS:   The lungs remain aerated.  Heart and mediastinum contours are unremarkable.  No pleural effusion.  No  pneumothorax.   Bony and soft tissue structures are unremarkable.  Pacer noted.    Impression:       No radiographic evidence of acute cardiac or pulmonary  disease.     This report was finalized on 6/11/2018 8:11 AM by Dr. Amadou Webster MD.       CT Head Without Contrast [[id number]] Collected:  06/11/18 0810     Updated:  06/11/18 0812    Narrative:       EXAMINATION: CT HEAD WO CONTRAST-      CLINICAL INDICATION:     confusion     COMPARISON:    None     Technique: Multiple CT axial images were obtained through the level of  the brain without IV contrast administration. Reformatted images in the  coronal and/or sagittal plane(s) were generated from the axial data set  to facilitate diagnostic accuracy and/or surgical planning.     Radiation dose reduction techniques were utilized per ALARA protocol.  Automated exposure control was initiated through either or [employer] or  [employer] software packages by  protocol.       748.82 mGy.cm     FINDINGS:    There is no mass effect or midline shift. No  ventriculomegaly. There is no CT evidence of acute vascular territory  infarct. No intraparenchymal or extra-axial hemorrhage. Bone windows  show no acute osseous abnormality.       Impression:       No acute intracranial abnormality.     This report was finalized on 6/11/2018 8:10 AM by Dr. Amadou Webster MD.           Transthoracic Echocardiogram 03/06/18  Interpretation Summary   · Left ventricular systolic function is normal. Estimated EF = 60%.  · Left ventricular wall thickness is consistent with mild-to-moderate concentric hypertrophy.  · Left ventricular diastolic dysfunction (grade I a) consistent with impaired relaxation.  · There is a prosthetic aortic valve  · Ao mean PG 22.1 mmHg  · Mild tricuspid valve regurgitation is present.  · RVSP(TR) 20.0 mmHg      Echocardiogram Findings   Left Ventricle Left ventricular systolic function is normal. Estimated EF appears to be in the range of 61 - 65%.  Estimated EF = 60%. Normal left ventricular cavity size noted. Left ventricular wall thickness is consistent with mild-to-moderate concentric hypertrophy. Left ventricular diastolic dysfunction is noted (grade I a w/high LAP) consistent with impaired relaxation.      Right Ventricle Normal right ventricular cavity size, wall thickness, systolic function and septal motion noted.      Left Atrium Normal left atrial size and volume noted. Saline test results are positive.      Right Atrium Normal right atrial size noted.      Aortic Valve There is a prosthetic aortic valve. No aortic valve regurgitation is present. No hemodynamically significant aortic valve stenosis is present. Aortic valve mean pressure gradient is 22.1 mmHg. The prosthetic valve is of unknown type.      Mitral Valve The mitral valve is normal in structure. Trace mitral valve regurgitation is present. No significant mitral valve stenosis is present.      Tricuspid Valve The tricuspid valve is normal. No tricuspid valve stenosis is present. Mild tricuspid valve regurgitation is present. Estimated right ventricular systolic pressure from tricuspid regurgitation is normal (<35 mmHg). Calculated right ventricular systolic pressure from tricuspid regurgitation is 20 mmHg.      Pulmonic Valve The pulmonic valve is structurally normal. There is no significant pulmonic valve stenosis present. There is no pulmonic valve regurgitation present.      Greater Vessels No dilation of the aortic root is present. No dilation of the sinuses of Valsalva is present.        I have personally reviewed the above radiology results.   ---------------------------------------------------------------------------------------------------------------------  Assessment & Plan      -Hypertensive emergency: Patient was admitted to the critical care unit.  He is currently on a Cardene drip at 10 mg per hour with improvement noted in BP. Continue to titrate based on blood pressure  response.  CT scan of the head with no acute intracranial abnormalities.    -Chest pain: He was given a dose of aspirin 325 mg in the ED as well as metoprolol tartrate 2.5 mg IV and morphine 4 mg ×2 doses. Will check stat d dimer due to persistent chest pain, tachycardia, and complaints of shortness of breath on presentation. Follow up on serial cardiac enzymes and adjust as needed. Continue at home atorvastatin when medications have been reconciled by pharmacy. Check fasting lipid panel in a.m and adjust as needed. Consult cardiology. I have discussed the case with Dr. Pastrana who is agreeable to see the patient and will plan for stress testing in AM.     -History of grand mal and generalized tonic-clonic seizures: Check Levetiractam level and continue home dose if therapeutic. We do not currently have neurology coverage. Request records from  regarding recent admission.       -Sinus tachycardia: Give light IV fluids with normal saline at 75mL/hr. Check TSH and magnesium level. Monitor on telemetry.     -Shortness of breath: Continue O2 via nasal cannula and titrate to maintain SpO2 >92%. D-dimer pending. CXR unremarkable at this time.     -Diarrhea: Will give light IV fluids. Denies any recent antibiotics. Continue to monitor for now.     -Reported pre-diabetes- Will place on consistent carbohydrate/cardiac diet. NPO after midnight. A1c 5.6% in 11/25/17.    -Paroxysmal atrial fibrillation: currently maintaining sinus rhythm/sinus tachycardia. Anticoagulated with Eliquis for stroke prevention. Will continue this and will also serve as DVT prophylaxis.    -History of aortic valve replacement with a bioprosthetic/porcine valve in 2016: Transthoracic echocardiogram in 03/2018 with no significant dysfunction per Dr. Chaidez and normal EF at 60%.    -Permanent pacemaker implantation after his aortic valve replacement in 2016.     -Reported obstructive sleep apnea: not on CPAP therapy.     -Insomnia    -Dyslipidemia:  Continue home atorvastatin and fasting lipid panel ordered for AM.      -Tobacco abuse: 1-2ppd for 36 years.     CODE STATUS: FULL, discussed at bedside in the ER.     DVT Prophylaxis: Eliquis.    The patient is considered to be a high risk patient due to: hypertensive emergency, seizure disorder, chest pain, prosthetic aortic valve, paroxsymal atrial fibrillation, tobacco abuse.     I have discussed the patient's assessment and plan with the patient.     ROSEMARIE Villarreal  06/11/18  2:56 PM  ---------------------------------------------------------------------------------------------------------------------

## 2018-06-11 NOTE — ED NOTES
PT TOOK HIM SELF OF MONITOR AND DISCONNECTED IV AND HIS DAUGHTER SAID HE WENT FOR A WALK     Gene Be RN  06/11/18 8209

## 2018-06-11 NOTE — CONSULTS
Referring Provider: Dr Bliss  Reason for Consultation: Chest pain    Patient Care Team:  LARRY Carroll as PCP - General (Family Medicine)  LARRY Carroll (Family Medicine)    Chief complaint: chest pain.    Subjective .     History of present illness:     The patient is a 43-year-old white male with a history of seizure disorder status post multiple seizures.  2 yesterday, aortic stenosis, atrial fibrillation, coronary artery disease with reported last cardiac catheterization done 2 years ago showing no evidence of coronary artery disease of the time, hypertension, tachybradycardia syndrome status post pacemaker placement and dyslipidemia who comes to the hospital for an episode of chest pain and vision loss.  According to the patient he was in his usual state of health until early this morning when he went out to smoke a cigarette and started having severe 10 over 10 chest pain in the middle of the chest.  Denies any exacerbating or ameliorating factors.  He states this was associated with shortness of breath.  She also states it was associated with vision loss and memory loss but he could not remember what had happened the last few days.  He decided to come to the ER for further evaluation.  On arrival he was noted to be severely hypertensive and was started on a Cardene infusion.  Initial troponins were noted to be within normal limits.  His EKG shows sinus rhythm with a right bundle branch block unchanged from previous EKGs.  Cardiology was consulted for further evaluation of the patient's chest pain.    Review of Systems    Review of Systems   Constitution: Negative for chills, diaphoresis and fever.   HENT: Negative for congestion, ear pain and sore throat.    Eyes: Positive for blurred vision, vision loss in left eye and vision loss in right eye. Negative for pain.   Cardiovascular: Positive for chest pain and syncope. Negative for leg swelling, orthopnea, palpitations and paroxysmal  nocturnal dyspnea.   Respiratory: Positive for shortness of breath. Negative for cough and hemoptysis.    Endocrine: Negative for cold intolerance and heat intolerance.   Hematologic/Lymphatic: Does not bruise/bleed easily.   Skin: Negative for rash.   Musculoskeletal: Positive for arthritis, back pain, joint pain and stiffness. Negative for myalgias.   Gastrointestinal: Negative for abdominal pain, constipation, diarrhea, hematemesis, hematochezia, melena, nausea and vomiting.   Genitourinary: Negative for dysuria and hematuria.   Neurological: Positive for seizures. Negative for dizziness, focal weakness and numbness.   Psychiatric/Behavioral: Negative for depression. The patient is not nervous/anxious.        History    Past Medical History:   Diagnosis Date   • Anxiety    • Aortic stenosis    • Atrial fibrillation    • CAD (coronary artery disease)    • Chronic back pain    • Hallucinations    • Heart attack    • Hyperlipidemia    • Hypertension    • Injury of back    • Nervous breakdown     12 years ago   • Seizure    • Stroke    • Stroke     affected speech, vision, right side weakness   • Tobacco abuse         Past Surgical History:   Procedure Laterality Date   • AORTIC VALVE REPAIR/REPLACEMENT  2016    tissue valve, performed in [place]   • AORTIC VALVE REPAIR/REPLACEMENT MITRAL VALVE REPAIR/REPLACEMENT     • APPENDECTOMY     • CARDIAC CATHETERIZATION     • CHOLECYSTECTOMY     • HERNIA REPAIR      umbilical   • HIATAL HERNIA REPAIR     • KNEE ARTHROSCOPY     • PACEMAKER IMPLANTATION     • PACEMAKER IMPLANTATION Left 2014 2014 OR 2015.   [employer]. interrogated 3/9/18   • PATELLAR RECONSTRUCTION     • QUADRICEPS REPAIR     • SHOULDER ARTHROSCOPY     • SHOULDER CLOSED REDUCTION Left 3/9/2018    Procedure: LEFT SHOULDER CLOSED REDUCTION;  Surgeon: Steven Wang Jr., MD;  Location: [facility];  Service: Orthopedics       Family History   Problem Relation Age of Onset   • No Known Problems Mother     • No Known Problems Father         Social History   Substance Use Topics   • Smoking status: Current Every Day Smoker     Packs/day: 0.50     Types: Cigarettes   • Smokeless tobacco: Not on file      Comment: 2-3 cigarettes   • Alcohol use No       Prescriptions Prior to Admission   Medication Sig Dispense Refill Last Dose   • acetaminophen (TYLENOL) 325 MG tablet Take 2 tablets by mouth Every 4 (Four) Hours As Needed for Mild Pain .   3/16/2018   • apixaban (ELIQUIS) 5 MG tablet tablet Take 1 tablet by mouth 2 (Two) Times a Day. 60 tablet 0 3/16/2018   • aspirin 81 MG EC tablet Take 1 tablet by mouth Daily.   3/16/2018   • atorvastatin (LIPITOR) 80 MG tablet Take 1 tablet by mouth Every Night. 30 tablet 0 3/16/2018   • atorvastatin (LIPITOR) 80 MG tablet Take 0.5 tablets by mouth Every Night. 30 tablet 0 3/16/2018   • cyclobenzaprine (FLEXERIL) 5 MG tablet Take 1 tablet by mouth 3 (Three) Times a Day As Needed for Muscle Spasms. 40 tablet 0 3/16/2018   • enoxaparin (LOVENOX) 100 MG/ML solution syringe Inject 1 mL under the skin 2 (Two) Times a Day. 10 mL 0 3/16/2018   • FLUoxetine (PROzac) 20 MG capsule Take 20 mg by mouth Every Night.   3/16/2018   • HYDROcodone-acetaminophen (NORCO)  MG per tablet Take 1 tablet by mouth Every 8 (Eight) Hours.   3/16/2018   • HYDROcodone-acetaminophen (NORCO) 7.5-325 MG per tablet Take 1 tablet by mouth Every 6 (Six) Hours As Needed for Moderate Pain . 10 tablet 0    • levETIRAcetam (KEPPRA) 500 MG tablet Take 1 tablet by mouth Every 12 (Twelve) Hours. 60 tablet 0 3/16/2018   • lisinopril (PRINIVIL,ZESTRIL) 5 MG tablet Take 1 tablet by mouth Daily. (Patient taking differently: Take 10 mg by mouth Daily.) 30 tablet 0 3/16/2018   • loxapine (LOXITANE) 10 MG capsule Take 75 mg by mouth Every Night.   3/16/2018   • loxapine (LOXITANE) 25 MG capsule Take 75 mg by mouth Every Night.   3/16/2018   • metoprolol tartrate (LOPRESSOR) 25 MG tablet Take 1/2 tablet by mouth Every 12  "(Twelve) Hours. 60 tablet 0 3/16/2018   • metoprolol tartrate (LOPRESSOR) 25 MG tablet Take 1 tablet by mouth Every 12 (Twelve) Hours. 60 tablet 0 3/16/2018   • QUEtiapine (SEROQUEL) 300 MG tablet Take 300 mg by mouth Every Night.   3/16/2018   • ranolazine (RANEXA) 500 MG 12 hr tablet Take 1 tablet by mouth Every 12 (Twelve) Hours. 60 tablet 0 3/16/2018   • ranolazine (RANEXA) 500 MG 12 hr tablet Take 1 tablet by mouth Every 12 (Twelve) Hours. 60 tablet 0 3/16/2018   • rivaroxaban (XARELTO) 20 MG tablet Take 1 tablet by mouth Daily. 30 tablet 0 3/16/2018   • traZODone (DESYREL) 150 MG tablet Take 150 mg by mouth Every Night.   3/16/2018   • warfarin (COUMADIN) 2.5 MG tablet Take 2 tablets by mouth Daily. 60 tablet 0 3/16/2018   • warfarin (COUMADIN) 5 MG tablet TAKE BY MOUTH AS DIRECTED 60 tablet 0 3/16/2018         Scheduled Meds:       , Continuous Infusions:    niCARdipine 5 mg/hr Last Rate: 10 mg/hr (06/11/18 1202)   , PRN Meds:      sodium chloride  •  sodium chloride and Allergies:  Bee venom; Celexa [citalopram]; Fentanyl; Fentanyl; Haldol [haloperidol lactate]; Haldol [haloperidol]; Nitroglycerin; Risperdal [risperidone]; Risperidone and related; and Celexa [citalopram hydrobromide]    Objective     Vital Sign Min/Max for last 24 hours  Temp  Min: 97.9 °F (36.6 °C)  Max: 97.9 °F (36.6 °C)   BP  Min: 138/97  Max: 187/134   Pulse  Min: 77  Max: 130   Resp  Min: 20  Max: 20   SpO2  Min: 90 %  Max: 100 %   No Data Recorded   Weight  Min: 87.1 kg (192 lb)  Max: 87.1 kg (192 lb)     Flowsheet Rows      First Filed Value   Admission Height  188 cm (74\") Documented at 06/11/2018 0638   Admission Weight  87.1 kg (192 lb) Documented at 06/11/2018 0638             Ejection Fraction  Lab Results   Component Value Date    EF 60 11/25/2017       Echo EF Estimated  Lab Results   Component Value Date    ECHOEFEST 60 03/06/2018       Nuclear Stress Ejection Fraction  No components found for: NUCEF    Cath Ejection Fraction " Quantitative  No results found for: [facility]    Lab Results (last 24 hours)     Procedure Component Value Units Date/Time    D-dimer, Quantitative [[id number]] Collected:  06/11/18 1244    Specimen:  Blood from Arm, Right Updated:  06/11/18 1446    TSH [[id number]] Collected:  06/11/18 0655    Specimen:  Blood from Arm, Left Updated:  06/11/18 1446    Magnesium [[id number]] Collected:  06/11/18 1244    Specimen:  Blood from Arm, Right Updated:  06/11/18 1445    Basic Metabolic Panel [[id number]]  (Abnormal) Collected:  06/11/18 1244    Specimen:  Blood from Arm, Right Updated:  06/11/18 1337     Glucose 109 mg/dL      BUN <5 (L) mg/dL      Creatinine 1.00 mg/dL      Sodium 138 mmol/L      Potassium 3.9 mmol/L      Chloride 110 mmol/L      CO2 24.7 mmol/L      Calcium 9.3 mg/dL      eGFR Non African Amer 81 mL/min/1.73      BUN/Creatinine Ratio --     Comment: Unable to calculate Bun/Crea Ratio.        Anion Gap 3.3 (L) mmol/L     Narrative:       GFR Normal >60  Chronic Kidney Disease <60  Kidney Failure <15    Osmolality, Calculated [[id number]] Collected:  06/11/18 1244    Specimen:  Blood from Arm, Right Updated:  06/11/18 1337     Osmolality Calc -- mOsm/kg      Comment: Unable to calculate.       Protime-INR [[id number]]  (Normal) Collected:  06/11/18 1244    Specimen:  Blood from Arm, Right Updated:  06/11/18 1316     Protime 12.7 Seconds      Comment: Note new Reference Range        INR 0.93    Narrative:       Suggested INR therapeutic range for stable oral anticoagulant therapy:    Low Intensity therapy:   1.5-2.0  Moderate Intensity therapy:   2.0-3.0  High Intensity therapy:   2.5-4.0    Troponin [[id number]]  (Normal) Collected:  06/11/18 0951    Specimen:  Blood Updated:  06/11/18 1022     Troponin I 0.007 ng/mL     Narrative:       Ultra Troponin I Reference Range:         <=0.039 ng/mL: Negative    0.04-0.779 ng/mL: Indeterminate Range. Suspicious of MI.  Clinical correlation required.       >=0.78  ng/mL:  Consistent with myocardial injury.  Clinical correlation required.    CK-MB [[id number]]  (Normal) Collected:  06/11/18 0951    Specimen:  Blood Updated:  06/11/18 1022     CKMB 1.96 ng/mL     CK-MB Index [[id number]]  (Normal) Collected:  06/11/18 0951    Specimen:  Blood Updated:  06/11/18 1022     CK-MB Index 1.6 %     CK [[id number]]  (Normal) Collected:  06/11/18 0951    Specimen:  Blood Updated:  06/11/18 1019     Creatine Kinase 119 U/L     [place] Draw [[id number]] Collected:  06/11/18 0655    Specimen:  Blood Updated:  06/11/18 0800    Narrative:       The following orders were created for panel order [place] Draw.  Procedure                               Abnormality         Status                     ---------                               -----------         ------                     Light Blue Top[[id number]]                                   Final result               Green Top (Gel)[[id number]]                                  Final result               Lavender Top[[id number]]                                     Final result               Gold Top - SST[[id number]]                                   Final result                 Please view results for these tests on the individual orders.    Green Top (Gel) [[id number]] Collected:  06/11/18 0655    Specimen:  Blood from Arm, Left Updated:  06/11/18 0800     Extra Tube Hold for add-ons.     Comment: Auto resulted.       Light Blue Top [[id number]] Collected:  06/11/18 0655    Specimen:  Blood from Arm, Left Updated:  06/11/18 0800     Extra Tube hold for add-on     Comment: Auto resulted       Lavender Top [[id number]] Collected:  06/11/18 0655    Specimen:  Blood from Arm, Left Updated:  06/11/18 0800     Extra Tube hold for add-on     Comment: Auto resulted       Gold Top - SST [[id number]] Collected:  06/11/18 0655    Specimen:  Blood from Arm, Left Updated:  06/11/18 0800     Extra Tube Hold for add-ons.     Comment: Auto resulted.       Urine Drug Screen -  Urine, Clean Catch [[id number]]  (Normal) Collected:  06/11/18 0712    Specimen:  Urine from Urine, Clean Catch Updated:  06/11/18 0754     Amphetamine Screen, Urine Negative     Barbiturates Screen, Urine Negative     Benzodiazepine Screen, Urine Negative     Cocaine Screen, Urine Negative     Methadone Screen, Urine Negative     Opiate Screen Negative     Phencyclidine (PCP), Urine Negative     THC, Screen, Urine Negative     6-ACETYL MORPHINE Negative     Buprenorphine, Screen, Urine Negative     Oxycodone Screen, Urine Negative    Narrative:       Negative Thresholds For Drugs Screened:                  Amphetamines              1000 ng/ml               Barbiturates               200 ng/ml               Benzodiazepines            200 ng/ml              Cocaine                    300 ng/ml              Methadone                  300 ng/ml              Opiates                    300 ng/ml               Phencyclidine               25 ng/ml               THC                         50 ng/ml              6-Acetyl Morphine           10 ng/ml              Buprenorphine                5 ng/ml              Oxycodone                  300 ng/ml    The reference range for all drugs tested is negative. This report includes final unconfirmed qualitative results to be used for medical treatment purposes only. Unconfirmed results must not be used for non-medical purposes such as employment or legal testing. Clinical consideration should be applied to any drug of abuse test, especially when unconfirmed quantitative results are used.      Osmolality, Calculated [[id number]] Collected:  06/11/18 0655    Specimen:  Blood from Arm, Left Updated:  06/11/18 0750     Osmolality Calc -- mOsm/kg      Comment: Unable to calculate.       Comprehensive Metabolic Panel [[id number]]  (Abnormal) Collected:  06/11/18 0655    Specimen:  Blood from Arm, Left Updated:  06/11/18 0750     Glucose 132 (H) mg/dL      BUN <5 (L) mg/dL      Creatinine 1.03  mg/dL      Sodium 138 mmol/L      Potassium 3.5 mmol/L      Chloride 109 mmol/L      CO2 22.4 (L) mmol/L      Calcium 9.4 mg/dL      Total Protein 7.1 g/dL      Albumin 4.50 g/dL      ALT (SGPT) 27 U/L      AST (SGOT) 17 U/L      Alkaline Phosphatase 112 U/L      Comment: Note New Reference Ranges        Total Bilirubin 0.2 mg/dL      eGFR Non African Amer 78 mL/min/1.73      Globulin 2.6 gm/dL      A/G Ratio 1.7 g/dL      BUN/Creatinine Ratio --     Comment: Unable to calculate Bun/Crea Ratio.        Anion Gap 6.6 mmol/L     Troponin [[id number]]  (Normal) Collected:  06/11/18 0655    Specimen:  Blood from Arm, Left Updated:  06/11/18 0743     Troponin I 0.008 ng/mL     Narrative:       Ultra Troponin I Reference Range:         <=0.039 ng/mL: Negative    0.04-0.779 ng/mL: Indeterminate Range. Suspicious of MI.  Clinical correlation required.       >=0.78  ng/mL: Consistent with myocardial injury.  Clinical correlation required.    Ethanol [[id number]] Collected:  06/11/18 0655    Specimen:  Blood from Arm, Left Updated:  06/11/18 0736     Ethanol <10 mg/dL      Ethanol % <0.010 %     Narrative:       >/= 80.0 legally intoxicated    Protime-INR [[id number]]  (Normal) Collected:  06/11/18 0655    Specimen:  Blood from Arm, Left Updated:  06/11/18 0723     Protime 12.7 Seconds      Comment: Note new Reference Range        INR 0.93    Narrative:       Suggested INR therapeutic range for stable oral anticoagulant therapy:    Low Intensity therapy:   1.5-2.0  Moderate Intensity therapy:   2.0-3.0  High Intensity therapy:   2.5-4.0    Levetiracetam Level (Keppra) [[id number]] Collected:  06/11/18 0655    Specimen:  Blood from Arm, Left Updated:  06/11/18 0716    CBC & Differential [[id number]] Collected:  06/11/18 0655    Specimen:  Blood Updated:  06/11/18 0714    Narrative:       The following orders were created for panel order CBC & Differential.  Procedure                               Abnormality         Status                      ---------                               -----------         ------                     CBC Auto Differential[[id number]]        Abnormal            Final result                 Please view results for these tests on the individual orders.    CBC Auto Differential [[id number]]  (Abnormal) Collected:  06/11/18 0655    Specimen:  Blood from Arm, Left Updated:  06/11/18 0714     WBC 10.57 10*3/mm3      RBC 4.58 (L) 10*6/mm3      Hemoglobin 13.0 (L) g/dL      Hematocrit 41.4 (L) %      MCV 90.4 fL      MCH 28.4 pg      MCHC 31.4 (L) g/dL      RDW 14.1 %      RDW-SD 46.3 fl      MPV 10.5 (H) fL      Platelets 288 10*3/mm3      Neutrophil % 69.6 %      Lymphocyte % 17.8 (L) %      Monocyte % 9.7 %      Eosinophil % 2.3 %      Basophil % 0.4 %      Immature Grans % 0.2 %      Neutrophils, Absolute 7.36 (H) 10*3/mm3      Lymphocytes, Absolute 1.88 10*3/mm3      Monocytes, Absolute 1.03 (H) 10*3/mm3      Eosinophils, Absolute 0.24 10*3/mm3      Basophils, Absolute 0.04 10*3/mm3      Immature Grans, Absolute 0.02 10*3/mm3           Physical Exam   Constitutional: He is oriented to person, place, and time. He appears well-developed and well-nourished.   White male laying comfortably on bed.   HENT:   Mouth/Throat: Oropharynx is clear and moist.   Eyes: EOM are normal. Pupils are equal, round, and reactive to light.   Neck: Neck supple. No JVD present. No tracheal deviation present. No thyromegaly present.   Cardiovascular: Normal rate, regular rhythm, S1 normal and S2 normal.  Exam reveals no gallop and no friction rub.    No murmur heard.  Pulmonary/Chest: Effort normal and breath sounds normal. No respiratory distress. He has no wheezes. He has no rales.   Abdominal: Soft. Bowel sounds are normal. He exhibits no mass. There is no tenderness.   Musculoskeletal: Normal range of motion. He exhibits no edema.   Lymphadenopathy:     He has no cervical adenopathy.   Neurological: He is alert and oriented to person, place,  and time.   Skin: Skin is warm and dry. No rash noted.   Psychiatric: He has a normal mood and affect.       Results Review:   I reviewed the patient's new clinical results.  I reviewed the patient's new imaging results and agree with the interpretation.  I reviewed the patient's other test results and agree with the interpretation  I personally viewed and interpreted the patient's EKG/Telemetry data      Assessment/Plan     Active Problems:    Hypertensive emergency      1.  Chest pain: Patient with chest pain with some typical and some atypical features for coronary artery disease.  He continues to complain of 10 over 10 chest pain but does not appear to be in any acute distress.  Initial troponins have been negative.  EKG is unchanged from previous.    2.  Memory and vision loss: Patient with episode of memory and vision loss appear more neurologic than cardiac.  This could be related to episode of hypertension but could also be related to stroke.  This is being evaluated by the primary team.  CT scan done in the ER showed no evidence of acute pathology.     3.  Aortic stenosis: Patient with history of aortic stenosis status post aortic valve replacement with bioprosthetic 2 years ago.  He has an echocardiogram done approximately 3 months ago which showed there is only mild aortic valve stenosis.    4.  Atrial fibrillation: Patient is a history of atrial fibrillation and multiple strokes in the past.  He has a CHADS VASC score of at least 4.  He has been started on Eliquis for stroke prevention.  He is currently maintaining sinus rhythm.     5.  Coronary artery disease: Patient with history of coronary with reportedly no coronary artery disease on last cardiac catheterization done 2 years ago.  We will once again try to obtain records.  He is on medical therapy with aspirin, statin, beta blocker, ACE inhibitor and Ranexa.     6.  Hypertension: Patient with an episode of severe hypertension and admission that  appears to be improved at this time.     7.  Tachybradycardia syndrome: Patient with history of tachybradycardia syndrome status post pacemaker placement.     8.  Dyslipidemia: Patient with history of dyslipidemia with no recent lipid panel record.       Plan:     1.  Chest pain: Patient with chest pain that appears to be noncardiac in nature.  The patient has had previous admissions for similar complaints in the past. In spite of him hurting for 9 hours straight his troponins remain negative.  This point would continue serial troponins.  Since he does have a history of coronary artery disease will plan for stress test in the morning.    2.  Aortic stenosis:   patient with a history of aortic stenosis with last echocardiogram done 3 months ago showing only mild stenosis.  At this point we'll repeat echocardiogram and monitor.     3.  Atrial fibrillation: Patient with a history of atrial fibrillation currently maintaining sinus rhythm.  Would continue metoprolol for rate control and Eliquis for stroke prevention.     4.  Coronary artery disease: Patient is currently on aspirin, ACE inhibitor, statin and beta blocker.  Would continue current regimen.     5.  Dyslipidemia: This point we will check a lipid panel.    I discussed the patients findings and my recommendations with patient and consulting provider    Max Doll MD  06/11/18  2:49 PM

## 2018-06-11 NOTE — ED PROVIDER NOTES
"Subjective   43-year-old male is presenting with several complaints.  He is complaining of \"severe chest pain\".  He is complaining of memory loss states that he does not recall the events of 2 hours ago up until last night.  He states that he usually has chest pain but this is much more severe.  He states he does not know what medications he takes.  He reports he has aortic stenosis which is moderate to severe by it is not severe enough to get surgery yet.  He states his wife knows all of his information and that we have to wait until she gets here to ask him questions.   He states he has very bad seizures and he has had to be flown out two times for his seizures             Review of Systems   Respiratory: Positive for chest tightness and shortness of breath.    Cardiovascular: Positive for chest pain.   Gastrointestinal: Negative for abdominal pain, nausea and vomiting.   Musculoskeletal: Positive for back pain.   Neurological: Positive for seizures.   Psychiatric/Behavioral: Positive for behavioral problems. The patient is nervous/anxious and is hyperactive.        Past Medical History:   Diagnosis Date   • Anxiety    • Aortic stenosis    • Atrial fibrillation    • CAD (coronary artery disease)    • Chronic back pain    • Hallucinations    • Heart attack    • Hyperlipidemia    • Hypertension    • Injury of back    • Nervous breakdown     12 years ago   • Seizure    • Stroke    • Stroke     affected speech, vision, right side weakness   • Tobacco abuse        Allergies   Allergen Reactions   • Bee Venom Anaphylaxis   • Celexa [Citalopram] Shortness Of Breath   • Fentanyl      Pt states that he stops breathing   • Fentanyl Shortness Of Breath   • Haldol [Haloperidol Lactate] Anaphylaxis   • Haldol [Haloperidol] Shortness Of Breath   • Nitroglycerin Anaphylaxis   • Risperdal [Risperidone] Shortness Of Breath   • Risperidone And Related Anaphylaxis   • Celexa [Citalopram Hydrobromide] Other (See Comments)     Pt " states that it causes him to sweat and a severe headache.        Past Surgical History:   Procedure Laterality Date   • AORTIC VALVE REPAIR/REPLACEMENT  2016    tissue valve, performed in [place]   • AORTIC VALVE REPAIR/REPLACEMENT MITRAL VALVE REPAIR/REPLACEMENT     • APPENDECTOMY     • CARDIAC CATHETERIZATION     • CHOLECYSTECTOMY     • HERNIA REPAIR      umbilical   • HIATAL HERNIA REPAIR     • KNEE ARTHROSCOPY     • PACEMAKER IMPLANTATION     • PACEMAKER IMPLANTATION Left 2014 2014 OR 2015.   [employer]. interrogated 3/9/18   • PATELLAR RECONSTRUCTION     • QUADRICEPS REPAIR     • SHOULDER ARTHROSCOPY     • SHOULDER CLOSED REDUCTION Left 3/9/2018    Procedure: LEFT SHOULDER CLOSED REDUCTION;  Surgeon: Steven Wang Jr., MD;  Location: [facility];  Service: Orthopedics       Family History   Problem Relation Age of Onset   • No Known Problems Mother    • No Known Problems Father        Social History     Social History   • Marital status:      Social History Main Topics   • Smoking status: Current Every Day Smoker     Packs/day: 0.50     Types: Cigarettes      Comment: 2-3 cigarettes   • Alcohol use No   • Drug use: No   • Sexual activity: Defer     Other Topics Concern   • Not on file     Social History Narrative    ** Merged History Encounter **         Retired , independentt of ADL, lives with wife           Objective   Physical Exam   Constitutional: He appears well-developed and well-nourished. He appears distressed.   HENT:   Head: Normocephalic.   Right Ear: External ear normal.   Left Ear: External ear normal.   Eyes: Conjunctivae are normal.   Neck: Neck supple.   Cardiovascular: Regular rhythm, normal heart sounds and intact distal pulses.    Tachycardia     Pulmonary/Chest: Effort normal.   Abdominal: Soft. He exhibits no mass. There is no tenderness. There is no rebound and no guarding.   Musculoskeletal: He exhibits no edema.   Neurological: He is alert.   Skin: Skin is  warm and dry.   Psychiatric:   Anxious        Procedures           ED Course  ED Course as of Jun 11 2327   Mon Jun 11, 2018   0654 Sinus tachycardia, the rate is 132.  There is a right bundle branch block which is chronic.  There is left axis deviation. ECG 12 Lead [NV]   1206 BP: (!) 187/134 [MH]   1207 BP: (!) 141/101 [MH]   1237 Eliquis 5mg  [MH]      ED Course User Index  [MH] Krystina Campbell DO  [NV] Megan Cox DO                  [facility]      Final diagnoses:   Hypertensive emergency            Megan Cox DO  06/11/18 2106

## 2018-06-11 NOTE — DISCHARGE SUMMARY
Date of admission: 6/11/18  Date of discharge: 6/11/18    Disposition: AGAINST MEDICAL ADVICE    Primary diagnosis: Chest pain of uncertain nature  Secondary diagnosis:  Accelerated hypertension  History of paroxysmal atrial fibrillation  History of porcine valve replacement  Seizure history  Status post permanent pacemaker placement  Tobacco abuse     Consultants:  Dr. Pastrana cardiology    Procedures: None    Exam: [facility] course: Patient was admitted with accelerated hypertension placed on the Cardene drip as well as chest pain.  Patient had had chest pain the past.  His workup has been negative in the past.  His troponins were negative.  EKG nonspecific.  Patient was seen by cardiology and thought to need a stress test.  It is noted however November 2017 he did have a negative stress test.  His d-dimer was negative.  Per nursing patient was seen by cardiology and on learning he was not going to receive narcotic analgesia he decided that he was going to leave [place].  Because I was in process of actively seeing an ill patient I was unable to talk to him however nursing did reiterate to him that without further workup and having been on the Cardene drip death or myocardial infarction was a risk if he left but he left anyway.  See chart for full details.  He had an episode where apparently he was unable to speak for about 10 minutes and could not recall some of the events of the previous day however this was not able to be worked up further, CT head negative, this could've been related to his hypertensive encephalopathy.  Labs were unremarkable.

## 2018-06-13 LAB — LEVETIRACETAM SERPL-MCNC: 15.6 UG/ML (ref 10–40)

## 2018-06-15 ENCOUNTER — HOSPITAL ENCOUNTER (EMERGENCY)
Facility: HOSPITAL | Age: 43
Discharge: HOME | End: 2018-06-16
Attending: EMERGENCY MEDICINE | Admitting: EMERGENCY MEDICINE

## 2018-06-15 ENCOUNTER — APPOINTMENT (OUTPATIENT)
Dept: GENERAL RADIOLOGY | Facility: HOSPITAL | Age: 43
End: 2018-06-15

## 2018-06-15 ENCOUNTER — APPOINTMENT (OUTPATIENT)
Dept: CT IMAGING | Facility: HOSPITAL | Age: 43
End: 2018-06-15

## 2018-06-15 DIAGNOSIS — R07.89 ATYPICAL CHEST PAIN: Primary | ICD-10-CM

## 2018-06-15 LAB
ALBUMIN SERPL-MCNC: 4.6 G/DL (ref 3.5–5)
ALBUMIN/GLOB SERPL: 1.8 G/DL (ref 1.5–2.5)
ALP SERPL-CCNC: 116 U/L (ref 40–129)
ALT SERPL W P-5'-P-CCNC: 24 U/L (ref 10–44)
ANION GAP SERPL CALCULATED.3IONS-SCNC: 5.3 MMOL/L (ref 3.6–11.2)
AST SERPL-CCNC: 20 U/L (ref 10–34)
BASOPHILS # BLD AUTO: 0.1 10*3/MM3 (ref 0–0.3)
BASOPHILS NFR BLD AUTO: 1.1 % (ref 0–2)
BILIRUB SERPL-MCNC: 0.2 MG/DL (ref 0.2–1.8)
BUN BLD-MCNC: 6 MG/DL (ref 7–21)
BUN/CREAT SERPL: 5.9 (ref 7–25)
CALCIUM SPEC-SCNC: 9.2 MG/DL (ref 7.7–10)
CHLORIDE SERPL-SCNC: 108 MMOL/L (ref 99–112)
CO2 SERPL-SCNC: 28.7 MMOL/L (ref 24.3–31.9)
CREAT BLD-MCNC: 1.02 MG/DL (ref 0.43–1.29)
DEPRECATED RDW RBC AUTO: 45.6 FL (ref 37–54)
EOSINOPHIL # BLD AUTO: 0.51 10*3/MM3 (ref 0–0.7)
EOSINOPHIL NFR BLD AUTO: 5.4 % (ref 0–5)
ERYTHROCYTE [DISTWIDTH] IN BLOOD BY AUTOMATED COUNT: 14 % (ref 11.5–14.5)
GFR SERPL CREATININE-BSD FRML MDRD: 79 ML/MIN/1.73
GLOBULIN UR ELPH-MCNC: 2.6 GM/DL
GLUCOSE BLD-MCNC: 88 MG/DL (ref 70–110)
HCT VFR BLD AUTO: 43.9 % (ref 42–52)
HGB BLD-MCNC: 14.1 G/DL (ref 14–18)
HOLD SPECIMEN: NORMAL
HOLD SPECIMEN: NORMAL
IMM GRANULOCYTES # BLD: 0.01 10*3/MM3 (ref 0–0.03)
IMM GRANULOCYTES NFR BLD: 0.1 % (ref 0–0.5)
LYMPHOCYTES # BLD AUTO: 2.87 10*3/MM3 (ref 1–3)
LYMPHOCYTES NFR BLD AUTO: 30.2 % (ref 21–51)
MCH RBC QN AUTO: 29.2 PG (ref 27–33)
MCHC RBC AUTO-ENTMCNC: 32.1 G/DL (ref 33–37)
MCV RBC AUTO: 90.9 FL (ref 80–94)
MONOCYTES # BLD AUTO: 1 10*3/MM3 (ref 0.1–0.9)
MONOCYTES NFR BLD AUTO: 10.5 % (ref 0–10)
NEUTROPHILS # BLD AUTO: 5.02 10*3/MM3 (ref 1.4–6.5)
NEUTROPHILS NFR BLD AUTO: 52.7 % (ref 30–70)
OSMOLALITY SERPL CALC.SUM OF ELEC: 280.2 MOSM/KG (ref 273–305)
PLATELET # BLD AUTO: 335 10*3/MM3 (ref 130–400)
PMV BLD AUTO: 10.7 FL (ref 6–10)
POTASSIUM BLD-SCNC: 3.8 MMOL/L (ref 3.5–5.3)
PROT SERPL-MCNC: 7.2 G/DL (ref 6–8)
RBC # BLD AUTO: 4.83 10*6/MM3 (ref 4.7–6.1)
SODIUM BLD-SCNC: 142 MMOL/L (ref 135–153)
TROPONIN I SERPL-MCNC: <0.006 NG/ML
WBC NRBC COR # BLD: 9.51 10*3/MM3 (ref 4.5–12.5)
WHOLE BLOOD HOLD SPECIMEN: NORMAL
WHOLE BLOOD HOLD SPECIMEN: NORMAL

## 2018-06-15 PROCEDURE — 73030 X-RAY EXAM OF SHOULDER: CPT

## 2018-06-15 PROCEDURE — 93005 ELECTROCARDIOGRAM TRACING: CPT | Performed by: EMERGENCY MEDICINE

## 2018-06-15 PROCEDURE — 93005 ELECTROCARDIOGRAM TRACING: CPT | Performed by: FAMILY MEDICINE

## 2018-06-15 PROCEDURE — 80053 COMPREHEN METABOLIC PANEL: CPT | Performed by: EMERGENCY MEDICINE

## 2018-06-15 PROCEDURE — 70450 CT HEAD/BRAIN W/O DYE: CPT | Performed by: RADIOLOGY

## 2018-06-15 PROCEDURE — 84484 ASSAY OF TROPONIN QUANT: CPT | Performed by: EMERGENCY MEDICINE

## 2018-06-15 PROCEDURE — 73030 X-RAY EXAM OF SHOULDER: CPT | Performed by: RADIOLOGY

## 2018-06-15 PROCEDURE — 99285 EMERGENCY DEPT VISIT HI MDM: CPT

## 2018-06-15 PROCEDURE — 71045 X-RAY EXAM CHEST 1 VIEW: CPT

## 2018-06-15 PROCEDURE — 70450 CT HEAD/BRAIN W/O DYE: CPT

## 2018-06-15 PROCEDURE — 85025 COMPLETE CBC W/AUTO DIFF WBC: CPT | Performed by: EMERGENCY MEDICINE

## 2018-06-15 PROCEDURE — 71045 X-RAY EXAM CHEST 1 VIEW: CPT | Performed by: RADIOLOGY

## 2018-06-15 RX ORDER — OXYCODONE AND ACETAMINOPHEN 10; 325 MG/1; MG/1
1 TABLET ORAL ONCE
Status: COMPLETED | OUTPATIENT
Start: 2018-06-15 | End: 2018-06-15

## 2018-06-15 RX ORDER — SODIUM CHLORIDE 0.9 % (FLUSH) 0.9 %
10 SYRINGE (ML) INJECTION AS NEEDED
Status: DISCONTINUED | OUTPATIENT
Start: 2018-06-15 | End: 2018-06-16 | Stop reason: HOSPADM

## 2018-06-15 RX ADMIN — OXYCODONE HYDROCHLORIDE AND ACETAMINOPHEN 1 TABLET: 10; 325 TABLET ORAL at 23:38

## 2018-06-16 VITALS
DIASTOLIC BLOOD PRESSURE: 72 MMHG | OXYGEN SATURATION: 99 % | SYSTOLIC BLOOD PRESSURE: 136 MMHG | WEIGHT: 198 LBS | HEIGHT: 74 IN | RESPIRATION RATE: 16 BRPM | BODY MASS INDEX: 25.41 KG/M2 | TEMPERATURE: 98.2 F | HEART RATE: 76 BPM

## 2018-06-16 LAB — TROPONIN I SERPL-MCNC: <0.006 NG/ML

## 2018-06-16 PROCEDURE — 93005 ELECTROCARDIOGRAM TRACING: CPT | Performed by: EMERGENCY MEDICINE

## 2018-06-16 PROCEDURE — 36415 COLL VENOUS BLD VENIPUNCTURE: CPT

## 2018-06-16 PROCEDURE — 84484 ASSAY OF TROPONIN QUANT: CPT | Performed by: EMERGENCY MEDICINE

## 2018-06-16 NOTE — ED PROVIDER NOTES
Subjective   Patient is a 43-year-old white male who complains that he awoke early this morning, approximately 18 hours ago, with chest pain that has persisted all day.  It is a poorly characterized pain in his left upper chest that does not radiate.  There is no associated shortness of breath, nausea, vomiting, diaphoresis, or other associated symptoms.  He states that he fell earlier today and reinjured his left shoulder, where he recently had a left humeral head and humeral neck fracture.  He states that he thinks he may have had a syncopal episode earlier in the day today, he is not sure.  His wife states that he has had syncope ×3 today.  From reviewing recent records, the patient has no documented history of coronary artery disease, he has never had any sort of coronary artery interventions, no stents, no bypasses.  Had a negative stress test in November.  He does however have a history of valvular cardiac disease having had a porcine aortic valve replacement and also with a history of atrial fibrillation.  Patient appears quite calm and comfortable, though he rates his pain as a 14 scale of 10.  Patient was admitted to this facility on June 11 with chest pain and accelerated hypertension, signed out later that day AGAINST MEDICAL ADVICE because he was not receiving narcotic analgesics.            Review of Systems   Constitutional: Negative for chills, diaphoresis and fever.   HENT: Negative for ear pain, sore throat and trouble swallowing.    Eyes: Negative for photophobia and pain.   Respiratory: Negative for shortness of breath, wheezing and stridor.    Cardiovascular: Positive for chest pain. Negative for palpitations.   Gastrointestinal: Negative for abdominal distention, abdominal pain, blood in stool, diarrhea, nausea and vomiting.   Endocrine: Negative for polydipsia and polyphagia.   Genitourinary: Negative for difficulty urinating and flank pain.   Musculoskeletal: Negative for back pain, neck pain  and neck stiffness.   Skin: Negative for color change and pallor.   Neurological: Positive for seizures (Patient reports that he has a seizure disorder, last seizure greater than 1 month ago). Negative for speech difficulty, weakness and numbness.   Psychiatric/Behavioral: Negative for confusion.   All other systems reviewed and are negative.      Past Medical History:   Diagnosis Date   • Anxiety    • Aortic stenosis    • Atrial fibrillation    • CAD (coronary artery disease)    • Chronic back pain    • Hallucinations    • Heart attack    • Hyperlipidemia    • Hypertension    • Injury of back    • Nervous breakdown     12 years ago   • Seizure    • Stroke    • Stroke     affected speech, vision, right side weakness   • Tobacco abuse        Allergies   Allergen Reactions   • Bee Venom Anaphylaxis   • Celexa [Citalopram] Shortness Of Breath   • Fentanyl      Pt states that he stops breathing   • Fentanyl Shortness Of Breath   • Haldol [Haloperidol Lactate] Anaphylaxis   • Haldol [Haloperidol] Shortness Of Breath   • Nitroglycerin Anaphylaxis   • Risperdal [Risperidone] Shortness Of Breath   • Risperidone And Related Anaphylaxis   • Celexa [Citalopram Hydrobromide] Other (See Comments)     Pt states that it causes him to sweat and a severe headache.        Past Surgical History:   Procedure Laterality Date   • AORTIC VALVE REPAIR/REPLACEMENT  2016    tissue valve, performed in [place]   • AORTIC VALVE REPAIR/REPLACEMENT MITRAL VALVE REPAIR/REPLACEMENT     • APPENDECTOMY     • CARDIAC CATHETERIZATION     • CHOLECYSTECTOMY     • HERNIA REPAIR      umbilical   • HIATAL HERNIA REPAIR     • KNEE ARTHROSCOPY     • PACEMAKER IMPLANTATION     • PACEMAKER IMPLANTATION Left 2014 2014 OR 2015.   [employer]. interrogated 3/9/18   • PATELLAR RECONSTRUCTION     • QUADRICEPS REPAIR     • SHOULDER ARTHROSCOPY     • SHOULDER CLOSED REDUCTION Left 3/9/2018    Procedure: LEFT SHOULDER CLOSED REDUCTION;  Surgeon: Steven DAVID  Kathleen Daley MD;  Location: [facility];  Service: Orthopedics       Family History   Problem Relation Age of Onset   • No Known Problems Mother    • No Known Problems Father        Social History     Social History   • Marital status:      Social History Main Topics   • Smoking status: Current Every Day Smoker     Packs/day: 0.50     Types: Cigarettes      Comment: 2-3 cigarettes   • Alcohol use No   • Drug use: No   • Sexual activity: Defer     Other Topics Concern   • Not on file     Social History Narrative    ** Merged History Encounter **         Retired , independentt of ADL, lives with wife           Objective   Physical Exam   Constitutional: He is oriented to person, place, and time. He appears well-developed and well-nourished. No distress.   Patient appears to be quite calm and comfortable.  He is in no apparent distress of any kind.  He does not appear to be acutely ill.   HENT:   Head: Normocephalic and atraumatic.   Eyes: EOM are normal. Pupils are equal, round, and reactive to light. No scleral icterus.   Neck: Normal range of motion. Neck supple. No neck rigidity. No tracheal deviation present.   Cardiovascular: Normal rate, regular rhythm and intact distal pulses.    Pulmonary/Chest: Effort normal and breath sounds normal. No respiratory distress. He exhibits no tenderness.   Abdominal: Soft. Bowel sounds are normal. There is no tenderness. There is no rebound and no guarding.   Musculoskeletal: Normal range of motion. He exhibits no tenderness.   Neurological: He is alert and oriented to person, place, and time. He has normal strength. No sensory deficit. He exhibits normal muscle tone. Coordination normal. GCS eye subscore is 4. GCS verbal subscore is 5. GCS motor subscore is 6.   Skin: Skin is warm and dry. Capillary refill takes less than 2 seconds. He is not diaphoretic. No cyanosis. No pallor.   Psychiatric: He has a normal mood and affect. His behavior is normal.   Nursing  note and vitals reviewed.      Procedures  2 EKGs done during the patient's stay show no apparent acute ischemia or significant change from June 11 of 2018.  CT Head Without Contrast   ED Interpretation   Per virtual radiology, no definite acute intracranial abnormality.      XR Chest 1 View   ED Interpretation   No apparent acute disease pending radiologist.      XR Shoulder 2+ View Left   ED Interpretation   Pending radiologist review, no apparent acute abnormalities, healing humeral neck and head fractures.        Results for orders placed or performed during the hospital encounter of 06/15/18   Comprehensive Metabolic Panel   Result Value Ref Range    Glucose 88 70 - 110 mg/dL    BUN 6 (L) 7 - 21 mg/dL    Creatinine 1.02 0.43 - 1.29 mg/dL    Sodium 142 135 - 153 mmol/L    Potassium 3.8 3.5 - 5.3 mmol/L    Chloride 108 99 - 112 mmol/L    CO2 28.7 24.3 - 31.9 mmol/L    Calcium 9.2 7.7 - 10.0 mg/dL    Total Protein 7.2 6.0 - 8.0 g/dL    Albumin 4.60 3.50 - 5.00 g/dL    ALT (SGPT) 24 10 - 44 U/L    AST (SGOT) 20 10 - 34 U/L    Alkaline Phosphatase 116 40 - 129 U/L    Total Bilirubin 0.2 0.2 - 1.8 mg/dL    eGFR Non African Amer 79 >60 mL/min/1.73    Globulin 2.6 gm/dL    A/G Ratio 1.8 1.5 - 2.5 g/dL    BUN/Creatinine Ratio 5.9 (L) 7.0 - 25.0    Anion Gap 5.3 3.6 - 11.2 mmol/L   Troponin   Result Value Ref Range    Troponin I <0.006 <=0.040 ng/mL   CBC Auto Differential   Result Value Ref Range    WBC 9.51 4.50 - 12.50 10*3/mm3    RBC 4.83 4.70 - 6.10 10*6/mm3    Hemoglobin 14.1 14.0 - 18.0 g/dL    Hematocrit 43.9 42.0 - 52.0 %    MCV 90.9 80.0 - 94.0 fL    MCH 29.2 27.0 - 33.0 pg    MCHC 32.1 (L) 33.0 - 37.0 g/dL    RDW 14.0 11.5 - 14.5 %    RDW-SD 45.6 37.0 - 54.0 fl    MPV 10.7 (H) 6.0 - 10.0 fL    Platelets 335 130 - 400 10*3/mm3    Neutrophil % 52.7 30.0 - 70.0 %    Lymphocyte % 30.2 21.0 - 51.0 %    Monocyte % 10.5 (H) 0.0 - 10.0 %    Eosinophil % 5.4 (H) 0.0 - 5.0 %    Basophil % 1.1 0.0 - 2.0 %    Immature  Grans % 0.1 0.0 - 0.5 %    Neutrophils, Absolute 5.02 1.40 - 6.50 10*3/mm3    Lymphocytes, Absolute 2.87 1.00 - 3.00 10*3/mm3    Monocytes, Absolute 1.00 (H) 0.10 - 0.90 10*3/mm3    Eosinophils, Absolute 0.51 0.00 - 0.70 10*3/mm3    Basophils, Absolute 0.10 0.00 - 0.30 10*3/mm3    Immature Grans, Absolute 0.01 0.00 - 0.03 10*3/mm3   Osmolality, Calculated   Result Value Ref Range    Osmolality Calc 280.2 273.0 - 305.0 mOsm/kg   Troponin   Result Value Ref Range    Troponin I <0.006 <=0.040 ng/mL   Light Blue Top   Result Value Ref Range    Extra Tube hold for add-on    Green Top (Gel)   Result Value Ref Range    Extra Tube Hold for add-ons.    Lavender Top   Result Value Ref Range    Extra Tube hold for add-on    Gold Top - SST   Result Value Ref Range    Extra Tube Hold for add-ons.                 ED Course  ED Course as of Luiz 16 0125   Fri Luiz 15, 2018   2259 148/106  [CM]   Sat Jun 16, 2018   0122 Patient's emergency department stay has been uneventful.  Never has he shown any signs of distress.  137/94 currently.  [CM]      ED Course User Index  [CM] Sanchez Saavedra MD                  [facility]      Final diagnoses:   Atypical chest pain             Please note that portions of this note were completed with a voice recognition program. Efforts were made to edit the dictations, but occasionally words are mistranscribed.       Sanchez Saavedra MD  06/16/18 0125

## 2018-06-16 NOTE — DISCHARGE INSTRUCTIONS
Home to rest in care of family.  Take your medications as prescribed.  Follow-up with your primary care provider, Edith Hurtado, in the office on Monday.  Return the emergency Department right away if any problems.

## 2018-06-19 ENCOUNTER — HOSPITAL ENCOUNTER (EMERGENCY)
Facility: HOSPITAL | Age: 43
Discharge: HOME | End: 2018-06-19
Attending: EMERGENCY MEDICINE | Admitting: EMERGENCY MEDICINE

## 2018-06-19 VITALS
HEART RATE: 100 BPM | OXYGEN SATURATION: 98 % | WEIGHT: 200 LBS | RESPIRATION RATE: 18 BRPM | BODY MASS INDEX: 25.67 KG/M2 | SYSTOLIC BLOOD PRESSURE: 141 MMHG | HEIGHT: 74 IN | DIASTOLIC BLOOD PRESSURE: 96 MMHG | TEMPERATURE: 98 F

## 2018-06-19 DIAGNOSIS — S61.411A LACERATION OF RIGHT HAND WITHOUT FOREIGN BODY, INITIAL ENCOUNTER: Primary | ICD-10-CM

## 2018-06-19 PROCEDURE — 99283 EMERGENCY DEPT VISIT LOW MDM: CPT

## 2018-06-19 RX ORDER — CEPHALEXIN 250 MG/1
500 CAPSULE ORAL ONCE
Status: COMPLETED | OUTPATIENT
Start: 2018-06-19 | End: 2018-06-19

## 2018-06-19 RX ORDER — ONDANSETRON 4 MG/1
4 TABLET, ORALLY DISINTEGRATING ORAL ONCE
Status: COMPLETED | OUTPATIENT
Start: 2018-06-19 | End: 2018-06-19

## 2018-06-19 RX ORDER — HYDROCODONE BITARTRATE AND ACETAMINOPHEN 7.5; 325 MG/1; MG/1
1 TABLET ORAL ONCE
Status: COMPLETED | OUTPATIENT
Start: 2018-06-19 | End: 2018-06-19

## 2018-06-19 RX ORDER — LIDOCAINE HYDROCHLORIDE 10 MG/ML
5 INJECTION, SOLUTION EPIDURAL; INFILTRATION; INTRACAUDAL; PERINEURAL ONCE
Status: COMPLETED | OUTPATIENT
Start: 2018-06-19 | End: 2018-06-19

## 2018-06-19 RX ORDER — HYDROCODONE BITARTRATE AND ACETAMINOPHEN 5; 325 MG/1; MG/1
1 TABLET ORAL EVERY 8 HOURS PRN
Qty: 8 TABLET | Refills: 0 | Status: ON HOLD | OUTPATIENT
Start: 2018-06-19 | End: 2018-08-18

## 2018-06-19 RX ORDER — CEPHALEXIN 500 MG/1
500 CAPSULE ORAL 2 TIMES DAILY
Qty: 20 CAPSULE | Refills: 0 | Status: ON HOLD | OUTPATIENT
Start: 2018-06-19 | End: 2018-08-18

## 2018-06-19 RX ADMIN — LIDOCAINE HYDROCHLORIDE 5 ML: 10 INJECTION, SOLUTION EPIDURAL; INFILTRATION; INTRACAUDAL; PERINEURAL at 21:01

## 2018-06-19 RX ADMIN — ONDANSETRON 4 MG: 4 TABLET, ORALLY DISINTEGRATING ORAL at 21:00

## 2018-06-19 RX ADMIN — HYDROCODONE BITARTRATE AND ACETAMINOPHEN 1 TABLET: 7.5; 325 TABLET ORAL at 21:00

## 2018-06-19 RX ADMIN — CEPHALEXIN 500 MG: 250 CAPSULE ORAL at 22:51

## 2018-06-19 NOTE — ED NOTES
4x4 gauze secured with kurlex applied to Pt right hand. Pt tolerated well.      Tereza Ochoa RN  06/19/18 1941

## 2018-06-20 NOTE — ED PROVIDER NOTES
Subjective     History provided by:  Patient  Laceration   Location:  Shoulder/arm  Shoulder/arm laceration location:  R hand  Depth:  Through dermis  Quality: straight    Bleeding: controlled    Time since incident:  1 hour  Laceration mechanism:  Broken glass  Pain details:     Quality:  Aching    Timing:  Constant  Foreign body present:  No foreign bodies  Relieved by:  Nothing  Ineffective treatments:  None tried  Tetanus status:  Up to date  Associated symptoms: no fever        Review of Systems   Constitutional: Negative.  Negative for fever.   HENT: Negative.    Respiratory: Negative.    Cardiovascular: Negative.  Negative for chest pain.   Gastrointestinal: Negative.  Negative for abdominal pain.   Endocrine: Negative.    Genitourinary: Negative.  Negative for dysuria.   Skin: Positive for wound.   Neurological: Negative.    Psychiatric/Behavioral: Negative.    All other systems reviewed and are negative.      Past Medical History:   Diagnosis Date   • Anxiety    • Aortic stenosis    • Atrial fibrillation    • CAD (coronary artery disease)    • Chronic back pain    • Hallucinations    • Heart attack    • Hyperlipidemia    • Hypertension    • Injury of back    • Nervous breakdown     12 years ago   • Seizure    • Stroke    • Stroke     affected speech, vision, right side weakness   • Tobacco abuse        Allergies   Allergen Reactions   • Bee Venom Anaphylaxis   • Celexa [Citalopram] Shortness Of Breath   • Fentanyl      Pt states that he stops breathing   • Fentanyl Shortness Of Breath   • Haldol [Haloperidol Lactate] Anaphylaxis   • Haldol [Haloperidol] Shortness Of Breath   • Nitroglycerin Anaphylaxis   • Risperdal [Risperidone] Shortness Of Breath   • Risperidone And Related Anaphylaxis   • Celexa [Citalopram Hydrobromide] Other (See Comments)     Pt states that it causes him to sweat and a severe headache.        Past Surgical History:   Procedure Laterality Date   • AORTIC VALVE REPAIR/REPLACEMENT  2016     tissue valve, performed in [place]   • AORTIC VALVE REPAIR/REPLACEMENT MITRAL VALVE REPAIR/REPLACEMENT     • APPENDECTOMY     • CARDIAC CATHETERIZATION     • CHOLECYSTECTOMY     • HERNIA REPAIR      umbilical   • HIATAL HERNIA REPAIR     • KNEE ARTHROSCOPY     • PACEMAKER IMPLANTATION     • PACEMAKER IMPLANTATION Left 2014 2014 OR 2015.   [employer]. interrogated 3/9/18   • PATELLAR RECONSTRUCTION     • QUADRICEPS REPAIR     • SHOULDER ARTHROSCOPY     • SHOULDER CLOSED REDUCTION Left 3/9/2018    Procedure: LEFT SHOULDER CLOSED REDUCTION;  Surgeon: Steven Wang Jr., MD;  Location: [facility];  Service: Orthopedics       Family History   Problem Relation Age of Onset   • No Known Problems Mother    • No Known Problems Father        Social History     Social History   • Marital status:      Social History Main Topics   • Smoking status: Current Every Day Smoker     Packs/day: 0.50     Types: Cigarettes      Comment: 2-3 cigarettes   • Alcohol use No   • Drug use: No   • Sexual activity: Defer     Other Topics Concern   • Not on file     Social History Narrative    ** Merged History Encounter **         Retired , independentt of ADL, lives with wife           Objective   Physical Exam   Constitutional: He is oriented to person, place, and time. He appears well-developed and well-nourished. No distress.   HENT:   Head: Normocephalic and atraumatic.   Right Ear: External ear normal.   Left Ear: External ear normal.   Nose: Nose normal.   Eyes: Conjunctivae are normal.   Neck: Normal range of motion. Neck supple. No JVD present. No tracheal deviation present.   Cardiovascular: Normal rate.    No murmur heard.  Pulmonary/Chest: Effort normal. No respiratory distress. He has no wheezes.   Abdominal: Soft. There is no tenderness.   Musculoskeletal: Normal range of motion. He exhibits no edema or deformity.   Neurological: He is alert and oriented to person, place, and time. No cranial nerve  deficit.   Skin: Skin is warm and dry. No rash noted. He is not diaphoretic. No erythema. No pallor.   Forcing a major laceration to the dorsum of the right hand.   Psychiatric: He has a normal mood and affect. His behavior is normal. Thought content normal.   Nursing note and vitals reviewed.      Laceration Repair  Date/Time: 6/19/2018 10:36 PM  Performed by: HARRIETT RODRIGUEZ  Authorized by: ANABELA VILLALTA     Consent:     Consent obtained:  Verbal    Consent given by:  Patient  Anesthesia (see MAR for exact dosages):     Anesthesia method:  Local infiltration    Local anesthetic:  Lidocaine 1% w/o epi  Laceration details:     Location:  Hand    Hand location:  R hand, dorsum    Length (cm):  4  Repair type:     Repair type:  Simple  Pre-procedure details:     Preparation:  Patient was prepped and draped in usual sterile fashion  Exploration:     Wound exploration: wound explored through full range of motion      Contaminated: no    Treatment:     Area cleansed with:  Betadine and Hibiclens    Amount of cleaning:  Standard    Irrigation solution:  Sterile saline    Irrigation method:  Pressure wash    Visualized foreign bodies/material removed: no    Skin repair:     Repair method:  Sutures    Suture size:  4-0    Suture material:  Nylon    Suture technique:  Simple interrupted    Number of sutures:  6  Approximation:     Approximation:  Close  Post-procedure details:     Dressing:  Non-adherent dressing    Patient tolerance of procedure:  Tolerated well, no immediate complications               ED Course                  MDM  Number of Diagnoses or Management Options  Laceration of right hand without foreign body, initial encounter: new and does not require workup  Risk of Complications, Morbidity, and/or Mortality  Presenting problems: low  Diagnostic procedures: low  Management options: low    Patient Progress  Patient progress: stable        Final diagnoses:   Laceration of right hand without foreign body,  initial encounter            ROSEMARIE Edgar  06/19/18 1115

## 2018-06-20 NOTE — ED NOTES
Pt left the ED with dressing to right hand clean, dry, and intact. No drainage noted to dressing.      Tereza Ochoa RN  06/19/18 7851

## 2018-06-20 NOTE — ED NOTES
Report received from Serene Dean RN. Pt is A&OX4, RR even and unlabored, skin WPD. ROSEMARIE Edgar at bedside at this time for laceration repair. Pt does not appear in any acute distress. WCTM. Pt family remains at bedside.      Tereza Ochoa RN  06/19/18 6705

## 2018-06-20 NOTE — ED NOTES
Pt tolerated 6 sutures to the right hand placed by ROSEMARIE Edgar well. Pt laceration is clean, dry, and bleeding noted to be controlled. Applied 4x4 gauze secured with kurlex and tape. Pt verbalized understanding of wound care.      Tereza Ochoa RN  06/19/18 1154

## 2018-06-28 ENCOUNTER — APPOINTMENT (OUTPATIENT)
Dept: GENERAL RADIOLOGY | Facility: HOSPITAL | Age: 43
End: 2018-06-28

## 2018-06-28 ENCOUNTER — HOSPITAL ENCOUNTER (EMERGENCY)
Facility: HOSPITAL | Age: 43
Discharge: HOME | End: 2018-06-29
Attending: FAMILY MEDICINE | Admitting: FAMILY MEDICINE

## 2018-06-28 DIAGNOSIS — G40.909 SEIZURE DISORDER (HCC): ICD-10-CM

## 2018-06-28 DIAGNOSIS — Z79.01 CHRONIC ANTICOAGULATION: ICD-10-CM

## 2018-06-28 DIAGNOSIS — R07.9 CHEST PAIN IN ADULT: Primary | ICD-10-CM

## 2018-06-28 DIAGNOSIS — Z95.2 H/O PROSTHETIC HEART VALVE: ICD-10-CM

## 2018-06-28 LAB
ALBUMIN SERPL-MCNC: 4.3 G/DL (ref 3.5–5)
ALBUMIN/GLOB SERPL: 1.7 G/DL (ref 1.5–2.5)
ALP SERPL-CCNC: 95 U/L (ref 40–129)
ALT SERPL W P-5'-P-CCNC: 21 U/L (ref 10–44)
ANION GAP SERPL CALCULATED.3IONS-SCNC: 4.4 MMOL/L (ref 3.6–11.2)
APTT PPP: 27.9 SECONDS (ref 23.8–36.1)
AST SERPL-CCNC: 19 U/L (ref 10–34)
BASOPHILS # BLD AUTO: 0.11 10*3/MM3 (ref 0–0.3)
BASOPHILS NFR BLD AUTO: 1 % (ref 0–2)
BILIRUB SERPL-MCNC: 0.3 MG/DL (ref 0.2–1.8)
BUN BLD-MCNC: 8 MG/DL (ref 7–21)
BUN/CREAT SERPL: 8 (ref 7–25)
CALCIUM SPEC-SCNC: 8.7 MG/DL (ref 7.7–10)
CHLORIDE SERPL-SCNC: 110 MMOL/L (ref 99–112)
CK SERPL-CCNC: 136 U/L (ref 24–204)
CO2 SERPL-SCNC: 25.6 MMOL/L (ref 24.3–31.9)
CREAT BLD-MCNC: 1 MG/DL (ref 0.43–1.29)
D-LACTATE SERPL-SCNC: 1.7 MMOL/L (ref 0.5–2)
DEPRECATED RDW RBC AUTO: 43.6 FL (ref 37–54)
EOSINOPHIL # BLD AUTO: 0.3 10*3/MM3 (ref 0–0.7)
EOSINOPHIL NFR BLD AUTO: 2.8 % (ref 0–5)
ERYTHROCYTE [DISTWIDTH] IN BLOOD BY AUTOMATED COUNT: 13.7 % (ref 11.5–14.5)
GFR SERPL CREATININE-BSD FRML MDRD: 81 ML/MIN/1.73
GLOBULIN UR ELPH-MCNC: 2.5 GM/DL
GLUCOSE BLD-MCNC: 141 MG/DL (ref 70–110)
HCT VFR BLD AUTO: 39.5 % (ref 42–52)
HGB BLD-MCNC: 12.6 G/DL (ref 14–18)
IMM GRANULOCYTES # BLD: 0.03 10*3/MM3 (ref 0–0.03)
IMM GRANULOCYTES NFR BLD: 0.3 % (ref 0–0.5)
INR PPP: 0.99 (ref 0.9–1.1)
LYMPHOCYTES # BLD AUTO: 2.59 10*3/MM3 (ref 1–3)
LYMPHOCYTES NFR BLD AUTO: 24.1 % (ref 21–51)
MAGNESIUM SERPL-MCNC: 2.1 MG/DL (ref 1.7–2.6)
MCH RBC QN AUTO: 28.2 PG (ref 27–33)
MCHC RBC AUTO-ENTMCNC: 31.9 G/DL (ref 33–37)
MCV RBC AUTO: 88.4 FL (ref 80–94)
MONOCYTES # BLD AUTO: 1.15 10*3/MM3 (ref 0.1–0.9)
MONOCYTES NFR BLD AUTO: 10.7 % (ref 0–10)
MYOGLOBIN SERPL-MCNC: 23 NG/ML (ref 0–109)
NEUTROPHILS # BLD AUTO: 6.58 10*3/MM3 (ref 1.4–6.5)
NEUTROPHILS NFR BLD AUTO: 61.1 % (ref 30–70)
OSMOLALITY SERPL CALC.SUM OF ELEC: 280.1 MOSM/KG (ref 273–305)
PHOSPHATE SERPL-MCNC: 3.9 MG/DL (ref 2.7–4.5)
PLATELET # BLD AUTO: 306 10*3/MM3 (ref 130–400)
PMV BLD AUTO: 9.9 FL (ref 6–10)
POTASSIUM BLD-SCNC: 3.5 MMOL/L (ref 3.5–5.3)
PROT SERPL-MCNC: 6.8 G/DL (ref 6–8)
PROTHROMBIN TIME: 13.3 SECONDS (ref 11–15.4)
RBC # BLD AUTO: 4.47 10*6/MM3 (ref 4.7–6.1)
SODIUM BLD-SCNC: 140 MMOL/L (ref 135–153)
TROPONIN I SERPL-MCNC: 0.01 NG/ML
WBC NRBC COR # BLD: 10.76 10*3/MM3 (ref 4.5–12.5)

## 2018-06-28 PROCEDURE — 85610 PROTHROMBIN TIME: CPT | Performed by: FAMILY MEDICINE

## 2018-06-28 PROCEDURE — 82550 ASSAY OF CK (CPK): CPT | Performed by: FAMILY MEDICINE

## 2018-06-28 PROCEDURE — 93005 ELECTROCARDIOGRAM TRACING: CPT | Performed by: FAMILY MEDICINE

## 2018-06-28 PROCEDURE — 71045 X-RAY EXAM CHEST 1 VIEW: CPT | Performed by: RADIOLOGY

## 2018-06-28 PROCEDURE — 80053 COMPREHEN METABOLIC PANEL: CPT | Performed by: FAMILY MEDICINE

## 2018-06-28 PROCEDURE — 83874 ASSAY OF MYOGLOBIN: CPT | Performed by: FAMILY MEDICINE

## 2018-06-28 PROCEDURE — 99285 EMERGENCY DEPT VISIT HI MDM: CPT

## 2018-06-28 PROCEDURE — 85730 THROMBOPLASTIN TIME PARTIAL: CPT | Performed by: FAMILY MEDICINE

## 2018-06-28 PROCEDURE — 85025 COMPLETE CBC W/AUTO DIFF WBC: CPT | Performed by: FAMILY MEDICINE

## 2018-06-28 PROCEDURE — 83735 ASSAY OF MAGNESIUM: CPT | Performed by: FAMILY MEDICINE

## 2018-06-28 PROCEDURE — 83605 ASSAY OF LACTIC ACID: CPT | Performed by: FAMILY MEDICINE

## 2018-06-28 PROCEDURE — 84484 ASSAY OF TROPONIN QUANT: CPT | Performed by: FAMILY MEDICINE

## 2018-06-28 PROCEDURE — 71045 X-RAY EXAM CHEST 1 VIEW: CPT

## 2018-06-28 PROCEDURE — 84100 ASSAY OF PHOSPHORUS: CPT | Performed by: FAMILY MEDICINE

## 2018-06-28 PROCEDURE — 93010 ELECTROCARDIOGRAM REPORT: CPT | Performed by: INTERNAL MEDICINE

## 2018-06-28 PROCEDURE — 96374 THER/PROPH/DIAG INJ IV PUSH: CPT

## 2018-06-28 RX ORDER — SODIUM CHLORIDE 0.9 % (FLUSH) 0.9 %
10 SYRINGE (ML) INJECTION AS NEEDED
Status: DISCONTINUED | OUTPATIENT
Start: 2018-06-28 | End: 2018-06-29 | Stop reason: HOSPADM

## 2018-06-28 RX ORDER — ASPIRIN 81 MG/1
324 TABLET, CHEWABLE ORAL ONCE
Status: DISCONTINUED | OUTPATIENT
Start: 2018-06-28 | End: 2018-06-28

## 2018-06-29 ENCOUNTER — TRANSCRIBE ORDERS (OUTPATIENT)
Dept: ADMINISTRATIVE | Facility: HOSPITAL | Age: 43
End: 2018-06-29

## 2018-06-29 VITALS
SYSTOLIC BLOOD PRESSURE: 135 MMHG | HEIGHT: 74 IN | WEIGHT: 217 LBS | OXYGEN SATURATION: 98 % | DIASTOLIC BLOOD PRESSURE: 101 MMHG | RESPIRATION RATE: 19 BRPM | TEMPERATURE: 98.6 F | HEART RATE: 87 BPM | BODY MASS INDEX: 27.85 KG/M2

## 2018-06-29 DIAGNOSIS — R07.9 CHEST PAIN, UNSPECIFIED TYPE: Primary | ICD-10-CM

## 2018-06-29 LAB — TROPONIN I SERPL-MCNC: 0.01 NG/ML

## 2018-06-29 PROCEDURE — 25010000002 HYDRALAZINE PER 20 MG: Performed by: FAMILY MEDICINE

## 2018-06-29 PROCEDURE — 84484 ASSAY OF TROPONIN QUANT: CPT | Performed by: FAMILY MEDICINE

## 2018-06-29 RX ORDER — HYDRALAZINE HYDROCHLORIDE 20 MG/ML
5 INJECTION INTRAMUSCULAR; INTRAVENOUS ONCE
Status: COMPLETED | OUTPATIENT
Start: 2018-06-29 | End: 2018-06-29

## 2018-06-29 RX ADMIN — HYDRALAZINE HYDROCHLORIDE 5 MG: 20 INJECTION INTRAMUSCULAR; INTRAVENOUS at 00:29

## 2018-06-29 RX ADMIN — SODIUM CHLORIDE 1000 ML: 9 INJECTION, SOLUTION INTRAVENOUS at 00:49

## 2018-08-18 ENCOUNTER — HOSPITAL ENCOUNTER (EMERGENCY)
Facility: HOSPITAL | Age: 43
Discharge: HOME | End: 2018-08-18
Attending: EMERGENCY MEDICINE | Admitting: EMERGENCY MEDICINE

## 2018-08-18 ENCOUNTER — APPOINTMENT (OUTPATIENT)
Dept: CT IMAGING | Facility: HOSPITAL | Age: 43
End: 2018-08-18

## 2018-08-18 ENCOUNTER — APPOINTMENT (OUTPATIENT)
Dept: GENERAL RADIOLOGY | Facility: HOSPITAL | Age: 43
End: 2018-08-18

## 2018-08-18 ENCOUNTER — HOSPITAL ENCOUNTER (INPATIENT)
Facility: HOSPITAL | Age: 43
LOS: 4 days | Discharge: HOME | End: 2018-08-22
Attending: PSYCHIATRY & NEUROLOGY | Admitting: PSYCHIATRY & NEUROLOGY

## 2018-08-18 VITALS
WEIGHT: 192 LBS | RESPIRATION RATE: 16 BRPM | HEART RATE: 86 BPM | TEMPERATURE: 99.8 F | BODY MASS INDEX: 24.64 KG/M2 | SYSTOLIC BLOOD PRESSURE: 131 MMHG | DIASTOLIC BLOOD PRESSURE: 95 MMHG | HEIGHT: 74 IN | OXYGEN SATURATION: 96 %

## 2018-08-18 DIAGNOSIS — G40.909 SEIZURE DISORDER (HCC): Primary | ICD-10-CM

## 2018-08-18 PROBLEM — F29 PSYCHOSIS (HCC): Status: ACTIVE | Noted: 2018-08-18

## 2018-08-18 LAB
6-ACETYL MORPHINE: NEGATIVE
ALBUMIN SERPL-MCNC: 4.6 G/DL (ref 3.5–5)
ALBUMIN/GLOB SERPL: 1.6 G/DL (ref 1.5–2.5)
ALP SERPL-CCNC: 98 U/L (ref 40–129)
ALT SERPL W P-5'-P-CCNC: 22 U/L (ref 10–44)
AMPHET+METHAMPHET UR QL: NEGATIVE
ANION GAP SERPL CALCULATED.3IONS-SCNC: 4.2 MMOL/L (ref 3.6–11.2)
AST SERPL-CCNC: 27 U/L (ref 10–34)
BARBITURATES UR QL SCN: NEGATIVE
BASOPHILS # BLD AUTO: 0.08 10*3/MM3 (ref 0–0.3)
BASOPHILS NFR BLD AUTO: 0.6 % (ref 0–2)
BENZODIAZ UR QL SCN: NEGATIVE
BILIRUB SERPL-MCNC: 0.4 MG/DL (ref 0.2–1.8)
BILIRUB UR QL STRIP: NEGATIVE
BUN BLD-MCNC: 8 MG/DL (ref 7–21)
BUN/CREAT SERPL: 8.4 (ref 7–25)
BUPRENORPHINE SERPL-MCNC: NEGATIVE NG/ML
CALCIUM SPEC-SCNC: 9.4 MG/DL (ref 7.7–10)
CANNABINOIDS SERPL QL: NEGATIVE
CHLORIDE SERPL-SCNC: 112 MMOL/L (ref 99–112)
CLARITY UR: CLEAR
CO2 SERPL-SCNC: 21.8 MMOL/L (ref 24.3–31.9)
COCAINE UR QL: NEGATIVE
COLOR UR: YELLOW
CREAT BLD-MCNC: 0.95 MG/DL (ref 0.43–1.29)
DEPRECATED RDW RBC AUTO: 42.9 FL (ref 37–54)
EOSINOPHIL # BLD AUTO: 0.22 10*3/MM3 (ref 0–0.7)
EOSINOPHIL NFR BLD AUTO: 1.6 % (ref 0–5)
ERYTHROCYTE [DISTWIDTH] IN BLOOD BY AUTOMATED COUNT: 13.4 % (ref 11.5–14.5)
ETHANOL BLD-MCNC: <10 MG/DL
ETHANOL UR QL: <0.01 %
GFR SERPL CREATININE-BSD FRML MDRD: 86 ML/MIN/1.73
GLOBULIN UR ELPH-MCNC: 2.9 GM/DL
GLUCOSE BLD-MCNC: 118 MG/DL (ref 70–110)
GLUCOSE UR STRIP-MCNC: NEGATIVE MG/DL
HCT VFR BLD AUTO: 45.5 % (ref 42–52)
HGB BLD-MCNC: 15 G/DL (ref 14–18)
HGB UR QL STRIP.AUTO: NEGATIVE
IMM GRANULOCYTES # BLD: 0.04 10*3/MM3 (ref 0–0.03)
IMM GRANULOCYTES NFR BLD: 0.3 % (ref 0–0.5)
KETONES UR QL STRIP: NEGATIVE
LEUKOCYTE ESTERASE UR QL STRIP.AUTO: NEGATIVE
LYMPHOCYTES # BLD AUTO: 2.38 10*3/MM3 (ref 1–3)
LYMPHOCYTES NFR BLD AUTO: 17 % (ref 21–51)
MCH RBC QN AUTO: 28.8 PG (ref 27–33)
MCHC RBC AUTO-ENTMCNC: 33 G/DL (ref 33–37)
MCV RBC AUTO: 87.3 FL (ref 80–94)
METHADONE UR QL SCN: NEGATIVE
MONOCYTES # BLD AUTO: 0.97 10*3/MM3 (ref 0.1–0.9)
MONOCYTES NFR BLD AUTO: 6.9 % (ref 0–10)
NEUTROPHILS # BLD AUTO: 10.35 10*3/MM3 (ref 1.4–6.5)
NEUTROPHILS NFR BLD AUTO: 73.6 % (ref 30–70)
NITRITE UR QL STRIP: NEGATIVE
OPIATES UR QL: NEGATIVE
OSMOLALITY SERPL CALC.SUM OF ELEC: 275.1 MOSM/KG (ref 273–305)
OXYCODONE UR QL SCN: NEGATIVE
PCP UR QL SCN: NEGATIVE
PH UR STRIP.AUTO: 6.5 [PH] (ref 5–8)
PLATELET # BLD AUTO: 302 10*3/MM3 (ref 130–400)
PMV BLD AUTO: 10.5 FL (ref 6–10)
POTASSIUM BLD-SCNC: 3.7 MMOL/L (ref 3.5–5.3)
PROT SERPL-MCNC: 7.5 G/DL (ref 6–8)
PROT UR QL STRIP: NEGATIVE
RBC # BLD AUTO: 5.21 10*6/MM3 (ref 4.7–6.1)
SODIUM BLD-SCNC: 138 MMOL/L (ref 135–153)
SP GR UR STRIP: 1.01 (ref 1–1.03)
TROPONIN I SERPL-MCNC: 0.02 NG/ML
TROPONIN I SERPL-MCNC: 0.02 NG/ML
UROBILINOGEN UR QL STRIP: NORMAL
WBC NRBC COR # BLD: 14.04 10*3/MM3 (ref 4.5–12.5)

## 2018-08-18 PROCEDURE — 80177 DRUG SCRN QUAN LEVETIRACETAM: CPT | Performed by: EMERGENCY MEDICINE

## 2018-08-18 PROCEDURE — 84484 ASSAY OF TROPONIN QUANT: CPT | Performed by: EMERGENCY MEDICINE

## 2018-08-18 PROCEDURE — 80307 DRUG TEST PRSMV CHEM ANLYZR: CPT | Performed by: EMERGENCY MEDICINE

## 2018-08-18 PROCEDURE — 80307 DRUG TEST PRSMV CHEM ANLYZR: CPT | Performed by: PHYSICIAN ASSISTANT

## 2018-08-18 PROCEDURE — 25010000002 MORPHINE PER 10 MG: Performed by: EMERGENCY MEDICINE

## 2018-08-18 PROCEDURE — 25010000002 ZIPRASIDONE MESYLATE PER 10 MG: Performed by: EMERGENCY MEDICINE

## 2018-08-18 PROCEDURE — 80053 COMPREHEN METABOLIC PANEL: CPT | Performed by: EMERGENCY MEDICINE

## 2018-08-18 PROCEDURE — 96374 THER/PROPH/DIAG INJ IV PUSH: CPT

## 2018-08-18 PROCEDURE — 96375 TX/PRO/DX INJ NEW DRUG ADDON: CPT

## 2018-08-18 PROCEDURE — 81003 URINALYSIS AUTO W/O SCOPE: CPT | Performed by: EMERGENCY MEDICINE

## 2018-08-18 PROCEDURE — 25010000002 LORAZEPAM PER 2 MG: Performed by: EMERGENCY MEDICINE

## 2018-08-18 PROCEDURE — 93005 ELECTROCARDIOGRAM TRACING: CPT | Performed by: PSYCHIATRY & NEUROLOGY

## 2018-08-18 PROCEDURE — 99285 EMERGENCY DEPT VISIT HI MDM: CPT

## 2018-08-18 PROCEDURE — 71045 X-RAY EXAM CHEST 1 VIEW: CPT | Performed by: RADIOLOGY

## 2018-08-18 PROCEDURE — 93005 ELECTROCARDIOGRAM TRACING: CPT | Performed by: EMERGENCY MEDICINE

## 2018-08-18 PROCEDURE — 25010000002 LEVETRIRACETAM PER 10 MG: Performed by: EMERGENCY MEDICINE

## 2018-08-18 PROCEDURE — 71045 X-RAY EXAM CHEST 1 VIEW: CPT

## 2018-08-18 PROCEDURE — 96372 THER/PROPH/DIAG INJ SC/IM: CPT

## 2018-08-18 PROCEDURE — 70450 CT HEAD/BRAIN W/O DYE: CPT | Performed by: RADIOLOGY

## 2018-08-18 PROCEDURE — 99223 1ST HOSP IP/OBS HIGH 75: CPT | Performed by: PSYCHIATRY & NEUROLOGY

## 2018-08-18 PROCEDURE — 96361 HYDRATE IV INFUSION ADD-ON: CPT

## 2018-08-18 PROCEDURE — 85025 COMPLETE CBC W/AUTO DIFF WBC: CPT | Performed by: EMERGENCY MEDICINE

## 2018-08-18 PROCEDURE — 70450 CT HEAD/BRAIN W/O DYE: CPT

## 2018-08-18 PROCEDURE — 25010000002 ONDANSETRON PER 1 MG: Performed by: EMERGENCY MEDICINE

## 2018-08-18 RX ORDER — HYDROCODONE BITARTRATE AND ACETAMINOPHEN 10; 325 MG/1; MG/1
1 TABLET ORAL EVERY 8 HOURS PRN
Status: CANCELLED | OUTPATIENT
Start: 2018-08-18

## 2018-08-18 RX ORDER — HYDROCODONE BITARTRATE AND ACETAMINOPHEN 10; 325 MG/1; MG/1
1 TABLET ORAL EVERY 8 HOURS PRN
COMMUNITY

## 2018-08-18 RX ORDER — LEVETIRACETAM 500 MG/1
1000 TABLET ORAL EVERY 12 HOURS SCHEDULED
Status: DISCONTINUED | OUTPATIENT
Start: 2018-08-18 | End: 2018-08-22 | Stop reason: HOSPADM

## 2018-08-18 RX ORDER — ONDANSETRON 4 MG/1
4 TABLET, FILM COATED ORAL EVERY 6 HOURS PRN
Status: DISCONTINUED | OUTPATIENT
Start: 2018-08-18 | End: 2018-08-22 | Stop reason: HOSPADM

## 2018-08-18 RX ORDER — BENZONATATE 100 MG/1
100 CAPSULE ORAL 3 TIMES DAILY PRN
Status: DISCONTINUED | OUTPATIENT
Start: 2018-08-18 | End: 2018-08-22 | Stop reason: HOSPADM

## 2018-08-18 RX ORDER — OLANZAPINE 10 MG/1
10 TABLET, ORALLY DISINTEGRATING ORAL NIGHTLY
Status: DISCONTINUED | OUTPATIENT
Start: 2018-08-18 | End: 2018-08-22 | Stop reason: HOSPADM

## 2018-08-18 RX ORDER — HYDROXYZINE 50 MG/1
50 TABLET, FILM COATED ORAL EVERY 4 HOURS PRN
Status: DISCONTINUED | OUTPATIENT
Start: 2018-08-18 | End: 2018-08-22 | Stop reason: HOSPADM

## 2018-08-18 RX ORDER — SODIUM CHLORIDE 9 MG/ML
125 INJECTION, SOLUTION INTRAVENOUS CONTINUOUS
Status: DISCONTINUED | OUTPATIENT
Start: 2018-08-18 | End: 2018-08-18 | Stop reason: HOSPADM

## 2018-08-18 RX ORDER — ECHINACEA PURPUREA EXTRACT 125 MG
2 TABLET ORAL AS NEEDED
Status: DISCONTINUED | OUTPATIENT
Start: 2018-08-18 | End: 2018-08-22 | Stop reason: HOSPADM

## 2018-08-18 RX ORDER — NICOTINE 21 MG/24HR
1 PATCH, TRANSDERMAL 24 HOURS TRANSDERMAL DAILY
Status: DISCONTINUED | OUTPATIENT
Start: 2018-08-18 | End: 2018-08-22 | Stop reason: HOSPADM

## 2018-08-18 RX ORDER — BENZTROPINE MESYLATE 1 MG/1
1 TABLET ORAL DAILY PRN
Status: DISCONTINUED | OUTPATIENT
Start: 2018-08-18 | End: 2018-08-22 | Stop reason: HOSPADM

## 2018-08-18 RX ORDER — THIAMINE HCL 50 MG
100 TABLET ORAL DAILY
Status: DISCONTINUED | OUTPATIENT
Start: 2018-08-18 | End: 2018-08-22 | Stop reason: HOSPADM

## 2018-08-18 RX ORDER — QUETIAPINE FUMARATE 50 MG/1
50 TABLET, FILM COATED ORAL NIGHTLY
COMMUNITY
End: 2018-08-22 | Stop reason: HOSPADM

## 2018-08-18 RX ORDER — TRAZODONE HYDROCHLORIDE 50 MG/1
50 TABLET ORAL NIGHTLY PRN
Status: DISCONTINUED | OUTPATIENT
Start: 2018-08-18 | End: 2018-08-22 | Stop reason: HOSPADM

## 2018-08-18 RX ORDER — LOXAPINE SUCCINATE 25 MG/1
75 TABLET ORAL NIGHTLY
Status: CANCELLED | OUTPATIENT
Start: 2018-08-18

## 2018-08-18 RX ORDER — QUETIAPINE FUMARATE 25 MG/1
50 TABLET, FILM COATED ORAL NIGHTLY
Status: CANCELLED | OUTPATIENT
Start: 2018-08-18

## 2018-08-18 RX ORDER — BENZTROPINE MESYLATE 1 MG/ML
0.5 INJECTION INTRAMUSCULAR; INTRAVENOUS DAILY PRN
Status: DISCONTINUED | OUTPATIENT
Start: 2018-08-18 | End: 2018-08-22 | Stop reason: HOSPADM

## 2018-08-18 RX ORDER — LORAZEPAM 2 MG/ML
1 INJECTION INTRAMUSCULAR ONCE
Status: COMPLETED | OUTPATIENT
Start: 2018-08-18 | End: 2018-08-18

## 2018-08-18 RX ORDER — FAMOTIDINE 20 MG/1
20 TABLET, FILM COATED ORAL 2 TIMES DAILY PRN
Status: DISCONTINUED | OUTPATIENT
Start: 2018-08-18 | End: 2018-08-22 | Stop reason: HOSPADM

## 2018-08-18 RX ORDER — IBUPROFEN 600 MG/1
600 TABLET ORAL EVERY 6 HOURS PRN
Status: DISCONTINUED | OUTPATIENT
Start: 2018-08-18 | End: 2018-08-22 | Stop reason: HOSPADM

## 2018-08-18 RX ORDER — ONDANSETRON 2 MG/ML
4 INJECTION INTRAMUSCULAR; INTRAVENOUS ONCE
Status: COMPLETED | OUTPATIENT
Start: 2018-08-18 | End: 2018-08-18

## 2018-08-18 RX ORDER — ALUMINA, MAGNESIA, AND SIMETHICONE 2400; 2400; 240 MG/30ML; MG/30ML; MG/30ML
15 SUSPENSION ORAL EVERY 6 HOURS PRN
Status: DISCONTINUED | OUTPATIENT
Start: 2018-08-18 | End: 2018-08-22 | Stop reason: HOSPADM

## 2018-08-18 RX ADMIN — SODIUM CHLORIDE 1000 ML: 9 INJECTION, SOLUTION INTRAVENOUS at 03:16

## 2018-08-18 RX ADMIN — HYDROXYZINE HYDROCHLORIDE 50 MG: 50 TABLET, FILM COATED ORAL at 16:44

## 2018-08-18 RX ADMIN — LORAZEPAM 1 MG: 2 INJECTION INTRAMUSCULAR; INTRAVENOUS at 03:16

## 2018-08-18 RX ADMIN — SODIUM CHLORIDE 1000 MG: 9 INJECTION, SOLUTION INTRAVENOUS at 03:18

## 2018-08-18 RX ADMIN — WATER 20 MG: 1 INJECTION INTRAMUSCULAR; INTRAVENOUS; SUBCUTANEOUS at 05:04

## 2018-08-18 RX ADMIN — MORPHINE SULFATE 4 MG: 4 INJECTION, SOLUTION INTRAMUSCULAR; INTRAVENOUS at 03:45

## 2018-08-18 RX ADMIN — LEVETIRACETAM 1000 MG: 500 TABLET, FILM COATED ORAL at 15:23

## 2018-08-18 RX ADMIN — APIXABAN 5 MG: 5 TABLET, FILM COATED ORAL at 15:23

## 2018-08-18 RX ADMIN — ONDANSETRON 4 MG: 2 INJECTION, SOLUTION INTRAMUSCULAR; INTRAVENOUS at 03:43

## 2018-08-18 RX ADMIN — THIAMINE HCL (VITAMIN B1) 50 MG TABLET 100 MG: at 12:10

## 2018-08-18 RX ADMIN — NICOTINE 1 PATCH: 21 PATCH TRANSDERMAL at 12:10

## 2018-08-18 RX ADMIN — SODIUM CHLORIDE 125 ML/HR: 9 INJECTION, SOLUTION INTRAVENOUS at 04:50

## 2018-08-18 NOTE — NURSING NOTE
Brother present in waiting room. Updated on pt status. Instructed him that staff will keep him updated on pt progress.

## 2018-08-18 NOTE — H&P
"INITIAL PSYCHIATRIC HISTORY & PHYSICAL    Patient Identification:  Name:   Bahman Banks  Age:   43 y.o.  Sex:   male  :   1975  MRN:   [id number]  Visit Number:   [id number]  Primary Care Physician:   Edith Hurtado APRN    SUBJECTIVE    CC: Psychosis, Auditory and visual hallucinations    HPI: Bahman Banks is a 43 y.o. male who was admitted on 2018 with complaints of psychosis, auditory and visual hallucinations.  Patient presents to [facility] stating that he is messed up in the head, he has been seeing shadows and feeling bugs crawling on him for 2 weeks.  Patient reports that he has been out of his psychotic meds for the past month.  Patient currently denies any suicidal ideation but says that he is just at the point that he does not care if he lives or dies.  When asked if he felt if he was in danger of hurting himself at this time he said, \"maybe.\" When asked to rate his depression, he states that it is a 5/10 and a lot of times it is a 10/10 with 10 being the worst. Patient reports that his current stressors are being out of his medicines, trying to raise his daughter and just life in general. Patient has a history of a psychiatric admission he states in [place], years ago. Reports, a history of a nervous breakdown but patient will not elaborate and is vague with answering assessment questions. Patient has been recently seeing Dr. Hurtado in [place], [place] that had been prescribing his psychotic medicines but he states she wont write them anymore. Patient denies any history of sexual or physical abuse. Patient reports that he has a history of head injuries with the last one being 25 years ago. Patient has a medical history of MI, strokes, a pacemaker in , atrial fibrillation, CAD, hyperlipidemia, and hypertension. Patient has been admitted to the Psychiatry Unit for further safety and stabilization.     PAST PSYCHIATRIC HX: Patient states that he has other psychiatric " "admissions in [place]. He has a history of a nervous breakdown, 12 years ago and reports a h/o diagnosis of \" Bipolar disorder, schizophrenia, ADHD, and PTSD.\"   Previously helpful psy meds include loxapine 75 Daily.      SUBSTANCE USE HX: UDS was negative on initial intake. Patient denies any substance or illicit drug use. Denies any alcohol abuse.     SOCIAL HX: Patient was born and raised in [place], [place]. He currently resides in [place], [place] with his wife and 13 year old daughter. He has been  for 18 years. He has no contact with his parents. Patient has an 8th grade education and draws disability.     Past Medical History:   Diagnosis Date   • Anxiety    • Aortic stenosis    • Atrial fibrillation (CMS/HCC)    • Bipolar disorder (CMS/HCC)    • CAD (coronary artery disease)    • Chronic back pain    • Hallucinations    • Heart attack    • Hyperlipidemia    • Hypertension    • Injury of back    • Nervous breakdown     12 years ago   • Seizure (CMS/HCC)    • Stroke (CMS/HCC)    • Stroke (CMS/HCC)     affected speech, vision, right side weakness   • Tobacco abuse        Past Surgical History:   Procedure Laterality Date   • AORTIC VALVE REPAIR/REPLACEMENT  2016    tissue valve, performed in [place]   • AORTIC VALVE REPAIR/REPLACEMENT MITRAL VALVE REPAIR/REPLACEMENT     • APPENDECTOMY     • CARDIAC CATHETERIZATION     • CHOLECYSTECTOMY     • HERNIA REPAIR      umbilical   • HIATAL HERNIA REPAIR     • KNEE ARTHROSCOPY     • PACEMAKER IMPLANTATION     • PACEMAKER IMPLANTATION Left 2014 2014 OR 2015.   [employer]. interrogated 3/9/18   • PATELLAR RECONSTRUCTION     • QUADRICEPS REPAIR     • SHOULDER ARTHROSCOPY     • SHOULDER CLOSED REDUCTION Left 3/9/2018    Procedure: LEFT SHOULDER CLOSED REDUCTION;  Surgeon: Steven Wang Jr., MD;  Location: [facility];  Service: Orthopedics       Family History   Problem Relation Age of Onset   • No Known Problems Mother    • No Known Problems Father  "         Prescriptions Prior to Admission   Medication Sig Dispense Refill Last Dose   • apixaban (ELIQUIS) 5 MG tablet tablet Take 1 tablet by mouth 2 (Two) Times a Day. 60 tablet 0 6/10/2018 at Unknown time   • cephalexin (KEFLEX) 500 MG capsule Take 1 capsule by mouth 2 (Two) Times a Day. 20 capsule 0    • HYDROcodone-acetaminophen (NORCO) 5-325 MG per tablet Take 1 tablet by mouth Every 8 (Eight) Hours As Needed for Moderate Pain . 8 tablet 0    • levETIRAcetam (KEPPRA) 1000 MG tablet Take 1,000 mg by mouth 2 (Two) Times a Day.   6/11/2018 at 0500   • loxapine (LOXITANE) 25 MG capsule Take 75 mg by mouth Every Night.   6/10/2018 at Unknown time       Reviewed available past medical and psychiatric records.    ALLERGIES:  Bee venom; Celexa [citalopram]; Fentanyl; Fentanyl; Haldol [haloperidol lactate]; Haldol [haloperidol]; Nitroglycerin; Risperdal [risperidone]; Risperidone and related; and Celexa [citalopram hydrobromide]    Temp:  [96.8 °F (36 °C)-99.8 °F (37.7 °C)] 96.8 °F (36 °C)  Heart Rate:  [79-88] 79  Resp:  [16-18] 18  BP: (105-154)/() 105/66    REVIEW OF SYSTEMS:  Review of Systems   Constitutional: Negative.    HENT: Negative.    Eyes: Negative.    Respiratory: Negative.    Cardiovascular: Negative.    Gastrointestinal: Negative.    Endocrine: Negative.    Genitourinary: Negative.    Musculoskeletal: Negative.    Skin: Negative.    Allergic/Immunologic: Negative.    Neurological: Negative.    Hematological: Negative.    All other systems reviewed and are negative.     See HPI for psychiatric ROS  OBJECTIVE    PHYSICAL EXAM:  Physical Exam   Constitutional: He is oriented to person, place, and time. He appears well-developed and well-nourished.   Multiple tatoos   HENT:   Head: Normocephalic and atraumatic.   Right Ear: External ear normal.   Nose: Nose normal.   Eyes: Pupils are equal, round, and reactive to light. Conjunctivae and EOM are normal. Right eye exhibits no discharge. Left eye  exhibits no discharge. No scleral icterus.   Neck: No JVD present. No tracheal deviation present. No thyromegaly present.   Cardiovascular: Normal rate, regular rhythm and normal heart sounds.  Exam reveals no gallop and no friction rub.    No murmur heard.  Pulmonary/Chest: Effort normal and breath sounds normal. No stridor. No respiratory distress. He has no wheezes. He has no rales.   Abdominal: Soft. Bowel sounds are normal. He exhibits no distension and no mass. There is no tenderness. There is no rebound and no guarding.   Musculoskeletal: Normal range of motion. He exhibits no edema, tenderness or deformity.   Lymphadenopathy:     He has no cervical adenopathy.   Neurological: He is alert and oriented to person, place, and time. He displays normal reflexes. No cranial nerve deficit. He exhibits normal muscle tone.   Skin: Skin is warm and dry. No rash noted. No erythema.   Nursing note and vitals reviewed.      MENTAL STATUS EXAM:               Hygiene:   fair  Cooperation:  Evasive  Eye Contact:  Closed  Psychomotor Behavior:  Restless  Affect:  Euphoric  Hopelessness: 2  Speech:  Monotone  Thought Progress:  Disorganized  Thought Content:  Mood incongruent  Suicidal:  Suicidal Ideation  Homicidal:  None  Hallucinations:  Auditory and Visual  Delusion:  Paranoid  Memory:  Deficits  Orientation:  Person, Place, Time and Situation  Reliability:  fair  Insight:  Fair  Judgement:  Fair  Impulse Control:  Fair  Physical/Medical Issues:  No       Imaging Results (last 24 hours)     ** No results found for the last 24 hours. **           ECG/EMG Results (most recent)     None           Lab Results   Component Value Date    GLUCOSE 118 (H) 08/18/2018    BUN 8 08/18/2018    CREATININE 0.95 08/18/2018    EGFRIFNONA 86 08/18/2018    BCR 8.4 08/18/2018    CO2 21.8 (L) 08/18/2018    CALCIUM 9.4 08/18/2018    ALBUMIN 4.60 08/18/2018    AST 27 08/18/2018    ALT 22 08/18/2018       Lab Results   Component Value Date    WBC  "14.04 (H) 08/18/2018    HGB 15.0 08/18/2018    HCT 45.5 08/18/2018    MCV 87.3 08/18/2018     08/18/2018       Pain Management Panel     Pain Management Panel Latest Ref Rng & Units 8/18/2018 6/11/2018    AMPHETAMINES SCREEN, URINE Negative Negative Negative    BARBITURATES SCREEN Negative Negative Negative    BENZODIAZEPINE SCREEN, URINE Negative Negative Negative    BUPRENORPHINE Negative Negative Negative    COCAINE SCREEN, URINE Negative Negative Negative    METHADONE SCREEN, URINE Negative Negative Negative    METHAMPHETAMINE UR Negative - -          Brief Urine Lab Results  (Last result in the past 365 days)      Color   Clarity   Blood   Leuk Est   Nitrite   Protein   CREAT   Urine HCG        08/18/18 0454 Yellow Clear Negative Negative Negative Negative               Reviewed labs and studies done with this admission.       ASSESSMENT & PLAN:  Principal Problem:    Psychosis      The patient has been admitted for safety and stabilization.  Patient will be monitored for suicidality daily and maintained on Suicide precaution Level 3 (q15 min checks) .  The patient will have individual and group therapy with a master's level therapist. A master treatment plan will be developed and agreed upon by the patient and his/her treatment team.  The patient's estimated length of stay in the hospital is 5-7 days.        -  Ideally we would start loxapine which has been most helpful for the patient in the past but we don't have that on our formulary.  We will start a trial of Stelazine as the patient is reporting a high degree of anxiety in combination with his psychosis.    Abnormal EKG    - EKG shows \"Atrial-sensed ventricular-paced rhythm\".   Patient is completely asymptomatic at this time.  Will consult cardiology for an opinion on the following question: \"Is any intervention or followup required?\".      Hypertension     - Continue lisinopril and norvasc      CAD (coronary artery disease)      - lisinopril, " norvasc.           Hyperlipidemia       - Continue lipitor      This note was generated using a scribe, CORDELL Crespo.  The work documented in this note was completed, reviewed, and approved by the attending psychiatrist as designated Dr. ADILIA vo.

## 2018-08-18 NOTE — ED PROVIDER NOTES
Subjective     History provided by:  Spouse  Seizures   Seizure activity on arrival: no    Seizure type: generalized.  Preceding symptoms: aura    Initial focality:  None  Episode characteristics: abnormal movements, apnea, combativeness, confusion, disorientation, generalized shaking and unresponsiveness    Episode characteristics: no incontinence and no tongue biting    Postictal symptoms: confusion, memory loss and somnolence    Timing:  Once  Context: medical non-compliance    Context comment:  Wife states missed both Keppra doses today  Recent head injury:  No recent head injuries  PTA treatment:  None  History of seizures: yes    Current therapy:  Levetiracetam      Review of Systems   Unable to perform ROS: Other (postictal)   Neurological: Positive for seizures.       Past Medical History:   Diagnosis Date   • Anxiety    • Aortic stenosis    • Atrial fibrillation (CMS/HCC)    • CAD (coronary artery disease)    • Chronic back pain    • Hallucinations    • Heart attack    • Hyperlipidemia    • Hypertension    • Injury of back    • Nervous breakdown     12 years ago   • Seizure (CMS/HCC)    • Stroke (CMS/HCC)    • Stroke (CMS/HCC)     affected speech, vision, right side weakness   • Tobacco abuse        Allergies   Allergen Reactions   • Bee Venom Anaphylaxis   • Celexa [Citalopram] Shortness Of Breath   • Fentanyl      Pt states that he stops breathing   • Fentanyl Shortness Of Breath   • Haldol [Haloperidol Lactate] Anaphylaxis   • Haldol [Haloperidol] Shortness Of Breath   • Nitroglycerin Anaphylaxis   • Risperdal [Risperidone] Shortness Of Breath   • Risperidone And Related Anaphylaxis   • Celexa [Citalopram Hydrobromide] Other (See Comments)     Pt states that it causes him to sweat and a severe headache.        Past Surgical History:   Procedure Laterality Date   • AORTIC VALVE REPAIR/REPLACEMENT  2016    tissue valve, performed in [place]   • AORTIC VALVE REPAIR/REPLACEMENT MITRAL VALVE  REPAIR/REPLACEMENT     • APPENDECTOMY     • CARDIAC CATHETERIZATION     • CHOLECYSTECTOMY     • HERNIA REPAIR      umbilical   • HIATAL HERNIA REPAIR     • KNEE ARTHROSCOPY     • PACEMAKER IMPLANTATION     • PACEMAKER IMPLANTATION Left 2014 2014 OR 2015.   [employer]. interrogated 3/9/18   • PATELLAR RECONSTRUCTION     • QUADRICEPS REPAIR     • SHOULDER ARTHROSCOPY     • SHOULDER CLOSED REDUCTION Left 3/9/2018    Procedure: LEFT SHOULDER CLOSED REDUCTION;  Surgeon: Steven Wang Jr., MD;  Location: [facility];  Service: Orthopedics       Family History   Problem Relation Age of Onset   • No Known Problems Mother    • No Known Problems Father        Social History     Social History   • Marital status:      Social History Main Topics   • Smoking status: Current Every Day Smoker     Packs/day: 0.50     Types: Cigarettes      Comment: 2-3 cigarettes   • Alcohol use No   • Drug use: No   • Sexual activity: Defer     Other Topics Concern   • Not on file     Social History Narrative    ** Merged History Encounter **         Retired , independentt of ADL, lives with wife           Objective   Physical Exam   Constitutional: He appears well-developed and well-nourished.   HENT:   Head: Normocephalic and atraumatic.   Eyes: Pupils are equal, round, and reactive to light. EOM are normal. No scleral icterus.   Neck: Normal range of motion. Neck supple. No neck rigidity. No tracheal deviation present.   Cardiovascular: Normal rate, regular rhythm and intact distal pulses.    Pulmonary/Chest: Effort normal and breath sounds normal. No respiratory distress. He exhibits no tenderness.   Abdominal: Soft. Bowel sounds are normal. There is no tenderness. There is no rebound and no guarding.   Musculoskeletal: Normal range of motion. He exhibits no tenderness.   Neurological: He has normal strength. He exhibits normal muscle tone. GCS eye subscore is 4. GCS verbal subscore is 5. GCS motor subscore is 6.    Confused, postictal   Skin: Skin is warm and dry. Capillary refill takes less than 2 seconds. He is not diaphoretic. No cyanosis. No pallor.   Psychiatric:   Confused, postictal   Nursing note and vitals reviewed.      Procedures         ED Course                  MDM      Final diagnoses:   Seizure disorder (CMS/HCC)             Please note that portions of this note were completed with a voice recognition program. Efforts were made to edit the dictations, but occasionally words are mistranscribed.       Sanchez Saavedra MD  08/18/18 0602

## 2018-08-18 NOTE — PLAN OF CARE
Problem: Patient Care Overview  Goal: Plan of Care Review  Outcome: Ongoing (interventions implemented as appropriate)  New admission to unit at 0750. See Professional   08/18/18 0840   Coping/Psychosocial   Plan of Care Reviewed With patient   Coping/Psychosocial   Patient Agreement with Plan of Care agrees   Plan of Care Review   Progress no change    Exchange Report for details.  Goal: Individualization and Mutuality  Outcome: Ongoing (interventions implemented as appropriate)    Goal: Discharge Needs Assessment  Outcome: Ongoing (interventions implemented as appropriate)    Goal: Interprofessional Rounds/Family Conf  Outcome: Ongoing (interventions implemented as appropriate)

## 2018-08-18 NOTE — ED NOTES
"Pt. Wife states that pt. \"feels like he is dying still\" and wants to see the Dr. TURNER notified.      Saba Tim RN  08/18/18 3116    "
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
## 2018-08-18 NOTE — ED NOTES
Pt. Wife states that  started having seizure like activity about 50 minutes ago. Pt. Wife states that he has a hx of seizures and is on medicine daily for them. Pt. States that he is now having chest pain. Md notified.      Saba Tim RN  08/18/18 0238

## 2018-08-18 NOTE — ED NOTES
Pt. Agitated, pulling cardiac monitor off, throwing lead into hallway. Pt. Wife states that pt. Cant talk right now but that he is agitated because his chest hurts. MD aware of pt. Chest pain.      Saba Tim RN  08/18/18 3837

## 2018-08-18 NOTE — NURSING NOTE
Patient noted to be very drowsy and unsteady. Moved pt to ED to place in a bed for safety. Notified ED provider.

## 2018-08-18 NOTE — ED NOTES
Pt. Wife states that pt thinks he is dying and wants to see the DR. MD notified.      Saba Tim RN  08/18/18 5917

## 2018-08-18 NOTE — PLAN OF CARE
Problem: Patient Care Overview  Goal: Plan of Care Review  Outcome: Ongoing (interventions implemented as appropriate)   08/18/18 1601   Coping/Psychosocial   Plan of Care Reviewed With patient   Coping/Psychosocial   Patient Agreement with Plan of Care agrees   Plan of Care Review   Progress no change   OTHER   Outcome Summary Initial assessment/follow-up with [facility]     Goal: Individualization and Mutuality  Outcome: Ongoing (interventions implemented as appropriate)   08/18/18 1553 08/18/18 1601   Individualization   Patient Specific Goals (Include Timeframe) --  Deny SI/HI/AVH. Verbalize agreement to crisis safety plan. Verbalize 3 positive coping skills   Patient Specific Interventions --  Individual and group sessions   Personal Strengths/Vulnerabilities   Patient Personal Strengths family/social support;expressive of needs;expressive of emotions;resourceful;resilient --    Patient Vulnerabilities Not taking psych meds, panic attacks --      Goal: Discharge Needs Assessment  Outcome: Ongoing (interventions implemented as appropriate)   08/18/18 1601   Discharge Needs Assessment   Readmission Within the Last 30 Days no previous admission in last 30 days   Concerns to be Addressed mental health   Patient/Family Anticipates Transition to home with family   Patient/Family Anticipated Services at Transition mental health services;outpatient care   Transportation Concerns car, none   Transportation Anticipated family or friend will provide   Patient's Choice of Community Agency(s) [facility]   Current Discharge Risk psychiatric illness   Discharge Coordination/Progress Patient will have adequate access to medications at discharge. Patient will likely follow up with [facility]. patient will return home with wife and daughter at discharge.   Discharge Needs Assessment,    Outpatient/Agency/Support Group Needs outpatient counseling;outpatient medication management;outpatient psychiatric care (specify)   Anticipated Discharge  "Disposition home or self-care     Goal: Interprofessional Rounds/Family Conf  Outcome: Ongoing (interventions implemented as appropriate)   08/18/18 1601   Interdisciplinary Rounds/Family Conf   Summary Initial assessment   Interdisciplinary Rounds/Family Conf   Participants patient;social work     D: Therapist met with patient on this day for the purpose of initial assessment, social history, integrated summary,  initial treatment planning,  initial crisis safety planning, and initial discharge planning.    Patient is a 43-year-old  male presenting ER reported feeling \"messed up in the head\".  Reports being out of his psychiatric medications at home and having visual hallucinations of bugs and things crawling on him.  Patient reports history of panic attacks.  He reports previous diagnosis of bipolar disorder, schizophrenia, PTSD, ADHD.  He reports multiple previous admissions at facilities in [place] and [place].  He reports currently living in [place], [place] with his wife and daughter.  He reports his doctor stopped prescribing his medications recently.  Patient was drowsy and evasive and would not open his eyes for assessment.  He answered multiple questions but was resistant to answer all assessment questions.  Patient was admitted for safety and stabilization.    A: Patient appeared drowsy and evasive.  He kept his eyes closed while answering questions.  He would not leave his bed.  He reported auditory or visual hallucinations    P: Treatment team will work to Stabilize patient's acute symptoms.  Patient will monitor 24/7 nursing staff and evaluated daily by a psychiatrist.  Therapist will offer individual sessions during hospitalization.  Therapist work with patient's family and treatment team to establish appropriate disposition plan.  Patient verbalized agreement to follow up with [facility] but refused to get out of bed to sign consent.      "

## 2018-08-18 NOTE — NURSING NOTE
Pt brought to intake by ED staff. ED staff reports that pt said that if he is being dc that he wants to talk to malika. Patient appears drowsy and aloof. Pt states that he is messed up in the head, out of his psych meds for over a month, hearing things, seeing shadows and feeling bugs crawling on him for two weeks now. He denies that he uses alcohol or drugs. He rates depression a 5/10 and a 10/10 at times. Patient denies any SI plan but says that he is just at the point that he does not care if he were to die and when asked if he felt he was in danger of hurting himself at this time he said maybe. Vague with response. He reports that his stressors are being out of meds, trying to raise his daughter and just life in general.

## 2018-08-18 NOTE — PLAN OF CARE
Problem: Patient Care Overview  Goal: Plan of Care Review  Outcome: Ongoing (interventions implemented as appropriate)  When patient was admitted this morning he was sedated and unable to complete assessment. Patient has been up to dayroom several times, is alert and oriented x 4 with clear coherent speech. Patient has improved greatly since admission and is cooperative with staff and interacting well with peers on the unit. Patient has a cardiology consult ordered for abnormal EKG and has an extensive cardiac history. Please see past history for detailed notes. Patient is on fall precaution due to seizure disorder and is known to be non compliant with home medications. Will continue to monitor.   08/18/18 1602   Coping/Psychosocial   Plan of Care Reviewed With patient   Coping/Psychosocial   Patient Agreement with Plan of Care agrees   Plan of Care Review   Progress improving     Goal: Individualization and Mutuality  Outcome: Ongoing (interventions implemented as appropriate)    Goal: Discharge Needs Assessment  Outcome: Ongoing (interventions implemented as appropriate)    Goal: Interprofessional Rounds/Family Conf  Outcome: Ongoing (interventions implemented as appropriate)      Problem: Overarching Goals (Adult)  Goal: Adheres to Safety Considerations for Self and Others  Outcome: Ongoing (interventions implemented as appropriate)    Goal: Optimized Coping Skills in Response to Life Stressors  Outcome: Ongoing (interventions implemented as appropriate)    Goal: Develops/Participates in Therapeutic [place] to Support Successful Transition  Outcome: Ongoing (interventions implemented as appropriate)

## 2018-08-19 PROCEDURE — 99232 SBSQ HOSP IP/OBS MODERATE 35: CPT | Performed by: PSYCHIATRY & NEUROLOGY

## 2018-08-19 RX ORDER — FLUOXETINE HYDROCHLORIDE 20 MG/1
20 CAPSULE ORAL DAILY
Status: DISCONTINUED | OUTPATIENT
Start: 2018-08-19 | End: 2018-08-22 | Stop reason: HOSPADM

## 2018-08-19 RX ORDER — ATORVASTATIN CALCIUM 20 MG/1
20 TABLET, FILM COATED ORAL NIGHTLY
Status: DISCONTINUED | OUTPATIENT
Start: 2018-08-19 | End: 2018-08-22 | Stop reason: HOSPADM

## 2018-08-19 RX ORDER — TRIFLUOPERAZINE HYDROCHLORIDE 1 MG/1
1 TABLET, FILM COATED ORAL EVERY 12 HOURS SCHEDULED
Status: DISCONTINUED | OUTPATIENT
Start: 2018-08-19 | End: 2018-08-22 | Stop reason: HOSPADM

## 2018-08-19 RX ORDER — QUETIAPINE FUMARATE 25 MG/1
50 TABLET, FILM COATED ORAL NIGHTLY
Status: CANCELLED | OUTPATIENT
Start: 2018-08-19

## 2018-08-19 RX ORDER — HYDROCODONE BITARTRATE AND ACETAMINOPHEN 10; 325 MG/1; MG/1
1 TABLET ORAL EVERY 8 HOURS PRN
Status: DISCONTINUED | OUTPATIENT
Start: 2018-08-19 | End: 2018-08-22 | Stop reason: HOSPADM

## 2018-08-19 RX ORDER — AMLODIPINE BESYLATE 10 MG/1
10 TABLET ORAL
Status: DISCONTINUED | OUTPATIENT
Start: 2018-08-19 | End: 2018-08-22 | Stop reason: HOSPADM

## 2018-08-19 RX ORDER — LISINOPRIL 10 MG/1
20 TABLET ORAL
Status: DISCONTINUED | OUTPATIENT
Start: 2018-08-19 | End: 2018-08-22 | Stop reason: HOSPADM

## 2018-08-19 RX ADMIN — FLUOXETINE 20 MG: 20 CAPSULE ORAL at 17:07

## 2018-08-19 RX ADMIN — LISINOPRIL 20 MG: 10 TABLET ORAL at 17:07

## 2018-08-19 RX ADMIN — TRIFLUOPERAZINE HYDROCHLORIDE 1 MG: 1 TABLET, FILM COATED ORAL at 20:19

## 2018-08-19 RX ADMIN — LEVETIRACETAM 1000 MG: 500 TABLET, FILM COATED ORAL at 20:19

## 2018-08-19 RX ADMIN — HYDROCODONE BITARTRATE AND ACETAMINOPHEN 1 TABLET: 10; 325 TABLET ORAL at 17:07

## 2018-08-19 RX ADMIN — HYDROXYZINE HYDROCHLORIDE 50 MG: 50 TABLET, FILM COATED ORAL at 17:10

## 2018-08-19 RX ADMIN — APIXABAN 5 MG: 5 TABLET, FILM COATED ORAL at 20:19

## 2018-08-19 RX ADMIN — ATORVASTATIN CALCIUM 20 MG: 20 TABLET, FILM COATED ORAL at 20:19

## 2018-08-19 RX ADMIN — OLANZAPINE 10 MG: 10 TABLET, ORALLY DISINTEGRATING ORAL at 20:19

## 2018-08-19 RX ADMIN — AMLODIPINE BESYLATE 10 MG: 10 TABLET ORAL at 17:07

## 2018-08-19 NOTE — NURSING NOTE
REPORTED TO DR. ADILIA BERNAL THAT THE CLIENT REFUSED MORNING MEDICINES. MARINA WHALEY IS ALSO AWARE.

## 2018-08-19 NOTE — NURSING NOTE
MARINA WHALEY REPORTS THAT THE CLIENT REFUSED ASSESSMENT; WILL REPORT MEDICINE AND ASSESSMENT REFUSAL TO DR. ADILIA BERNAL WHEN HE ARRIVES TO UNIT.

## 2018-08-19 NOTE — PLAN OF CARE
Problem: Patient Care Overview  Goal: Plan of Care Review  Outcome: Ongoing (interventions implemented as appropriate)   08/19/18 8642   Coping/Psychosocial   Plan of Care Reviewed With patient   Coping/Psychosocial   Patient Agreement with Plan of Care agrees   OTHER   Outcome Summary New patient, reported if he didn't get the medicines he was on he was not taking anything, refering to [facility].

## 2018-08-19 NOTE — NURSING NOTE
REPORTED TO MARINA WHALEY THAT THE CLIENT IS REFUSING MORNING MEDICINE INCLUDING ELIQUIS AND KEPPRA. MARINA WHALEY REPORTS HE WILL ATTEMPT THE CLIENT TO TAKE HIS MEDS.

## 2018-08-19 NOTE — PLAN OF CARE
Problem: Patient Care Overview  Goal: Plan of Care Review  Outcome: Ongoing (interventions implemented as appropriate)   08/19/18 1612   Coping/Psychosocial   Plan of Care Reviewed With patient   Coping/Psychosocial   Patient Agreement with Plan of Care agrees   Plan of Care Review   Progress no change   OTHER   Outcome Summary Patient isolates self in room.

## 2018-08-19 NOTE — NURSING NOTE
"Pt came to the nurses station wanting to know what meds he is on....stated \"if I'm not getting what I take I'm not taking anything\".  Asked patient what meds he usually takes.  Pt stated, \"Norcos and prozac.. I don't know a whole bunch of stuff\".  Advised pt of ordered meds, pt angry and stated, \"well I'm not taking anything then.\"  "

## 2018-08-19 NOTE — PROGRESS NOTES
"      Inpatient Psy Progress Note   Clinician: Matthieu Ordaz MD  Admission Date: 8/18/2018  4:07 PM 08/19/18    Behavioral Health Treatment Plan and Problem List: I have reviewed and approved the Behavioral Health Treatment Plan and Problem list.    Allergies  Allergies   Allergen Reactions   • Bee Venom Anaphylaxis   • Celexa [Citalopram] Shortness Of Breath   • Fentanyl      Pt states that he stops breathing   • Fentanyl Shortness Of Breath   • Haldol [Haloperidol Lactate] Anaphylaxis   • Haldol [Haloperidol] Shortness Of Breath   • Nitroglycerin Anaphylaxis   • Risperdal [Risperidone] Shortness Of Breath   • Risperidone And Related Anaphylaxis   • Celexa [Citalopram Hydrobromide] Other (See Comments)     Pt states that it causes him to sweat and a severe headache.        Hospital Day: 1 day      Assessment completed within view of staff    History  CC: inpatient followup  Interval HPI: Patient seen and evaluated by me.  Chart reviewed. Patient refused his medication this morning.  When I asked him about this, he stated, \"they ain't giving me the stuff I need (norco), I ain't taking shit.\"      Interval Review of Systems:   General ROS: negative for - fever or malaise  Endocrine ROS: negative for - palpitations  Respiratory ROS: no cough, shortness of breath, or wheezing  Cardiovascular ROS: no chest pain or dyspnea on exertion  Gastrointestinal ROS: no abdominal pain,no black or bloody stools    /87 (BP Location: Right arm, Patient Position: Lying)   Pulse 71   Temp 99 °F (37.2 °C) (Temporal Artery )   Resp 18   Ht 188 cm (74\")   Wt 91.8 kg (202 lb 6.4 oz)   SpO2 99%   BMI 25.99 kg/m²     Mental Status Exam  Mood: dysphoric  Affect: dysphoric   Thought Processes: linear, logical, and goal directed  Thought Content: negativistic  Hallucinations: no  Suicidal Thoughts: denies  Suicidal Plan/Intent:denies  Hopelesness:Moderate  Homicidal Thoughts:  absent      Medical Decision Making:   Labs:     Lab " "Results (last 24 hours)     ** No results found for the last 24 hours. **            Radiology:     Imaging Results (last 24 hours)     ** No results found for the last 24 hours. **            EKG:     ECG/EMG Results (most recent)     Procedure Component Value Units Date/Time    ECG 12 Lead [[id number]] Collected:  08/18/18 1114     Updated:  08/18/18 1351    Narrative:       Test Reason : Potential adverse reaction to medications.  Blood Pressure : **/** mmHG  Vent. Rate : 078 BPM     Atrial Rate : 078 BPM     P-R Int : 154 ms          QRS Dur : 132 ms      QT Int : 400 ms       P-R-T Axes : 036 -78 015 degrees     QTc Int : 456 ms    Atrial-sensed ventricular-paced rhythm  Abnormal ECG    Confirmed by Boone Villarreal ([id number]) on 8/18/2018 1:51:02 PM    Referred By:  DOLORES           Confirmed By:Boone Villarreal           Medications:     apixaban 5 mg Oral BID   levETIRAcetam 1,000 mg Oral Q12H   nicotine 1 patch Transdermal Daily   OLANZapine zydis 10 mg Oral Nightly   vitamin B-1 100 mg Oral Daily          Principal Problem:    Psychosis    Assessment and Plan:       -  Ideally we would start loxapine which has been most helpful for the patient in the past but we don't have that on our formulary.  We will start a trial of Stelazine as the patient is reporting a high degree of anxiety in combination with his psychosis.     Abnormal EKG    - EKG shows \"Atrial-sensed ventricular-paced rhythm\".   Patient is completely asymptomatic at this time.  Will consult cardiology for an opinion on the following question: \"Is any intervention or followup required?\".       Hypertension     - Continue lisinopril and norvasc       CAD (coronary artery disease)      - lisinopril, norvasc.           Hyperlipidemia       - Continue lipitor       Continue hospitalization for safety and stabilization.  Continue current level of Special Precautions (q15 minute checks).        "

## 2018-08-19 NOTE — PHARMACY PATIENT ASSISTANCE
Patient looked into home med of eliquis and the patient's copay at [facility] was $3.70.  However he has been very noncompliant on picking it up and his wife states that he has 2 or 3 bottles of it at the house that he hasn't taken.  Spoke to Dr. ADILIA Ordaz about it and he said if he needs it restart it.    Thank You;  Renee Winston [facility]  08/19/18  8:10 AM

## 2018-08-20 PROCEDURE — 99232 SBSQ HOSP IP/OBS MODERATE 35: CPT | Performed by: PSYCHIATRY & NEUROLOGY

## 2018-08-20 RX ADMIN — TRIFLUOPERAZINE HYDROCHLORIDE 1 MG: 1 TABLET, FILM COATED ORAL at 08:24

## 2018-08-20 RX ADMIN — LEVETIRACETAM 1000 MG: 500 TABLET, FILM COATED ORAL at 20:27

## 2018-08-20 RX ADMIN — IBUPROFEN 600 MG: 600 TABLET ORAL at 18:29

## 2018-08-20 RX ADMIN — APIXABAN 5 MG: 5 TABLET, FILM COATED ORAL at 20:27

## 2018-08-20 RX ADMIN — OLANZAPINE 10 MG: 10 TABLET, ORALLY DISINTEGRATING ORAL at 20:27

## 2018-08-20 RX ADMIN — ATORVASTATIN CALCIUM 20 MG: 20 TABLET, FILM COATED ORAL at 20:27

## 2018-08-20 RX ADMIN — FLUOXETINE 20 MG: 20 CAPSULE ORAL at 08:24

## 2018-08-20 RX ADMIN — AMLODIPINE BESYLATE 10 MG: 10 TABLET ORAL at 08:24

## 2018-08-20 RX ADMIN — LISINOPRIL 20 MG: 10 TABLET ORAL at 08:24

## 2018-08-20 RX ADMIN — HYDROXYZINE HYDROCHLORIDE 50 MG: 50 TABLET, FILM COATED ORAL at 16:26

## 2018-08-20 RX ADMIN — TRIFLUOPERAZINE HYDROCHLORIDE 1 MG: 1 TABLET, FILM COATED ORAL at 20:27

## 2018-08-20 RX ADMIN — IBUPROFEN 600 MG: 600 TABLET ORAL at 00:49

## 2018-08-20 RX ADMIN — HYDROCODONE BITARTRATE AND ACETAMINOPHEN 1 TABLET: 10; 325 TABLET ORAL at 16:26

## 2018-08-20 RX ADMIN — APIXABAN 5 MG: 5 TABLET, FILM COATED ORAL at 08:24

## 2018-08-20 RX ADMIN — LEVETIRACETAM 1000 MG: 500 TABLET, FILM COATED ORAL at 08:24

## 2018-08-20 RX ADMIN — THIAMINE HCL (VITAMIN B1) 50 MG TABLET 100 MG: at 08:24

## 2018-08-20 RX ADMIN — HYDROCODONE BITARTRATE AND ACETAMINOPHEN 1 TABLET: 10; 325 TABLET ORAL at 08:24

## 2018-08-20 NOTE — CONSULTS
Referring Provider: -Dr Ordaz    Primary care provider-Edith Hurtado    Reason for Consultation: -Pacemaker rhythm and ECG.?  Needs any intervention or follow-up    Chief complaint     Admitted in psychiatric unit for psychosis      History of present illness:      43-year-old male has been admitted in psychiatric unit for auditory and visual hallucinations due to psychosis.    I have been consulted as ECG showed atrial paced rhythm. ?  Needs intervention versus follow-up.    Patient has no history of dizziness, palpitation or chest discomfort.  His effort tolerance is good.  No leg edema.  No cardiovascular symptoms.    His cardiovascular history is remarkable for history of aortic stenosis for which he had replacement with day bioprosthetic valve in 2015, status post pacemaker implantation 2015, paroxysmal A. fib and anticoagulated on Eliquis.    Cardiovascular risk factors-hypertension, hyperlipidemia and smoking.      Review of Systems     Essentially as above.  Review of father organ systems were done.    History  Past Medical History:   Diagnosis Date   • Anxiety    • Aortic stenosis    • Atrial fibrillation (CMS/HCC)    • Bipolar disorder (CMS/HCC)    • CAD (coronary artery disease)    • Chronic back pain    • Hallucinations    • Heart attack    • Hyperlipidemia    • Hypertension    • Injury of back    • Nervous breakdown     12 years ago   • Seizure (CMS/HCC)    • Stroke (CMS/HCC)    • Stroke (CMS/HCC)     affected speech, vision, right side weakness   • Tobacco abuse    , Past Surgical History:   Procedure Laterality Date   • AORTIC VALVE REPAIR/REPLACEMENT  2016    tissue valve, performed in [place]   • AORTIC VALVE REPAIR/REPLACEMENT MITRAL VALVE REPAIR/REPLACEMENT     • APPENDECTOMY     • CARDIAC CATHETERIZATION     • CHOLECYSTECTOMY     • HERNIA REPAIR      umbilical   • HIATAL HERNIA REPAIR     • KNEE ARTHROSCOPY     • PACEMAKER IMPLANTATION     • PACEMAKER IMPLANTATION Left 2014 2014 OR 2015.    "Mogi. interrogated 3/9/18   • PATELLAR RECONSTRUCTION     • QUADRICEPS REPAIR     • SHOULDER ARTHROSCOPY     • SHOULDER CLOSED REDUCTION Left 3/9/2018    Procedure: LEFT SHOULDER CLOSED REDUCTION;  Surgeon: Steven Wang Jr., MD;  Location: [facility];  Service: Orthopedics   , Family History   Problem Relation Age of Onset   • No Known Problems Mother    • No Known Problems Father    , Social History   Substance Use Topics   • Smoking status: Current Every Day Smoker     Packs/day: 0.50     Types: Cigarettes   • Smokeless tobacco: Never Used      Comment: 2-3 cigarettes   • Alcohol use No      Comment: denies   ,     Medication Review:      amLODIPine 10 mg Oral Q24H   apixaban 5 mg Oral BID   atorvastatin 20 mg Oral Nightly   FLUoxetine 20 mg Oral Daily   levETIRAcetam 1,000 mg Oral Q12H   lisinopril 20 mg Oral Q24H   nicotine 1 patch Transdermal Daily   OLANZapine zydis 10 mg Oral Nightly   trifluoperazine 1 mg Oral Q12H   vitamin B-1 100 mg Oral Daily        Allergies:  Bee venom; Celexa [citalopram]; Fentanyl; Fentanyl; Haldol [haloperidol lactate]; Haldol [haloperidol]; Nitroglycerin; Risperdal [risperidone]; Risperidone and related; and Celexa [citalopram hydrobromide]    Social history-history of smoking.  Motion of all: Substance abuse    Family history-noncontributory      Objective     Vital Sign Min/Max for last 24 hours  Temp  Min: 97.8 °F (36.6 °C)  Max: 99 °F (37.2 °C)   BP  Min: 115/80  Max: 140/106   Pulse  Min: 71  Max: 81   Resp  Min: 18  Max: 18   SpO2  Min: 96 %  Max: 99 %   No Data Recorded   No Data Recorded     Flowsheet Rows      First Filed Value   Admission Height  188 cm (74\") Documented at 08/18/2018 0750   Admission Weight  91.8 kg (202 lb 6.4 oz) Documented at 08/18/2018 0750             Physical Exam:     General Appearance:    Alert, cooperative, in no acute distress   Head:    Normocephalic, without obvious abnormality, atraumatic   Eyes:            Lids and lashes " normal, conjunctivae and sclerae normal, no   icterus, no pallor, corneas clear, PERRLA   Ears:    Ears appear intact with no abnormalities noted   Throat:   No oral lesions, no thrush, oral mucosa moist   Neck:   No adenopathy, supple, trachea midline, no thyromegaly, no   carotid bruit, no JVD   Back:     No kyphosis present, no scoliosis present, no skin lesions,      erythema or scars, no tenderness to percussion or                   palpation,   range of motion normal   Lungs:     Clear to auscultation,respirations regular, even and                  unlabored    Heart:    Regular rhythm and normal rate, normal S1 and S2, no            murmur, no gallop, no rub, no click   Chest Wall:    No abnormalities observed.  Pacemaker generator was felt in left pectoral region    Abdomen:     Normal bowel sounds, no masses, no organomegaly, soft        non-tender, non-distended, no guarding, no rebound                tenderness   Rectal:     Deferred   Extremities:   Moves all extremities well, no edema, no cyanosis, no             redness   Pulses:   Pulses palpable and equal bilaterally   Skin:  Tattoos present        Neurologic:   Cranial nerves 2 - 12 grossly intact, sensation intact, DTR       present and equal bilaterally       EKG-atrial paced rhythm    Echocardiogram- 8/2018    · Left ventricular systolic function is normal. Estimated EF = 60%.  · Left ventricular wall thickness is consistent with mild-to-moderate concentric hypertrophy.  · Left ventricular diastolic dysfunction (grade I a) consistent with impaired relaxation.  · There is a prosthetic aortic valve  · Ao mean PG 22.1 mmHg  · Mild tricuspid valve regurgitation is present.  · RVSP(TR) 20.0 mmHg    Labs    Results from last 7 days  Lab Units 08/18/18  0311   WBC 10*3/mm3 14.04*   HEMOGLOBIN g/dL 15.0   HEMATOCRIT % 45.5   PLATELETS 10*3/mm3 302       Results from last 7 days  Lab Units 08/18/18  0311   SODIUM mmol/L 138   POTASSIUM mmol/L 3.7    CHLORIDE mmol/L 112   CO2 mmol/L 21.8*   BUN mg/dL 8   CREATININE mg/dL 0.95   CALCIUM mg/dL 9.4   GLUCOSE mg/dL 118*       Results from last 7 days  Lab Units 08/18/18  0311   BILIRUBIN mg/dL 0.4   ALK PHOS U/L 98   AST (SGOT) U/L 27   ALT (SGPT) U/L 22                   Results from last 7 days  Lab Units 08/18/18  0453 08/18/18  0311   TROPONIN I ng/mL 0.021 0.021             Imaging  Imaging Results (last 24 hours)     ** No results found for the last 24 hours. **            Assessment     1.  Cardiac pacemaker.       EKG showing atrial paced rhythm which is normal in this patient    2.  Status post aortic valve replacement with a bioprosthetic valve.           Echocardiogram done 3/2018-showed normally functioning prosthetic valve    3.  Paroxysmal A. Fib    4.  Anticoagulated on Eliquis    5.  Hypertension     6.  Hyperlipidemia    7.  Psychosis      Plan    1.  Medications-continue amlodipine, atorvastatin and Eliquis  2.  Patient needs cardiology follow-up for periodic pacemaker interrogation.  Please schedule an appointment with me in one month after the discharge.  Call [phone] for appointment.  3.  Patient was instructed to stop smoking    Thanks for the consult    Boone Villarreal MD  08/20/18  10:19 AM

## 2018-08-20 NOTE — PROGRESS NOTES
"INPATIENT PSYCHIATRIC PROGRESS NOTE    Name:  Bahman Banks  :  1975  MRN:  [id number]  Visit Number:  [id number]  Length of stay:  2    SUBJECTIVE  CC: psychosis    INTERVAL HISTORY:  The patient states that he is feeling better today and is not experiencing any hallucinations. He states that he has been diagnosed with bipolar and schizoaffective disorder in the past.  Depression rating 5/10  Anxiety rating 6/10  Sleep: interrupted      Review of Systems   Constitutional: Negative.    Respiratory: Negative.    Cardiovascular: Positive for chest pain.   Gastrointestinal: Negative.        OBJECTIVE    Temp:  [97.8 °F (36.6 °C)-97.9 °F (36.6 °C)] 97.9 °F (36.6 °C)  Heart Rate:  [75-81] 75  Resp:  [18] 18  BP: (115-140)/() 115/80    MENTAL STATUS EXAM:  Appearance:Casually dressed, good hygeine.   Cooperation:Cooperative  Psychomotor: No psychomotor agitation/retardation, No EPS, No motor tics  Speech-normal rate, amount.  Mood \"okay\"   Affect-congruent, appropriate, stable  Thought Content-goal directed, no delusional material present  Thought process-linear, organized.  Suicidality: No SI  Homicidality: No HI  Perception: No AH/VH  Insight-fair   Judgement-fair    Lab Results (last 24 hours)     ** No results found for the last 24 hours. **             Imaging Results (last 24 hours)     ** No results found for the last 24 hours. **             ECG/EMG Results (most recent)     Procedure Component Value Units Date/Time    ECG 12 Lead [[id number]] Collected:  18 1114     Updated:  18 1351    Narrative:       Test Reason : Potential adverse reaction to medications.  Blood Pressure : **/** mmHG  Vent. Rate : 078 BPM     Atrial Rate : 078 BPM     P-R Int : 154 ms          QRS Dur : 132 ms      QT Int : 400 ms       P-R-T Axes : 036 -78 015 degrees     QTc Int : 456 ms    Atrial-sensed ventricular-paced rhythm  Abnormal ECG    Confirmed by Boone Villarreal ([id number]) on 2018 1:51:02 PM    Referred " By:  DOLORES           Confirmed By:Boone Villarreal           ALLERGIES: Bee venom; Celexa [citalopram]; Fentanyl; Fentanyl; Haldol [haloperidol lactate]; Haldol [haloperidol]; Nitroglycerin; Risperdal [risperidone]; Risperidone and related; and Celexa [citalopram hydrobromide]      Current Facility-Administered Medications:   •  aluminum-magnesium hydroxide-simethicone (MAALOX MAX) 400-400-40 MG/5ML suspension 15 mL, 15 mL, Oral, Q6H PRN, Matthieu Ordaz MD  •  amLODIPine (NORVASC) tablet 10 mg, 10 mg, Oral, Q24H, Matthieu Ordaz MD, 10 mg at 08/20/18 0824  •  apixaban (ELIQUIS) tablet 5 mg, 5 mg, Oral, BID, Matthieu Ordaz MD, 5 mg at 08/20/18 0824  •  atorvastatin (LIPITOR) tablet 20 mg, 20 mg, Oral, Nightly, Matthieu Ordaz MD, 20 mg at 08/19/18 2019  •  benzonatate (TESSALON) capsule 100 mg, 100 mg, Oral, TID PRN, Matthieu Ordaz MD  •  benztropine (COGENTIN) tablet 1 mg, 1 mg, Oral, Daily PRN **OR** benztropine (COGENTIN) injection 0.5 mg, 0.5 mg, Intramuscular, Daily PRN, Matthieu Ordaz MD  •  famotidine (PEPCID) tablet 20 mg, 20 mg, Oral, BID PRN, Matthieu Ordaz MD  •  FLUoxetine (PROzac) capsule 20 mg, 20 mg, Oral, Daily, Matthieu Ordaz MD, 20 mg at 08/20/18 0824  •  HYDROcodone-acetaminophen (NORCO)  MG per tablet 1 tablet, 1 tablet, Oral, Q8H PRN, Matthieu Ordaz MD, 1 tablet at 08/20/18 0824  •  hydrOXYzine (ATARAX) tablet 50 mg, 50 mg, Oral, Q4H PRN, Matthieu Ordaz MD, 50 mg at 08/19/18 1710  •  ibuprofen (ADVIL,MOTRIN) tablet 600 mg, 600 mg, Oral, Q6H PRN, Matthieu Ordaz MD, 600 mg at 08/20/18 0049  •  levETIRAcetam (KEPPRA) tablet 1,000 mg, 1,000 mg, Oral, Q12H, Matthieu Ordaz MD, 1,000 mg at 08/20/18 0824  •  lisinopril (PRINIVIL,ZESTRIL) tablet 20 mg, 20 mg, Oral, Q24H, Matthieu Ordaz MD, 20 mg at 08/20/18 0824  •  magnesium hydroxide (MILK OF MAGNESIA) suspension 2400 mg/10mL 10 mL, 10 mL, Oral, Daily PRN, Matthieu Ordaz MD  •  nicotine (NICODERM CQ) 21  MG/24HR patch 1 patch, 1 patch, Transdermal, Daily, Matthieu Ordaz MD, 1 patch at 08/18/18 1210  •  OLANZapine zydis (ZyPREXA) disintegrating tablet 10 mg, 10 mg, Oral, Nightly, Matthieu Ordaz MD, 10 mg at 08/19/18 2019  •  ondansetron (ZOFRAN) tablet 4 mg, 4 mg, Oral, Q6H PRN, Matthieu Ordaz MD  •  sodium chloride (OCEAN) nasal spray 2 spray, 2 spray, Each Nare, PRN, Matthieu Ordaz MD  •  traZODone (DESYREL) tablet 50 mg, 50 mg, Oral, Nightly PRN, Matthieu Ordaz MD  •  trifluoperazine (STELAZINE) tablet 1 mg, 1 mg, Oral, Q12H, Matthieu Ordaz MD, 1 mg at 08/20/18 0824  •  vitamin B-1 tablet 100 mg, 100 mg, Oral, Daily, Matthieu Ordaz MD, 100 mg at 08/20/18 0824    ASSESSMENT & PLAN:    Principal Problem:    Schizoaffective disorder, bipolar type  Plan: Continue stelazine    Active Problems:    Hypertension  Plan: Norvasc, Lisinopril      Paroxysmal A fib  Plan: Eliquis      Hyperlipidemia  Plan: Lipitor      Suicide precautions: Suicide precaution Level 3 (q15 min checks)     Behavioral Health Treatment Plan and Problem List: I have reviewed and approved the Behavioral Health Treatment Plan and Problem list.  The patient has had a chance to review and agrees with the treatment plan.     Clinician:  Karla Anderson MD  08/20/18  2:16 PM

## 2018-08-20 NOTE — PLAN OF CARE
Problem: Patient Care Overview  Goal: Plan of Care Review  Outcome: Ongoing (interventions implemented as appropriate)   08/20/18 0323   Coping/Psychosocial   Plan of Care Reviewed With patient   Coping/Psychosocial   Patient Agreement with Plan of Care agrees   Plan of Care Review   Progress no change   OTHER   Outcome Summary isolates in room, No new ordes, no current changes.

## 2018-08-20 NOTE — PLAN OF CARE
Problem: Patient Care Overview  Goal: Plan of Care Review  Outcome: Ongoing (interventions implemented as appropriate)  Patient has been calm and cooperative this shift with anxiety and depression both rated at 5/10 on 0-10 scale. Patient denies SI, HI, or AVH but tends to isolate in room. Cardiologist came for consultation. See note for details. Patient appetite and sleep have been good. No concerns reported.   08/20/18 6825   Coping/Psychosocial   Plan of Care Reviewed With patient   Plan of Care Review   Progress no change     Goal: Individualization and Mutuality  Outcome: Ongoing (interventions implemented as appropriate)    Goal: Discharge Needs Assessment  Outcome: Ongoing (interventions implemented as appropriate)    Goal: Interprofessional Rounds/Family Conf  Outcome: Ongoing (interventions implemented as appropriate)      Problem: Overarching Goals (Adult)  Goal: Adheres to Safety Considerations for Self and Others  Outcome: Ongoing (interventions implemented as appropriate)    Goal: Optimized Coping Skills in Response to Life Stressors  Outcome: Ongoing (interventions implemented as appropriate)    Goal: Develops/Participates in Therapeutic [place] to Support Successful Transition  Outcome: Ongoing (interventions implemented as appropriate)

## 2018-08-20 NOTE — PLAN OF CARE
Problem: Patient Care Overview  Goal: Interprofessional Rounds/Family Conf  Outcome: Ongoing (interventions implemented as appropriate)   08/20/18 1145   Interdisciplinary Rounds/Family Conf   Summary Individual session   Interdisciplinary Rounds/Family Conf   Participants patient;social work     D: Therapist met with patient on this date for individual.  Patient reports some improvement in overall mood and symptoms today.  He reports pharmacy does not have his medication and he normally takes but he has taken an alternative medication.  He reports he left his home medications in [place] when he went to visit.  He reports being out of medication for a few weeks.  He reports without those he has auditory and visual hallucinations and other issues.  He reports extensive history of psychiatric treatment in several states.  He reports he plans to return home with his wife and daughter and [facility] after stabilization.  Discussed discharge plans and he was agreeable to [facility] and signed consent.     A: Patient mood and affect were appropriate.  Patient denied current SI/HI/AVH.    P: Patient remains hospitalized for safety and stabilization.  Patient signed consent for [facility] for outpatient treatment.

## 2018-08-21 LAB — LEVETIRACETAM SERPL-MCNC: NORMAL UG/ML (ref 10–40)

## 2018-08-21 PROCEDURE — 99232 SBSQ HOSP IP/OBS MODERATE 35: CPT | Performed by: PSYCHIATRY & NEUROLOGY

## 2018-08-21 RX ADMIN — ATORVASTATIN CALCIUM 20 MG: 20 TABLET, FILM COATED ORAL at 20:35

## 2018-08-21 RX ADMIN — HYDROXYZINE HYDROCHLORIDE 50 MG: 50 TABLET, FILM COATED ORAL at 08:36

## 2018-08-21 RX ADMIN — LEVETIRACETAM 1000 MG: 500 TABLET, FILM COATED ORAL at 20:35

## 2018-08-21 RX ADMIN — FLUOXETINE 20 MG: 20 CAPSULE ORAL at 08:36

## 2018-08-21 RX ADMIN — HYDROCODONE BITARTRATE AND ACETAMINOPHEN 1 TABLET: 10; 325 TABLET ORAL at 08:36

## 2018-08-21 RX ADMIN — HYDROCODONE BITARTRATE AND ACETAMINOPHEN 1 TABLET: 10; 325 TABLET ORAL at 16:37

## 2018-08-21 RX ADMIN — APIXABAN 5 MG: 5 TABLET, FILM COATED ORAL at 20:35

## 2018-08-21 RX ADMIN — THIAMINE HCL (VITAMIN B1) 50 MG TABLET 100 MG: at 08:36

## 2018-08-21 RX ADMIN — TRIFLUOPERAZINE HYDROCHLORIDE 1 MG: 1 TABLET, FILM COATED ORAL at 08:35

## 2018-08-21 RX ADMIN — OLANZAPINE 10 MG: 10 TABLET, ORALLY DISINTEGRATING ORAL at 20:35

## 2018-08-21 RX ADMIN — LEVETIRACETAM 1000 MG: 500 TABLET, FILM COATED ORAL at 08:36

## 2018-08-21 RX ADMIN — APIXABAN 5 MG: 5 TABLET, FILM COATED ORAL at 08:36

## 2018-08-21 RX ADMIN — TRIFLUOPERAZINE HYDROCHLORIDE 1 MG: 1 TABLET, FILM COATED ORAL at 20:35

## 2018-08-21 NOTE — PLAN OF CARE
Problem: Patient Care Overview  Goal: Plan of Care Review  Outcome: Ongoing (interventions implemented as appropriate)   08/20/18 0323 08/21/18 0306   Coping/Psychosocial   Plan of Care Reviewed With --  patient   Coping/Psychosocial   Patient Agreement with Plan of Care --  agrees   Plan of Care Review   Progress --  no change   OTHER   Outcome Summary isolates in room, No new ordes, no current changes. --

## 2018-08-21 NOTE — DISCHARGE INSTR - APPOINTMENTS
[facility]  [address] [place] [address]  Ph- [phone]    Appt date and time: August 23rd @ 10:45 with Salima  There will be a 35.00 Co Pay

## 2018-08-21 NOTE — PLAN OF CARE
Problem: Patient Care Overview  Goal: Interprofessional Rounds/Family Conf   08/21/18 1550   Interdisciplinary Rounds/Family Conf   Participants patient;social work     D: Therapist attempted to complete individual session multiple times today and patient isolates to room and did not arouse when therapist attempted to speak to him. Staff reports he only comes out of his room to eat meals.

## 2018-08-21 NOTE — PLAN OF CARE
Problem: Patient Care Overview  Goal: Plan of Care Review  Outcome: Ongoing (interventions implemented as appropriate)   08/21/18 1529   Coping/Psychosocial   Plan of Care Reviewed With patient   Coping/Psychosocial   Patient Agreement with Plan of Care agrees   Plan of Care Review   Progress no change   OTHER   Outcome Summary Pt isolates in room. Pt rates anxiety and depression 5/10. Pt denies SI/HI and NATHAN.

## 2018-08-21 NOTE — PROGRESS NOTES
"INPATIENT PSYCHIATRIC PROGRESS NOTE    Name:  Bahman aBnks  :  1975  MRN:  [id number]  Visit Number:  [id number]  Length of stay:  3    SUBJECTIVE  CC: psychosis    INTERVAL HISTORY:  The patient states that he is feeling better today and is not experiencing any hallucinations. He states that he has been diagnosed with bipolar and schizoaffective disorder in the past. Had not been taking his meds for past month and started to experience stress leading to AVH. Reports poor sleep and appetite and feeling tired.   Depression rating 5/10  Anxiety rating 6/10  Sleep: interrupted      Review of Systems   Constitutional: Negative.    HENT: Negative.    Respiratory: Negative.    Cardiovascular: Negative for chest pain.   Gastrointestinal: Negative.        OBJECTIVE    Temp:  [98.2 °F (36.8 °C)] 98.2 °F (36.8 °C)  Heart Rate:  [83] 83  Resp:  [18] 18  BP: (99)/(64) 99/64    MENTAL STATUS EXAM:  Appearance:Casually dressed, good hygeine.   Cooperation:Cooperative  Psychomotor: slowed down  Speech-normal rate, amount.  Mood \"better\"   Affect-congruent, appropriate, stable  Thought Content-goal directed, no delusional material present  Thought process-linear, organized.  Suicidality: No SI  Homicidality: No HI  Perception: No AH/VH  Insight-fair   Judgement-fair    Lab Results (last 24 hours)     ** No results found for the last 24 hours. **             Imaging Results (last 24 hours)     ** No results found for the last 24 hours. **             ECG/EMG Results (most recent)     Procedure Component Value Units Date/Time    ECG 12 Lead [[id number]] Collected:  18 1114     Updated:  18 1351    Narrative:       Test Reason : Potential adverse reaction to medications.  Blood Pressure : **/** mmHG  Vent. Rate : 078 BPM     Atrial Rate : 078 BPM     P-R Int : 154 ms          QRS Dur : 132 ms      QT Int : 400 ms       P-R-T Axes : 036 -78 015 degrees     QTc Int : 456 ms    Atrial-sensed ventricular-paced " rhythm  Abnormal ECG    Confirmed by Boone Villarreal ([id number]) on 8/18/2018 1:51:02 PM    Referred By:  DOLORES           Confirmed By:Boone Villarreal           ALLERGIES: Bee venom; Celexa [citalopram]; Fentanyl; Fentanyl; Haldol [haloperidol lactate]; Haldol [haloperidol]; Nitroglycerin; Risperdal [risperidone]; Risperidone and related; and Celexa [citalopram hydrobromide]      Current Facility-Administered Medications:   •  aluminum-magnesium hydroxide-simethicone (MAALOX MAX) 400-400-40 MG/5ML suspension 15 mL, 15 mL, Oral, Q6H PRN, Matthieu Ordaz MD  •  amLODIPine (NORVASC) tablet 10 mg, 10 mg, Oral, Q24H, Matthieu Ordaz MD, 10 mg at 08/20/18 0824  •  apixaban (ELIQUIS) tablet 5 mg, 5 mg, Oral, BID, Matthieu Ordaz MD, 5 mg at 08/21/18 0836  •  atorvastatin (LIPITOR) tablet 20 mg, 20 mg, Oral, Nightly, Matthieu Ordaz MD, 20 mg at 08/20/18 2027  •  benzonatate (TESSALON) capsule 100 mg, 100 mg, Oral, TID PRN, Matthieu Ordaz MD  •  benztropine (COGENTIN) tablet 1 mg, 1 mg, Oral, Daily PRN **OR** benztropine (COGENTIN) injection 0.5 mg, 0.5 mg, Intramuscular, Daily PRN, Matthieu Ordaz MD  •  famotidine (PEPCID) tablet 20 mg, 20 mg, Oral, BID PRN, Matthieu Ordaz MD  •  FLUoxetine (PROzac) capsule 20 mg, 20 mg, Oral, Daily, Matthieu Ordaz MD, 20 mg at 08/21/18 0836  •  HYDROcodone-acetaminophen (NORCO)  MG per tablet 1 tablet, 1 tablet, Oral, Q8H PRN, Matthieu Ordaz MD, 1 tablet at 08/21/18 0836  •  hydrOXYzine (ATARAX) tablet 50 mg, 50 mg, Oral, Q4H PRN, Matthieu Ordaz MD, 50 mg at 08/21/18 0836  •  ibuprofen (ADVIL,MOTRIN) tablet 600 mg, 600 mg, Oral, Q6H PRN, Matthieu Ordaz MD, 600 mg at 08/20/18 1829  •  levETIRAcetam (KEPPRA) tablet 1,000 mg, 1,000 mg, Oral, Q12H, Matthieu Ordaz MD, 1,000 mg at 08/21/18 0836  •  lisinopril (PRINIVIL,ZESTRIL) tablet 20 mg, 20 mg, Oral, Q24H, Matthieu Ordaz MD, 20 mg at 08/20/18 0824  •  magnesium hydroxide (MILK OF MAGNESIA) suspension  2400 mg/10mL 10 mL, 10 mL, Oral, Daily PRN, Matthieu Ordaz MD  •  nicotine (NICODERM CQ) 21 MG/24HR patch 1 patch, 1 patch, Transdermal, Daily, Matthieu Ordaz MD, 1 patch at 08/18/18 1210  •  OLANZapine zydis (ZyPREXA) disintegrating tablet 10 mg, 10 mg, Oral, Nightly, Matthieu Ordaz MD, 10 mg at 08/20/18 2027  •  ondansetron (ZOFRAN) tablet 4 mg, 4 mg, Oral, Q6H PRN, Matthieu Ordaz MD  •  sodium chloride (OCEAN) nasal spray 2 spray, 2 spray, Each Nare, PRN, Matthieu Ordaz MD  •  traZODone (DESYREL) tablet 50 mg, 50 mg, Oral, Nightly PRN, Matthieu Ordaz MD  •  trifluoperazine (STELAZINE) tablet 1 mg, 1 mg, Oral, Q12H, Matthieu Ordaz MD, 1 mg at 08/21/18 0835  •  vitamin B-1 tablet 100 mg, 100 mg, Oral, Daily, Matthieu Ordaz MD, 100 mg at 08/21/18 0836      amLODIPine 10 mg Oral Q24H   apixaban 5 mg Oral BID   atorvastatin 20 mg Oral Nightly   FLUoxetine 20 mg Oral Daily   levETIRAcetam 1,000 mg Oral Q12H   lisinopril 20 mg Oral Q24H   nicotine 1 patch Transdermal Daily   OLANZapine zydis 10 mg Oral Nightly   trifluoperazine 1 mg Oral Q12H   vitamin B-1 100 mg Oral Daily       ASSESSMENT & PLAN:    Principal Problem:    Schizoaffective disorder, bipolar type  Plan: Continue stelazine, prozac, olanzapine zydis, vitamin B-1    Active Problems:    Hypertension  Plan: Norvasc, Lisinopril      Paroxysmal A fib  Plan: Eliquis      Hyperlipidemia  Plan: Lipitor    Tobacco use  Plan: on nicotine patch      Suicide precautions: Suicide precaution Level 3 (q15 min checks)     Behavioral Health Treatment Plan and Problem List: I have reviewed and approved the Behavioral Health Treatment Plan and Problem list.  The patient has had a chance to review and agrees with the treatment plan.     Clinician:  Dianna Chang MD  08/21/18  1:56 PM

## 2018-08-22 VITALS
TEMPERATURE: 98.7 F | DIASTOLIC BLOOD PRESSURE: 71 MMHG | HEIGHT: 74 IN | HEART RATE: 68 BPM | OXYGEN SATURATION: 98 % | RESPIRATION RATE: 20 BRPM | BODY MASS INDEX: 25.98 KG/M2 | WEIGHT: 202.4 LBS | SYSTOLIC BLOOD PRESSURE: 112 MMHG

## 2018-08-22 PROBLEM — F25.1 SCHIZOAFFECTIVE DISORDER, DEPRESSIVE TYPE (HCC): Status: ACTIVE | Noted: 2018-08-18

## 2018-08-22 PROCEDURE — 99238 HOSP IP/OBS DSCHRG MGMT 30/<: CPT | Performed by: PSYCHIATRY & NEUROLOGY

## 2018-08-22 RX ORDER — LISINOPRIL 20 MG/1
20 TABLET ORAL
Qty: 30 TABLET | Refills: 0 | Status: SHIPPED | OUTPATIENT
Start: 2018-08-23 | End: 2018-11-01 | Stop reason: HOSPADM

## 2018-08-22 RX ORDER — TRIFLUOPERAZINE HYDROCHLORIDE 1 MG/1
1 TABLET, FILM COATED ORAL EVERY 12 HOURS SCHEDULED
Qty: 30 TABLET | Refills: 1 | Status: SHIPPED | OUTPATIENT
Start: 2018-08-22 | End: 2018-09-13 | Stop reason: SDUPTHER

## 2018-08-22 RX ORDER — OLANZAPINE 5 MG/1
5 TABLET, ORALLY DISINTEGRATING ORAL NIGHTLY
Qty: 30 TABLET | Refills: 1 | Status: SHIPPED | OUTPATIENT
Start: 2018-08-22 | End: 2018-09-13

## 2018-08-22 RX ORDER — ATORVASTATIN CALCIUM 20 MG/1
20 TABLET, FILM COATED ORAL NIGHTLY
Qty: 30 TABLET | Refills: 0 | Status: SHIPPED | OUTPATIENT
Start: 2018-08-22 | End: 2018-10-02 | Stop reason: HOSPADM

## 2018-08-22 RX ORDER — AMLODIPINE BESYLATE 10 MG/1
10 TABLET ORAL
Qty: 30 TABLET | Refills: 0 | Status: ON HOLD | OUTPATIENT
Start: 2018-08-23 | End: 2018-10-18

## 2018-08-22 RX ORDER — FLUOXETINE HYDROCHLORIDE 20 MG/1
20 CAPSULE ORAL DAILY
Qty: 30 CAPSULE | Refills: 1 | Status: SHIPPED | OUTPATIENT
Start: 2018-08-23 | End: 2018-09-13 | Stop reason: SDUPTHER

## 2018-08-22 RX ORDER — LANOLIN ALCOHOL/MO/W.PET/CERES
100 CREAM (GRAM) TOPICAL DAILY
Qty: 30 TABLET | Refills: 0 | Status: SHIPPED | OUTPATIENT
Start: 2018-08-23 | End: 2018-09-13 | Stop reason: SDUPTHER

## 2018-08-22 RX ADMIN — LISINOPRIL 20 MG: 10 TABLET ORAL at 08:06

## 2018-08-22 RX ADMIN — LEVETIRACETAM 1000 MG: 500 TABLET, FILM COATED ORAL at 08:06

## 2018-08-22 RX ADMIN — THIAMINE HCL (VITAMIN B1) 50 MG TABLET 100 MG: at 08:06

## 2018-08-22 RX ADMIN — APIXABAN 5 MG: 5 TABLET, FILM COATED ORAL at 08:06

## 2018-08-22 RX ADMIN — AMLODIPINE BESYLATE 10 MG: 10 TABLET ORAL at 08:05

## 2018-08-22 RX ADMIN — TRIFLUOPERAZINE HYDROCHLORIDE 1 MG: 1 TABLET, FILM COATED ORAL at 08:05

## 2018-08-22 RX ADMIN — FLUOXETINE 20 MG: 20 CAPSULE ORAL at 08:05

## 2018-08-22 RX ADMIN — HYDROCODONE BITARTRATE AND ACETAMINOPHEN 1 TABLET: 10; 325 TABLET ORAL at 08:07

## 2018-08-22 NOTE — PLAN OF CARE
Problem: Patient Care Overview  Goal: Interprofessional Rounds/Family Conf  Outcome: Ongoing (interventions implemented as appropriate)   08/22/18 1030   Interdisciplinary Rounds/Family Conf   Summary Individual session   Interdisciplinary Rounds/Family Conf   Participants patient;social work     D: Therapist met with patient on this date for the purpose individual session.  Patient reports overall improvement in mood and symptoms and is hopeful for discharge home today.  He denies any auditory or visual hallucinations or thoughts of harming himself.  He reports that he has not been off his medications for a long period in the last 15 years and feels that is the main cause for his issues prior to hospitalization.  He reports she has spoken with his family and feels ready to go home.    A: Patient mood and affect are appropriate.  Patient denied SI/HI/AVH.    P: Patient is hopeful for discharge home today.  He will follow-up with [facility] in [place] at discharge.

## 2018-08-22 NOTE — DISCHARGE SUMMARY
"      PSYCHIATRIC DISCHARGE SUMMARY     Patient Identification:  Name:  Bahman Banks  Age:  43 y.o.  Sex:  male  :  1975  MRN:  [id number]  Visit Number:  [id number]      Date of Admission:2018   Date of Discharge:  2018    Discharge Diagnosis:  Principal Problem:    Schizoaffective disorder, depressive type (CMS/HCC)  Active Problems:    Hypertension    CAD (coronary artery disease)    Hyperlipidemia        Admission Diagnosis:  Psychosis [F29]     Hospital Course  Patient is a 43 y.o. male presented with complaints of psychosis, auditory and visual hallucinations and that he is messed up in the head, he has been seeing shadows and bugs crawling on him for 2 weeks.  Patient reports that he has been out of his psychotic meds for the past month.  Patient currently denies any suicidal ideation but says that he is just at the point that he does not care if he lives or dies.  When asked if he felt if he was in danger of hurting himself at this time he said, \"maybe.\" When asked to rate his depression, he states that it is a 5/10 and a lot of times it is a 10/10 with 10 being the worst. Patient reports that his current stressors are being out of his medicines, trying to raise his daughter and just life in general. Patient has a history of a psychiatric admission he states in [place], years ago. Reports, a history of a nervous breakdown but patient will not elaborate and is vague with answering assessment questions. Patient has been recently seeing Dr. Hurtado in [place], [place] that had been prescribing his psychotic medicines but he states she wont write them anymore. Patient denies any history of sexual or physical abuse. Patient reports that he has a history of head injuries with the last one being 25 years ago. Patient has a medical history of MI, strokes, a pacemaker in , atrial fibrillation, CAD, hyperlipidemia, and hypertension.     The patient was admitted to the [facility] unit " "for safety, further evaluation and treatment. His meds were adjusted: Seroquel and Loxapine were discontinued and he was continued on Prozac and started on Zydes and Stelazine which he tolerated very well. The patient was also able to take part in individual and group counseling sessions and work on appropriate coping skills. The patient made steady improvement in her mood and expressed feeling more positive and hopeful about future. Sleep and appetite were improved. The day of discharge the patient was calm, cooperative and pleasant. Mood was reported to be good, and denied SI/HI/AVH. Also reported no medication side effects.     Mental Status Exam upon discharge:   Mood \"good\"  Affect-congruent, appropriate, stable  Thought Content-goal directed, no delusional material present  Thought process-linear, organized.  Suicidality: No SI  Homicidality: No HI  Perception: No AH/VH    Procedures Performed     Consults:   Consults     Date and Time Order Name Status Description    8/18/2018 1355 Inpatient Cardiology Consult            Pertinent Test Results: CMP  unremarkable, CBC  Unremarkable except elevated WBC of 14; UA wnl; UDS negative for all;     ECG 12 Lead   Order: [id number]   Status:  Final result   Visible to patient:  No (Not Released) Next appt:  None   Narrative     Test Reason : Potential adverse reaction to medications.  Blood Pressure : **/** mmHG  Vent. Rate : 078 BPM     Atrial Rate : 078 BPM     P-R Int : 154 ms          QRS Dur : 132 ms      QT Int : 400 ms       P-R-T Axes : 036 -78 015 degrees     QTc Int : 456 ms    Atrial-sensed ventricular-paced rhythm  Abnormal ECG    Confirmed by Boone Villarreal ([id number]) on 8/18/2018 1:51:02 PM    Referred By:  DOLORES           Confirmed By:Boone Villarreal                  Condition on Discharge:  improved    Vital Signs  Temp:  [98.4 °F (36.9 °C)-99.2 °F (37.3 °C)] 99.2 °F (37.3 °C)  Heart Rate:  [] 103  Resp:  [18] 18  BP: ()/() " 137/104      Discharge Disposition:  Home or Self Care    Discharge Medications:     Discharge Medications      New Medications      Instructions Start Date   amLODIPine 10 MG tablet  Commonly known as:  NORVASC   10 mg, Oral, Every 24 Hours Scheduled      atorvastatin 20 MG tablet  Commonly known as:  LIPITOR   20 mg, Oral, Nightly      FLUoxetine 20 MG capsule  Commonly known as:  PROzac   20 mg, Oral, Daily      lisinopril 20 MG tablet  Commonly known as:  PRINIVIL,ZESTRIL   20 mg, Oral, Every 24 Hours Scheduled      OLANZapine zydis 5 MG disintegrating tablet  Commonly known as:  zyPREXA   5 mg, Oral, Nightly      thiamine 100 MG tablet  Commonly known as:  VITAMIN B1   100 mg, Oral, Daily      trifluoperazine 1 MG tablet  Commonly known as:  STELAZINE   1 mg, Oral, Every 12 Hours Scheduled         Continue These Medications      Instructions Start Date   apixaban 5 MG tablet tablet  Commonly known as:  ELIQUIS   5 mg, Oral, 2 Times Daily      HYDROcodone-acetaminophen  MG per tablet  Commonly known as:  NORCO   1 tablet, Oral, Every 8 Hours PRN      levETIRAcetam 1000 MG tablet  Commonly known as:  KEPPRA   1,000 mg, Oral, 2 Times Daily         Stop These Medications    loxapine 25 MG capsule  Commonly known as:  LOXITANE     QUEtiapine 50 MG tablet  Commonly known as:  SEROquel            Discharge Diet: regular    Activity at Discharge: as tolerated    Follow-up Appointments  Has an appointment set up for outpatient psychiatry at [facility] in [place].       Test Results Pending at Discharge: none      Clinician:   Dianna Chang MD  08/22/18  11:02 AM

## 2018-08-22 NOTE — PLAN OF CARE
Problem: Patient Care Overview  Goal: Plan of Care Review  Outcome: Ongoing (interventions implemented as appropriate)   08/21/18 2170   Coping/Psychosocial   Plan of Care Reviewed With patient   Coping/Psychosocial   Patient Agreement with Plan of Care agrees   Plan of Care Review   Progress no change   OTHER   Outcome Summary Pt denies anxiety depression SI HI hallucinations.       Problem: Overarching Goals (Adult)  Goal: Adheres to Safety Considerations for Self and Others  Outcome: Ongoing (interventions implemented as appropriate)    Goal: Optimized Coping Skills in Response to Life Stressors  Outcome: Ongoing (interventions implemented as appropriate)    Goal: Develops/Participates in Therapeutic Landing to Support Successful Transition  Outcome: Ongoing (interventions implemented as appropriate)

## 2018-09-09 ENCOUNTER — HOSPITAL ENCOUNTER (EMERGENCY)
Facility: HOSPITAL | Age: 43
Discharge: HOME | End: 2018-09-10
Attending: FAMILY MEDICINE | Admitting: FAMILY MEDICINE

## 2018-09-09 ENCOUNTER — APPOINTMENT (OUTPATIENT)
Dept: CT IMAGING | Facility: HOSPITAL | Age: 43
End: 2018-09-09

## 2018-09-09 DIAGNOSIS — R10.32 LEFT LOWER QUADRANT PAIN: Primary | ICD-10-CM

## 2018-09-09 LAB
6-ACETYL MORPHINE: NEGATIVE
ALBUMIN SERPL-MCNC: 4.1 G/DL (ref 3.5–5)
ALBUMIN/GLOB SERPL: 1.5 G/DL (ref 1.5–2.5)
ALP SERPL-CCNC: 105 U/L (ref 40–129)
ALT SERPL W P-5'-P-CCNC: 21 U/L (ref 10–44)
AMPHET+METHAMPHET UR QL: NEGATIVE
AMYLASE SERPL-CCNC: 41 U/L (ref 28–100)
ANION GAP SERPL CALCULATED.3IONS-SCNC: 3.4 MMOL/L (ref 3.6–11.2)
AST SERPL-CCNC: 16 U/L (ref 10–34)
BARBITURATES UR QL SCN: NEGATIVE
BASOPHILS # BLD AUTO: 0.05 10*3/MM3 (ref 0–0.3)
BASOPHILS NFR BLD AUTO: 0.5 % (ref 0–2)
BENZODIAZ UR QL SCN: NEGATIVE
BILIRUB SERPL-MCNC: 0.4 MG/DL (ref 0.2–1.8)
BILIRUB UR QL STRIP: NEGATIVE
BUN BLD-MCNC: 7 MG/DL (ref 7–21)
BUN/CREAT SERPL: 7.7 (ref 7–25)
BUPRENORPHINE SERPL-MCNC: NEGATIVE NG/ML
CALCIUM SPEC-SCNC: 9 MG/DL (ref 7.7–10)
CANNABINOIDS SERPL QL: NEGATIVE
CHLORIDE SERPL-SCNC: 111 MMOL/L (ref 99–112)
CLARITY UR: CLEAR
CO2 SERPL-SCNC: 23.6 MMOL/L (ref 24.3–31.9)
COCAINE UR QL: NEGATIVE
COLOR UR: YELLOW
CREAT BLD-MCNC: 0.91 MG/DL (ref 0.43–1.29)
D-LACTATE SERPL-SCNC: 1.6 MMOL/L (ref 0.5–2)
DEPRECATED RDW RBC AUTO: 41.8 FL (ref 37–54)
EOSINOPHIL # BLD AUTO: 0.16 10*3/MM3 (ref 0–0.7)
EOSINOPHIL NFR BLD AUTO: 1.6 % (ref 0–5)
ERYTHROCYTE [DISTWIDTH] IN BLOOD BY AUTOMATED COUNT: 13.3 % (ref 11.5–14.5)
GFR SERPL CREATININE-BSD FRML MDRD: 91 ML/MIN/1.73
GLOBULIN UR ELPH-MCNC: 2.7 GM/DL
GLUCOSE BLD-MCNC: 96 MG/DL (ref 70–110)
GLUCOSE UR STRIP-MCNC: NEGATIVE MG/DL
HCT VFR BLD AUTO: 41.5 % (ref 42–52)
HGB BLD-MCNC: 13.6 G/DL (ref 14–18)
HGB UR QL STRIP.AUTO: NEGATIVE
IMM GRANULOCYTES # BLD: 0.02 10*3/MM3 (ref 0–0.03)
IMM GRANULOCYTES NFR BLD: 0.2 % (ref 0–0.5)
KETONES UR QL STRIP: NEGATIVE
LEUKOCYTE ESTERASE UR QL STRIP.AUTO: NEGATIVE
LIPASE SERPL-CCNC: 37 U/L (ref 13–60)
LYMPHOCYTES # BLD AUTO: 2.36 10*3/MM3 (ref 1–3)
LYMPHOCYTES NFR BLD AUTO: 24.2 % (ref 21–51)
MCH RBC QN AUTO: 28.5 PG (ref 27–33)
MCHC RBC AUTO-ENTMCNC: 32.8 G/DL (ref 33–37)
MCV RBC AUTO: 87 FL (ref 80–94)
METHADONE UR QL SCN: NEGATIVE
MONOCYTES # BLD AUTO: 0.63 10*3/MM3 (ref 0.1–0.9)
MONOCYTES NFR BLD AUTO: 6.5 % (ref 0–10)
NEUTROPHILS # BLD AUTO: 6.52 10*3/MM3 (ref 1.4–6.5)
NEUTROPHILS NFR BLD AUTO: 67 % (ref 30–70)
NITRITE UR QL STRIP: NEGATIVE
OPIATES UR QL: NEGATIVE
OSMOLALITY SERPL CALC.SUM OF ELEC: 273.5 MOSM/KG (ref 273–305)
OXYCODONE UR QL SCN: NEGATIVE
PCP UR QL SCN: NEGATIVE
PH UR STRIP.AUTO: 8 [PH] (ref 5–8)
PLATELET # BLD AUTO: 263 10*3/MM3 (ref 130–400)
PMV BLD AUTO: 10.4 FL (ref 6–10)
POTASSIUM BLD-SCNC: 3.8 MMOL/L (ref 3.5–5.3)
PROT SERPL-MCNC: 6.8 G/DL (ref 6–8)
PROT UR QL STRIP: NEGATIVE
RBC # BLD AUTO: 4.77 10*6/MM3 (ref 4.7–6.1)
SODIUM BLD-SCNC: 138 MMOL/L (ref 135–153)
SP GR UR STRIP: 1.01 (ref 1–1.03)
TROPONIN I SERPL-MCNC: <0.006 NG/ML
UROBILINOGEN UR QL STRIP: NORMAL
WBC NRBC COR # BLD: 9.74 10*3/MM3 (ref 4.5–12.5)

## 2018-09-09 PROCEDURE — 80307 DRUG TEST PRSMV CHEM ANLYZR: CPT | Performed by: FAMILY MEDICINE

## 2018-09-09 PROCEDURE — 81003 URINALYSIS AUTO W/O SCOPE: CPT | Performed by: FAMILY MEDICINE

## 2018-09-09 PROCEDURE — 74176 CT ABD & PELVIS W/O CONTRAST: CPT

## 2018-09-09 PROCEDURE — 25010000002 KETOROLAC TROMETHAMINE PER 15 MG: Performed by: FAMILY MEDICINE

## 2018-09-09 PROCEDURE — 82150 ASSAY OF AMYLASE: CPT | Performed by: FAMILY MEDICINE

## 2018-09-09 PROCEDURE — 25010000002 DICYCLOMINE PER 20 MG: Performed by: FAMILY MEDICINE

## 2018-09-09 PROCEDURE — 83605 ASSAY OF LACTIC ACID: CPT | Performed by: FAMILY MEDICINE

## 2018-09-09 PROCEDURE — 74176 CT ABD & PELVIS W/O CONTRAST: CPT | Performed by: RADIOLOGY

## 2018-09-09 PROCEDURE — 83690 ASSAY OF LIPASE: CPT | Performed by: FAMILY MEDICINE

## 2018-09-09 PROCEDURE — 85025 COMPLETE CBC W/AUTO DIFF WBC: CPT | Performed by: FAMILY MEDICINE

## 2018-09-09 PROCEDURE — 99285 EMERGENCY DEPT VISIT HI MDM: CPT

## 2018-09-09 PROCEDURE — 96374 THER/PROPH/DIAG INJ IV PUSH: CPT

## 2018-09-09 PROCEDURE — 84484 ASSAY OF TROPONIN QUANT: CPT | Performed by: FAMILY MEDICINE

## 2018-09-09 PROCEDURE — 96372 THER/PROPH/DIAG INJ SC/IM: CPT

## 2018-09-09 PROCEDURE — 80053 COMPREHEN METABOLIC PANEL: CPT | Performed by: FAMILY MEDICINE

## 2018-09-09 RX ORDER — DICYCLOMINE HYDROCHLORIDE 10 MG/1
10 CAPSULE ORAL EVERY 8 HOURS PRN
Qty: 30 CAPSULE | Refills: 0 | Status: ON HOLD | OUTPATIENT
Start: 2018-09-09 | End: 2018-09-26

## 2018-09-09 RX ORDER — KETOROLAC TROMETHAMINE 30 MG/ML
30 INJECTION, SOLUTION INTRAMUSCULAR; INTRAVENOUS ONCE
Status: COMPLETED | OUTPATIENT
Start: 2018-09-09 | End: 2018-09-09

## 2018-09-09 RX ORDER — DICYCLOMINE HYDROCHLORIDE 10 MG/ML
20 INJECTION INTRAMUSCULAR ONCE
Status: COMPLETED | OUTPATIENT
Start: 2018-09-09 | End: 2018-09-09

## 2018-09-09 RX ADMIN — KETOROLAC TROMETHAMINE 30 MG: 30 INJECTION, SOLUTION INTRAMUSCULAR; INTRAVENOUS at 20:48

## 2018-09-09 RX ADMIN — DICYCLOMINE HYDROCHLORIDE 20 MG: 20 INJECTION, SOLUTION INTRAMUSCULAR at 22:20

## 2018-09-10 VITALS
HEIGHT: 74 IN | TEMPERATURE: 97.9 F | SYSTOLIC BLOOD PRESSURE: 134 MMHG | DIASTOLIC BLOOD PRESSURE: 97 MMHG | BODY MASS INDEX: 26.31 KG/M2 | HEART RATE: 80 BPM | OXYGEN SATURATION: 98 % | RESPIRATION RATE: 18 BRPM | WEIGHT: 205 LBS

## 2018-09-10 NOTE — ED NOTES
Patient presents to the ER via [facility] due to left lower quadrant abdominal pain that began approximately 2-3 hours ago. Patient states that he is unsure what caused the abdominal pain, states he was laying at home when the pain began, reports that he is not experiencing any vomiting or diarrhea, reports nausea.     Matthew Encarnacion, RN  09/09/18 4929

## 2018-09-10 NOTE — ED PROVIDER NOTES
Subjective   History of Present Illness  Patient is a 43 year old male who presents to the emergency department for a 3 hour history of LLQ pain.  He states that pain began suddenly and has gotten worse since it started.  He describes the pain as an aching/ cramping sensation.  He had some diarrhea earlier today, but no nausea or vomiting.  He denies fever or chills.  Denies blood in his stool.  He hasn't had any additional symptoms and has never had these symptoms in the past.  He has no known history of diverticulosis.   Review of Systems   Constitutional: Negative for activity change, appetite change, chills, diaphoresis, fatigue and fever.   HENT: Negative for congestion, dental problem, postnasal drip, rhinorrhea, sinus pressure, sneezing, sore throat and tinnitus.    Eyes: Negative for discharge and itching.   Respiratory: Negative for cough, choking, chest tightness and shortness of breath.    Cardiovascular: Negative for chest pain, palpitations and leg swelling.   Gastrointestinal: Positive for abdominal pain and diarrhea. Negative for abdominal distention, anal bleeding, blood in stool, constipation, nausea and vomiting.   Endocrine: Negative for cold intolerance and heat intolerance.   Genitourinary: Negative for dysuria, flank pain and hematuria.   Musculoskeletal: Negative for arthralgias, back pain, neck pain and neck stiffness.   Skin: Negative for color change and pallor.   Neurological: Negative for dizziness, light-headedness, numbness and headaches.   Psychiatric/Behavioral: Negative for agitation, behavioral problems and confusion.       Past Medical History:   Diagnosis Date   • Anxiety    • Aortic stenosis    • Atrial fibrillation (CMS/HCC)    • Bipolar disorder (CMS/[facility])    • CAD (coronary artery disease)    • Chronic back pain    • Hallucinations    • Heart attack    • Hyperlipidemia    • Hypertension    • Injury of back    • Nervous breakdown     12 years ago   • Seizure (CMS/HCC)    •  Stroke (CMS/HCC)    • Stroke (CMS/HCC)     affected speech, vision, right side weakness   • Tobacco abuse        Allergies   Allergen Reactions   • Bee Venom Anaphylaxis   • Celexa [Citalopram] Shortness Of Breath   • Fentanyl      Pt states that he stops breathing   • Fentanyl Shortness Of Breath   • Haldol [Haloperidol Lactate] Anaphylaxis   • Haldol [Haloperidol] Shortness Of Breath   • Nitroglycerin Anaphylaxis   • Risperdal [Risperidone] Shortness Of Breath   • Risperidone And Related Anaphylaxis   • Celexa [Citalopram Hydrobromide] Other (See Comments)     Pt states that it causes him to sweat and a severe headache.        Past Surgical History:   Procedure Laterality Date   • AORTIC VALVE REPAIR/REPLACEMENT  2016    tissue valve, performed in [place]   • AORTIC VALVE REPAIR/REPLACEMENT MITRAL VALVE REPAIR/REPLACEMENT     • APPENDECTOMY     • CARDIAC CATHETERIZATION     • CHOLECYSTECTOMY     • HERNIA REPAIR      umbilical   • HIATAL HERNIA REPAIR     • KNEE ARTHROSCOPY     • PACEMAKER IMPLANTATION     • PACEMAKER IMPLANTATION Left 2014 2014 OR 2015.   [employer]. interrogated 3/9/18   • PATELLAR RECONSTRUCTION     • QUADRICEPS REPAIR     • SHOULDER ARTHROSCOPY     • SHOULDER CLOSED REDUCTION Left 3/9/2018    Procedure: LEFT SHOULDER CLOSED REDUCTION;  Surgeon: Steven Wang Jr., MD;  Location: [facility];  Service: Orthopedics       Family History   Problem Relation Age of Onset   • No Known Problems Mother    • No Known Problems Father        Social History     Social History   • Marital status:      Social History Main Topics   • Smoking status: Current Every Day Smoker     Packs/day: 0.50     Types: Cigarettes   • Smokeless tobacco: Never Used      Comment: 2-3 cigarettes   • Alcohol use No      Comment: denies   • Drug use: No      Comment: denies   • Sexual activity: Defer     Other Topics Concern   • Not on file     Social History Narrative    ** Merged History Encounter **          Retired , independentt of ADL, lives with wife           Objective   Physical Exam   Constitutional: He is oriented to person, place, and time. He appears well-developed and well-nourished. No distress.   HENT:   Head: Normocephalic and atraumatic.   Right Ear: External ear normal.   Left Ear: External ear normal.   Mouth/Throat: Oropharynx is clear and moist. No oropharyngeal exudate.   Eyes: Pupils are equal, round, and reactive to light. Conjunctivae and EOM are normal. Right eye exhibits no discharge. Left eye exhibits no discharge. No scleral icterus.   Neck: Normal range of motion. Neck supple. No JVD present. No tracheal deviation present.   Cardiovascular: Normal rate, regular rhythm, normal heart sounds and intact distal pulses.  Exam reveals no gallop and no friction rub.    No murmur heard.  Pulmonary/Chest: Effort normal and breath sounds normal. No stridor. No respiratory distress. He has no wheezes. He has no rales. He exhibits no tenderness.   Abdominal: Soft. Bowel sounds are normal. He exhibits no distension and no mass. There is tenderness. There is no guarding.   Musculoskeletal: Normal range of motion. He exhibits no edema, tenderness or deformity.   Lymphadenopathy:     He has no cervical adenopathy.   Neurological: He is alert and oriented to person, place, and time. No cranial nerve deficit.   Skin: Skin is warm. Capillary refill takes less than 2 seconds. He is not diaphoretic.   Psychiatric: He has a normal mood and affect. His behavior is normal.   Nursing note and vitals reviewed.      Procedures           ED Course  ED Course as of Sep 11 0434   Mon Sep 10, 2018   0016 No acute intraabdominal process  Bilateral nephrolithiasis  Renal cyst CT Abdomen Pelvis Without Contrast [EG]      ED Course User Index  [EG] Eliane Ozuna,                   MDM  Number of Diagnoses or Management Options  Left lower quadrant pain: new and requires workup     Amount and/or Complexity of  Data Reviewed  Clinical lab tests: reviewed and ordered  Tests in the radiology section of CPT®: ordered and reviewed  Tests in the medicine section of CPT®: ordered and reviewed  Decide to obtain previous medical records or to obtain history from someone other than the patient: yes  Review and summarize past medical records: yes    Risk of Complications, Morbidity, and/or Mortality  Presenting problems: high  Diagnostic procedures: high  General comments: Patient is a 43 year old male who presents to the ED for abdominal pain.  Labs and CT scan negative.  Patient's pain resolved with IM bentyl.  Patient discharged home with Rx for Bentyl.     Critical Care  Total time providing critical care: < 30 minutes    Patient Progress  Patient progress: improved        Final diagnoses:   None            Eliane Ozuna DO  09/10/18 0017       Eliane Ozuna DO  09/11/18 0434

## 2018-09-13 ENCOUNTER — OFFICE VISIT (OUTPATIENT)
Dept: PSYCHIATRY | Facility: CLINIC | Age: 43
End: 2018-09-13

## 2018-09-13 VITALS
SYSTOLIC BLOOD PRESSURE: 133 MMHG | HEIGHT: 74 IN | WEIGHT: 206 LBS | DIASTOLIC BLOOD PRESSURE: 93 MMHG | HEART RATE: 84 BPM | BODY MASS INDEX: 26.44 KG/M2

## 2018-09-13 DIAGNOSIS — R56.9 SEIZURE (HCC): ICD-10-CM

## 2018-09-13 DIAGNOSIS — F25.1 SCHIZOAFFECTIVE DISORDER, DEPRESSIVE TYPE (HCC): ICD-10-CM

## 2018-09-13 DIAGNOSIS — I48.91 ATRIAL FIBRILLATION, UNSPECIFIED TYPE (HCC): ICD-10-CM

## 2018-09-13 PROCEDURE — 99214 OFFICE O/P EST MOD 30 MIN: CPT | Performed by: PSYCHIATRY & NEUROLOGY

## 2018-09-13 RX ORDER — TRIFLUOPERAZINE HYDROCHLORIDE 1 MG/1
1 TABLET, FILM COATED ORAL EVERY 12 HOURS SCHEDULED
Qty: 30 TABLET | Refills: 1 | Status: SHIPPED | OUTPATIENT
Start: 2018-09-13 | End: 2018-10-02 | Stop reason: HOSPADM

## 2018-09-13 RX ORDER — LANOLIN ALCOHOL/MO/W.PET/CERES
100 CREAM (GRAM) TOPICAL DAILY
Qty: 30 TABLET | Refills: 0 | Status: SHIPPED | OUTPATIENT
Start: 2018-09-13 | End: 2018-11-01 | Stop reason: HOSPADM

## 2018-09-13 RX ORDER — FLUOXETINE HYDROCHLORIDE 20 MG/1
40 CAPSULE ORAL DAILY
Qty: 30 CAPSULE | Refills: 2 | Status: SHIPPED | OUTPATIENT
Start: 2018-09-13 | End: 2018-10-02 | Stop reason: HOSPADM

## 2018-09-13 NOTE — PROGRESS NOTES
"INITIAL OUTPATIENT PSYCHIATRIC EVALUATION    Chief Complaint:  \"To establish care after hospital discharge\"    HPI:    Bahman Banks is a 43-year-old male who was hospitalized at the [facility] on August 18 until August 22 for symptoms of psychosis, depression, anxiety with auditory and visual hallucinations.  He complained of being \"messed up in the head, seeing shadows and feeling bugs crawling on him\" for 2 weeks and had ran out of his medications with non-compliance of 2 months. There was no evidence or report of substance use and he was treated for the presenting symptoms.    During the hospitalization he was switched to Stelazine due to nonavailability of Loxapine to manage the symptoms of high degree of anxiety in combination with his psychosis. He tolerated the medication change well with a resolution in the AVH. He was continued on the Prozac for the mood symptoms and also initiated on Zydes 5mg. At this time he reports compliance with the medications and denies any side effects.     Bahman denies current  symptoms of depression and reports a good mood. Prior to hospitalization on 8/18 he had reported feeling depressed with on and off thoughts of not wanting to wake up but did not have a plan. He reports that mood has improved and denies any SI. He reports feeling lethargic with increased sleep and appetite.  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  He reports a previous diagnosis of ADHD and reports of worsening in his symptoms due to not being on any medication.  He reports being distractible, unable to concentrate, unable to cope or do household chores due to distractibility.     He denies any symptom of OCD, panic attacks, chanell or hypomania.     Patient reports that he has a history of head injuries with the last one being 25 years ago.     Patient has several comorbidities and is currently takes medications for the management of Hypertension, " "Hyperlipidemia, CAD, a pacemaker in 2014, atrial fibrillation with last EKG showing \"Atrial-sensed ventricular-paced rhythm\".  He reports being evaluated in the emergency room 2 days ago for left lower quadrant abdominal pain due to renal cyst and renal stones.     Patient has several psychosocial stressors including finacial concerns, child with Autism, personal mental illness.     Review of psychiatric sx:    Past Psych History:  Reports previous diagnosis of \"Bipolar disorder, schizophrenia, ADHD, and PTSD.\"    Patient has a history of a psychiatric admissions, with the last one in [place], years ago for \" nervous breakdown,\" related to his diagnosis of Schizophrenia.     Patient has been recently seeing Dr. Hurtado in [place], [place] that had been prescribing his psychotic medicines but he states she wont write them anymore.   Medications:  Previously helpful meds include loxapine 75 Daily.      Substance Use History: (age of onset, last use, how frequent)  Cigarettes: 1 pack per day  Alcohol: last use 25 years ago.   UDS was negative at time of presentation at the hospital. Patient denies any substance or illicit drug use. Denies any alcohol abuse.     Family History:  family history includes No Known Problems in his father and mother.    Medical/Surgical History:  Extensive history of medical issues      Past Medical History:   Diagnosis Date   • Anxiety    • Aortic stenosis    • Atrial fibrillation (CMS/HCC)    • Bipolar disorder (CMS/HCC)    • CAD (coronary artery disease)    • Chronic back pain    • Hallucinations    • Heart attack    • Hyperlipidemia    • Hypertension    • Injury of back    • Nervous breakdown     12 years ago   • Seizure (CMS/HCC)    • Stroke (CMS/HCC)    • Stroke (CMS/HCC)     affected speech, vision, right side weakness   • Tobacco abuse      Past Surgical History:   Procedure Laterality Date   • AORTIC VALVE REPAIR/REPLACEMENT  2016    tissue valve, performed in [place]   • AORTIC " VALVE REPAIR/REPLACEMENT MITRAL VALVE REPAIR/REPLACEMENT     • APPENDECTOMY     • CARDIAC CATHETERIZATION     • CHOLECYSTECTOMY     • HERNIA REPAIR      umbilical   • HIATAL HERNIA REPAIR     • KNEE ARTHROSCOPY     • PACEMAKER IMPLANTATION     • PACEMAKER IMPLANTATION Left 2014 2014 OR 2015.   [employer]. interrogated 3/9/18   • PATELLAR RECONSTRUCTION     • QUADRICEPS REPAIR     • SHOULDER ARTHROSCOPY     • SHOULDER CLOSED REDUCTION Left 3/9/2018    Procedure: LEFT SHOULDER CLOSED REDUCTION;  Surgeon: Steven Wang Jr., MD;  Location: [facility] OR;  Service: Orthopedics       Allergies   Allergen Reactions   • Bee Venom Anaphylaxis   • Celexa [Citalopram] Shortness Of Breath   • Fentanyl      Pt states that he stops breathing   • Fentanyl Shortness Of Breath   • Haldol [Haloperidol Lactate] Anaphylaxis   • Haldol [Haloperidol] Shortness Of Breath   • Nitroglycerin Anaphylaxis   • Risperdal [Risperidone] Shortness Of Breath   • Risperidone And Related Anaphylaxis   • Celexa [Citalopram Hydrobromide] Other (See Comments)     Pt states that it causes him to sweat and a severe headache.        Social History:  Patient was born and raised in [place], [place]. He currently resides in [place], [place] with his wife and 13 year old daughter who has Autism. He has been  for 18 years. He has no contact with his parents. Patient has an 8th grade education and draws disability.     Legal history: deneis    Abuse history:Patient denies any history of sexual or physical abuse.    Current Medications:   Current Outpatient Prescriptions   Medication Sig Dispense Refill   • amLODIPine (NORVASC) 10 MG tablet Take 1 tablet by mouth Daily. 30 tablet 0   • apixaban (ELIQUIS) 5 MG tablet tablet Take 1 tablet by mouth 2 (Two) Times a Day. 60 tablet 0   • atorvastatin (LIPITOR) 20 MG tablet Take 1 tablet by mouth Every Night. 30 tablet 0   • dicyclomine (BENTYL) 10 MG capsule Take 1 capsule by mouth Every 8 (Eight) Hours As  Needed (abdominal pain). 30 capsule 0   • FLUoxetine (PROzac) 20 MG capsule Take 2 capsules by mouth Daily. 30 capsule 2   • HYDROcodone-acetaminophen (NORCO)  MG per tablet Take 1 tablet by mouth Every 8 (Eight) Hours As Needed for Moderate Pain .     • levETIRAcetam (KEPPRA) 1000 MG tablet Take 1,000 mg by mouth 2 (Two) Times a Day.     • lisinopril (PRINIVIL,ZESTRIL) 20 MG tablet Take 1 tablet by mouth Daily. 30 tablet 0   • thiamine (VITAMIN B1) 100 MG tablet Take 1 tablet by mouth Daily. 30 tablet 0   • trifluoperazine (STELAZINE) 1 MG tablet Take 1 tablet by mouth Every 12 (Twelve) Hours. 30 tablet 1     No current facility-administered medications for this visit.        Review of Systems   Constitutional: Negative.    HENT: Negative.    Eyes: Negative.    Respiratory: Positive for chest tightness.    Cardiovascular: Positive for chest pain.   Gastrointestinal: Negative.    Endocrine: Negative.    Genitourinary: Negative.    Musculoskeletal: Positive for back pain.   Skin: Negative.    Allergic/Immunologic: Negative.    Neurological: Negative.    Psychiatric/Behavioral: Positive for dysphoric mood and sleep disturbance. Negative for suicidal ideas. The patient is not nervous/anxious.        Objective   Physical Exam     Constitutional: He is oriented to person, place, and time. He appears well-developed and well-nourished.   Multiple tatoos   HENT:   Head: Normocephalic and atraumatic.   Right Ear: External ear normal.   Nose: Nose normal.   Eyes: Pupils are equal, round, and reactive to light. Conjunctivae and EOM are normal. Right eye exhibits no discharge. Left eye exhibits no discharge. No scleral icterus.   Neck: No JVD present. No tracheal deviation present. No thyromegaly present.   Cardiovascular: Normal rate, regular rhythm and normal heart sounds.  Exam reveals no gallop and no friction rub.    No murmur heard.  Pulmonary/Chest: Effort normal and breath sounds normal. No stridor. No respiratory  "distress. He has no wheezes. He has no rales.   Abdominal: Soft. Bowel sounds are normal. He exhibits no distension and no mass. There is no tenderness. There is no rebound and no guarding.   Musculoskeletal: Normal range of motion. He exhibits no edema, tenderness or deformity.   Lymphadenopathy:     He has no cervical adenopathy.   Neurological: He is alert and oriented to person, place, and time. He displays normal reflexes. No cranial nerve deficit. He exhibits normal muscle tone.   Skin: Skin is warm and dry. No rash noted. No erythema.   Nursing note and vitals reviewed.  Blood pressure 133/93, pulse 84, height 188 cm (74.02\"), weight 93.4 kg (206 lb).      MENTAL STATUS EXAM:  Appearance: Neat, casually dressed, good hygiene, appears much older than stated age  Cooperation:Cooperative with avoidant eye contact  Orientation: Ox4  Gait and station stable.   Psychomotor: No psychomotor agitation/retardation, No EPS, No motor tics  Speech-normal rate, amount.  Mood \"ok\"  Affect-congruent/appropriate.  Thought Content-goal directed, no delusional material present  Thought process-linear, organized.  Suicidality: No SI  Homicidality: No HI  Perception: No AH/VH  Memory is intact for recent and remote events  Concentration: good  Impulse control-good  Insight-fair   Judgement-fair    Assessment/Plan   Diagnoses and all orders for this visit:    Schizoaffective disorder, depressive type (CMS/HCC)  - will increase Prozac to 40mg po qdaily  - will d/c Zyprexa due to increased lethargy and appetite.     Seizure (CMS/HCC)    Atrial fibrillation, unspecified type (CMS/HCC)    Other orders  -     FLUoxetine (PROzac) 20 MG capsule; Take 2 capsules by mouth Daily.  -     trifluoperazine (STELAZINE) 1 MG tablet; Take 1 tablet by mouth Every 12 (Twelve) Hours.  -     thiamine (VITAMIN B1) 100 MG tablet; Take 1 tablet by mouth Daily.      Patient reports a history of ADHD since he was a child and being on stimulant medications " (Aderall 20mg po 6am and 1pm)  prescribed  by his previous psychiatrist who he has not seen in over a year.  I have requested for the patient to bring psychiatric records for review.  However given the extensive history of cardiac issues with many episodes of chest pain and abnormal EKG findings, stimulants are not a good choice. Will consult with cardiology if I consider stimulants.     We discussed risks, benefits, and side effects of the above medication and the patient was agreeable with the plan.     Return in about 4 weeks (around 10/11/2018).  The patient was instructed to call clinic as needed or go to ER if in crisis.

## 2018-09-25 ENCOUNTER — APPOINTMENT (OUTPATIENT)
Dept: GENERAL RADIOLOGY | Facility: HOSPITAL | Age: 43
End: 2018-09-25

## 2018-09-25 ENCOUNTER — APPOINTMENT (OUTPATIENT)
Dept: CT IMAGING | Facility: HOSPITAL | Age: 43
End: 2018-09-25

## 2018-09-25 ENCOUNTER — HOSPITAL ENCOUNTER (EMERGENCY)
Facility: HOSPITAL | Age: 43
Discharge: ANOTHER HOSPITAL | End: 2018-09-26
Attending: EMERGENCY MEDICINE | Admitting: FAMILY MEDICINE

## 2018-09-25 DIAGNOSIS — R07.9 CHEST PAIN IN ADULT: ICD-10-CM

## 2018-09-25 DIAGNOSIS — J40 BRONCHITIS: Primary | ICD-10-CM

## 2018-09-25 DIAGNOSIS — R45.851 SUICIDAL IDEATIONS: ICD-10-CM

## 2018-09-25 LAB
6-ACETYL MORPHINE: NEGATIVE
ALBUMIN SERPL-MCNC: 4.1 G/DL (ref 3.5–5)
ALBUMIN/GLOB SERPL: 1.6 G/DL (ref 1.5–2.5)
ALP SERPL-CCNC: 89 U/L (ref 40–129)
ALT SERPL W P-5'-P-CCNC: 22 U/L (ref 10–44)
AMPHET+METHAMPHET UR QL: NEGATIVE
ANION GAP SERPL CALCULATED.3IONS-SCNC: 2.8 MMOL/L (ref 3.6–11.2)
AST SERPL-CCNC: 15 U/L (ref 10–34)
BARBITURATES UR QL SCN: NEGATIVE
BASOPHILS # BLD AUTO: 0.08 10*3/MM3 (ref 0–0.3)
BASOPHILS NFR BLD AUTO: 0.9 % (ref 0–2)
BENZODIAZ UR QL SCN: NEGATIVE
BILIRUB SERPL-MCNC: 0.3 MG/DL (ref 0.2–1.8)
BILIRUB UR QL STRIP: NEGATIVE
BUN BLD-MCNC: 9 MG/DL (ref 7–21)
BUN/CREAT SERPL: 9.9 (ref 7–25)
BUPRENORPHINE SERPL-MCNC: NEGATIVE NG/ML
CALCIUM SPEC-SCNC: 8.2 MG/DL (ref 7.7–10)
CANNABINOIDS SERPL QL: NEGATIVE
CHLORIDE SERPL-SCNC: 114 MMOL/L (ref 99–112)
CK MB SERPL-CCNC: 0.28 NG/ML (ref 0–5)
CK MB SERPL-RTO: 0.5 % (ref 0–3)
CK SERPL-CCNC: 59 U/L (ref 24–204)
CLARITY UR: CLEAR
CO2 SERPL-SCNC: 24.2 MMOL/L (ref 24.3–31.9)
COCAINE UR QL: NEGATIVE
COLOR UR: YELLOW
CREAT BLD-MCNC: 0.91 MG/DL (ref 0.43–1.29)
D DIMER PPP FEU-MCNC: 0.57 MCGFEU/ML (ref 0–0.5)
DEPRECATED RDW RBC AUTO: 44.1 FL (ref 37–54)
EOSINOPHIL # BLD AUTO: 0.34 10*3/MM3 (ref 0–0.7)
EOSINOPHIL NFR BLD AUTO: 3.6 % (ref 0–5)
ERYTHROCYTE [DISTWIDTH] IN BLOOD BY AUTOMATED COUNT: 13.9 % (ref 11.5–14.5)
ETHANOL BLD-MCNC: <10 MG/DL
ETHANOL UR QL: <0.01 %
FLUAV AG NPH QL: NEGATIVE
FLUBV AG NPH QL IA: NEGATIVE
GFR SERPL CREATININE-BSD FRML MDRD: 91 ML/MIN/1.73
GLOBULIN UR ELPH-MCNC: 2.5 GM/DL
GLUCOSE BLD-MCNC: 106 MG/DL (ref 70–110)
GLUCOSE UR STRIP-MCNC: NEGATIVE MG/DL
HCT VFR BLD AUTO: 43.3 % (ref 42–52)
HGB BLD-MCNC: 14.2 G/DL (ref 14–18)
HGB UR QL STRIP.AUTO: NEGATIVE
IMM GRANULOCYTES # BLD: 0.02 10*3/MM3 (ref 0–0.03)
IMM GRANULOCYTES NFR BLD: 0.2 % (ref 0–0.5)
KETONES UR QL STRIP: NEGATIVE
LEUKOCYTE ESTERASE UR QL STRIP.AUTO: NEGATIVE
LIPASE SERPL-CCNC: 43 U/L (ref 13–60)
LYMPHOCYTES # BLD AUTO: 2.48 10*3/MM3 (ref 1–3)
LYMPHOCYTES NFR BLD AUTO: 26.5 % (ref 21–51)
MCH RBC QN AUTO: 29.2 PG (ref 27–33)
MCHC RBC AUTO-ENTMCNC: 32.8 G/DL (ref 33–37)
MCV RBC AUTO: 89.1 FL (ref 80–94)
METHADONE UR QL SCN: NEGATIVE
MONOCYTES # BLD AUTO: 0.6 10*3/MM3 (ref 0.1–0.9)
MONOCYTES NFR BLD AUTO: 6.4 % (ref 0–10)
NEUTROPHILS # BLD AUTO: 5.83 10*3/MM3 (ref 1.4–6.5)
NEUTROPHILS NFR BLD AUTO: 62.4 % (ref 30–70)
NITRITE UR QL STRIP: NEGATIVE
OPIATES UR QL: POSITIVE
OSMOLALITY SERPL CALC.SUM OF ELEC: 280.4 MOSM/KG (ref 273–305)
OXYCODONE UR QL SCN: NEGATIVE
PCP UR QL SCN: NEGATIVE
PH UR STRIP.AUTO: 7.5 [PH] (ref 5–8)
PLATELET # BLD AUTO: 297 10*3/MM3 (ref 130–400)
PMV BLD AUTO: 10.3 FL (ref 6–10)
POTASSIUM BLD-SCNC: 3.6 MMOL/L (ref 3.5–5.3)
PROT SERPL-MCNC: 6.6 G/DL (ref 6–8)
PROT UR QL STRIP: NEGATIVE
RBC # BLD AUTO: 4.86 10*6/MM3 (ref 4.7–6.1)
SODIUM BLD-SCNC: 141 MMOL/L (ref 135–153)
SP GR UR STRIP: 1.02 (ref 1–1.03)
TROPONIN I SERPL-MCNC: <0.006 NG/ML
TROPONIN I SERPL-MCNC: <0.006 NG/ML
UROBILINOGEN UR QL STRIP: NORMAL
WBC NRBC COR # BLD: 9.35 10*3/MM3 (ref 4.5–12.5)

## 2018-09-25 PROCEDURE — 85379 FIBRIN DEGRADATION QUANT: CPT | Performed by: EMERGENCY MEDICINE

## 2018-09-25 PROCEDURE — 99285 EMERGENCY DEPT VISIT HI MDM: CPT

## 2018-09-25 PROCEDURE — 83690 ASSAY OF LIPASE: CPT | Performed by: EMERGENCY MEDICINE

## 2018-09-25 PROCEDURE — 82553 CREATINE MB FRACTION: CPT | Performed by: FAMILY MEDICINE

## 2018-09-25 PROCEDURE — 71045 X-RAY EXAM CHEST 1 VIEW: CPT | Performed by: RADIOLOGY

## 2018-09-25 PROCEDURE — 81003 URINALYSIS AUTO W/O SCOPE: CPT | Performed by: EMERGENCY MEDICINE

## 2018-09-25 PROCEDURE — 80307 DRUG TEST PRSMV CHEM ANLYZR: CPT | Performed by: EMERGENCY MEDICINE

## 2018-09-25 PROCEDURE — 36415 COLL VENOUS BLD VENIPUNCTURE: CPT

## 2018-09-25 PROCEDURE — 25010000002 MORPHINE SULFATE (PF) 2 MG/ML SOLUTION: Performed by: EMERGENCY MEDICINE

## 2018-09-25 PROCEDURE — 80053 COMPREHEN METABOLIC PANEL: CPT | Performed by: EMERGENCY MEDICINE

## 2018-09-25 PROCEDURE — 93010 ELECTROCARDIOGRAM REPORT: CPT | Performed by: INTERNAL MEDICINE

## 2018-09-25 PROCEDURE — 87804 INFLUENZA ASSAY W/OPTIC: CPT | Performed by: EMERGENCY MEDICINE

## 2018-09-25 PROCEDURE — 84484 ASSAY OF TROPONIN QUANT: CPT | Performed by: EMERGENCY MEDICINE

## 2018-09-25 PROCEDURE — 0 IOPAMIDOL PER 1 ML: Performed by: FAMILY MEDICINE

## 2018-09-25 PROCEDURE — 96375 TX/PRO/DX INJ NEW DRUG ADDON: CPT

## 2018-09-25 PROCEDURE — 85025 COMPLETE CBC W/AUTO DIFF WBC: CPT | Performed by: EMERGENCY MEDICINE

## 2018-09-25 PROCEDURE — 93005 ELECTROCARDIOGRAM TRACING: CPT | Performed by: EMERGENCY MEDICINE

## 2018-09-25 PROCEDURE — 84484 ASSAY OF TROPONIN QUANT: CPT | Performed by: FAMILY MEDICINE

## 2018-09-25 PROCEDURE — 71275 CT ANGIOGRAPHY CHEST: CPT

## 2018-09-25 PROCEDURE — 82550 ASSAY OF CK (CPK): CPT | Performed by: FAMILY MEDICINE

## 2018-09-25 PROCEDURE — 96374 THER/PROPH/DIAG INJ IV PUSH: CPT

## 2018-09-25 PROCEDURE — 71045 X-RAY EXAM CHEST 1 VIEW: CPT

## 2018-09-25 PROCEDURE — 25010000002 METHYLPREDNISOLONE PER 125 MG: Performed by: FAMILY MEDICINE

## 2018-09-25 PROCEDURE — 71275 CT ANGIOGRAPHY CHEST: CPT | Performed by: RADIOLOGY

## 2018-09-25 PROCEDURE — 25010000002 ONDANSETRON PER 1 MG: Performed by: EMERGENCY MEDICINE

## 2018-09-25 RX ORDER — ACETAMINOPHEN 500 MG
1000 TABLET ORAL ONCE
Status: COMPLETED | OUTPATIENT
Start: 2018-09-25 | End: 2018-09-25

## 2018-09-25 RX ORDER — MORPHINE SULFATE 2 MG/ML
4 INJECTION, SOLUTION INTRAMUSCULAR; INTRAVENOUS ONCE
Status: COMPLETED | OUTPATIENT
Start: 2018-09-25 | End: 2018-09-25

## 2018-09-25 RX ORDER — PANTOPRAZOLE SODIUM 40 MG/10ML
40 INJECTION, POWDER, LYOPHILIZED, FOR SOLUTION INTRAVENOUS ONCE
Status: COMPLETED | OUTPATIENT
Start: 2018-09-25 | End: 2018-09-25

## 2018-09-25 RX ORDER — METHYLPREDNISOLONE SODIUM SUCCINATE 125 MG/2ML
60 INJECTION, POWDER, LYOPHILIZED, FOR SOLUTION INTRAMUSCULAR; INTRAVENOUS ONCE
Status: COMPLETED | OUTPATIENT
Start: 2018-09-25 | End: 2018-09-25

## 2018-09-25 RX ORDER — ONDANSETRON 2 MG/ML
4 INJECTION INTRAMUSCULAR; INTRAVENOUS ONCE
Status: COMPLETED | OUTPATIENT
Start: 2018-09-25 | End: 2018-09-25

## 2018-09-25 RX ORDER — ASPIRIN 81 MG/1
324 TABLET, CHEWABLE ORAL ONCE
Status: COMPLETED | OUTPATIENT
Start: 2018-09-25 | End: 2018-09-25

## 2018-09-25 RX ORDER — SODIUM CHLORIDE 0.9 % (FLUSH) 0.9 %
10 SYRINGE (ML) INJECTION AS NEEDED
Status: DISCONTINUED | OUTPATIENT
Start: 2018-09-25 | End: 2018-09-26 | Stop reason: HOSPADM

## 2018-09-25 RX ADMIN — ASPIRIN 324 MG: 81 TABLET, CHEWABLE ORAL at 19:12

## 2018-09-25 RX ADMIN — METHYLPREDNISOLONE SODIUM SUCCINATE 60 MG: 125 INJECTION, POWDER, FOR SOLUTION INTRAMUSCULAR; INTRAVENOUS at 22:14

## 2018-09-25 RX ADMIN — ONDANSETRON HYDROCHLORIDE 4 MG: 2 INJECTION, SOLUTION INTRAMUSCULAR; INTRAVENOUS at 19:12

## 2018-09-25 RX ADMIN — ACETAMINOPHEN 1000 MG: 500 TABLET, FILM COATED ORAL at 21:09

## 2018-09-25 RX ADMIN — MORPHINE SULFATE 4 MG: 2 INJECTION, SOLUTION INTRAMUSCULAR; INTRAVENOUS at 19:12

## 2018-09-25 RX ADMIN — PANTOPRAZOLE SODIUM 40 MG: 40 INJECTION, POWDER, FOR SOLUTION INTRAVENOUS at 19:12

## 2018-09-25 RX ADMIN — IOPAMIDOL 100 ML: 755 INJECTION, SOLUTION INTRAVENOUS at 21:06

## 2018-09-26 ENCOUNTER — HOSPITAL ENCOUNTER (INPATIENT)
Facility: HOSPITAL | Age: 43
LOS: 6 days | Discharge: HOME | End: 2018-10-02
Attending: PSYCHIATRY & NEUROLOGY | Admitting: PSYCHIATRY & NEUROLOGY

## 2018-09-26 VITALS
HEART RATE: 73 BPM | HEIGHT: 74 IN | RESPIRATION RATE: 18 BRPM | OXYGEN SATURATION: 99 % | SYSTOLIC BLOOD PRESSURE: 130 MMHG | TEMPERATURE: 98.5 F | WEIGHT: 205 LBS | DIASTOLIC BLOOD PRESSURE: 73 MMHG | BODY MASS INDEX: 26.31 KG/M2

## 2018-09-26 PROBLEM — F32.A DEPRESSION WITH SUICIDAL IDEATION: Status: ACTIVE | Noted: 2018-09-26

## 2018-09-26 PROBLEM — R45.851 DEPRESSION WITH SUICIDAL IDEATION: Status: ACTIVE | Noted: 2018-09-26

## 2018-09-26 PROCEDURE — 99223 1ST HOSP IP/OBS HIGH 75: CPT | Performed by: PSYCHIATRY & NEUROLOGY

## 2018-09-26 RX ORDER — LEVETIRACETAM 500 MG/1
1000 TABLET ORAL 2 TIMES DAILY
Status: CANCELLED | OUTPATIENT
Start: 2018-09-26

## 2018-09-26 RX ORDER — AMLODIPINE BESYLATE 10 MG/1
10 TABLET ORAL
Status: DISCONTINUED | OUTPATIENT
Start: 2018-09-26 | End: 2018-10-02 | Stop reason: HOSPADM

## 2018-09-26 RX ORDER — FLUOXETINE HYDROCHLORIDE 20 MG/1
40 CAPSULE ORAL DAILY
Status: DISCONTINUED | OUTPATIENT
Start: 2018-09-26 | End: 2018-09-27

## 2018-09-26 RX ORDER — HYDROCODONE BITARTRATE AND ACETAMINOPHEN 10; 325 MG/1; MG/1
1 TABLET ORAL EVERY 8 HOURS PRN
Status: CANCELLED | OUTPATIENT
Start: 2018-09-26

## 2018-09-26 RX ORDER — TRIFLUOPERAZINE HYDROCHLORIDE 1 MG/1
1 TABLET, FILM COATED ORAL EVERY 12 HOURS SCHEDULED
Status: CANCELLED | OUTPATIENT
Start: 2018-09-26

## 2018-09-26 RX ORDER — HYDROCODONE BITARTRATE AND ACETAMINOPHEN 10; 325 MG/1; MG/1
1 TABLET ORAL EVERY 12 HOURS PRN
Status: DISCONTINUED | OUTPATIENT
Start: 2018-09-26 | End: 2018-09-26

## 2018-09-26 RX ORDER — BENZTROPINE MESYLATE 1 MG/1
1 TABLET ORAL DAILY PRN
Status: DISCONTINUED | OUTPATIENT
Start: 2018-09-26 | End: 2018-10-02 | Stop reason: HOSPADM

## 2018-09-26 RX ORDER — ECHINACEA PURPUREA EXTRACT 125 MG
2 TABLET ORAL AS NEEDED
Status: DISCONTINUED | OUTPATIENT
Start: 2018-09-26 | End: 2018-10-02 | Stop reason: HOSPADM

## 2018-09-26 RX ORDER — FAMOTIDINE 20 MG/1
20 TABLET, FILM COATED ORAL 2 TIMES DAILY PRN
Status: DISCONTINUED | OUTPATIENT
Start: 2018-09-26 | End: 2018-10-02 | Stop reason: HOSPADM

## 2018-09-26 RX ORDER — TRAZODONE HYDROCHLORIDE 50 MG/1
50 TABLET ORAL NIGHTLY PRN
Status: DISCONTINUED | OUTPATIENT
Start: 2018-09-26 | End: 2018-10-02 | Stop reason: HOSPADM

## 2018-09-26 RX ORDER — THIAMINE HCL 50 MG
100 TABLET ORAL DAILY
Status: CANCELLED | OUTPATIENT
Start: 2018-09-26

## 2018-09-26 RX ORDER — BENZONATATE 100 MG/1
100 CAPSULE ORAL 3 TIMES DAILY PRN
Status: DISCONTINUED | OUTPATIENT
Start: 2018-09-26 | End: 2018-10-02 | Stop reason: HOSPADM

## 2018-09-26 RX ORDER — NICOTINE 21 MG/24HR
1 PATCH, TRANSDERMAL 24 HOURS TRANSDERMAL EVERY 24 HOURS
Status: DISCONTINUED | OUTPATIENT
Start: 2018-09-26 | End: 2018-10-02 | Stop reason: HOSPADM

## 2018-09-26 RX ORDER — LEVETIRACETAM 500 MG/1
1000 TABLET ORAL EVERY 12 HOURS SCHEDULED
Status: DISCONTINUED | OUTPATIENT
Start: 2018-09-26 | End: 2018-10-02 | Stop reason: HOSPADM

## 2018-09-26 RX ORDER — IBUPROFEN 600 MG/1
600 TABLET ORAL EVERY 6 HOURS PRN
Status: DISCONTINUED | OUTPATIENT
Start: 2018-09-26 | End: 2018-10-02 | Stop reason: HOSPADM

## 2018-09-26 RX ORDER — THIAMINE HCL 50 MG
100 TABLET ORAL DAILY
Status: DISCONTINUED | OUTPATIENT
Start: 2018-09-26 | End: 2018-10-02 | Stop reason: HOSPADM

## 2018-09-26 RX ORDER — HYDROXYZINE 50 MG/1
50 TABLET, FILM COATED ORAL EVERY 6 HOURS PRN
Status: DISCONTINUED | OUTPATIENT
Start: 2018-09-26 | End: 2018-10-02 | Stop reason: HOSPADM

## 2018-09-26 RX ORDER — ATORVASTATIN CALCIUM 20 MG/1
20 TABLET, FILM COATED ORAL NIGHTLY
Status: DISCONTINUED | OUTPATIENT
Start: 2018-09-26 | End: 2018-10-02 | Stop reason: HOSPADM

## 2018-09-26 RX ORDER — AMLODIPINE BESYLATE 10 MG/1
10 TABLET ORAL
Status: CANCELLED | OUTPATIENT
Start: 2018-09-26

## 2018-09-26 RX ORDER — HYDROCODONE BITARTRATE AND ACETAMINOPHEN 10; 325 MG/1; MG/1
1 TABLET ORAL EVERY 8 HOURS PRN
Status: DISCONTINUED | OUTPATIENT
Start: 2018-09-26 | End: 2018-10-02 | Stop reason: HOSPADM

## 2018-09-26 RX ORDER — ATORVASTATIN CALCIUM 20 MG/1
20 TABLET, FILM COATED ORAL NIGHTLY
Status: CANCELLED | OUTPATIENT
Start: 2018-09-26

## 2018-09-26 RX ORDER — ONDANSETRON 4 MG/1
4 TABLET, FILM COATED ORAL EVERY 6 HOURS PRN
Status: DISCONTINUED | OUTPATIENT
Start: 2018-09-26 | End: 2018-10-02 | Stop reason: HOSPADM

## 2018-09-26 RX ORDER — LISINOPRIL 10 MG/1
20 TABLET ORAL
Status: CANCELLED | OUTPATIENT
Start: 2018-09-26

## 2018-09-26 RX ORDER — FLUOXETINE HYDROCHLORIDE 20 MG/1
40 CAPSULE ORAL DAILY
Status: CANCELLED | OUTPATIENT
Start: 2018-09-26

## 2018-09-26 RX ORDER — TRIFLUOPERAZINE HYDROCHLORIDE 1 MG/1
1 TABLET, FILM COATED ORAL EVERY 12 HOURS SCHEDULED
Status: DISCONTINUED | OUTPATIENT
Start: 2018-09-26 | End: 2018-09-27

## 2018-09-26 RX ORDER — BENZTROPINE MESYLATE 1 MG/ML
0.5 INJECTION INTRAMUSCULAR; INTRAVENOUS DAILY PRN
Status: DISCONTINUED | OUTPATIENT
Start: 2018-09-26 | End: 2018-10-02 | Stop reason: HOSPADM

## 2018-09-26 RX ORDER — LISINOPRIL 10 MG/1
20 TABLET ORAL
Status: DISCONTINUED | OUTPATIENT
Start: 2018-09-26 | End: 2018-10-02 | Stop reason: HOSPADM

## 2018-09-26 RX ORDER — ALUMINA, MAGNESIA, AND SIMETHICONE 2400; 2400; 240 MG/30ML; MG/30ML; MG/30ML
15 SUSPENSION ORAL EVERY 6 HOURS PRN
Status: DISCONTINUED | OUTPATIENT
Start: 2018-09-26 | End: 2018-10-02 | Stop reason: HOSPADM

## 2018-09-26 RX ADMIN — LEVETIRACETAM 1000 MG: 500 TABLET, FILM COATED ORAL at 21:03

## 2018-09-26 RX ADMIN — THIAMINE HCL (VITAMIN B1) 50 MG TABLET 100 MG: at 13:57

## 2018-09-26 RX ADMIN — APIXABAN 5 MG: 5 TABLET, FILM COATED ORAL at 13:56

## 2018-09-26 RX ADMIN — TRAZODONE HYDROCHLORIDE 50 MG: 50 TABLET ORAL at 21:03

## 2018-09-26 RX ADMIN — TRIFLUOPERAZINE HYDROCHLORIDE 1 MG: 1 TABLET, FILM COATED ORAL at 13:58

## 2018-09-26 RX ADMIN — HYDROCODONE BITARTRATE AND ACETAMINOPHEN 1 TABLET: 10; 325 TABLET ORAL at 13:59

## 2018-09-26 RX ADMIN — LISINOPRIL 20 MG: 10 TABLET ORAL at 13:58

## 2018-09-26 RX ADMIN — FLUOXETINE 40 MG: 20 CAPSULE ORAL at 13:56

## 2018-09-26 RX ADMIN — AMLODIPINE BESYLATE 10 MG: 10 TABLET ORAL at 13:58

## 2018-09-26 RX ADMIN — TRAZODONE HYDROCHLORIDE 50 MG: 50 TABLET ORAL at 01:34

## 2018-09-26 RX ADMIN — TRIFLUOPERAZINE HYDROCHLORIDE 1 MG: 1 TABLET, FILM COATED ORAL at 21:03

## 2018-09-26 RX ADMIN — APIXABAN 5 MG: 5 TABLET, FILM COATED ORAL at 21:03

## 2018-09-26 RX ADMIN — HYDROCODONE BITARTRATE AND ACETAMINOPHEN 1 TABLET: 10; 325 TABLET ORAL at 21:03

## 2018-09-26 RX ADMIN — ATORVASTATIN CALCIUM 20 MG: 20 TABLET, FILM COATED ORAL at 21:03

## 2018-09-26 RX ADMIN — LEVETIRACETAM 1000 MG: 500 TABLET, FILM COATED ORAL at 13:56

## 2018-09-27 PROBLEM — F14.11 COCAINE USE DISORDER, MILD, IN SUSTAINED REMISSION (HCC): Status: ACTIVE | Noted: 2018-09-27

## 2018-09-27 PROCEDURE — 99232 SBSQ HOSP IP/OBS MODERATE 35: CPT | Performed by: PSYCHIATRY & NEUROLOGY

## 2018-09-27 RX ORDER — BUPROPION HYDROCHLORIDE 75 MG/1
150 TABLET ORAL EVERY MORNING
Status: DISCONTINUED | OUTPATIENT
Start: 2018-09-28 | End: 2018-10-02 | Stop reason: HOSPADM

## 2018-09-27 RX ORDER — TRIFLUOPERAZINE HYDROCHLORIDE 1 MG/1
2 TABLET, FILM COATED ORAL NIGHTLY
Status: DISCONTINUED | OUTPATIENT
Start: 2018-09-27 | End: 2018-10-02 | Stop reason: HOSPADM

## 2018-09-27 RX ORDER — TRIFLUOPERAZINE HYDROCHLORIDE 1 MG/1
1 TABLET, FILM COATED ORAL EVERY MORNING
Status: DISCONTINUED | OUTPATIENT
Start: 2018-09-28 | End: 2018-10-02 | Stop reason: HOSPADM

## 2018-09-27 RX ORDER — FLUOXETINE HYDROCHLORIDE 20 MG/1
60 CAPSULE ORAL DAILY
Status: DISCONTINUED | OUTPATIENT
Start: 2018-09-28 | End: 2018-09-27

## 2018-09-27 RX ORDER — FLUOXETINE HYDROCHLORIDE 20 MG/1
40 CAPSULE ORAL DAILY
Status: DISCONTINUED | OUTPATIENT
Start: 2018-09-28 | End: 2018-10-02 | Stop reason: HOSPADM

## 2018-09-27 RX ADMIN — AMLODIPINE BESYLATE 10 MG: 10 TABLET ORAL at 09:12

## 2018-09-27 RX ADMIN — LEVETIRACETAM 1000 MG: 500 TABLET, FILM COATED ORAL at 20:21

## 2018-09-27 RX ADMIN — LEVETIRACETAM 1000 MG: 500 TABLET, FILM COATED ORAL at 09:12

## 2018-09-27 RX ADMIN — TRIFLUOPERAZINE HYDROCHLORIDE 2 MG: 1 TABLET, FILM COATED ORAL at 20:21

## 2018-09-27 RX ADMIN — LISINOPRIL 20 MG: 10 TABLET ORAL at 09:13

## 2018-09-27 RX ADMIN — HYDROCODONE BITARTRATE AND ACETAMINOPHEN 1 TABLET: 10; 325 TABLET ORAL at 15:07

## 2018-09-27 RX ADMIN — APIXABAN 5 MG: 5 TABLET, FILM COATED ORAL at 09:12

## 2018-09-27 RX ADMIN — APIXABAN 5 MG: 5 TABLET, FILM COATED ORAL at 20:21

## 2018-09-27 RX ADMIN — TRAZODONE HYDROCHLORIDE 50 MG: 50 TABLET ORAL at 20:21

## 2018-09-27 RX ADMIN — THIAMINE HCL (VITAMIN B1) 50 MG TABLET 100 MG: at 09:12

## 2018-09-27 RX ADMIN — TRIFLUOPERAZINE HYDROCHLORIDE 1 MG: 1 TABLET, FILM COATED ORAL at 09:12

## 2018-09-27 RX ADMIN — ATORVASTATIN CALCIUM 20 MG: 20 TABLET, FILM COATED ORAL at 20:21

## 2018-09-27 RX ADMIN — HYDROCODONE BITARTRATE AND ACETAMINOPHEN 1 TABLET: 10; 325 TABLET ORAL at 07:24

## 2018-09-27 RX ADMIN — FLUOXETINE 40 MG: 20 CAPSULE ORAL at 09:12

## 2018-09-28 PROCEDURE — 99232 SBSQ HOSP IP/OBS MODERATE 35: CPT | Performed by: PSYCHIATRY & NEUROLOGY

## 2018-09-28 RX ADMIN — LEVETIRACETAM 1000 MG: 500 TABLET, FILM COATED ORAL at 09:31

## 2018-09-28 RX ADMIN — APIXABAN 5 MG: 5 TABLET, FILM COATED ORAL at 20:52

## 2018-09-28 RX ADMIN — LEVETIRACETAM 1000 MG: 500 TABLET, FILM COATED ORAL at 20:52

## 2018-09-28 RX ADMIN — HYDROCODONE BITARTRATE AND ACETAMINOPHEN 1 TABLET: 10; 325 TABLET ORAL at 07:26

## 2018-09-28 RX ADMIN — THIAMINE HCL (VITAMIN B1) 50 MG TABLET 100 MG: at 08:14

## 2018-09-28 RX ADMIN — HYDROCODONE BITARTRATE AND ACETAMINOPHEN 1 TABLET: 10; 325 TABLET ORAL at 15:17

## 2018-09-28 RX ADMIN — TRIFLUOPERAZINE HYDROCHLORIDE 2 MG: 1 TABLET, FILM COATED ORAL at 20:53

## 2018-09-28 RX ADMIN — LISINOPRIL 20 MG: 10 TABLET ORAL at 08:14

## 2018-09-28 RX ADMIN — BUPROPION HYDROCHLORIDE 150 MG: 75 TABLET, FILM COATED ORAL at 06:07

## 2018-09-28 RX ADMIN — ATORVASTATIN CALCIUM 20 MG: 20 TABLET, FILM COATED ORAL at 20:52

## 2018-09-28 RX ADMIN — TRIFLUOPERAZINE HYDROCHLORIDE 1 MG: 1 TABLET, FILM COATED ORAL at 06:07

## 2018-09-28 RX ADMIN — APIXABAN 5 MG: 5 TABLET, FILM COATED ORAL at 08:14

## 2018-09-28 RX ADMIN — AMLODIPINE BESYLATE 10 MG: 10 TABLET ORAL at 08:14

## 2018-09-28 RX ADMIN — FLUOXETINE 40 MG: 20 CAPSULE ORAL at 08:14

## 2018-09-28 RX ADMIN — TRAZODONE HYDROCHLORIDE 50 MG: 50 TABLET ORAL at 20:52

## 2018-09-29 PROCEDURE — 99231 SBSQ HOSP IP/OBS SF/LOW 25: CPT | Performed by: PSYCHIATRY & NEUROLOGY

## 2018-09-29 RX ADMIN — HYDROCODONE BITARTRATE AND ACETAMINOPHEN 1 TABLET: 10; 325 TABLET ORAL at 06:35

## 2018-09-29 RX ADMIN — LEVETIRACETAM 1000 MG: 500 TABLET, FILM COATED ORAL at 20:20

## 2018-09-29 RX ADMIN — THIAMINE HCL (VITAMIN B1) 50 MG TABLET 100 MG: at 08:19

## 2018-09-29 RX ADMIN — APIXABAN 5 MG: 5 TABLET, FILM COATED ORAL at 20:20

## 2018-09-29 RX ADMIN — LEVETIRACETAM 1000 MG: 500 TABLET, FILM COATED ORAL at 08:19

## 2018-09-29 RX ADMIN — ATORVASTATIN CALCIUM 20 MG: 20 TABLET, FILM COATED ORAL at 20:20

## 2018-09-29 RX ADMIN — BUPROPION HYDROCHLORIDE 150 MG: 75 TABLET, FILM COATED ORAL at 06:10

## 2018-09-29 RX ADMIN — FLUOXETINE 40 MG: 20 CAPSULE ORAL at 08:19

## 2018-09-29 RX ADMIN — TRAZODONE HYDROCHLORIDE 50 MG: 50 TABLET ORAL at 20:20

## 2018-09-29 RX ADMIN — HYDROCODONE BITARTRATE AND ACETAMINOPHEN 1 TABLET: 10; 325 TABLET ORAL at 14:26

## 2018-09-29 RX ADMIN — HYDROXYZINE HYDROCHLORIDE 50 MG: 50 TABLET ORAL at 14:27

## 2018-09-29 RX ADMIN — TRIFLUOPERAZINE HYDROCHLORIDE 1 MG: 1 TABLET, FILM COATED ORAL at 06:10

## 2018-09-29 RX ADMIN — TRIFLUOPERAZINE HYDROCHLORIDE 2 MG: 1 TABLET, FILM COATED ORAL at 20:20

## 2018-09-29 RX ADMIN — LISINOPRIL 20 MG: 10 TABLET ORAL at 08:19

## 2018-09-29 RX ADMIN — AMLODIPINE BESYLATE 10 MG: 10 TABLET ORAL at 08:19

## 2018-09-29 RX ADMIN — APIXABAN 5 MG: 5 TABLET, FILM COATED ORAL at 08:19

## 2018-09-30 PROCEDURE — 99231 SBSQ HOSP IP/OBS SF/LOW 25: CPT | Performed by: PSYCHIATRY & NEUROLOGY

## 2018-09-30 RX ADMIN — LEVETIRACETAM 1000 MG: 500 TABLET, FILM COATED ORAL at 20:57

## 2018-09-30 RX ADMIN — LISINOPRIL 20 MG: 10 TABLET ORAL at 08:50

## 2018-09-30 RX ADMIN — TRIFLUOPERAZINE HYDROCHLORIDE 2 MG: 1 TABLET, FILM COATED ORAL at 20:57

## 2018-09-30 RX ADMIN — ATORVASTATIN CALCIUM 20 MG: 20 TABLET, FILM COATED ORAL at 20:57

## 2018-09-30 RX ADMIN — HYDROXYZINE HYDROCHLORIDE 50 MG: 50 TABLET ORAL at 09:31

## 2018-09-30 RX ADMIN — HYDROCODONE BITARTRATE AND ACETAMINOPHEN 1 TABLET: 10; 325 TABLET ORAL at 09:31

## 2018-09-30 RX ADMIN — LEVETIRACETAM 1000 MG: 500 TABLET, FILM COATED ORAL at 08:50

## 2018-09-30 RX ADMIN — TRIFLUOPERAZINE HYDROCHLORIDE 1 MG: 1 TABLET, FILM COATED ORAL at 06:08

## 2018-09-30 RX ADMIN — APIXABAN 5 MG: 5 TABLET, FILM COATED ORAL at 20:57

## 2018-09-30 RX ADMIN — HYDROXYZINE HYDROCHLORIDE 50 MG: 50 TABLET ORAL at 17:16

## 2018-09-30 RX ADMIN — HYDROCODONE BITARTRATE AND ACETAMINOPHEN 1 TABLET: 10; 325 TABLET ORAL at 17:16

## 2018-09-30 RX ADMIN — THIAMINE HCL (VITAMIN B1) 50 MG TABLET 100 MG: at 08:50

## 2018-09-30 RX ADMIN — FLUOXETINE 40 MG: 20 CAPSULE ORAL at 08:50

## 2018-09-30 RX ADMIN — APIXABAN 5 MG: 5 TABLET, FILM COATED ORAL at 08:50

## 2018-09-30 RX ADMIN — HYDROCODONE BITARTRATE AND ACETAMINOPHEN 1 TABLET: 10; 325 TABLET ORAL at 01:39

## 2018-09-30 RX ADMIN — AMLODIPINE BESYLATE 10 MG: 10 TABLET ORAL at 08:50

## 2018-09-30 RX ADMIN — BUPROPION HYDROCHLORIDE 150 MG: 75 TABLET, FILM COATED ORAL at 06:07

## 2018-10-01 PROCEDURE — 99232 SBSQ HOSP IP/OBS MODERATE 35: CPT | Performed by: PSYCHIATRY & NEUROLOGY

## 2018-10-01 RX ADMIN — LEVETIRACETAM 1000 MG: 500 TABLET, FILM COATED ORAL at 20:41

## 2018-10-01 RX ADMIN — APIXABAN 5 MG: 5 TABLET, FILM COATED ORAL at 09:18

## 2018-10-01 RX ADMIN — HYDROXYZINE HYDROCHLORIDE 50 MG: 50 TABLET ORAL at 15:41

## 2018-10-01 RX ADMIN — ATORVASTATIN CALCIUM 20 MG: 20 TABLET, FILM COATED ORAL at 20:41

## 2018-10-01 RX ADMIN — THIAMINE HCL (VITAMIN B1) 50 MG TABLET 100 MG: at 09:18

## 2018-10-01 RX ADMIN — HYDROCODONE BITARTRATE AND ACETAMINOPHEN 1 TABLET: 10; 325 TABLET ORAL at 15:41

## 2018-10-01 RX ADMIN — APIXABAN 5 MG: 5 TABLET, FILM COATED ORAL at 20:41

## 2018-10-01 RX ADMIN — FLUOXETINE 40 MG: 20 CAPSULE ORAL at 09:18

## 2018-10-01 RX ADMIN — LEVETIRACETAM 1000 MG: 500 TABLET, FILM COATED ORAL at 09:18

## 2018-10-01 RX ADMIN — BUPROPION HYDROCHLORIDE 150 MG: 75 TABLET, FILM COATED ORAL at 06:03

## 2018-10-01 RX ADMIN — AMLODIPINE BESYLATE 10 MG: 10 TABLET ORAL at 09:18

## 2018-10-01 RX ADMIN — TRIFLUOPERAZINE HYDROCHLORIDE 2 MG: 1 TABLET, FILM COATED ORAL at 20:41

## 2018-10-01 RX ADMIN — TRAZODONE HYDROCHLORIDE 50 MG: 50 TABLET ORAL at 20:43

## 2018-10-01 RX ADMIN — LISINOPRIL 20 MG: 10 TABLET ORAL at 09:18

## 2018-10-01 RX ADMIN — HYDROXYZINE HYDROCHLORIDE 50 MG: 50 TABLET ORAL at 06:50

## 2018-10-01 RX ADMIN — TRIFLUOPERAZINE HYDROCHLORIDE 1 MG: 1 TABLET, FILM COATED ORAL at 06:04

## 2018-10-01 RX ADMIN — HYDROCODONE BITARTRATE AND ACETAMINOPHEN 1 TABLET: 10; 325 TABLET ORAL at 06:50

## 2018-10-01 NOTE — PLAN OF CARE
Problem: Patient Care Overview  Goal: Plan of Care Review  Outcome: Ongoing (interventions implemented as appropriate)   10/01/18 6891   Coping/Psychosocial   Plan of Care Reviewed With patient   Coping/Psychosocial   Patient Agreement with Plan of Care agrees   Plan of Care Review   Progress improving   OTHER   Outcome Summary PT DENIES ANY ISSUES. DENIES ANY ANXIETY, DEPRESSION, SI, HI, OR AVH. PT IS WITHDRAWAN. ISOLATES TO HIS ROOM. ANTICIPATE DC IN THE AM        Problem: Overarching Goals (Adult)  Goal: Adheres to Safety Considerations for Self and Others  Outcome: Ongoing (interventions implemented as appropriate)    Goal: Optimized Coping Skills in Response to Life Stressors  Outcome: Ongoing (interventions implemented as appropriate)    Goal: Develops/Participates in Therapeutic [place] to Support Successful Transition  Outcome: Ongoing (interventions implemented as appropriate)

## 2018-10-01 NOTE — PROGRESS NOTES
Therapist introduced self to patient as Therapist covering for April Zamanford, he was agreeable. Therapist met with the Patient at bedside within view of staff on this date. He reports that he is feeling better, but is tired. The Patient seems to have some ongoing paranoia or at least is evasive. He answers questions bluntly and does not elaborate. He reports that he slept well, he denied suicidal ideation and reports no hallucinations on this date. He does reports that Dr Chang plans to discharge him tomorrow and he will need to have [facility] transport home. He has follow up scheduled with the first available in the [facility].

## 2018-10-01 NOTE — PLAN OF CARE
Problem: Patient Care Overview  Goal: Plan of Care Review  Outcome: Ongoing (interventions implemented as appropriate)   09/30/18 9475   Coping/Psychosocial   Plan of Care Reviewed With patient   Coping/Psychosocial   Patient Agreement with Plan of Care agrees   Plan of Care Review   Progress no change   OTHER   Outcome Summary Patient reports appetite and sleep as good; reports anxiety and depression as 6; denies SI, HI and hallucinations; reports meds are helping; will continue to monitor pt.       Problem: Overarching Goals (Adult)  Goal: Adheres to Safety Considerations for Self and Others  Outcome: Ongoing (interventions implemented as appropriate)    Goal: Optimized Coping Skills in Response to Life Stressors  Outcome: Ongoing (interventions implemented as appropriate)    Goal: Develops/Participates in Therapeutic [place] to Support Successful Transition  Outcome: Ongoing (interventions implemented as appropriate)

## 2018-10-01 NOTE — PROGRESS NOTES
"INPATIENT PSYCHIATRIC PROGRESS NOTE    Name:  Bahman Banks  :  1975  MRN:  [id number]  Visit Number:  [id number]  Length of stay:  5    SUBJECTIVE  CC: \"paranoia, auditory and visual hallucinations with insomnia\"    INTERVAL HISTORY:    Patient reports that he had a good weekend but has been more paranoid over the weekend. Reports sleep improved with increased dose of Stelazine and Trazodone. Denies any SI.  Tolerating meds w/o side effects. Feels tired.   We discussed discharge for tomorrow with a plan to f/up outpatient thereafter. Patient is agreeable.     Depression rating 8/10  Anxiety rating 6/10  Sleep: improved w/ medication  Withdrawal sx: none; no substance history  Craving: denies     Review of Systems  Constitutional: Negative.    HENT: Negative.    Eyes: Negative.    Respiratory: Negative.    Cardiovascular: Negative.    Gastrointestinal: Negative.    Endocrine: Negative.    Genitourinary: Negative.    Musculoskeletal: Negative.    Skin: Negative.    Allergic/Immunologic: Negative.    Neurological: Negative.    Hematological: Negative.    All other systems reviewed and are negative.  OBJECTIVE    Temp:  [97.2 °F (36.2 °C)-98.6 °F (37 °C)] 97.2 °F (36.2 °C)  Heart Rate:  [69-89] 89  Resp:  [18] 18  BP: (104-115)/(64-80) 115/80    MENTAL STATUS EXAM:  Hygiene:   fair  Cooperation:  guarded  Eye Contact:  Good  Psychomotor Behavior:  Appropriate  Affect:  Restricted  Hopelessness: 7  Speech:  Monotone  Thought Progress:  Goal directed  Thought Content:  Hallucinations - Auditory and  Visual; likely responding to internal stimuli   Suicidal:  denies currently  Homicidal:  None  Memory:  Intact  Orientation:  Person, Place, Time and Situation  Reliability:  fair  Insight:  Fair  Judgement:  Fair  Impulse Control:  Fair  Physical/Medical Issues:  No     Lab Results (last 24 hours)     ** No results found for the last 24 hours. **             Imaging Results (last 24 hours)     ** No results found for " the last 24 hours. **             ECG/EMG Results (most recent)     None           ALLERGIES: Bee venom; Celexa [citalopram]; Fentanyl; Fentanyl; Haldol [haloperidol lactate]; Haldol [haloperidol]; Nitroglycerin; Risperdal [risperidone]; Risperidone and related; and Celexa [citalopram hydrobromide]      Current Facility-Administered Medications:   •  aluminum-magnesium hydroxide-simethicone (MAALOX MAX) 400-400-40 MG/5ML suspension 15 mL, 15 mL, Oral, Q6H PRN, Matthieu Ordaz MD  •  amLODIPine (NORVASC) tablet 10 mg, 10 mg, Oral, Q24H, Dianna Chang MD, 10 mg at 10/01/18 0918  •  apixaban (ELIQUIS) tablet 5 mg, 5 mg, Oral, BID, Dianna Chang MD, 5 mg at 10/01/18 0918  •  atorvastatin (LIPITOR) tablet 20 mg, 20 mg, Oral, Nightly, Dianna Chang MD, 20 mg at 09/30/18 2057  •  benzonatate (TESSALON) capsule 100 mg, 100 mg, Oral, TID PRN, Matthieu Ordaz MD  •  benztropine (COGENTIN) tablet 1 mg, 1 mg, Oral, Daily PRN **OR** benztropine (COGENTIN) injection 0.5 mg, 0.5 mg, Intramuscular, Daily PRN, Matthieu Ordaz MD  •  buPROPion (WELLBUTRIN) tablet 150 mg, 150 mg, Oral, QAM, Dianna Chang MD, 150 mg at 10/01/18 0603  •  famotidine (PEPCID) tablet 20 mg, 20 mg, Oral, BID PRN, Matthieu Ordaz MD  •  FLUoxetine (PROzac) capsule 40 mg, 40 mg, Oral, Daily, Dianna Chang MD, 40 mg at 10/01/18 0918  •  HYDROcodone-acetaminophen (NORCO)  MG per tablet 1 tablet, 1 tablet, Oral, Q8H PRN, Dianna Chang MD, 1 tablet at 10/01/18 0650  •  hydrOXYzine (ATARAX) tablet 50 mg, 50 mg, Oral, Q6H PRN, Matthieu Ordaz MD, 50 mg at 10/01/18 0650  •  ibuprofen (ADVIL,MOTRIN) tablet 600 mg, 600 mg, Oral, Q6H PRN, Matthieu Ordaz MD  •  levETIRAcetam (KEPPRA) tablet 1,000 mg, 1,000 mg, Oral, Q12H, Dianna Chang MD, 1,000 mg at 10/01/18 0918  •  lisinopril (PRINIVIL,ZESTRIL) tablet 20 mg, 20 mg, Oral, Q24H, Dianna Chang MD, 20 mg at 10/01/18 0918  •  magnesium hydroxide (MILK OF MAGNESIA) suspension 2400  mg/10mL 10 mL, 10 mL, Oral, Daily PRN, Matthieu Ordaz MD  •  nicotine (NICODERM CQ) 21 MG/24HR patch 1 patch, 1 patch, Transdermal, Q24H, Matthieu Ordaz MD  •  ondansetron (ZOFRAN) tablet 4 mg, 4 mg, Oral, Q6H PRN, Matthieu Ordaz MD  •  sodium chloride (OCEAN) nasal spray 2 spray, 2 spray, Each Nare, PRN, Matthieu Ordaz MD  •  traZODone (DESYREL) tablet 50 mg, 50 mg, Oral, Nightly PRN, Matthieu Ordaz MD, 50 mg at 09/29/18 2020  •  trifluoperazine (STELAZINE) tablet 1 mg, 1 mg, Oral, QAM, Dianna Chang MD, 1 mg at 10/01/18 0604  •  trifluoperazine (STELAZINE) tablet 2 mg, 2 mg, Oral, Nightly, Dianna Chang MD, 2 mg at 09/30/18 2057  •  vitamin B-1 tablet 100 mg, 100 mg, Oral, Daily, Dianna Chang MD, 100 mg at 10/01/18 0918    ASSESSMENT & PLAN:    Active Problems:     Schizoaffective disorder, depressive type (CMS/HCC)  Plan:   Patient has a history of being on loxapine which has been most helpful for the patient in the past but we don't have that on our formulary and therefore he was switched to Stelazine as the patient was reporting a high degree of anxiety in combination with his psychosis.     - Stelazine increased to 1mg po qam and 2 mg po qhs on 9/27  - continue trazodone 50mg for insomnia; will continue  - continue Prozac  - will augment with Wellbutrin to address depression and concerns of ADHD  - will not initiate stimulants due to significant cardiac issues; discussed this with patient and he expressed understanding      Seizure (CMS/HCC)  Plan: on Keppra; last seizure in spring 2018    Aortic valve disease  Plan: stable; no concerns at this time     Hypertension  Plan: Continue lisinopril and norvasc    Atrial fibrillation (CMS/HCC)  Plan: on Eliquis    Chronic back pain  Plan: continue home meds    CAD (coronary artery disease)  Plan: lisinopril, norvasc.      Hyperlipidemia  Plan: Continue lipitor    History of Cocaine use disorder in sustained remission for over 10 years    Suicide  precautions: Suicide precaution Level 3 (q15 min checks)     Behavioral Health Treatment Plan and Problem List: I have reviewed and approved the Behavioral Health Treatment Plan and Problem list.  The patient has had a chance to review and agrees with the treatment plan.     Clinician:  Dianna Chang MD  10/01/18  11:24 AM

## 2018-10-02 VITALS
BODY MASS INDEX: 26.34 KG/M2 | TEMPERATURE: 98.4 F | HEART RATE: 92 BPM | HEIGHT: 74 IN | SYSTOLIC BLOOD PRESSURE: 112 MMHG | DIASTOLIC BLOOD PRESSURE: 74 MMHG | OXYGEN SATURATION: 96 % | WEIGHT: 205.2 LBS | RESPIRATION RATE: 18 BRPM

## 2018-10-02 PROCEDURE — 99238 HOSP IP/OBS DSCHRG MGMT 30/<: CPT | Performed by: PSYCHIATRY & NEUROLOGY

## 2018-10-02 RX ORDER — TRAZODONE HYDROCHLORIDE 50 MG/1
50 TABLET ORAL NIGHTLY PRN
Qty: 30 TABLET | Refills: 2 | Status: SHIPPED | OUTPATIENT
Start: 2018-10-02 | End: 2018-10-10 | Stop reason: SDUPTHER

## 2018-10-02 RX ORDER — FLUOXETINE HYDROCHLORIDE 40 MG/1
40 CAPSULE ORAL DAILY
Qty: 30 CAPSULE | Refills: 2 | Status: SHIPPED | OUTPATIENT
Start: 2018-10-03 | End: 2018-10-10

## 2018-10-02 RX ORDER — TRIFLUOPERAZINE HYDROCHLORIDE 1 MG/1
TABLET, FILM COATED ORAL
Qty: 90 TABLET | Refills: 2 | Status: SHIPPED | OUTPATIENT
Start: 2018-10-02 | End: 2018-10-10 | Stop reason: SDUPTHER

## 2018-10-02 RX ADMIN — THIAMINE HCL (VITAMIN B1) 50 MG TABLET 100 MG: at 08:09

## 2018-10-02 RX ADMIN — HYDROCODONE BITARTRATE AND ACETAMINOPHEN 1 TABLET: 10; 325 TABLET ORAL at 08:09

## 2018-10-02 RX ADMIN — FLUOXETINE 40 MG: 20 CAPSULE ORAL at 08:09

## 2018-10-02 RX ADMIN — APIXABAN 5 MG: 5 TABLET, FILM COATED ORAL at 08:09

## 2018-10-02 RX ADMIN — BUPROPION HYDROCHLORIDE 150 MG: 75 TABLET, FILM COATED ORAL at 08:09

## 2018-10-02 RX ADMIN — TRIFLUOPERAZINE HYDROCHLORIDE 1 MG: 1 TABLET, FILM COATED ORAL at 06:06

## 2018-10-02 RX ADMIN — LEVETIRACETAM 1000 MG: 500 TABLET, FILM COATED ORAL at 08:09

## 2018-10-02 RX ADMIN — LISINOPRIL 20 MG: 10 TABLET ORAL at 08:09

## 2018-10-02 RX ADMIN — HYDROXYZINE HYDROCHLORIDE 50 MG: 50 TABLET ORAL at 08:09

## 2018-10-02 RX ADMIN — AMLODIPINE BESYLATE 10 MG: 10 TABLET ORAL at 08:09

## 2018-10-02 NOTE — PLAN OF CARE
Problem: Patient Care Overview  Goal: Plan of Care Review  Outcome: Ongoing (interventions implemented as appropriate)   10/01/18 3856   Coping/Psychosocial   Plan of Care Reviewed With patient   Coping/Psychosocial   Patient Agreement with Plan of Care agrees   Plan of Care Review   Progress no change

## 2018-10-02 NOTE — DISCHARGE SUMMARY
PSYCHIATRIC DISCHARGE SUMMARY     Patient Identification:  Name:  Bahman Banks  Age:  43 y.o.  Sex:  male  :  1975  MRN:  [id number]  Visit Number:  [id number]      Date of Admission:2018   Date of Discharge:  10/2/2018    Discharge Diagnosis:  Active Problems:    Schizoaffective disorder, depressive type (CMS/[facility])    Depression with suicidal ideation    Seizure (CMS/[facility])    Aortic valve disease    Hypertension    Atrial fibrillation (CMS/[facility])    Chronic back pain    CAD (coronary artery disease)    Hyperlipidemia    Cocaine use disorder, mild, in sustained remission (CMS/[facility])        Admission Diagnosis:  Depression with suicidal ideation [F32.9, R45.851]     Hospital Course  Patient is a 43 y.o. male history of schizophrenia  who was admitted on 2018 with complaints of suicidal ideation and psychosis, including auditory, visual and tactile hallucinations. The patient was admitted to the [facility] AE1 unit for safety, further evaluation and treatment.    Patient has a history of being on loxapine which has been most helpful for the patient in the past but we don't have that on our formulary and therefore he was switched to Stelazine as the patient was reporting a high degree of anxiety in combination with his psychosis. Stelazine was increased to 1mg po qam and 2 mg po qhs.     He benefited from Trazodone 50mg for insomnia. Prozac was increased to 40mg po qdaily.     He reports a history of being on adderall for ADHD several years ago and wants to discuss medication options. However has an extended history of cardiac illness with valve replacement and a rhythm disorder. We did not initiate stimulants due to significant cardiac issues; discussed this with patient and he expressed understanding. We started him on Wellbutrin which he tolerated well.     His medical problems were also managed as follows:  Seizure (CMS/[facility])  Plan: on Keppra; last seizure in spring 2018   Aortic valve  "disease  Plan: patient is stable; no concerns at this time   Hypertension and CAD  Plan: lisinopril and norvasc were continued   Atrial fibrillation (CMS/HCC)  Plan: Eliquis  continued   Chronic back pain  Plan:  home meds  continued   Hyperlipidemia  Plan: Continue lipitor  History of Cocaine use disorder in sustained remission for over 10 years       The patient was also able to take part in individual and group counseling sessions and work on appropriate coping skills. The patient made steady improvement in his mood and expressed feeling more positive and hopeful about future. Sleep and appetite were improved.  The day of discharge the patient was calm, cooperative and pleasant. Mood was reported to be good, and denied SI/HI/AVH. Also reported no medication side effects.    Mental Status Exam upon discharge:   Mood \"good\"  Affect-congruent, appropriate, stable  Thought Content-goal directed, no delusional material present  Thought process-linear, organized.  Suicidality: No SI  Homicidality: No HI  Perception: No AH/VH    Procedures Performed       Consults:   Consults     No orders found from 8/28/2018 to 9/27/2018.          Pertinent Test Results:   Lab Results   Component Value Date     GLUCOSE 106 09/25/2018     BUN 9 09/25/2018     CREATININE 0.91 09/25/2018     EGFRIFNONA 91 09/25/2018     BCR 9.9 09/25/2018     CO2 24.2 (L) 09/25/2018     CALCIUM 8.2 09/25/2018     ALBUMIN 4.10 09/25/2018     AST 15 09/25/2018     ALT 22 09/25/2018              Lab Results   Component Value Date     WBC 9.35 09/25/2018     HGB 14.2 09/25/2018     HCT 43.3 09/25/2018     MCV 89.1 09/25/2018      09/25/2018         Condition on Discharge:  improved    Vital Signs  Temp:  [98.4 °F (36.9 °C)-99 °F (37.2 °C)] 98.4 °F (36.9 °C)  Heart Rate:  [79-95] 92  Resp:  [18] 18  BP: ()/(62-74) 112/74      Discharge Disposition:  Home or Self Care    Discharge Medications:     Discharge Medications      Changes to Medications  "     Instructions Start Date   FLUoxetine 40 MG capsule  Commonly known as:  PROzac  What changed:  medication strength   40 mg, Oral, Daily      trifluoperazine 1 MG tablet  Commonly known as:  STELAZINE  What changed:  · how much to take  · how to take this  · when to take this  · additional instructions   Take 2 tabs in the morning and 1 tab at night         Continue These Medications      Instructions Start Date   amLODIPine 10 MG tablet  Commonly known as:  NORVASC   10 mg, Oral, Every 24 Hours Scheduled      apixaban 5 MG tablet tablet  Commonly known as:  ELIQUIS   5 mg, Oral, 2 Times Daily      HYDROcodone-acetaminophen  MG per tablet  Commonly known as:  NORCO   1 tablet, Oral, Every 8 Hours PRN      levETIRAcetam 1000 MG tablet  Commonly known as:  KEPPRA   1,000 mg, Oral, 2 Times Daily      lisinopril 20 MG tablet  Commonly known as:  PRINIVIL,ZESTRIL   20 mg, Oral, Every 24 Hours Scheduled      thiamine 100 MG tablet  Commonly known as:  VITAMIN B1   100 mg, Oral, Daily         Stop These Medications    atorvastatin 20 MG tablet  Commonly known as:  LIPITOR            Discharge Diet:   Diet Instructions     AS TOLERATED                 Activity at Discharge:   Activity Instructions     AS TOLERATED                   Follow-up Appointments  Future Appointments  Date Time Provider Department Center   10/10/2018 9:30 AM Dianna Chang MD [facility] None         Test Results Pending at Discharge: none     Clinician:   Dianna Chang MD  10/02/18  10:10 AM

## 2018-10-06 ENCOUNTER — HOSPITAL ENCOUNTER (EMERGENCY)
Facility: HOSPITAL | Age: 43
Discharge: HOME | End: 2018-10-06
Attending: EMERGENCY MEDICINE | Admitting: NURSE PRACTITIONER

## 2018-10-06 ENCOUNTER — APPOINTMENT (OUTPATIENT)
Dept: GENERAL RADIOLOGY | Facility: HOSPITAL | Age: 43
End: 2018-10-06

## 2018-10-06 ENCOUNTER — APPOINTMENT (OUTPATIENT)
Dept: CT IMAGING | Facility: HOSPITAL | Age: 43
End: 2018-10-06

## 2018-10-06 VITALS
DIASTOLIC BLOOD PRESSURE: 74 MMHG | SYSTOLIC BLOOD PRESSURE: 128 MMHG | TEMPERATURE: 98.5 F | RESPIRATION RATE: 18 BRPM | OXYGEN SATURATION: 100 % | BODY MASS INDEX: 26.31 KG/M2 | HEART RATE: 94 BPM | WEIGHT: 205 LBS | HEIGHT: 74 IN

## 2018-10-06 DIAGNOSIS — R10.9 ABDOMINAL PAIN OF UNKNOWN ETIOLOGY: Primary | ICD-10-CM

## 2018-10-06 LAB
6-ACETYL MORPHINE: NEGATIVE
ALBUMIN SERPL-MCNC: 4.9 G/DL (ref 3.5–5)
ALBUMIN/GLOB SERPL: 1.7 G/DL (ref 1.5–2.5)
ALP SERPL-CCNC: 109 U/L (ref 40–129)
ALT SERPL W P-5'-P-CCNC: 75 U/L (ref 10–44)
AMPHET+METHAMPHET UR QL: NEGATIVE
ANION GAP SERPL CALCULATED.3IONS-SCNC: 12.6 MMOL/L (ref 3.6–11.2)
AST SERPL-CCNC: 27 U/L (ref 10–34)
BACTERIA UR QL AUTO: ABNORMAL /HPF
BARBITURATES UR QL SCN: NEGATIVE
BASOPHILS # BLD AUTO: 0.06 10*3/MM3 (ref 0–0.3)
BASOPHILS NFR BLD AUTO: 0.4 % (ref 0–2)
BENZODIAZ UR QL SCN: NEGATIVE
BILIRUB SERPL-MCNC: 1.3 MG/DL (ref 0.2–1.8)
BILIRUB UR QL STRIP: NEGATIVE
BILIRUB UR QL STRIP: NEGATIVE
BUN BLD-MCNC: 18 MG/DL (ref 7–21)
BUN/CREAT SERPL: 12.8 (ref 7–25)
BUPRENORPHINE SERPL-MCNC: NEGATIVE NG/ML
CALCIUM SPEC-SCNC: 9.4 MG/DL (ref 7.7–10)
CANNABINOIDS SERPL QL: NEGATIVE
CHLORIDE SERPL-SCNC: 93 MMOL/L (ref 99–112)
CLARITY UR: CLEAR
CLARITY UR: CLEAR
CO2 SERPL-SCNC: 29.4 MMOL/L (ref 24.3–31.9)
COCAINE UR QL: NEGATIVE
COLOR UR: ABNORMAL
COLOR UR: ABNORMAL
CREAT BLD-MCNC: 1.41 MG/DL (ref 0.43–1.29)
DEPRECATED RDW RBC AUTO: 42.3 FL (ref 37–54)
EOSINOPHIL # BLD AUTO: 0.04 10*3/MM3 (ref 0–0.7)
EOSINOPHIL NFR BLD AUTO: 0.3 % (ref 0–5)
ERYTHROCYTE [DISTWIDTH] IN BLOOD BY AUTOMATED COUNT: 13.7 % (ref 11.5–14.5)
GFR SERPL CREATININE-BSD FRML MDRD: 55 ML/MIN/1.73
GLOBULIN UR ELPH-MCNC: 2.9 GM/DL
GLUCOSE BLD-MCNC: 124 MG/DL (ref 70–110)
GLUCOSE UR STRIP-MCNC: NEGATIVE MG/DL
GLUCOSE UR STRIP-MCNC: NEGATIVE MG/DL
HCT VFR BLD AUTO: 44.1 % (ref 42–52)
HGB BLD-MCNC: 14.9 G/DL (ref 14–18)
HGB UR QL STRIP.AUTO: NEGATIVE
HGB UR QL STRIP.AUTO: NEGATIVE
HOLD SPECIMEN: NORMAL
HOLD SPECIMEN: NORMAL
HYALINE CASTS UR QL AUTO: ABNORMAL /LPF
IMM GRANULOCYTES # BLD: 0.05 10*3/MM3 (ref 0–0.03)
IMM GRANULOCYTES NFR BLD: 0.4 % (ref 0–0.5)
KETONES UR QL STRIP: ABNORMAL
KETONES UR QL STRIP: ABNORMAL
LEUKOCYTE ESTERASE UR QL STRIP.AUTO: NEGATIVE
LEUKOCYTE ESTERASE UR QL STRIP.AUTO: NEGATIVE
LIPASE SERPL-CCNC: 50 U/L (ref 13–60)
LYMPHOCYTES # BLD AUTO: 2.1 10*3/MM3 (ref 1–3)
LYMPHOCYTES NFR BLD AUTO: 15.3 % (ref 21–51)
MCH RBC QN AUTO: 28.9 PG (ref 27–33)
MCHC RBC AUTO-ENTMCNC: 33.8 G/DL (ref 33–37)
MCV RBC AUTO: 85.5 FL (ref 80–94)
METHADONE UR QL SCN: NEGATIVE
MONOCYTES # BLD AUTO: 1.75 10*3/MM3 (ref 0.1–0.9)
MONOCYTES NFR BLD AUTO: 12.7 % (ref 0–10)
NEUTROPHILS # BLD AUTO: 9.76 10*3/MM3 (ref 1.4–6.5)
NEUTROPHILS NFR BLD AUTO: 70.9 % (ref 30–70)
NITRITE UR QL STRIP: NEGATIVE
NITRITE UR QL STRIP: NEGATIVE
OPIATES UR QL: POSITIVE
OSMOLALITY SERPL CALC.SUM OF ELEC: 273.4 MOSM/KG (ref 273–305)
OXYCODONE UR QL SCN: NEGATIVE
PCP UR QL SCN: NEGATIVE
PH UR STRIP.AUTO: 8.5 [PH] (ref 5–8)
PH UR STRIP.AUTO: 8.5 [PH] (ref 5–8)
PLATELET # BLD AUTO: 291 10*3/MM3 (ref 130–400)
PMV BLD AUTO: 10.7 FL (ref 6–10)
POTASSIUM BLD-SCNC: 3.2 MMOL/L (ref 3.5–5.3)
PROT SERPL-MCNC: 7.8 G/DL (ref 6–8)
PROT UR QL STRIP: ABNORMAL
PROT UR QL STRIP: ABNORMAL
RBC # BLD AUTO: 5.16 10*6/MM3 (ref 4.7–6.1)
RBC # UR: ABNORMAL /HPF
REF LAB TEST METHOD: ABNORMAL
SODIUM BLD-SCNC: 135 MMOL/L (ref 135–153)
SP GR UR STRIP: 1.02 (ref 1–1.03)
SP GR UR STRIP: 1.02 (ref 1–1.03)
SQUAMOUS #/AREA URNS HPF: ABNORMAL /HPF
TROPONIN I SERPL-MCNC: <0.006 NG/ML
UROBILINOGEN UR QL STRIP: ABNORMAL
UROBILINOGEN UR QL STRIP: ABNORMAL
WBC NRBC COR # BLD: 13.76 10*3/MM3 (ref 4.5–12.5)
WBC UR QL AUTO: ABNORMAL /HPF
WHOLE BLOOD HOLD SPECIMEN: NORMAL
WHOLE BLOOD HOLD SPECIMEN: NORMAL

## 2018-10-06 PROCEDURE — 80307 DRUG TEST PRSMV CHEM ANLYZR: CPT | Performed by: NURSE PRACTITIONER

## 2018-10-06 PROCEDURE — 25010000002 MORPHINE PER 10 MG: Performed by: NURSE PRACTITIONER

## 2018-10-06 PROCEDURE — 93005 ELECTROCARDIOGRAM TRACING: CPT | Performed by: EMERGENCY MEDICINE

## 2018-10-06 PROCEDURE — 96375 TX/PRO/DX INJ NEW DRUG ADDON: CPT

## 2018-10-06 PROCEDURE — 84484 ASSAY OF TROPONIN QUANT: CPT | Performed by: EMERGENCY MEDICINE

## 2018-10-06 PROCEDURE — 85025 COMPLETE CBC W/AUTO DIFF WBC: CPT | Performed by: EMERGENCY MEDICINE

## 2018-10-06 PROCEDURE — 25010000002 KETOROLAC TROMETHAMINE PER 15 MG: Performed by: NURSE PRACTITIONER

## 2018-10-06 PROCEDURE — 74176 CT ABD & PELVIS W/O CONTRAST: CPT

## 2018-10-06 PROCEDURE — 71046 X-RAY EXAM CHEST 2 VIEWS: CPT | Performed by: RADIOLOGY

## 2018-10-06 PROCEDURE — 99283 EMERGENCY DEPT VISIT LOW MDM: CPT

## 2018-10-06 PROCEDURE — 25010000002 ONDANSETRON PER 1 MG: Performed by: NURSE PRACTITIONER

## 2018-10-06 PROCEDURE — 83690 ASSAY OF LIPASE: CPT | Performed by: EMERGENCY MEDICINE

## 2018-10-06 PROCEDURE — 96361 HYDRATE IV INFUSION ADD-ON: CPT

## 2018-10-06 PROCEDURE — 74176 CT ABD & PELVIS W/O CONTRAST: CPT | Performed by: RADIOLOGY

## 2018-10-06 PROCEDURE — 93010 ELECTROCARDIOGRAM REPORT: CPT | Performed by: INTERNAL MEDICINE

## 2018-10-06 PROCEDURE — 36415 COLL VENOUS BLD VENIPUNCTURE: CPT

## 2018-10-06 PROCEDURE — 71046 X-RAY EXAM CHEST 2 VIEWS: CPT

## 2018-10-06 PROCEDURE — 80053 COMPREHEN METABOLIC PANEL: CPT | Performed by: EMERGENCY MEDICINE

## 2018-10-06 PROCEDURE — 96374 THER/PROPH/DIAG INJ IV PUSH: CPT

## 2018-10-06 PROCEDURE — 81001 URINALYSIS AUTO W/SCOPE: CPT | Performed by: NURSE PRACTITIONER

## 2018-10-06 RX ORDER — ONDANSETRON 2 MG/ML
4 INJECTION INTRAMUSCULAR; INTRAVENOUS ONCE
Status: COMPLETED | OUTPATIENT
Start: 2018-10-06 | End: 2018-10-06

## 2018-10-06 RX ORDER — SODIUM CHLORIDE 0.9 % (FLUSH) 0.9 %
10 SYRINGE (ML) INJECTION AS NEEDED
Status: DISCONTINUED | OUTPATIENT
Start: 2018-10-06 | End: 2018-10-06 | Stop reason: HOSPADM

## 2018-10-06 RX ORDER — KETOROLAC TROMETHAMINE 30 MG/ML
30 INJECTION, SOLUTION INTRAMUSCULAR; INTRAVENOUS ONCE
Status: COMPLETED | OUTPATIENT
Start: 2018-10-06 | End: 2018-10-06

## 2018-10-06 RX ORDER — MORPHINE SULFATE 2 MG/ML
2 INJECTION, SOLUTION INTRAMUSCULAR; INTRAVENOUS ONCE
Status: COMPLETED | OUTPATIENT
Start: 2018-10-06 | End: 2018-10-06

## 2018-10-06 RX ORDER — ONDANSETRON 4 MG/1
4 TABLET, ORALLY DISINTEGRATING ORAL EVERY 6 HOURS PRN
Qty: 15 TABLET | Refills: 0 | Status: ON HOLD | OUTPATIENT
Start: 2018-10-06 | End: 2018-10-18

## 2018-10-06 RX ADMIN — MORPHINE SULFATE 2 MG: 2 INJECTION, SOLUTION INTRAMUSCULAR; INTRAVENOUS at 18:28

## 2018-10-06 RX ADMIN — KETOROLAC TROMETHAMINE 30 MG: 30 INJECTION, SOLUTION INTRAMUSCULAR; INTRAVENOUS at 16:36

## 2018-10-06 RX ADMIN — SODIUM CHLORIDE 1000 ML: 9 INJECTION, SOLUTION INTRAVENOUS at 15:56

## 2018-10-06 RX ADMIN — ONDANSETRON 4 MG: 2 INJECTION, SOLUTION INTRAMUSCULAR; INTRAVENOUS at 15:56

## 2018-10-06 NOTE — ED PROVIDER NOTES
Subjective     History provided by:  Patient  Abdominal Pain   Pain location:  Epigastric  Pain quality: aching    Pain radiates to:  Does not radiate  Pain severity:  Moderate  Onset quality:  Gradual  Timing:  Constant  Progression:  Worsening  Chronicity:  New  Relieved by:  None tried  Worsened by:  Nothing  Ineffective treatments:  None tried  Associated symptoms: nausea and vomiting    Associated symptoms: no chest pain, no dysuria and no fever        Review of Systems   Constitutional: Negative.  Negative for fever.   HENT: Negative.    Respiratory: Negative.    Cardiovascular: Negative.  Negative for chest pain.   Gastrointestinal: Positive for abdominal pain, nausea and vomiting.   Endocrine: Negative.    Genitourinary: Negative.  Negative for dysuria.   Skin: Negative.    Neurological: Negative.    Psychiatric/Behavioral: Negative.    All other systems reviewed and are negative.      Past Medical History:   Diagnosis Date   • Anxiety    • Aortic stenosis    • Atrial fibrillation (CMS/HCC)    • Bipolar disorder (CMS/HCC)    • Bleeding stomach ulcer    • CAD (coronary artery disease)    • Chronic back pain    • Hallucinations    • Hyperlipidemia    • Hypertension    • Injury of back    • Myocardial infarct 2015   • Nervous breakdown     12 years ago   • Schizophrenia (CMS/HCC)    • Seizure (CMS/HCC)     last seizure August 2018 per pt   • Stroke (CMS/HCC)    • Stroke (CMS/HCC)     affected speech, vision, right side weakness   • Suicide attempt     2005 overdose        Allergies   Allergen Reactions   • Bee Venom Anaphylaxis   • Celexa [Citalopram] Anaphylaxis and Shortness Of Breath   • Fentanyl      Pt states that he stops breathing   • Fentanyl Shortness Of Breath   • Haldol [Haloperidol Lactate] Anaphylaxis   • Haldol [Haloperidol] Shortness Of Breath   • Nitroglycerin Anaphylaxis   • Risperdal [Risperidone] Shortness Of Breath   • Risperidone And Related Anaphylaxis   • Celexa [Citalopram Hydrobromide]  Other (See Comments)     Pt states that it causes him to sweat and a severe headache.        Past Surgical History:   Procedure Laterality Date   • AORTIC VALVE REPAIR/REPLACEMENT  2016    tissue valve, performed in [place]   • AORTIC VALVE REPAIR/REPLACEMENT MITRAL VALVE REPAIR/REPLACEMENT     • APPENDECTOMY  2003   • CARDIAC CATHETERIZATION     • CHOLECYSTECTOMY  2016   • HERNIA REPAIR  2004    umbilical   • HIATAL HERNIA REPAIR  2004   • KNEE ARTHROSCOPY     • PACEMAKER IMPLANTATION     • PACEMAKER IMPLANTATION Left 2014 2014 OR 2015.   [employer]. interrogated 3/9/18   • PATELLAR RECONSTRUCTION Right [id number]   • QUADRICEPS REPAIR     • SHOULDER ARTHROSCOPY Right 2010   • SHOULDER CLOSED REDUCTION Left 3/9/2018    Procedure: LEFT SHOULDER CLOSED REDUCTION;  Surgeon: Steven Wang Jr., MD;  Location: [facility];  Service: Orthopedics       Family History   Problem Relation Age of Onset   • No Known Problems Mother    • No Known Problems Father    • Bipolar disorder Maternal Grandfather    • Schizophrenia Maternal Grandfather        Social History     Social History   • Marital status:      Social History Main Topics   • Smoking status: Current Every Day Smoker     Packs/day: 0.50     Years: 25.00     Types: Cigarettes   • Smokeless tobacco: Never Used   • Alcohol use No      Comment: denies   • Drug use: No      Comment: denies   • Sexual activity: Defer     Other Topics Concern   • Not on file     Social History Narrative    ** Merged History Encounter **         Retired , independentt of ADL, lives with wife           Objective   Physical Exam   Constitutional: He is oriented to person, place, and time. He appears well-developed and well-nourished. No distress.   HENT:   Head: Normocephalic and atraumatic.   Right Ear: External ear normal.   Left Ear: External ear normal.   Nose: Nose normal.   Eyes: Pupils are equal, round, and reactive to light. Conjunctivae and EOM are normal.    Neck: Normal range of motion. Neck supple. No JVD present. No tracheal deviation present.   Cardiovascular: Normal rate, regular rhythm and normal heart sounds.    No murmur heard.  Pulmonary/Chest: Effort normal and breath sounds normal. No respiratory distress. He has no wheezes.   Abdominal: Soft. Bowel sounds are normal. There is tenderness.   Musculoskeletal: Normal range of motion. He exhibits no edema or deformity.   Neurological: He is alert and oriented to person, place, and time. No cranial nerve deficit.   Skin: Skin is warm and dry. No rash noted. He is not diaphoretic. No erythema. No pallor.   Psychiatric: He has a normal mood and affect. His behavior is normal. Thought content normal.   Nursing note and vitals reviewed.      Procedures           ED Course                  MDM      Final diagnoses:   Abdominal pain of unknown etiology            Lynsey Castellanos, APRN  10/06/18 1901

## 2018-10-10 ENCOUNTER — OFFICE VISIT (OUTPATIENT)
Dept: PSYCHIATRY | Facility: CLINIC | Age: 43
End: 2018-10-10

## 2018-10-10 VITALS
DIASTOLIC BLOOD PRESSURE: 91 MMHG | SYSTOLIC BLOOD PRESSURE: 140 MMHG | HEART RATE: 88 BPM | WEIGHT: 209.2 LBS | HEIGHT: 74 IN | BODY MASS INDEX: 26.85 KG/M2

## 2018-10-10 DIAGNOSIS — F41.1 GENERALIZED ANXIETY DISORDER WITH PANIC ATTACKS: ICD-10-CM

## 2018-10-10 DIAGNOSIS — F14.11 COCAINE USE DISORDER, MILD, IN SUSTAINED REMISSION (HCC): ICD-10-CM

## 2018-10-10 DIAGNOSIS — F25.1 SCHIZOAFFECTIVE DISORDER, DEPRESSIVE TYPE (HCC): Primary | ICD-10-CM

## 2018-10-10 DIAGNOSIS — F90.2 ADHD (ATTENTION DEFICIT HYPERACTIVITY DISORDER), COMBINED TYPE: ICD-10-CM

## 2018-10-10 DIAGNOSIS — F41.0 GENERALIZED ANXIETY DISORDER WITH PANIC ATTACKS: ICD-10-CM

## 2018-10-10 DIAGNOSIS — R56.9 SEIZURE (HCC): ICD-10-CM

## 2018-10-10 PROCEDURE — 99214 OFFICE O/P EST MOD 30 MIN: CPT | Performed by: PSYCHIATRY & NEUROLOGY

## 2018-10-10 RX ORDER — TRAZODONE HYDROCHLORIDE 50 MG/1
50 TABLET ORAL NIGHTLY PRN
Qty: 30 TABLET | Refills: 2 | Status: ON HOLD | OUTPATIENT
Start: 2018-10-10 | End: 2018-10-18

## 2018-10-10 RX ORDER — OLANZAPINE 5 MG/1
5 TABLET ORAL NIGHTLY
Qty: 30 TABLET | Refills: 2 | Status: ON HOLD | OUTPATIENT
Start: 2018-10-10 | End: 2018-10-18

## 2018-10-10 RX ORDER — CLONAZEPAM 0.5 MG/1
0.25 TABLET ORAL 2 TIMES DAILY PRN
Qty: 30 TABLET | Refills: 2 | Status: SHIPPED | OUTPATIENT
Start: 2018-10-10 | End: 2018-11-01 | Stop reason: HOSPADM

## 2018-10-10 RX ORDER — BUPROPION HYDROCHLORIDE 150 MG/1
150 TABLET ORAL EVERY MORNING
Qty: 30 TABLET | Refills: 2 | Status: SHIPPED | OUTPATIENT
Start: 2018-10-10 | End: 2018-11-01 | Stop reason: HOSPADM

## 2018-10-10 RX ORDER — TRIFLUOPERAZINE HYDROCHLORIDE 1 MG/1
TABLET, FILM COATED ORAL
Qty: 90 TABLET | Refills: 2 | Status: ON HOLD | OUTPATIENT
Start: 2018-10-10 | End: 2018-10-18

## 2018-10-10 NOTE — PROGRESS NOTES
"Subjective   Patient ID: Bahman Banks is a 43 y.o. male is here today for follow-up..     /91   Pulse 88   Ht 188 cm (74\")   Wt 94.9 kg (209 lb 3.2 oz)   BMI 26.86 kg/m²     Bee venom; Celexa [citalopram]; Fentanyl; Fentanyl; Haldol [haloperidol lactate]; Haldol [haloperidol]; Nitroglycerin; Risperdal [risperidone]; Risperidone and related; and Celexa [citalopram hydrobromide]    History of Present Illness     Bahman Banks is a 43-year-old male with history of schizoaffective disorder, depressed mood, anxiety with panic attacks and history of multiple hospitalizations with most recent one last month at the [facility]. There was no report of substance use and UDS was negative.     He currently takes Stelazine (switched from Loxapine) to manage the symptoms of high degree of anxiety in combination with psychosis. He tolerated the medication well with a resolution in the AVH. Reports side effects of GI issues with Prozac. Continues to panic attacks on a daily basis and reports that vistaril has been ineffective.  Reports improvement in his symptoms of being distractible, unable to concentrate, unable to cope or do household chores due to distractibility when he was on Wellbutrin but stopped taking it b/c of no refills available. Reports sleep and appetite to be good.     He denies any symptom of OCD, panic attacks, chanell or hypomania.     Review of psychiatric sx:    Past Psych History:  Previous diagnosis of \"Bipolar disorder, schizophrenia, ADHD, and PTSD.\"    history of numerous  psychiatric admissions, with the last one in [place], for \" nervous breakdown,\" related to his diagnosis of Schizophrenia.   Previously helpful meds include loxapine 75 Daily.      Substance Use History: (age of onset, last use, how frequent)  Cigarettes: 1 pack per day  Alcohol: last use 25 years ago.   UDS was negative at time of presentation at the hospital. Patient denies any substance or illicit drug use.     Family " "History:  family history includes Bipolar disorder in his maternal grandfather; No Known Problems in his father and mother; Schizophrenia in his maternal grandfather.    Medical/Surgical History:  Extensive history of medical issues  1.  Hypertension  2. Hyperlipidemia  3. CAD, a pacemaker in 2014,  4. atrial fibrillation with last EKG showing \"Atrial-sensed ventricular-paced rhythm\".  5. left lower quadrant abdominal pain due to renal cyst and renal stones.     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  several psychosocial stressors including finacial concerns, child with Autism, personal mental illness. Legal history: denies.     The following portions of the patient's history were reviewed and updated as appropriate: allergies, current medications, past family history, past medical history, past social history, past surgical history and problem list.    Review of Systems   Constitutional: Negative.    HENT: Negative.    Eyes: Negative.    Respiratory: Negative.    Cardiovascular: Negative.    Gastrointestinal: Positive for abdominal pain.   Genitourinary: Negative.    Psychiatric/Behavioral: Positive for agitation, decreased concentration and dysphoric mood. The patient is nervous/anxious and is hyperactive.        Objective   Mental Status Exam  Appearance:  clean and casually dressed, appropriate  Attitude toward clinician:  cooperative and agreeable   Speech: mild dysarthria   Rate:  regular rate and rhythm   Volume:  normal  Motor:  no abnormal movements present  Mood:  Good  Affect:  euthymic  Thought Processes:  linear, logical, and goal directed  Thought Content:  normal  Suicidal Thoughts:  absent  Homicidal Thoughts:  absent  Perceptual Disturbance: no perceptual disturbance  Attention and Concentration:  good  Insight and Judgement:  good  Memory:  memory appears to be intact    MEDICATION ISSUES:  Current Outpatient Prescriptions on File Prior to Visit   Medication " Sig Dispense Refill   • amLODIPine (NORVASC) 10 MG tablet Take 1 tablet by mouth Daily. 30 tablet 0   • apixaban (ELIQUIS) 5 MG tablet tablet Take 1 tablet by mouth 2 (Two) Times a Day. 60 tablet 0   • HYDROcodone-acetaminophen (NORCO)  MG per tablet Take 1 tablet by mouth Every 8 (Eight) Hours As Needed for Moderate Pain .     • levETIRAcetam (KEPPRA) 1000 MG tablet Take 1,000 mg by mouth 2 (Two) Times a Day.     • lisinopril (PRINIVIL,ZESTRIL) 20 MG tablet Take 1 tablet by mouth Daily. 30 tablet 0   • ondansetron ODT (ZOFRAN-ODT) 4 MG disintegrating tablet Take 1 tablet by mouth Every 6 (Six) Hours As Needed for Nausea. 15 tablet 0   • thiamine (VITAMIN B1) 100 MG tablet Take 1 tablet by mouth Daily. 30 tablet 0   • [DISCONTINUED] FLUoxetine (PROzac) 40 MG capsule Take 1 capsule by mouth Daily. 30 capsule 2   • [DISCONTINUED] traZODone (DESYREL) 50 MG tablet Take 1 tablet by mouth At Night As Needed for Sleep. 30 tablet 2   • [DISCONTINUED] trifluoperazine (STELAZINE) 1 MG tablet Take 2 tablets by mouth in the morning, and 1 tablet at night. 90 tablet 2     No current facility-administered medications on file prior to visit.        Lab Review:   not applicable  Assessment/Plan   Diagnoses and all orders for this visit:    Schizoaffective disorder, depressive type (CMS/HCC)  -     OLANZapine (ZYPREXA) 5 MG tablet; Take 1 tablet by mouth Every Night.  -     trifluoperazine (STELAZINE) 1 MG tablet; Take 2 tablets by mouth in the morning, and 1 tablet at night.  -     traZODone (DESYREL) 50 MG tablet; Take 1 tablet by mouth At Night As Needed for Sleep.    Cocaine use disorder, mild, in sustained remission (CMS/HCC)    Seizure (CMS/HCC)    Generalized anxiety disorder with panic attacks  -     clonazePAM (KLONOPIN) 0.5 MG tablet; Take ½ tablet by mouth 2 (Two) Times a Day As Needed for Seizures.    ADHD (attention deficit hyperactivity disorder), combined type  -     buPROPion XL (WELLBUTRIN XL) 150 MG 24 hr  tablet; Take 1 tablet by mouth Every Morning.      Atrial fibrillation, unspecified type (CMS/HCC)        Patient reports a history of ADHD since he was a child and being on stimulant medications (Aderall 20mg po 6am and 1pm)  prescribed  by his previous psychiatrist.  However given the extensive history of cardiac issues with many episodes of chest pain and abnormal EKG findings, stimulants will not be prescribed. This was discussed w/ patient and he expressed understanding.    We discussed risks, benefits, and side effects of the above medication and the patient was agreeable with the plan. The patient was instructed to call clinic as needed or go to ER if in crisis.     Return in about 2 weeks     Dianna Chang MD  Time in: 9.35 and Time out 10.05AM

## 2018-10-14 NOTE — ED PROVIDER NOTES
Subjective   History of Present Illness    Review of Systems    Past Medical History:   Diagnosis Date   • Anxiety    • Aortic stenosis    • Atrial fibrillation (CMS/[facility])    • Bipolar disorder (CMS/[facility])    • Bleeding stomach ulcer    • CAD (coronary artery disease)    • Chronic back pain    • Hallucinations    • Hyperlipidemia    • Hypertension    • Injury of back    • Myocardial infarct (CMS/[facility]) 2015   • Nervous breakdown     12 years ago   • Schizophrenia (CMS/[facility])    • Seizure (CMS/[facility])     last seizure August 2018 per pt   • Stroke (CMS/[facility])    • Stroke (CMS/[facility])     affected speech, vision, right side weakness   • Suicide attempt (CMS/[facility])     2005 overdose        Allergies   Allergen Reactions   • Bee Venom Anaphylaxis   • Celexa [Citalopram] Anaphylaxis and Shortness Of Breath   • Fentanyl      Pt states that he stops breathing   • Fentanyl Shortness Of Breath   • Haldol [Haloperidol Lactate] Anaphylaxis   • Haldol [Haloperidol] Shortness Of Breath   • Nitroglycerin Anaphylaxis   • Risperdal [Risperidone] Shortness Of Breath   • Risperidone And Related Anaphylaxis   • Celexa [Citalopram Hydrobromide] Other (See Comments)     Pt states that it causes him to sweat and a severe headache.        Past Surgical History:   Procedure Laterality Date   • AORTIC VALVE REPAIR/REPLACEMENT  2016    tissue valve, performed in [place]   • AORTIC VALVE REPAIR/REPLACEMENT MITRAL VALVE REPAIR/REPLACEMENT     • APPENDECTOMY  2003   • CARDIAC CATHETERIZATION     • CHOLECYSTECTOMY  2016   • HERNIA REPAIR  2004    umbilical   • HIATAL HERNIA REPAIR  2004   • KNEE ARTHROSCOPY     • PACEMAKER IMPLANTATION     • PACEMAKER IMPLANTATION Left 2014 2014 OR 2015.   [employer]. interrogated 3/9/18   • PATELLAR RECONSTRUCTION Right [id number]   • QUADRICEPS REPAIR     • SHOULDER ARTHROSCOPY Right 2010   • SHOULDER CLOSED REDUCTION Left 3/9/2018    Procedure: LEFT SHOULDER CLOSED REDUCTION;  Surgeon: Steven Wang Jr., MD;   Location: [facility] OR;  Service: Orthopedics       Family History   Problem Relation Age of Onset   • No Known Problems Mother    • No Known Problems Father    • Bipolar disorder Maternal Grandfather    • Schizophrenia Maternal Grandfather        Social History     Social History   • Marital status:      Social History Main Topics   • Smoking status: Current Every Day Smoker     Packs/day: 0.50     Years: 25.00     Types: Cigarettes   • Smokeless tobacco: Never Used   • Alcohol use No      Comment: denies   • Drug use: No      Comment: denies   • Sexual activity: Defer     Other Topics Concern   • Not on file     Social History Narrative    ** Merged History Encounter **         Retired , independentt of ADL, lives with wife           Objective   Physical Exam    Procedures           ED Course  ED Course as of Oct 13 2023   Tue Sep 25, 2018   1930 Endorsed to Dr. Campbell at shift change.  [CM]      ED Course User Index  [CM] Sanchez Saavedra MD                  MDM  Number of Diagnoses or Management Options  Bronchitis: new and requires workup  Chest pain in adult: new and requires workup  Suicidal ideations: new and requires workup     Amount and/or Complexity of Data Reviewed  Clinical lab tests: ordered and reviewed  Tests in the radiology section of CPT®: reviewed and ordered  Tests in the medicine section of CPT®: reviewed and ordered  Decide to obtain previous medical records or to obtain history from someone other than the patient: yes  Discuss the patient with other providers: yes  Independent visualization of images, tracings, or specimens: yes    Risk of Complications, Morbidity, and/or Mortality  Presenting problems: high  Diagnostic procedures: high  Management options: high    Patient Progress  Patient progress: (guarded)        Final diagnoses:   Bronchitis   Chest pain in adult   Suicidal ideations            Krystina Campbell DO  10/13/18 2023

## 2018-10-17 ENCOUNTER — HOSPITAL ENCOUNTER (EMERGENCY)
Facility: HOSPITAL | Age: 43
End: 2018-10-18
Attending: EMERGENCY MEDICINE

## 2018-10-17 ENCOUNTER — APPOINTMENT (OUTPATIENT)
Dept: GENERAL RADIOLOGY | Facility: HOSPITAL | Age: 43
End: 2018-10-17

## 2018-10-17 DIAGNOSIS — R44.0 AUDITORY HALLUCINATIONS: ICD-10-CM

## 2018-10-17 DIAGNOSIS — I20.8 STABLE ANGINA (HCC): ICD-10-CM

## 2018-10-17 DIAGNOSIS — R45.851 SUICIDAL IDEATION: Primary | ICD-10-CM

## 2018-10-17 LAB
6-ACETYL MORPHINE: NEGATIVE
ALBUMIN SERPL-MCNC: 4.8 G/DL (ref 3.5–5)
ALBUMIN/GLOB SERPL: 1.8 G/DL (ref 1.5–2.5)
ALP SERPL-CCNC: 102 U/L (ref 40–129)
ALT SERPL W P-5'-P-CCNC: 21 U/L (ref 10–44)
AMPHET+METHAMPHET UR QL: NEGATIVE
AMYLASE SERPL-CCNC: 51 U/L (ref 28–100)
ANION GAP SERPL CALCULATED.3IONS-SCNC: 6.8 MMOL/L (ref 3.6–11.2)
APAP SERPL-MCNC: <10 MCG/ML (ref 0–200)
APTT PPP: 27.3 SECONDS (ref 23.8–36.1)
AST SERPL-CCNC: 17 U/L (ref 10–34)
BACTERIA UR QL AUTO: ABNORMAL /HPF
BARBITURATES UR QL SCN: NEGATIVE
BASOPHILS # BLD AUTO: 0.05 10*3/MM3 (ref 0–0.3)
BASOPHILS NFR BLD AUTO: 0.5 % (ref 0–2)
BENZODIAZ UR QL SCN: NEGATIVE
BILIRUB SERPL-MCNC: 0.4 MG/DL (ref 0.2–1.8)
BILIRUB UR QL STRIP: NEGATIVE
BUN BLD-MCNC: 9 MG/DL (ref 7–21)
BUN/CREAT SERPL: 6.9 (ref 7–25)
BUPRENORPHINE SERPL-MCNC: NEGATIVE NG/ML
CALCIUM SPEC-SCNC: 9.3 MG/DL (ref 7.7–10)
CANNABINOIDS SERPL QL: NEGATIVE
CHLORIDE SERPL-SCNC: 109 MMOL/L (ref 99–112)
CLARITY UR: CLEAR
CO2 SERPL-SCNC: 26.2 MMOL/L (ref 24.3–31.9)
COCAINE UR QL: NEGATIVE
COD CRY URNS QL: ABNORMAL /HPF
COLOR UR: ABNORMAL
CREAT BLD-MCNC: 1.3 MG/DL (ref 0.43–1.29)
DEPRECATED RDW RBC AUTO: 45.4 FL (ref 37–54)
EOSINOPHIL # BLD AUTO: 0.37 10*3/MM3 (ref 0–0.7)
EOSINOPHIL NFR BLD AUTO: 3.4 % (ref 0–5)
ERYTHROCYTE [DISTWIDTH] IN BLOOD BY AUTOMATED COUNT: 14.2 % (ref 11.5–14.5)
ETHANOL BLD-MCNC: <10 MG/DL
ETHANOL UR QL: <0.01 %
GFR SERPL CREATININE-BSD FRML MDRD: 60 ML/MIN/1.73
GLOBULIN UR ELPH-MCNC: 2.6 GM/DL
GLUCOSE BLD-MCNC: 129 MG/DL (ref 70–110)
GLUCOSE UR STRIP-MCNC: NEGATIVE MG/DL
HCT VFR BLD AUTO: 42.2 % (ref 42–52)
HGB BLD-MCNC: 14.2 G/DL (ref 14–18)
HGB UR QL STRIP.AUTO: NEGATIVE
HYALINE CASTS UR QL AUTO: ABNORMAL /LPF
IMM GRANULOCYTES # BLD: 0.03 10*3/MM3 (ref 0–0.03)
IMM GRANULOCYTES NFR BLD: 0.3 % (ref 0–0.5)
INR PPP: 0.97 (ref 0.9–1.1)
KETONES UR QL STRIP: ABNORMAL
LEUKOCYTE ESTERASE UR QL STRIP.AUTO: NEGATIVE
LIPASE SERPL-CCNC: 40 U/L (ref 13–60)
LYMPHOCYTES # BLD AUTO: 2.19 10*3/MM3 (ref 1–3)
LYMPHOCYTES NFR BLD AUTO: 20.3 % (ref 21–51)
MAGNESIUM SERPL-MCNC: 2.2 MG/DL (ref 1.7–2.6)
MCH RBC QN AUTO: 29.6 PG (ref 27–33)
MCHC RBC AUTO-ENTMCNC: 33.6 G/DL (ref 33–37)
MCV RBC AUTO: 88.1 FL (ref 80–94)
METHADONE UR QL SCN: NEGATIVE
MONOCYTES # BLD AUTO: 1.04 10*3/MM3 (ref 0.1–0.9)
MONOCYTES NFR BLD AUTO: 9.6 % (ref 0–10)
MUCOUS THREADS URNS QL MICRO: ABNORMAL /HPF
NEUTROPHILS # BLD AUTO: 7.11 10*3/MM3 (ref 1.4–6.5)
NEUTROPHILS NFR BLD AUTO: 65.9 % (ref 30–70)
NITRITE UR QL STRIP: NEGATIVE
OPIATES UR QL: NEGATIVE
OSMOLALITY SERPL CALC.SUM OF ELEC: 283.5 MOSM/KG (ref 273–305)
OXYCODONE UR QL SCN: NEGATIVE
PCP UR QL SCN: NEGATIVE
PH UR STRIP.AUTO: 6.5 [PH] (ref 5–8)
PLATELET # BLD AUTO: 369 10*3/MM3 (ref 130–400)
PMV BLD AUTO: 10.1 FL (ref 6–10)
POTASSIUM BLD-SCNC: 3.3 MMOL/L (ref 3.5–5.3)
PROT SERPL-MCNC: 7.4 G/DL (ref 6–8)
PROT UR QL STRIP: ABNORMAL
PROTHROMBIN TIME: 13.1 SECONDS (ref 11–15.4)
RBC # BLD AUTO: 4.79 10*6/MM3 (ref 4.7–6.1)
RBC # UR: ABNORMAL /HPF
REF LAB TEST METHOD: ABNORMAL
SALICYLATES SERPL-MCNC: 2.1 MG/DL (ref 0–30)
SODIUM BLD-SCNC: 142 MMOL/L (ref 135–153)
SP GR UR STRIP: 1.02 (ref 1–1.03)
SQUAMOUS #/AREA URNS HPF: ABNORMAL /HPF
TROPONIN I SERPL-MCNC: <0.006 NG/ML
UROBILINOGEN UR QL STRIP: ABNORMAL
WBC NRBC COR # BLD: 10.79 10*3/MM3 (ref 4.5–12.5)
WBC UR QL AUTO: ABNORMAL /HPF

## 2018-10-17 PROCEDURE — 80307 DRUG TEST PRSMV CHEM ANLYZR: CPT | Performed by: EMERGENCY MEDICINE

## 2018-10-17 PROCEDURE — 85025 COMPLETE CBC W/AUTO DIFF WBC: CPT | Performed by: EMERGENCY MEDICINE

## 2018-10-17 PROCEDURE — 71046 X-RAY EXAM CHEST 2 VIEWS: CPT | Performed by: RADIOLOGY

## 2018-10-17 PROCEDURE — 99285 EMERGENCY DEPT VISIT HI MDM: CPT

## 2018-10-17 PROCEDURE — 80053 COMPREHEN METABOLIC PANEL: CPT | Performed by: EMERGENCY MEDICINE

## 2018-10-17 PROCEDURE — 85610 PROTHROMBIN TIME: CPT | Performed by: EMERGENCY MEDICINE

## 2018-10-17 PROCEDURE — 83690 ASSAY OF LIPASE: CPT | Performed by: EMERGENCY MEDICINE

## 2018-10-17 PROCEDURE — 82150 ASSAY OF AMYLASE: CPT | Performed by: EMERGENCY MEDICINE

## 2018-10-17 PROCEDURE — 85730 THROMBOPLASTIN TIME PARTIAL: CPT | Performed by: EMERGENCY MEDICINE

## 2018-10-17 PROCEDURE — 71046 X-RAY EXAM CHEST 2 VIEWS: CPT

## 2018-10-17 PROCEDURE — 84484 ASSAY OF TROPONIN QUANT: CPT | Performed by: EMERGENCY MEDICINE

## 2018-10-17 PROCEDURE — 93005 ELECTROCARDIOGRAM TRACING: CPT | Performed by: EMERGENCY MEDICINE

## 2018-10-17 PROCEDURE — 83735 ASSAY OF MAGNESIUM: CPT | Performed by: EMERGENCY MEDICINE

## 2018-10-17 PROCEDURE — 81001 URINALYSIS AUTO W/SCOPE: CPT | Performed by: EMERGENCY MEDICINE

## 2018-10-18 ENCOUNTER — HOSPITAL ENCOUNTER (INPATIENT)
Facility: HOSPITAL | Age: 43
LOS: 14 days | Discharge: HOME | End: 2018-11-01
Attending: PSYCHIATRY & NEUROLOGY | Admitting: PSYCHIATRY & NEUROLOGY

## 2018-10-18 VITALS
TEMPERATURE: 98 F | WEIGHT: 205 LBS | BODY MASS INDEX: 26.31 KG/M2 | SYSTOLIC BLOOD PRESSURE: 131 MMHG | HEIGHT: 74 IN | RESPIRATION RATE: 18 BRPM | DIASTOLIC BLOOD PRESSURE: 93 MMHG | OXYGEN SATURATION: 95 % | HEART RATE: 83 BPM

## 2018-10-18 PROBLEM — F32.9 MAJOR DEPRESSIVE DISORDER: Status: ACTIVE | Noted: 2018-10-18

## 2018-10-18 PROCEDURE — 25010000002 INFLUENZA VAC SUBUNIT QUAD 0.5 ML SUSPENSION PREFILLED SYRINGE: Performed by: PSYCHIATRY & NEUROLOGY

## 2018-10-18 PROCEDURE — 99223 1ST HOSP IP/OBS HIGH 75: CPT | Performed by: PSYCHIATRY & NEUROLOGY

## 2018-10-18 PROCEDURE — 90661 CCIIV3 VAC ABX FR 0.5 ML IM: CPT | Performed by: PSYCHIATRY & NEUROLOGY

## 2018-10-18 PROCEDURE — G0008 ADMIN INFLUENZA VIRUS VAC: HCPCS | Performed by: PSYCHIATRY & NEUROLOGY

## 2018-10-18 RX ORDER — ONDANSETRON 4 MG/1
4 TABLET, FILM COATED ORAL EVERY 6 HOURS PRN
Status: DISCONTINUED | OUTPATIENT
Start: 2018-10-18 | End: 2018-11-01 | Stop reason: HOSPADM

## 2018-10-18 RX ORDER — ALUMINA, MAGNESIA, AND SIMETHICONE 2400; 2400; 240 MG/30ML; MG/30ML; MG/30ML
15 SUSPENSION ORAL EVERY 6 HOURS PRN
Status: DISCONTINUED | OUTPATIENT
Start: 2018-10-18 | End: 2018-11-01 | Stop reason: HOSPADM

## 2018-10-18 RX ORDER — BENZTROPINE MESYLATE 1 MG/1
1 TABLET ORAL DAILY PRN
Status: DISCONTINUED | OUTPATIENT
Start: 2018-10-18 | End: 2018-11-01 | Stop reason: HOSPADM

## 2018-10-18 RX ORDER — FAMOTIDINE 20 MG/1
20 TABLET, FILM COATED ORAL 2 TIMES DAILY PRN
Status: DISCONTINUED | OUTPATIENT
Start: 2018-10-18 | End: 2018-11-01 | Stop reason: HOSPADM

## 2018-10-18 RX ORDER — BUPROPION HYDROCHLORIDE 150 MG/1
150 TABLET ORAL DAILY
Status: DISCONTINUED | OUTPATIENT
Start: 2018-10-18 | End: 2018-10-18

## 2018-10-18 RX ORDER — NICOTINE 21 MG/24HR
1 PATCH, TRANSDERMAL 24 HOURS TRANSDERMAL EVERY 24 HOURS
Status: DISCONTINUED | OUTPATIENT
Start: 2018-10-18 | End: 2018-11-01 | Stop reason: HOSPADM

## 2018-10-18 RX ORDER — LISINOPRIL 10 MG/1
20 TABLET ORAL DAILY
Status: DISCONTINUED | OUTPATIENT
Start: 2018-10-18 | End: 2018-10-29

## 2018-10-18 RX ORDER — LEVETIRACETAM 500 MG/1
1000 TABLET ORAL 2 TIMES DAILY
Status: DISCONTINUED | OUTPATIENT
Start: 2018-10-18 | End: 2018-11-01 | Stop reason: HOSPADM

## 2018-10-18 RX ORDER — IBUPROFEN 600 MG/1
600 TABLET ORAL EVERY 6 HOURS PRN
Status: DISCONTINUED | OUTPATIENT
Start: 2018-10-18 | End: 2018-11-01 | Stop reason: HOSPADM

## 2018-10-18 RX ORDER — BENZTROPINE MESYLATE 1 MG/ML
0.5 INJECTION INTRAMUSCULAR; INTRAVENOUS DAILY PRN
Status: DISCONTINUED | OUTPATIENT
Start: 2018-10-18 | End: 2018-11-01 | Stop reason: HOSPADM

## 2018-10-18 RX ORDER — QUETIAPINE FUMARATE 100 MG/1
100 TABLET, FILM COATED ORAL EVERY 12 HOURS SCHEDULED
Status: DISCONTINUED | OUTPATIENT
Start: 2018-10-18 | End: 2018-10-21

## 2018-10-18 RX ORDER — TRAZODONE HYDROCHLORIDE 50 MG/1
50 TABLET ORAL NIGHTLY PRN
Status: DISCONTINUED | OUTPATIENT
Start: 2018-10-18 | End: 2018-11-01 | Stop reason: HOSPADM

## 2018-10-18 RX ORDER — HYDROCODONE BITARTRATE AND ACETAMINOPHEN 10; 325 MG/1; MG/1
1 TABLET ORAL EVERY 8 HOURS PRN
Status: DISCONTINUED | OUTPATIENT
Start: 2018-10-18 | End: 2018-11-01 | Stop reason: HOSPADM

## 2018-10-18 RX ORDER — THIAMINE HCL 50 MG
100 TABLET ORAL DAILY
Status: DISCONTINUED | OUTPATIENT
Start: 2018-10-18 | End: 2018-11-01 | Stop reason: HOSPADM

## 2018-10-18 RX ORDER — HYDROXYZINE 50 MG/1
50 TABLET, FILM COATED ORAL EVERY 4 HOURS PRN
Status: DISCONTINUED | OUTPATIENT
Start: 2018-10-18 | End: 2018-11-01 | Stop reason: HOSPADM

## 2018-10-18 RX ORDER — ECHINACEA PURPUREA EXTRACT 125 MG
2 TABLET ORAL AS NEEDED
Status: DISCONTINUED | OUTPATIENT
Start: 2018-10-18 | End: 2018-11-01 | Stop reason: HOSPADM

## 2018-10-18 RX ORDER — AMLODIPINE BESYLATE 10 MG/1
10 TABLET ORAL DAILY
COMMUNITY
End: 2018-11-01 | Stop reason: HOSPADM

## 2018-10-18 RX ORDER — CLONAZEPAM 0.5 MG/1
0.25 TABLET ORAL 2 TIMES DAILY PRN
Status: DISCONTINUED | OUTPATIENT
Start: 2018-10-18 | End: 2018-11-01 | Stop reason: HOSPADM

## 2018-10-18 RX ORDER — BENZONATATE 100 MG/1
100 CAPSULE ORAL 3 TIMES DAILY PRN
Status: DISCONTINUED | OUTPATIENT
Start: 2018-10-18 | End: 2018-11-01 | Stop reason: HOSPADM

## 2018-10-18 RX ORDER — AMLODIPINE BESYLATE 10 MG/1
10 TABLET ORAL DAILY
Status: DISCONTINUED | OUTPATIENT
Start: 2018-10-18 | End: 2018-11-01 | Stop reason: HOSPADM

## 2018-10-18 RX ADMIN — LISINOPRIL 20 MG: 10 TABLET ORAL at 11:54

## 2018-10-18 RX ADMIN — QUETIAPINE FUMARATE 100 MG: 100 TABLET ORAL at 21:12

## 2018-10-18 RX ADMIN — HYDROCODONE BITARTRATE AND ACETAMINOPHEN 1 TABLET: 10; 325 TABLET ORAL at 21:12

## 2018-10-18 RX ADMIN — QUETIAPINE FUMARATE 100 MG: 100 TABLET ORAL at 11:54

## 2018-10-18 RX ADMIN — APIXABAN 5 MG: 5 TABLET, FILM COATED ORAL at 11:53

## 2018-10-18 RX ADMIN — LEVETIRACETAM 1000 MG: 500 TABLET, FILM COATED ORAL at 21:12

## 2018-10-18 RX ADMIN — LEVETIRACETAM 1000 MG: 500 TABLET, FILM COATED ORAL at 11:53

## 2018-10-18 RX ADMIN — THIAMINE HCL (VITAMIN B1) 50 MG TABLET 100 MG: at 11:53

## 2018-10-18 RX ADMIN — APIXABAN 5 MG: 5 TABLET, FILM COATED ORAL at 21:12

## 2018-10-18 RX ADMIN — INFLUENZA A VIRUS A/SINGAPORE/GP1908/2015 IVR-180 (H1N1) ANTIGEN (MDCK CELL DERIVED, PROPIOLACTONE INACTIVATED), INFLUENZA A VIRUS A/NORTH CAROLINA/04/2016 (H3N2) HEMAGGLUTININ ANTIGEN (MDCK CELL DERIVED, PROPIOLACTONE INACTIVATED), INFLUENZA B VIRUS B/IOWA/06/2017 HEMAGGLUTININ ANTIGEN (MDCK CELL DERIVED, PROPIOLACTONE INACTIVATED), INFLUENZA B VIRUS B/SINGAPORE/INFTT-16-0610/2016 HEMAGGLUTININ ANTIGEN (MDCK CELL DERIVED, PROPIOLACTONE INACTIVATED) 0.5 ML: 15; 15; 15; 15 INJECTION, SUSPENSION INTRAMUSCULAR at 11:58

## 2018-10-18 RX ADMIN — HYDROCODONE BITARTRATE AND ACETAMINOPHEN 1 TABLET: 10; 325 TABLET ORAL at 11:54

## 2018-10-18 RX ADMIN — AMLODIPINE BESYLATE 10 MG: 10 TABLET ORAL at 11:53

## 2018-10-19 PROCEDURE — 99232 SBSQ HOSP IP/OBS MODERATE 35: CPT | Performed by: PSYCHIATRY & NEUROLOGY

## 2018-10-19 RX ADMIN — APIXABAN 5 MG: 5 TABLET, FILM COATED ORAL at 22:04

## 2018-10-19 RX ADMIN — HYDROCODONE BITARTRATE AND ACETAMINOPHEN 1 TABLET: 10; 325 TABLET ORAL at 08:02

## 2018-10-19 RX ADMIN — TRAZODONE HYDROCHLORIDE 50 MG: 50 TABLET ORAL at 22:04

## 2018-10-19 RX ADMIN — QUETIAPINE FUMARATE 100 MG: 100 TABLET ORAL at 08:02

## 2018-10-19 RX ADMIN — AMLODIPINE BESYLATE 10 MG: 10 TABLET ORAL at 08:02

## 2018-10-19 RX ADMIN — HYDROCODONE BITARTRATE AND ACETAMINOPHEN 1 TABLET: 10; 325 TABLET ORAL at 16:55

## 2018-10-19 RX ADMIN — LEVETIRACETAM 1000 MG: 500 TABLET, FILM COATED ORAL at 22:04

## 2018-10-19 RX ADMIN — QUETIAPINE FUMARATE 100 MG: 100 TABLET ORAL at 22:04

## 2018-10-19 RX ADMIN — LISINOPRIL 20 MG: 10 TABLET ORAL at 08:02

## 2018-10-19 RX ADMIN — THIAMINE HCL (VITAMIN B1) 50 MG TABLET 100 MG: at 08:02

## 2018-10-19 RX ADMIN — LEVETIRACETAM 1000 MG: 500 TABLET, FILM COATED ORAL at 08:02

## 2018-10-19 RX ADMIN — APIXABAN 5 MG: 5 TABLET, FILM COATED ORAL at 08:02

## 2018-10-20 LAB
CHOLEST SERPL-MCNC: 206 MG/DL (ref 0–200)
HDLC SERPL-MCNC: 41 MG/DL (ref 60–100)
LDLC SERPL CALC-MCNC: 147 MG/DL (ref 0–100)
LDLC/HDLC SERPL: 3.58 {RATIO}
TRIGL SERPL-MCNC: 91 MG/DL (ref 0–150)
VLDLC SERPL-MCNC: 18.2 MG/DL

## 2018-10-20 PROCEDURE — 99232 SBSQ HOSP IP/OBS MODERATE 35: CPT | Performed by: PSYCHIATRY & NEUROLOGY

## 2018-10-20 PROCEDURE — 80061 LIPID PANEL: CPT | Performed by: PSYCHIATRY & NEUROLOGY

## 2018-10-20 RX ADMIN — APIXABAN 5 MG: 5 TABLET, FILM COATED ORAL at 21:21

## 2018-10-20 RX ADMIN — QUETIAPINE FUMARATE 100 MG: 100 TABLET ORAL at 08:26

## 2018-10-20 RX ADMIN — APIXABAN 5 MG: 5 TABLET, FILM COATED ORAL at 08:26

## 2018-10-20 RX ADMIN — LEVETIRACETAM 1000 MG: 500 TABLET, FILM COATED ORAL at 21:21

## 2018-10-20 RX ADMIN — QUETIAPINE FUMARATE 100 MG: 100 TABLET ORAL at 21:21

## 2018-10-20 RX ADMIN — LISINOPRIL 20 MG: 10 TABLET ORAL at 08:26

## 2018-10-20 RX ADMIN — HYDROCODONE BITARTRATE AND ACETAMINOPHEN 1 TABLET: 10; 325 TABLET ORAL at 16:41

## 2018-10-20 RX ADMIN — THIAMINE HCL (VITAMIN B1) 50 MG TABLET 100 MG: at 08:26

## 2018-10-20 RX ADMIN — LEVETIRACETAM 1000 MG: 500 TABLET, FILM COATED ORAL at 08:26

## 2018-10-20 RX ADMIN — HYDROCODONE BITARTRATE AND ACETAMINOPHEN 1 TABLET: 10; 325 TABLET ORAL at 08:27

## 2018-10-20 RX ADMIN — AMLODIPINE BESYLATE 10 MG: 10 TABLET ORAL at 08:26

## 2018-10-21 PROCEDURE — 99232 SBSQ HOSP IP/OBS MODERATE 35: CPT | Performed by: PSYCHIATRY & NEUROLOGY

## 2018-10-21 RX ADMIN — HYDROCODONE BITARTRATE AND ACETAMINOPHEN 1 TABLET: 10; 325 TABLET ORAL at 18:29

## 2018-10-21 RX ADMIN — HYDROCODONE BITARTRATE AND ACETAMINOPHEN 1 TABLET: 10; 325 TABLET ORAL at 09:26

## 2018-10-21 RX ADMIN — APIXABAN 5 MG: 5 TABLET, FILM COATED ORAL at 21:41

## 2018-10-21 RX ADMIN — QUETIAPINE FUMARATE 150 MG: 25 TABLET, FILM COATED ORAL at 21:41

## 2018-10-21 RX ADMIN — THIAMINE HCL (VITAMIN B1) 50 MG TABLET 100 MG: at 09:22

## 2018-10-21 RX ADMIN — LEVETIRACETAM 1000 MG: 500 TABLET, FILM COATED ORAL at 09:23

## 2018-10-21 RX ADMIN — LEVETIRACETAM 1000 MG: 500 TABLET, FILM COATED ORAL at 21:41

## 2018-10-21 RX ADMIN — QUETIAPINE FUMARATE 100 MG: 100 TABLET ORAL at 09:23

## 2018-10-21 RX ADMIN — APIXABAN 5 MG: 5 TABLET, FILM COATED ORAL at 09:23

## 2018-10-21 RX ADMIN — LISINOPRIL 20 MG: 10 TABLET ORAL at 09:22

## 2018-10-21 RX ADMIN — AMLODIPINE BESYLATE 10 MG: 10 TABLET ORAL at 09:23

## 2018-10-21 RX ADMIN — TRAZODONE HYDROCHLORIDE 50 MG: 50 TABLET ORAL at 21:41

## 2018-10-22 PROCEDURE — 99232 SBSQ HOSP IP/OBS MODERATE 35: CPT | Performed by: PSYCHIATRY & NEUROLOGY

## 2018-10-22 RX ORDER — TRIFLUOPERAZINE HYDROCHLORIDE 1 MG/1
1 TABLET, FILM COATED ORAL EVERY 12 HOURS SCHEDULED
Status: DISCONTINUED | OUTPATIENT
Start: 2018-10-22 | End: 2018-10-24

## 2018-10-22 RX ADMIN — APIXABAN 5 MG: 5 TABLET, FILM COATED ORAL at 09:56

## 2018-10-22 RX ADMIN — APIXABAN 5 MG: 5 TABLET, FILM COATED ORAL at 20:26

## 2018-10-22 RX ADMIN — AMLODIPINE BESYLATE 10 MG: 10 TABLET ORAL at 09:56

## 2018-10-22 RX ADMIN — LISINOPRIL 20 MG: 10 TABLET ORAL at 09:57

## 2018-10-22 RX ADMIN — THIAMINE HCL (VITAMIN B1) 50 MG TABLET 100 MG: at 09:57

## 2018-10-22 RX ADMIN — QUETIAPINE FUMARATE 150 MG: 25 TABLET, FILM COATED ORAL at 09:56

## 2018-10-22 RX ADMIN — HYDROXYZINE HYDROCHLORIDE 50 MG: 50 TABLET, FILM COATED ORAL at 16:37

## 2018-10-22 RX ADMIN — QUETIAPINE FUMARATE 150 MG: 25 TABLET, FILM COATED ORAL at 20:26

## 2018-10-22 RX ADMIN — TRIFLUOPERAZINE HYDROCHLORIDE 1 MG: 1 TABLET, FILM COATED ORAL at 15:10

## 2018-10-22 RX ADMIN — LEVETIRACETAM 1000 MG: 500 TABLET, FILM COATED ORAL at 09:56

## 2018-10-22 RX ADMIN — LEVETIRACETAM 1000 MG: 500 TABLET, FILM COATED ORAL at 20:26

## 2018-10-22 RX ADMIN — HYDROCODONE BITARTRATE AND ACETAMINOPHEN 1 TABLET: 10; 325 TABLET ORAL at 09:56

## 2018-10-22 RX ADMIN — HYDROCODONE BITARTRATE AND ACETAMINOPHEN 1 TABLET: 10; 325 TABLET ORAL at 18:37

## 2018-10-23 PROCEDURE — 99232 SBSQ HOSP IP/OBS MODERATE 35: CPT | Performed by: PSYCHIATRY & NEUROLOGY

## 2018-10-23 RX ADMIN — TRIFLUOPERAZINE HYDROCHLORIDE 1 MG: 1 TABLET, FILM COATED ORAL at 11:11

## 2018-10-23 RX ADMIN — APIXABAN 5 MG: 5 TABLET, FILM COATED ORAL at 20:28

## 2018-10-23 RX ADMIN — TRIFLUOPERAZINE HYDROCHLORIDE 1 MG: 1 TABLET, FILM COATED ORAL at 20:28

## 2018-10-23 RX ADMIN — QUETIAPINE FUMARATE 150 MG: 25 TABLET, FILM COATED ORAL at 11:11

## 2018-10-23 RX ADMIN — THIAMINE HCL (VITAMIN B1) 50 MG TABLET 100 MG: at 11:12

## 2018-10-23 RX ADMIN — QUETIAPINE FUMARATE 150 MG: 25 TABLET, FILM COATED ORAL at 20:28

## 2018-10-23 RX ADMIN — HYDROCODONE BITARTRATE AND ACETAMINOPHEN 1 TABLET: 10; 325 TABLET ORAL at 11:11

## 2018-10-23 RX ADMIN — LEVETIRACETAM 1000 MG: 500 TABLET, FILM COATED ORAL at 20:28

## 2018-10-23 RX ADMIN — AMLODIPINE BESYLATE 10 MG: 10 TABLET ORAL at 11:11

## 2018-10-23 RX ADMIN — HYDROCODONE BITARTRATE AND ACETAMINOPHEN 1 TABLET: 10; 325 TABLET ORAL at 19:13

## 2018-10-23 RX ADMIN — LEVETIRACETAM 1000 MG: 500 TABLET, FILM COATED ORAL at 11:12

## 2018-10-23 RX ADMIN — APIXABAN 5 MG: 5 TABLET, FILM COATED ORAL at 11:11

## 2018-10-23 RX ADMIN — LISINOPRIL 20 MG: 10 TABLET ORAL at 11:12

## 2018-10-24 PROCEDURE — 99232 SBSQ HOSP IP/OBS MODERATE 35: CPT | Performed by: PSYCHIATRY & NEUROLOGY

## 2018-10-24 RX ORDER — TRIFLUOPERAZINE HYDROCHLORIDE 1 MG/1
1 TABLET, FILM COATED ORAL EVERY MORNING
Status: DISCONTINUED | OUTPATIENT
Start: 2018-10-25 | End: 2018-10-26

## 2018-10-24 RX ORDER — ATORVASTATIN CALCIUM 10 MG/1
10 TABLET, FILM COATED ORAL NIGHTLY
Status: DISCONTINUED | OUTPATIENT
Start: 2018-10-24 | End: 2018-11-01 | Stop reason: HOSPADM

## 2018-10-24 RX ORDER — TRIFLUOPERAZINE HYDROCHLORIDE 1 MG/1
2 TABLET, FILM COATED ORAL NIGHTLY
Status: DISCONTINUED | OUTPATIENT
Start: 2018-10-24 | End: 2018-10-26

## 2018-10-24 RX ADMIN — APIXABAN 5 MG: 5 TABLET, FILM COATED ORAL at 20:52

## 2018-10-24 RX ADMIN — TRIFLUOPERAZINE HYDROCHLORIDE 1 MG: 1 TABLET, FILM COATED ORAL at 08:26

## 2018-10-24 RX ADMIN — HYDROCODONE BITARTRATE AND ACETAMINOPHEN 1 TABLET: 10; 325 TABLET ORAL at 08:27

## 2018-10-24 RX ADMIN — TRAZODONE HYDROCHLORIDE 50 MG: 50 TABLET ORAL at 20:52

## 2018-10-24 RX ADMIN — LEVETIRACETAM 1000 MG: 500 TABLET, FILM COATED ORAL at 08:26

## 2018-10-24 RX ADMIN — LISINOPRIL 20 MG: 10 TABLET ORAL at 08:27

## 2018-10-24 RX ADMIN — HYDROCODONE BITARTRATE AND ACETAMINOPHEN 1 TABLET: 10; 325 TABLET ORAL at 16:03

## 2018-10-24 RX ADMIN — QUETIAPINE FUMARATE 150 MG: 25 TABLET, FILM COATED ORAL at 20:52

## 2018-10-24 RX ADMIN — LEVETIRACETAM 1000 MG: 500 TABLET, FILM COATED ORAL at 20:52

## 2018-10-24 RX ADMIN — QUETIAPINE FUMARATE 150 MG: 25 TABLET, FILM COATED ORAL at 08:26

## 2018-10-24 RX ADMIN — AMLODIPINE BESYLATE 10 MG: 10 TABLET ORAL at 08:27

## 2018-10-24 RX ADMIN — APIXABAN 5 MG: 5 TABLET, FILM COATED ORAL at 08:26

## 2018-10-24 RX ADMIN — THIAMINE HCL (VITAMIN B1) 50 MG TABLET 100 MG: at 08:26

## 2018-10-24 RX ADMIN — TRIFLUOPERAZINE HYDROCHLORIDE 2 MG: 1 TABLET, FILM COATED ORAL at 20:52

## 2018-10-24 RX ADMIN — ATORVASTATIN CALCIUM 10 MG: 10 TABLET, FILM COATED ORAL at 20:52

## 2018-10-25 PROCEDURE — 99232 SBSQ HOSP IP/OBS MODERATE 35: CPT | Performed by: PSYCHIATRY & NEUROLOGY

## 2018-10-25 RX ADMIN — TRIFLUOPERAZINE HYDROCHLORIDE 1 MG: 1 TABLET, FILM COATED ORAL at 06:05

## 2018-10-25 RX ADMIN — APIXABAN 5 MG: 5 TABLET, FILM COATED ORAL at 09:06

## 2018-10-25 RX ADMIN — QUETIAPINE FUMARATE 150 MG: 25 TABLET, FILM COATED ORAL at 09:05

## 2018-10-25 RX ADMIN — HYDROCODONE BITARTRATE AND ACETAMINOPHEN 1 TABLET: 10; 325 TABLET ORAL at 17:21

## 2018-10-25 RX ADMIN — APIXABAN 5 MG: 5 TABLET, FILM COATED ORAL at 21:01

## 2018-10-25 RX ADMIN — TRIFLUOPERAZINE HYDROCHLORIDE 2 MG: 1 TABLET, FILM COATED ORAL at 21:00

## 2018-10-25 RX ADMIN — THIAMINE HCL (VITAMIN B1) 50 MG TABLET 100 MG: at 09:06

## 2018-10-25 RX ADMIN — QUETIAPINE FUMARATE 150 MG: 25 TABLET, FILM COATED ORAL at 21:01

## 2018-10-25 RX ADMIN — ATORVASTATIN CALCIUM 10 MG: 10 TABLET, FILM COATED ORAL at 21:01

## 2018-10-25 RX ADMIN — TRAZODONE HYDROCHLORIDE 50 MG: 50 TABLET ORAL at 21:00

## 2018-10-25 RX ADMIN — AMLODIPINE BESYLATE 10 MG: 10 TABLET ORAL at 09:06

## 2018-10-25 RX ADMIN — LEVETIRACETAM 1000 MG: 500 TABLET, FILM COATED ORAL at 09:05

## 2018-10-25 RX ADMIN — LEVETIRACETAM 1000 MG: 500 TABLET, FILM COATED ORAL at 21:01

## 2018-10-25 RX ADMIN — LISINOPRIL 20 MG: 10 TABLET ORAL at 09:06

## 2018-10-25 RX ADMIN — HYDROCODONE BITARTRATE AND ACETAMINOPHEN 1 TABLET: 10; 325 TABLET ORAL at 09:11

## 2018-10-26 PROCEDURE — 99232 SBSQ HOSP IP/OBS MODERATE 35: CPT | Performed by: PSYCHIATRY & NEUROLOGY

## 2018-10-26 RX ORDER — TRIFLUOPERAZINE HYDROCHLORIDE 1 MG/1
2 TABLET, FILM COATED ORAL EVERY 12 HOURS SCHEDULED
Status: DISCONTINUED | OUTPATIENT
Start: 2018-10-26 | End: 2018-10-29

## 2018-10-26 RX ORDER — QUETIAPINE FUMARATE 300 MG/1
300 TABLET, FILM COATED ORAL NIGHTLY
Status: DISCONTINUED | OUTPATIENT
Start: 2018-10-26 | End: 2018-10-29

## 2018-10-26 RX ORDER — TRIFLUOPERAZINE HYDROCHLORIDE 1 MG/1
1 TABLET, FILM COATED ORAL ONCE
Status: COMPLETED | OUTPATIENT
Start: 2018-10-26 | End: 2018-10-26

## 2018-10-26 RX ADMIN — TRIFLUOPERAZINE HYDROCHLORIDE 1 MG: 1 TABLET, FILM COATED ORAL at 06:24

## 2018-10-26 RX ADMIN — QUETIAPINE FUMARATE 150 MG: 25 TABLET, FILM COATED ORAL at 08:35

## 2018-10-26 RX ADMIN — ATORVASTATIN CALCIUM 10 MG: 10 TABLET, FILM COATED ORAL at 20:56

## 2018-10-26 RX ADMIN — TRIFLUOPERAZINE HYDROCHLORIDE 1 MG: 1 TABLET, FILM COATED ORAL at 11:47

## 2018-10-26 RX ADMIN — LEVETIRACETAM 1000 MG: 500 TABLET, FILM COATED ORAL at 08:35

## 2018-10-26 RX ADMIN — LEVETIRACETAM 1000 MG: 500 TABLET, FILM COATED ORAL at 20:56

## 2018-10-26 RX ADMIN — HYDROXYZINE HYDROCHLORIDE 50 MG: 50 TABLET, FILM COATED ORAL at 16:23

## 2018-10-26 RX ADMIN — THIAMINE HCL (VITAMIN B1) 50 MG TABLET 100 MG: at 08:35

## 2018-10-26 RX ADMIN — APIXABAN 5 MG: 5 TABLET, FILM COATED ORAL at 08:35

## 2018-10-26 RX ADMIN — HYDROCODONE BITARTRATE AND ACETAMINOPHEN 1 TABLET: 10; 325 TABLET ORAL at 08:36

## 2018-10-26 RX ADMIN — QUETIAPINE FUMARATE 300 MG: 300 TABLET, FILM COATED ORAL at 20:56

## 2018-10-26 RX ADMIN — HYDROCODONE BITARTRATE AND ACETAMINOPHEN 1 TABLET: 10; 325 TABLET ORAL at 16:23

## 2018-10-26 RX ADMIN — APIXABAN 5 MG: 5 TABLET, FILM COATED ORAL at 20:56

## 2018-10-26 RX ADMIN — TRIFLUOPERAZINE HYDROCHLORIDE 2 MG: 1 TABLET, FILM COATED ORAL at 20:56

## 2018-10-27 PROCEDURE — 99231 SBSQ HOSP IP/OBS SF/LOW 25: CPT | Performed by: PSYCHIATRY & NEUROLOGY

## 2018-10-27 RX ADMIN — LEVETIRACETAM 1000 MG: 500 TABLET, FILM COATED ORAL at 08:09

## 2018-10-27 RX ADMIN — APIXABAN 5 MG: 5 TABLET, FILM COATED ORAL at 20:50

## 2018-10-27 RX ADMIN — TRIFLUOPERAZINE HYDROCHLORIDE 2 MG: 1 TABLET, FILM COATED ORAL at 08:09

## 2018-10-27 RX ADMIN — TRIFLUOPERAZINE HYDROCHLORIDE 2 MG: 1 TABLET, FILM COATED ORAL at 20:50

## 2018-10-27 RX ADMIN — QUETIAPINE FUMARATE 300 MG: 300 TABLET, FILM COATED ORAL at 20:50

## 2018-10-27 RX ADMIN — THIAMINE HCL (VITAMIN B1) 50 MG TABLET 100 MG: at 08:09

## 2018-10-27 RX ADMIN — HYDROCODONE BITARTRATE AND ACETAMINOPHEN 1 TABLET: 10; 325 TABLET ORAL at 16:16

## 2018-10-27 RX ADMIN — LISINOPRIL 20 MG: 10 TABLET ORAL at 08:08

## 2018-10-27 RX ADMIN — LEVETIRACETAM 1000 MG: 500 TABLET, FILM COATED ORAL at 20:50

## 2018-10-27 RX ADMIN — AMLODIPINE BESYLATE 10 MG: 10 TABLET ORAL at 08:09

## 2018-10-27 RX ADMIN — HYDROCODONE BITARTRATE AND ACETAMINOPHEN 1 TABLET: 10; 325 TABLET ORAL at 08:10

## 2018-10-27 RX ADMIN — APIXABAN 5 MG: 5 TABLET, FILM COATED ORAL at 08:09

## 2018-10-27 RX ADMIN — ATORVASTATIN CALCIUM 10 MG: 10 TABLET, FILM COATED ORAL at 20:50

## 2018-10-28 PROCEDURE — 99231 SBSQ HOSP IP/OBS SF/LOW 25: CPT | Performed by: PSYCHIATRY & NEUROLOGY

## 2018-10-28 RX ADMIN — QUETIAPINE FUMARATE 300 MG: 300 TABLET, FILM COATED ORAL at 20:35

## 2018-10-28 RX ADMIN — APIXABAN 5 MG: 5 TABLET, FILM COATED ORAL at 08:28

## 2018-10-28 RX ADMIN — APIXABAN 5 MG: 5 TABLET, FILM COATED ORAL at 20:35

## 2018-10-28 RX ADMIN — HYDROCODONE BITARTRATE AND ACETAMINOPHEN 1 TABLET: 10; 325 TABLET ORAL at 08:30

## 2018-10-28 RX ADMIN — LEVETIRACETAM 1000 MG: 500 TABLET, FILM COATED ORAL at 08:30

## 2018-10-28 RX ADMIN — TRIFLUOPERAZINE HYDROCHLORIDE 2 MG: 1 TABLET, FILM COATED ORAL at 08:30

## 2018-10-28 RX ADMIN — TRIFLUOPERAZINE HYDROCHLORIDE 2 MG: 1 TABLET, FILM COATED ORAL at 20:35

## 2018-10-28 RX ADMIN — ATORVASTATIN CALCIUM 10 MG: 10 TABLET, FILM COATED ORAL at 20:35

## 2018-10-28 RX ADMIN — THIAMINE HCL (VITAMIN B1) 50 MG TABLET 100 MG: at 08:29

## 2018-10-28 RX ADMIN — LEVETIRACETAM 1000 MG: 500 TABLET, FILM COATED ORAL at 20:35

## 2018-10-28 RX ADMIN — HYDROCODONE BITARTRATE AND ACETAMINOPHEN 1 TABLET: 10; 325 TABLET ORAL at 16:24

## 2018-10-29 LAB
ANION GAP SERPL CALCULATED.3IONS-SCNC: 4.9 MMOL/L (ref 3.6–11.2)
BUN BLD-MCNC: 20 MG/DL (ref 7–21)
BUN/CREAT SERPL: 18.5 (ref 7–25)
CALCIUM SPEC-SCNC: 9.5 MG/DL (ref 7.7–10)
CHLORIDE SERPL-SCNC: 104 MMOL/L (ref 99–112)
CO2 SERPL-SCNC: 26.1 MMOL/L (ref 24.3–31.9)
CREAT BLD-MCNC: 1.08 MG/DL (ref 0.43–1.29)
GFR SERPL CREATININE-BSD FRML MDRD: 74 ML/MIN/1.73
GLUCOSE BLD-MCNC: 167 MG/DL (ref 70–110)
OSMOLALITY SERPL CALC.SUM OF ELEC: 276.5 MOSM/KG (ref 273–305)
POTASSIUM BLD-SCNC: 4.5 MMOL/L (ref 3.5–5.3)
SODIUM BLD-SCNC: 135 MMOL/L (ref 135–153)
T4 FREE SERPL-MCNC: 1.19 NG/DL (ref 0.89–1.76)
TSH SERPL DL<=0.05 MIU/L-ACNC: 0.92 MIU/ML (ref 0.55–4.78)

## 2018-10-29 PROCEDURE — 84443 ASSAY THYROID STIM HORMONE: CPT | Performed by: PSYCHIATRY & NEUROLOGY

## 2018-10-29 PROCEDURE — 84439 ASSAY OF FREE THYROXINE: CPT | Performed by: PSYCHIATRY & NEUROLOGY

## 2018-10-29 PROCEDURE — 80048 BASIC METABOLIC PNL TOTAL CA: CPT | Performed by: PSYCHIATRY & NEUROLOGY

## 2018-10-29 PROCEDURE — 99232 SBSQ HOSP IP/OBS MODERATE 35: CPT | Performed by: PSYCHIATRY & NEUROLOGY

## 2018-10-29 RX ORDER — LISINOPRIL 10 MG/1
10 TABLET ORAL DAILY
Status: DISCONTINUED | OUTPATIENT
Start: 2018-10-30 | End: 2018-10-30

## 2018-10-29 RX ORDER — LITHIUM CARBONATE 300 MG/1
300 TABLET, FILM COATED, EXTENDED RELEASE ORAL EVERY 12 HOURS SCHEDULED
Status: DISCONTINUED | OUTPATIENT
Start: 2018-10-29 | End: 2018-11-01 | Stop reason: HOSPADM

## 2018-10-29 RX ADMIN — LEVETIRACETAM 1000 MG: 500 TABLET, FILM COATED ORAL at 20:28

## 2018-10-29 RX ADMIN — QUETIAPINE FUMARATE 400 MG: 300 TABLET, FILM COATED ORAL at 20:27

## 2018-10-29 RX ADMIN — TRIFLUOPERAZINE HYDROCHLORIDE 2 MG: 1 TABLET, FILM COATED ORAL at 08:42

## 2018-10-29 RX ADMIN — HYDROCODONE BITARTRATE AND ACETAMINOPHEN 1 TABLET: 10; 325 TABLET ORAL at 08:41

## 2018-10-29 RX ADMIN — APIXABAN 5 MG: 5 TABLET, FILM COATED ORAL at 08:41

## 2018-10-29 RX ADMIN — ATORVASTATIN CALCIUM 10 MG: 10 TABLET, FILM COATED ORAL at 20:28

## 2018-10-29 RX ADMIN — LEVETIRACETAM 1000 MG: 500 TABLET, FILM COATED ORAL at 08:41

## 2018-10-29 RX ADMIN — LITHIUM CARBONATE 300 MG: 300 TABLET, EXTENDED RELEASE ORAL at 20:27

## 2018-10-29 RX ADMIN — APIXABAN 5 MG: 5 TABLET, FILM COATED ORAL at 20:28

## 2018-10-29 RX ADMIN — LITHIUM CARBONATE 300 MG: 300 TABLET, EXTENDED RELEASE ORAL at 15:55

## 2018-10-29 RX ADMIN — THIAMINE HCL (VITAMIN B1) 50 MG TABLET 100 MG: at 08:42

## 2018-10-29 RX ADMIN — HYDROCODONE BITARTRATE AND ACETAMINOPHEN 1 TABLET: 10; 325 TABLET ORAL at 16:42

## 2018-10-30 PROCEDURE — 99232 SBSQ HOSP IP/OBS MODERATE 35: CPT | Performed by: PSYCHIATRY & NEUROLOGY

## 2018-10-30 RX ORDER — LISINOPRIL 2.5 MG/1
5 TABLET ORAL DAILY
Status: DISCONTINUED | OUTPATIENT
Start: 2018-10-31 | End: 2018-11-01 | Stop reason: HOSPADM

## 2018-10-30 RX ADMIN — APIXABAN 5 MG: 5 TABLET, FILM COATED ORAL at 08:00

## 2018-10-30 RX ADMIN — HYDROCODONE BITARTRATE AND ACETAMINOPHEN 1 TABLET: 10; 325 TABLET ORAL at 16:09

## 2018-10-30 RX ADMIN — APIXABAN 5 MG: 5 TABLET, FILM COATED ORAL at 20:35

## 2018-10-30 RX ADMIN — LEVETIRACETAM 1000 MG: 500 TABLET, FILM COATED ORAL at 08:00

## 2018-10-30 RX ADMIN — HYDROCODONE BITARTRATE AND ACETAMINOPHEN 1 TABLET: 10; 325 TABLET ORAL at 08:01

## 2018-10-30 RX ADMIN — LEVETIRACETAM 1000 MG: 500 TABLET, FILM COATED ORAL at 20:35

## 2018-10-30 RX ADMIN — LITHIUM CARBONATE 300 MG: 300 TABLET, EXTENDED RELEASE ORAL at 08:00

## 2018-10-30 RX ADMIN — THIAMINE HCL (VITAMIN B1) 50 MG TABLET 100 MG: at 08:01

## 2018-10-30 RX ADMIN — QUETIAPINE FUMARATE 400 MG: 300 TABLET, FILM COATED ORAL at 20:35

## 2018-10-30 RX ADMIN — ATORVASTATIN CALCIUM 10 MG: 10 TABLET, FILM COATED ORAL at 20:35

## 2018-10-30 RX ADMIN — LITHIUM CARBONATE 300 MG: 300 TABLET, EXTENDED RELEASE ORAL at 20:35

## 2018-10-31 LAB — LITHIUM SERPL-SCNC: 0.4 MMOL/L (ref 0.6–1.2)

## 2018-10-31 PROCEDURE — 99232 SBSQ HOSP IP/OBS MODERATE 35: CPT | Performed by: PSYCHIATRY & NEUROLOGY

## 2018-10-31 PROCEDURE — 80178 ASSAY OF LITHIUM: CPT | Performed by: PSYCHIATRY & NEUROLOGY

## 2018-10-31 RX ADMIN — HYDROCODONE BITARTRATE AND ACETAMINOPHEN 1 TABLET: 10; 325 TABLET ORAL at 17:03

## 2018-10-31 RX ADMIN — THIAMINE HCL (VITAMIN B1) 50 MG TABLET 100 MG: at 08:41

## 2018-10-31 RX ADMIN — LEVETIRACETAM 1000 MG: 500 TABLET, FILM COATED ORAL at 21:10

## 2018-10-31 RX ADMIN — LITHIUM CARBONATE 300 MG: 300 TABLET, EXTENDED RELEASE ORAL at 21:10

## 2018-10-31 RX ADMIN — HYDROXYZINE HYDROCHLORIDE 50 MG: 50 TABLET, FILM COATED ORAL at 17:02

## 2018-10-31 RX ADMIN — APIXABAN 5 MG: 5 TABLET, FILM COATED ORAL at 21:10

## 2018-10-31 RX ADMIN — LITHIUM CARBONATE 300 MG: 300 TABLET, EXTENDED RELEASE ORAL at 08:41

## 2018-10-31 RX ADMIN — ATORVASTATIN CALCIUM 10 MG: 10 TABLET, FILM COATED ORAL at 21:10

## 2018-10-31 RX ADMIN — QUETIAPINE FUMARATE 400 MG: 300 TABLET, FILM COATED ORAL at 21:10

## 2018-10-31 RX ADMIN — AMLODIPINE BESYLATE 10 MG: 10 TABLET ORAL at 08:41

## 2018-10-31 RX ADMIN — HYDROCODONE BITARTRATE AND ACETAMINOPHEN 1 TABLET: 10; 325 TABLET ORAL at 08:42

## 2018-10-31 RX ADMIN — APIXABAN 5 MG: 5 TABLET, FILM COATED ORAL at 08:41

## 2018-10-31 RX ADMIN — LEVETIRACETAM 1000 MG: 500 TABLET, FILM COATED ORAL at 08:41

## 2018-10-31 RX ADMIN — LISINOPRIL 5 MG: 2.5 TABLET ORAL at 08:41

## 2018-11-01 VITALS
BODY MASS INDEX: 25.66 KG/M2 | OXYGEN SATURATION: 96 % | RESPIRATION RATE: 16 BRPM | SYSTOLIC BLOOD PRESSURE: 111 MMHG | HEIGHT: 74 IN | WEIGHT: 199.96 LBS | HEART RATE: 93 BPM | TEMPERATURE: 97.2 F | DIASTOLIC BLOOD PRESSURE: 81 MMHG

## 2018-11-01 PROBLEM — M25.512 LEFT SHOULDER PAIN: Status: RESOLVED | Noted: 2018-03-06 | Resolved: 2018-11-01

## 2018-11-01 PROBLEM — I16.1 HYPERTENSIVE EMERGENCY: Status: RESOLVED | Noted: 2018-06-11 | Resolved: 2018-11-01

## 2018-11-01 PROBLEM — R07.9 CHEST PAIN IN ADULT: Status: RESOLVED | Noted: 2017-11-28 | Resolved: 2018-11-01

## 2018-11-01 PROBLEM — R56.9 SEIZURE (HCC): Status: RESOLVED | Noted: 2018-03-06 | Resolved: 2018-11-01

## 2018-11-01 PROBLEM — F32.A DEPRESSION WITH SUICIDAL IDEATION: Status: RESOLVED | Noted: 2018-09-26 | Resolved: 2018-11-01

## 2018-11-01 PROBLEM — R07.9 CHEST PAIN: Status: RESOLVED | Noted: 2017-11-24 | Resolved: 2018-11-01

## 2018-11-01 PROBLEM — S43.025A: Status: RESOLVED | Noted: 2018-03-08 | Resolved: 2018-11-01

## 2018-11-01 PROBLEM — R45.851 DEPRESSION WITH SUICIDAL IDEATION: Status: RESOLVED | Noted: 2018-09-26 | Resolved: 2018-11-01

## 2018-11-01 PROCEDURE — 99238 HOSP IP/OBS DSCHRG MGMT 30/<: CPT | Performed by: PSYCHIATRY & NEUROLOGY

## 2018-11-01 RX ORDER — LISINOPRIL 5 MG/1
5 TABLET ORAL DAILY
Qty: 30 TABLET | Refills: 0 | Status: SHIPPED | OUTPATIENT
Start: 2018-11-02

## 2018-11-01 RX ORDER — AMLODIPINE BESYLATE 10 MG/1
10 TABLET ORAL DAILY
Qty: 30 TABLET | Refills: 0 | Status: SHIPPED | OUTPATIENT
Start: 2018-11-02

## 2018-11-01 RX ORDER — QUETIAPINE FUMARATE 400 MG/1
400 TABLET, FILM COATED ORAL NIGHTLY
Qty: 30 TABLET | Refills: 0 | Status: SHIPPED | OUTPATIENT
Start: 2018-11-01

## 2018-11-01 RX ORDER — LITHIUM CARBONATE 300 MG/1
300 TABLET, FILM COATED, EXTENDED RELEASE ORAL EVERY 12 HOURS SCHEDULED
Qty: 30 TABLET | Refills: 0 | Status: SHIPPED | OUTPATIENT
Start: 2018-11-01

## 2018-11-01 RX ADMIN — LEVETIRACETAM 1000 MG: 500 TABLET, FILM COATED ORAL at 08:50

## 2018-11-01 RX ADMIN — LITHIUM CARBONATE 300 MG: 300 TABLET, EXTENDED RELEASE ORAL at 08:50

## 2018-11-01 RX ADMIN — LISINOPRIL 5 MG: 2.5 TABLET ORAL at 08:50

## 2018-11-01 RX ADMIN — HYDROCODONE BITARTRATE AND ACETAMINOPHEN 1 TABLET: 10; 325 TABLET ORAL at 08:49

## 2018-11-01 RX ADMIN — APIXABAN 5 MG: 5 TABLET, FILM COATED ORAL at 08:50

## 2018-11-01 RX ADMIN — AMLODIPINE BESYLATE 10 MG: 10 TABLET ORAL at 08:51

## 2018-11-01 RX ADMIN — THIAMINE HCL (VITAMIN B1) 50 MG TABLET 100 MG: at 08:50

## 2019-01-01 ENCOUNTER — EXTERNAL RECORD (OUTPATIENT)
Dept: OTHER | Age: 44
End: 2019-01-01

## 2019-08-14 ENCOUNTER — OFFICE VISIT (OUTPATIENT)
Dept: FAMILY MEDICINE | Age: 44
End: 2019-08-14

## 2019-08-14 ENCOUNTER — LAB SERVICES (OUTPATIENT)
Dept: LAB | Age: 44
End: 2019-08-14

## 2019-08-14 ENCOUNTER — CLINICAL ABSTRACT (OUTPATIENT)
Dept: ORTHOPEDICS | Age: 44
End: 2019-08-14

## 2019-08-14 ENCOUNTER — TELEPHONE (OUTPATIENT)
Dept: ORTHOPEDICS | Age: 44
End: 2019-08-14

## 2019-08-14 VITALS
DIASTOLIC BLOOD PRESSURE: 88 MMHG | HEIGHT: 72 IN | WEIGHT: 214 LBS | HEART RATE: 72 BPM | SYSTOLIC BLOOD PRESSURE: 132 MMHG | BODY MASS INDEX: 28.99 KG/M2

## 2019-08-14 DIAGNOSIS — F17.200 TOBACCO DEPENDENCE: ICD-10-CM

## 2019-08-14 DIAGNOSIS — I48.0 PAROXYSMAL ATRIAL FIBRILLATION (CMD): ICD-10-CM

## 2019-08-14 DIAGNOSIS — Z79.899 MEDICATION MANAGEMENT: Primary | ICD-10-CM

## 2019-08-14 DIAGNOSIS — K21.9 GASTROESOPHAGEAL REFLUX DISEASE, ESOPHAGITIS PRESENCE NOT SPECIFIED: ICD-10-CM

## 2019-08-14 DIAGNOSIS — I10 ESSENTIAL HYPERTENSION, BENIGN: ICD-10-CM

## 2019-08-14 DIAGNOSIS — Z95.3 STATUS POST HEART VALVE REPLACEMENT WITH BIOPROSTHETIC VALVE: ICD-10-CM

## 2019-08-14 DIAGNOSIS — F42.9 OBSESSIVE-COMPULSIVE DISORDER, UNSPECIFIED TYPE: ICD-10-CM

## 2019-08-14 DIAGNOSIS — Z79.899 MEDICATION MANAGEMENT: ICD-10-CM

## 2019-08-14 DIAGNOSIS — Z79.899 ENCOUNTER FOR LONG-TERM (CURRENT) USE OF MEDICATIONS: ICD-10-CM

## 2019-08-14 DIAGNOSIS — M79.7 FIBROMYALGIA: ICD-10-CM

## 2019-08-14 DIAGNOSIS — I63.9 CRYPTOGENIC STROKE (CMD): ICD-10-CM

## 2019-08-14 DIAGNOSIS — F41.8 DEPRESSION WITH ANXIETY: ICD-10-CM

## 2019-08-14 DIAGNOSIS — E78.5 HYPERLIPIDEMIA, UNSPECIFIED HYPERLIPIDEMIA TYPE: ICD-10-CM

## 2019-08-14 DIAGNOSIS — Z76.5 MALINGERING: ICD-10-CM

## 2019-08-14 PROBLEM — G40.909 SEIZURE DISORDER (CMD): Status: ACTIVE | Noted: 2019-08-01

## 2019-08-14 PROBLEM — S42.92XA CLOSED FRACTURE OF LEFT SHOULDER: Status: ACTIVE | Noted: 2019-08-01

## 2019-08-14 PROBLEM — Z95.0 PACEMAKER: Status: ACTIVE | Noted: 2018-11-07

## 2019-08-14 PROBLEM — R45.851 SUICIDAL IDEATION: Status: ACTIVE | Noted: 2019-07-12

## 2019-08-14 PROCEDURE — 99204 OFFICE O/P NEW MOD 45 MIN: CPT | Performed by: FAMILY MEDICINE

## 2019-08-14 PROCEDURE — 99406 BEHAV CHNG SMOKING 3-10 MIN: CPT | Performed by: FAMILY MEDICINE

## 2019-08-14 RX ORDER — LAMOTRIGINE 25 MG/1
TABLET ORAL
Refills: 0 | COMMUNITY
Start: 2019-08-07 | End: 2019-08-14 | Stop reason: ALTCHOICE

## 2019-08-14 RX ORDER — CARVEDILOL 12.5 MG/1
12.5 TABLET ORAL
COMMUNITY
Start: 2019-06-17 | End: 2019-08-27 | Stop reason: SDUPTHER

## 2019-08-14 RX ORDER — HYDROCODONE BITARTRATE AND ACETAMINOPHEN 5; 325 MG/1; MG/1
TABLET ORAL
COMMUNITY
Start: 2019-08-14 | End: 2019-08-20 | Stop reason: ALTCHOICE

## 2019-08-14 RX ORDER — LAMOTRIGINE 25 MG/1
100 TABLET ORAL DAILY
COMMUNITY
Start: 2019-08-07

## 2019-08-14 RX ORDER — SERTRALINE HYDROCHLORIDE 100 MG/1
100 TABLET, FILM COATED ORAL AT BEDTIME
Refills: 0 | COMMUNITY
Start: 2019-06-17 | End: 2019-09-11 | Stop reason: ALTCHOICE

## 2019-08-14 RX ORDER — GABAPENTIN 600 MG/1
TABLET ORAL
Refills: 3 | COMMUNITY
Start: 2019-07-08 | End: 2019-08-16 | Stop reason: SDUPTHER

## 2019-08-14 RX ORDER — LEVETIRACETAM 1000 MG/1
1000 TABLET ORAL 2 TIMES DAILY
Refills: 1 | COMMUNITY
Start: 2019-07-31 | End: 2019-08-27 | Stop reason: SDUPTHER

## 2019-08-14 ASSESSMENT — ENCOUNTER SYMPTOMS
HEMATOLOGIC/LYMPHATIC NEGATIVE: 1
PSYCHIATRIC NEGATIVE: 1
NERVOUS/ANXIOUS: 0
ACTIVITY CHANGE: 0
DIARRHEA: 0
ENDOCRINE NEGATIVE: 1
HEADACHES: 1
BACK PAIN: 1
FATIGUE: 0
POLYDIPSIA: 0
BLOOD IN STOOL: 0
CONSTITUTIONAL NEGATIVE: 1
ADENOPATHY: 0
WHEEZING: 0
CHILLS: 0
NAUSEA: 0
VOMITING: 0
SEIZURES: 1
ALLERGIC/IMMUNOLOGIC NEGATIVE: 1
RHINORRHEA: 0
SLEEP DISTURBANCE: 0
FEVER: 0
CHEST TIGHTNESS: 0
GASTROINTESTINAL NEGATIVE: 1
WEAKNESS: 1
EYE ITCHING: 0
COUGH: 0
RESPIRATORY NEGATIVE: 1
EYES NEGATIVE: 1
LIGHT-HEADEDNESS: 0
EYE REDNESS: 0
VOICE CHANGE: 0
WOUND: 0

## 2019-08-14 ASSESSMENT — PATIENT HEALTH QUESTIONNAIRE - PHQ9
1. LITTLE INTEREST OR PLEASURE IN DOING THINGS: SEVERAL DAYS
2. FEELING DOWN, DEPRESSED OR HOPELESS: SEVERAL DAYS
8. MOVING OR SPEAKING SO SLOWLY THAT OTHER PEOPLE COULD HAVE NOTICED. OR THE OPPOSITE, BEING SO FIGETY OR RESTLESS THAT YOU HAVE BEEN MOVING AROUND A LOT MORE THAN USUAL: SEVERAL DAYS
SUM OF ALL RESPONSES TO PHQ9 QUESTIONS 1 AND 2: 2
9. THOUGHTS THAT YOU WOULD BE BETTER OFF DEAD, OR OF HURTING YOURSELF: NOT AT ALL
4. FEELING TIRED OR HAVING LITTLE ENERGY: MORE THAN HALF THE DAYS
6. FEELING BAD ABOUT YOURSELF - OR THAT YOU ARE A FAILURE OR HAVE LET YOURSELF OR YOUR FAMILY DOWN: SEVERAL DAYS
CLINICAL INTERPRETATION OF PHQ9 SCORE: MODERATE DEPRESSION
5. POOR APPETITE, WEIGHT LOSS, OR OVEREATING: SEVERAL DAYS
7. TROUBLE CONCENTRATING ON THINGS, SUCH AS READING THE NEWSPAPER OR WATCHING TELEVISION: MORE THAN HALF THE DAYS
SUM OF ALL RESPONSES TO PHQ9 QUESTIONS 1 AND 2: 2
SUM OF ALL RESPONSES TO PHQ9 QUESTIONS 1 TO 9: 11
SUM OF ALL RESPONSES TO PHQ QUESTIONS 1-9: 11
3. TROUBLE FALLING OR STAYING ASLEEP OR SLEEPING TOO MUCH: MORE THAN HALF THE DAYS

## 2019-08-15 LAB
AMPHETAMINES UR QL: NEGATIVE
BARBITURATES UR QL: NEGATIVE
BENZODIAZ UR QL: NEGATIVE
BZE UR QL: NEGATIVE
CANNABINOIDS UR QL SCN: NEGATIVE
DRUGS UR SCN NOM: ABNORMAL
ETHANOL UR-MCNC: NEGATIVE MG/DL
METHADONE UR QL: NEGATIVE NG/ML
OPIATES UR QL: POSITIVE
PCP UR QL: NEGATIVE

## 2019-08-16 RX ORDER — GABAPENTIN 600 MG/1
600 TABLET ORAL 3 TIMES DAILY
Qty: 90 TABLET | Refills: 0 | Status: SHIPPED | OUTPATIENT
Start: 2019-08-16 | End: 2019-08-27 | Stop reason: ALTCHOICE

## 2019-08-19 ENCOUNTER — TELEPHONE (OUTPATIENT)
Dept: FAMILY MEDICINE | Age: 44
End: 2019-08-19

## 2019-08-20 ENCOUNTER — APPOINTMENT (OUTPATIENT)
Dept: FAMILY MEDICINE | Age: 44
End: 2019-08-20

## 2019-08-20 ENCOUNTER — OFFICE VISIT (OUTPATIENT)
Dept: ORTHOPEDICS | Age: 44
End: 2019-08-20

## 2019-08-20 VITALS — BODY MASS INDEX: 26.95 KG/M2 | HEIGHT: 74 IN | RESPIRATION RATE: 18 BRPM | WEIGHT: 210 LBS

## 2019-08-20 DIAGNOSIS — S42.92XA CLOSED FRACTURE OF LEFT SHOULDER, INITIAL ENCOUNTER: Primary | ICD-10-CM

## 2019-08-20 PROCEDURE — 99202 OFFICE O/P NEW SF 15 MIN: CPT | Performed by: ORTHOPAEDIC SURGERY

## 2019-08-20 RX ORDER — HYDROCODONE BITARTRATE AND ACETAMINOPHEN 10; 325 MG/1; MG/1
1 TABLET ORAL EVERY 6 HOURS PRN
Qty: 30 TABLET | Refills: 0 | Status: SHIPPED | OUTPATIENT
Start: 2019-08-20 | End: 2019-08-27

## 2019-08-27 ENCOUNTER — OFFICE VISIT (OUTPATIENT)
Dept: FAMILY MEDICINE | Age: 44
End: 2019-08-27

## 2019-08-27 ENCOUNTER — NURSE TRIAGE (OUTPATIENT)
Dept: TELEHEALTH | Age: 44
End: 2019-08-27

## 2019-08-27 ENCOUNTER — HOSPITAL ENCOUNTER (OUTPATIENT)
Dept: CT IMAGING | Age: 44
Discharge: HOME | End: 2019-08-27
Attending: ORTHOPAEDIC SURGERY

## 2019-08-27 ENCOUNTER — TELEPHONE (OUTPATIENT)
Dept: FAMILY MEDICINE | Age: 44
End: 2019-08-27

## 2019-08-27 VITALS
WEIGHT: 213 LBS | BODY MASS INDEX: 27.34 KG/M2 | HEART RATE: 76 BPM | RESPIRATION RATE: 18 BRPM | HEIGHT: 74 IN | DIASTOLIC BLOOD PRESSURE: 110 MMHG | SYSTOLIC BLOOD PRESSURE: 154 MMHG

## 2019-08-27 DIAGNOSIS — S42.92XA CLOSED FRACTURE OF LEFT SHOULDER, INITIAL ENCOUNTER: ICD-10-CM

## 2019-08-27 DIAGNOSIS — G40.909 NONINTRACTABLE EPILEPSY WITHOUT STATUS EPILEPTICUS, UNSPECIFIED EPILEPSY TYPE (CMD): ICD-10-CM

## 2019-08-27 DIAGNOSIS — G89.29 OTHER CHRONIC PAIN: ICD-10-CM

## 2019-08-27 DIAGNOSIS — I70.90 ATHEROSCLEROSIS: ICD-10-CM

## 2019-08-27 DIAGNOSIS — I48.91 ATRIAL FIBRILLATION, UNSPECIFIED TYPE (CMD): Primary | ICD-10-CM

## 2019-08-27 DIAGNOSIS — Z95.3 STATUS POST AORTIC VALVE REPLACEMENT WITH BIOPROSTHETIC VALVE: ICD-10-CM

## 2019-08-27 PROCEDURE — 73200 CT UPPER EXTREMITY W/O DYE: CPT | Performed by: RADIOLOGY

## 2019-08-27 PROCEDURE — 99214 OFFICE O/P EST MOD 30 MIN: CPT | Performed by: FAMILY MEDICINE

## 2019-08-27 PROCEDURE — 73200 CT UPPER EXTREMITY W/O DYE: CPT

## 2019-08-27 RX ORDER — GABAPENTIN 300 MG/1
900 CAPSULE ORAL 3 TIMES DAILY
Qty: 270 CAPSULE | Refills: 0 | Status: SHIPPED | OUTPATIENT
Start: 2019-08-27 | End: 2019-08-27 | Stop reason: SDUPTHER

## 2019-08-27 RX ORDER — LEVETIRACETAM 1000 MG/1
1000 TABLET ORAL 2 TIMES DAILY
Qty: 60 TABLET | Refills: 1 | Status: SHIPPED | OUTPATIENT
Start: 2019-08-27

## 2019-08-27 RX ORDER — GABAPENTIN 300 MG/1
900 CAPSULE ORAL 3 TIMES DAILY
Qty: 270 CAPSULE | Refills: 0 | Status: SHIPPED | OUTPATIENT
Start: 2019-08-27 | End: 2019-09-16 | Stop reason: DRUGHIGH

## 2019-08-27 RX ORDER — OXYCODONE HYDROCHLORIDE 10 MG/1
10 TABLET ORAL
Refills: 0 | COMMUNITY
Start: 2019-07-26 | End: 2019-09-11 | Stop reason: ALTCHOICE

## 2019-08-27 RX ORDER — OLANZAPINE 5 MG/1
1 TABLET ORAL AT BEDTIME
Refills: 0 | COMMUNITY
Start: 2019-06-17

## 2019-08-27 RX ORDER — CARVEDILOL 12.5 MG/1
12.5 TABLET ORAL 2 TIMES DAILY WITH MEALS
Qty: 60 TABLET | Refills: 1 | Status: SHIPPED | OUTPATIENT
Start: 2019-08-27

## 2019-08-27 RX ORDER — ATORVASTATIN CALCIUM 40 MG/1
40 TABLET, FILM COATED ORAL NIGHTLY
Qty: 90 TABLET | Refills: 1 | Status: SHIPPED | OUTPATIENT
Start: 2019-08-27

## 2019-08-28 ENCOUNTER — TELEPHONE (OUTPATIENT)
Dept: ORTHOPEDICS | Age: 44
End: 2019-08-28

## 2019-08-28 ENCOUNTER — HOSPITAL ENCOUNTER (OUTPATIENT)
Dept: CT IMAGING | Age: 44
Discharge: HOME | End: 2019-08-28
Attending: ANESTHESIOLOGY

## 2019-08-28 ENCOUNTER — OFFICE VISIT (OUTPATIENT)
Dept: PAIN MANAGEMENT | Age: 44
End: 2019-08-28
Attending: FAMILY MEDICINE

## 2019-08-28 ENCOUNTER — TELEPHONE (OUTPATIENT)
Dept: FAMILY MEDICINE | Age: 44
End: 2019-08-28

## 2019-08-28 ENCOUNTER — APPOINTMENT (OUTPATIENT)
Dept: PAIN MANAGEMENT | Age: 44
End: 2019-08-28

## 2019-08-28 VITALS
HEIGHT: 74 IN | SYSTOLIC BLOOD PRESSURE: 138 MMHG | WEIGHT: 213.4 LBS | DIASTOLIC BLOOD PRESSURE: 90 MMHG | BODY MASS INDEX: 27.39 KG/M2 | HEART RATE: 96 BPM | OXYGEN SATURATION: 97 %

## 2019-08-28 DIAGNOSIS — M54.16 LUMBAR RADICULOPATHY: ICD-10-CM

## 2019-08-28 DIAGNOSIS — M54.12 CERVICAL RADICULOPATHY: ICD-10-CM

## 2019-08-28 DIAGNOSIS — M54.12 CERVICAL RADICULOPATHY: Primary | ICD-10-CM

## 2019-08-28 DIAGNOSIS — M47.816 FACET ARTHROPATHY, LUMBAR: ICD-10-CM

## 2019-08-28 PROCEDURE — 72125 CT NECK SPINE W/O DYE: CPT

## 2019-08-28 PROCEDURE — 72131 CT LUMBAR SPINE W/O DYE: CPT

## 2019-08-28 PROCEDURE — 72125 CT NECK SPINE W/O DYE: CPT | Performed by: RADIOLOGY

## 2019-08-28 PROCEDURE — 99204 OFFICE O/P NEW MOD 45 MIN: CPT | Performed by: ANESTHESIOLOGY

## 2019-08-28 PROCEDURE — 72131 CT LUMBAR SPINE W/O DYE: CPT | Performed by: RADIOLOGY

## 2019-08-28 ASSESSMENT — ENCOUNTER SYMPTOMS
EYE ITCHING: 0
CHEST TIGHTNESS: 0
FACIAL ASYMMETRY: 0
SPEECH DIFFICULTY: 0
ACTIVITY CHANGE: 0
CONFUSION: 0
EYE DISCHARGE: 0
AGITATION: 0
APPETITE CHANGE: 0

## 2019-08-29 ENCOUNTER — APPOINTMENT (OUTPATIENT)
Dept: ORTHOPEDICS | Age: 44
End: 2019-08-29

## 2019-08-29 DIAGNOSIS — S42.92XA CLOSED FRACTURE OF LEFT SHOULDER, INITIAL ENCOUNTER: Primary | ICD-10-CM

## 2019-08-29 RX ORDER — HYDROCODONE BITARTRATE AND ACETAMINOPHEN 10; 325 MG/1; MG/1
1 TABLET ORAL EVERY 6 HOURS PRN
Qty: 40 TABLET | Refills: 0 | Status: SHIPPED | OUTPATIENT
Start: 2019-08-29 | End: 2019-09-11 | Stop reason: ALTCHOICE

## 2019-09-04 DIAGNOSIS — S42.92XA CLOSED FRACTURE OF LEFT SHOULDER, INITIAL ENCOUNTER: Primary | ICD-10-CM

## 2019-09-11 ENCOUNTER — TELEPHONE (OUTPATIENT)
Dept: ORTHOPEDICS | Age: 44
End: 2019-09-11

## 2019-09-11 ENCOUNTER — TELEPHONE (OUTPATIENT)
Dept: PAIN MANAGEMENT | Age: 44
End: 2019-09-11

## 2019-09-11 ENCOUNTER — TELEPHONE (OUTPATIENT)
Dept: FAMILY MEDICINE | Age: 44
End: 2019-09-11

## 2019-09-11 ENCOUNTER — OFFICE VISIT (OUTPATIENT)
Dept: PAIN MANAGEMENT | Age: 44
End: 2019-09-11

## 2019-09-11 VITALS
WEIGHT: 219.14 LBS | HEART RATE: 90 BPM | SYSTOLIC BLOOD PRESSURE: 146 MMHG | BODY MASS INDEX: 28.12 KG/M2 | HEIGHT: 74 IN | OXYGEN SATURATION: 97 % | DIASTOLIC BLOOD PRESSURE: 98 MMHG | TEMPERATURE: 98.3 F

## 2019-09-11 DIAGNOSIS — R45.851 SUICIDAL IDEATION: ICD-10-CM

## 2019-09-11 DIAGNOSIS — M47.816 FACET ARTHROPATHY, LUMBAR: Primary | ICD-10-CM

## 2019-09-11 DIAGNOSIS — M47.812 FACET ARTHROPATHY, CERVICAL: ICD-10-CM

## 2019-09-11 DIAGNOSIS — S42.92XA CLOSED FRACTURE OF LEFT SHOULDER, INITIAL ENCOUNTER: Primary | ICD-10-CM

## 2019-09-11 PROCEDURE — 99214 OFFICE O/P EST MOD 30 MIN: CPT | Performed by: ANESTHESIOLOGY

## 2019-09-11 RX ORDER — QUETIAPINE FUMARATE 100 MG/1
TABLET, FILM COATED ORAL
Refills: 9 | COMMUNITY
Start: 2019-09-05

## 2019-09-11 RX ORDER — BENZTROPINE MESYLATE 0.5 MG/1
TABLET ORAL
Refills: 2 | COMMUNITY
Start: 2019-09-05

## 2019-09-11 ASSESSMENT — PATIENT HEALTH QUESTIONNAIRE - PHQ9
2. FEELING DOWN, DEPRESSED OR HOPELESS: NEARLY EVERY DAY
SUM OF ALL RESPONSES TO PHQ9 QUESTIONS 1 AND 2: 4
SUM OF ALL RESPONSES TO PHQ9 QUESTIONS 1 AND 2: 4
1. LITTLE INTEREST OR PLEASURE IN DOING THINGS: SEVERAL DAYS

## 2019-09-12 ENCOUNTER — APPOINTMENT (OUTPATIENT)
Dept: ORTHOPEDICS | Age: 44
End: 2019-09-12

## 2019-09-12 ENCOUNTER — TELEPHONE (OUTPATIENT)
Dept: PAIN MANAGEMENT | Age: 44
End: 2019-09-12

## 2019-09-12 ENCOUNTER — TELEPHONE (OUTPATIENT)
Dept: ORTHOPEDICS | Age: 44
End: 2019-09-12

## 2019-09-12 DIAGNOSIS — S42.92XA CLOSED FRACTURE OF LEFT SHOULDER, INITIAL ENCOUNTER: Primary | ICD-10-CM

## 2019-09-12 RX ORDER — TRAMADOL HYDROCHLORIDE 50 MG/1
TABLET ORAL
Qty: 8 TABLET | Refills: 0 | Status: SHIPPED
Start: 2019-09-12

## 2019-09-15 DIAGNOSIS — G89.29 OTHER CHRONIC PAIN: ICD-10-CM

## 2019-09-16 RX ORDER — GABAPENTIN 300 MG/1
CAPSULE ORAL
Qty: 270 CAPSULE | Refills: 2 | Status: SHIPPED | OUTPATIENT
Start: 2019-09-16

## 2019-09-17 ENCOUNTER — TELEPHONE (OUTPATIENT)
Dept: PAIN MANAGEMENT | Age: 44
End: 2019-09-17

## 2019-09-17 ENCOUNTER — IMAGING SERVICES (OUTPATIENT)
Dept: GENERAL RADIOLOGY | Age: 44
End: 2019-09-17
Attending: ORTHOPAEDIC SURGERY

## 2019-09-17 ENCOUNTER — OFFICE VISIT (OUTPATIENT)
Dept: ORTHOPEDICS | Age: 44
End: 2019-09-17

## 2019-09-17 VITALS — HEIGHT: 74 IN | RESPIRATION RATE: 16 BRPM | BODY MASS INDEX: 28.12 KG/M2 | WEIGHT: 219.14 LBS

## 2019-09-17 DIAGNOSIS — S42.92XA CLOSED FRACTURE OF LEFT SHOULDER, INITIAL ENCOUNTER: Primary | ICD-10-CM

## 2019-09-17 DIAGNOSIS — S42.92XA CLOSED FRACTURE OF LEFT SHOULDER, INITIAL ENCOUNTER: ICD-10-CM

## 2019-09-17 PROCEDURE — 73030 X-RAY EXAM OF SHOULDER: CPT | Performed by: RADIOLOGY

## 2019-09-17 RX ORDER — HYDROCODONE BITARTRATE AND ACETAMINOPHEN 5; 325 MG/1; MG/1
1 TABLET ORAL EVERY 6 HOURS PRN
Qty: 20 TABLET | Refills: 0 | Status: SHIPPED | OUTPATIENT
Start: 2019-09-17

## 2019-09-20 ENCOUNTER — TELEPHONE (OUTPATIENT)
Dept: ORTHOPEDICS | Age: 44
End: 2019-09-20

## 2019-09-23 ENCOUNTER — TELEPHONE (OUTPATIENT)
Dept: PULMONOLOGY | Age: 44
End: 2019-09-23

## 2019-09-24 ENCOUNTER — TELEPHONE (OUTPATIENT)
Dept: ORTHOPEDICS | Age: 44
End: 2019-09-24

## 2019-09-24 DIAGNOSIS — S42.92XA CLOSED FRACTURE OF LEFT SHOULDER, INITIAL ENCOUNTER: ICD-10-CM

## 2019-09-27 ENCOUNTER — TELEPHONE (OUTPATIENT)
Dept: FAMILY MEDICINE | Age: 44
End: 2019-09-27

## 2019-10-15 DIAGNOSIS — S42.92XA CLOSED FRACTURE OF LEFT SHOULDER, INITIAL ENCOUNTER: Primary | ICD-10-CM

## 2019-10-16 ENCOUNTER — APPOINTMENT (OUTPATIENT)
Dept: GENERAL RADIOLOGY | Age: 44
End: 2019-10-16

## 2019-10-21 ENCOUNTER — TELEPHONE (OUTPATIENT)
Dept: FAMILY MEDICINE | Age: 44
End: 2019-10-21

## 2019-12-31 ENCOUNTER — TELEPHONE (OUTPATIENT)
Dept: FAMILY MEDICINE | Age: 44
End: 2019-12-31

## 2021-04-09 ENCOUNTER — PATIENT OUTREACH (OUTPATIENT)
Dept: FAMILY MEDICINE | Age: 46
End: 2021-04-09

## 2021-09-01 ENCOUNTER — PATIENT OUTREACH (OUTPATIENT)
Dept: SCHEDULING | Age: 46
End: 2021-09-01

## (undated) DEVICE — CANN NASL CO2 DIVIDED A/